# Patient Record
Sex: MALE | Race: WHITE | Employment: OTHER | URBAN - METROPOLITAN AREA
[De-identification: names, ages, dates, MRNs, and addresses within clinical notes are randomized per-mention and may not be internally consistent; named-entity substitution may affect disease eponyms.]

---

## 2017-01-01 ENCOUNTER — HOSPITAL ENCOUNTER (OUTPATIENT)
Dept: OTHER | Age: 52
Discharge: HOME | End: 2017-01-31
Attending: INTERNAL MEDICINE | Admitting: INTERNAL MEDICINE

## 2017-01-03 ENCOUNTER — HOSPITAL ENCOUNTER (OUTPATIENT)
Dept: CARDIAC REHAB | Age: 52
Discharge: HOME | End: 2017-01-03
Admitting: INTERNAL MEDICINE

## 2017-01-03 VITALS — WEIGHT: 258 LBS | BODY MASS INDEX: 37.02 KG/M2

## 2017-01-05 ENCOUNTER — HOSPITAL ENCOUNTER (OUTPATIENT)
Dept: CARDIAC REHAB | Age: 52
Discharge: HOME | End: 2017-01-05
Admitting: INTERNAL MEDICINE

## 2017-01-05 VITALS — WEIGHT: 258.5 LBS | BODY MASS INDEX: 37.09 KG/M2

## 2017-01-10 ENCOUNTER — HOSPITAL ENCOUNTER (OUTPATIENT)
Dept: CARDIAC REHAB | Age: 52
Discharge: HOME | End: 2017-01-10
Admitting: INTERNAL MEDICINE

## 2017-01-10 PROCEDURE — 94620 PR PULMONARY STRESS TESTING,SIMPLE: CPT | Performed by: INTERNAL MEDICINE

## 2017-01-12 ENCOUNTER — HOSPITAL ENCOUNTER (OUTPATIENT)
Dept: CARDIAC REHAB | Age: 52
Discharge: HOME | End: 2017-01-12
Admitting: INTERNAL MEDICINE

## 2017-01-12 VITALS — WEIGHT: 258 LBS | BODY MASS INDEX: 37.02 KG/M2

## 2017-01-20 ENCOUNTER — TELEPHONE (OUTPATIENT)
Dept: FAMILY MEDICINE CLINIC | Age: 52
End: 2017-01-20

## 2017-01-24 ENCOUNTER — TELEPHONE (OUTPATIENT)
Dept: PULMONOLOGY | Age: 52
End: 2017-01-24

## 2017-02-07 ENCOUNTER — TELEPHONE (OUTPATIENT)
Dept: PULMONOLOGY | Age: 52
End: 2017-02-07

## 2017-02-07 DIAGNOSIS — J40 BRONCHITIS: Primary | ICD-10-CM

## 2017-02-07 RX ORDER — PREDNISONE 10 MG/1
TABLET ORAL
Qty: 30 TABLET | Refills: 0 | Status: SHIPPED | OUTPATIENT
Start: 2017-02-07 | End: 2017-04-03 | Stop reason: ALTCHOICE

## 2017-02-07 RX ORDER — LEVOFLOXACIN 500 MG/1
500 TABLET, FILM COATED ORAL DAILY
Qty: 7 TABLET | Refills: 0 | Status: SHIPPED | OUTPATIENT
Start: 2017-02-07 | End: 2017-02-14

## 2017-04-03 ENCOUNTER — OFFICE VISIT (OUTPATIENT)
Dept: FAMILY MEDICINE CLINIC | Age: 52
End: 2017-04-03

## 2017-04-03 VITALS
DIASTOLIC BLOOD PRESSURE: 66 MMHG | SYSTOLIC BLOOD PRESSURE: 102 MMHG | BODY MASS INDEX: 33.21 KG/M2 | HEART RATE: 103 BPM | TEMPERATURE: 98.4 F | OXYGEN SATURATION: 92 % | WEIGHT: 232 LBS | HEIGHT: 70 IN

## 2017-04-03 DIAGNOSIS — J02.9 SORE THROAT: Primary | ICD-10-CM

## 2017-04-03 LAB — S PYO AG THROAT QL: NORMAL

## 2017-04-03 PROCEDURE — 87880 STREP A ASSAY W/OPTIC: CPT | Performed by: FAMILY MEDICINE

## 2017-04-03 PROCEDURE — 99213 OFFICE O/P EST LOW 20 MIN: CPT | Performed by: FAMILY MEDICINE

## 2017-04-03 PROCEDURE — G8419 CALC BMI OUT NRM PARAM NOF/U: HCPCS | Performed by: FAMILY MEDICINE

## 2017-04-03 PROCEDURE — G8427 DOCREV CUR MEDS BY ELIG CLIN: HCPCS | Performed by: FAMILY MEDICINE

## 2017-04-03 PROCEDURE — 1036F TOBACCO NON-USER: CPT | Performed by: FAMILY MEDICINE

## 2017-04-03 ASSESSMENT — PATIENT HEALTH QUESTIONNAIRE - PHQ9
SUM OF ALL RESPONSES TO PHQ QUESTIONS 1-9: 0
SUM OF ALL RESPONSES TO PHQ9 QUESTIONS 1 & 2: 0
2. FEELING DOWN, DEPRESSED OR HOPELESS: 0
1. LITTLE INTEREST OR PLEASURE IN DOING THINGS: 0

## 2017-04-04 ENCOUNTER — TELEPHONE (OUTPATIENT)
Dept: PULMONOLOGY | Age: 52
End: 2017-04-04

## 2017-04-04 DIAGNOSIS — J40 BRONCHITIS: Primary | ICD-10-CM

## 2017-04-04 RX ORDER — DOXYCYCLINE HYCLATE 100 MG
100 TABLET ORAL 2 TIMES DAILY
Qty: 20 TABLET | Refills: 0 | Status: SHIPPED | OUTPATIENT
Start: 2017-04-04 | End: 2017-04-05 | Stop reason: SDUPTHER

## 2017-04-05 DIAGNOSIS — J40 BRONCHITIS: ICD-10-CM

## 2017-04-05 RX ORDER — DOXYCYCLINE HYCLATE 100 MG
100 TABLET ORAL 2 TIMES DAILY
Qty: 20 TABLET | Refills: 0 | Status: CANCELLED | OUTPATIENT
Start: 2017-04-05 | End: 2017-04-15

## 2017-04-05 RX ORDER — DOXYCYCLINE HYCLATE 100 MG
100 TABLET ORAL 2 TIMES DAILY
Qty: 20 TABLET | Refills: 0 | Status: SHIPPED | OUTPATIENT
Start: 2017-04-05 | End: 2017-04-15

## 2017-04-10 ENCOUNTER — OFFICE VISIT (OUTPATIENT)
Dept: PULMONOLOGY | Age: 52
End: 2017-04-10

## 2017-04-10 VITALS
WEIGHT: 230 LBS | HEART RATE: 76 BPM | DIASTOLIC BLOOD PRESSURE: 70 MMHG | SYSTOLIC BLOOD PRESSURE: 116 MMHG | BODY MASS INDEX: 32.93 KG/M2 | TEMPERATURE: 98 F | RESPIRATION RATE: 16 BRPM | OXYGEN SATURATION: 91 % | HEIGHT: 70 IN

## 2017-04-10 DIAGNOSIS — J96.11 CHRONIC RESPIRATORY FAILURE WITH HYPOXIA (HCC): ICD-10-CM

## 2017-04-10 DIAGNOSIS — G47.33 OSA TREATED WITH BIPAP: Primary | ICD-10-CM

## 2017-04-10 DIAGNOSIS — J44.9 COPD, VERY SEVERE (HCC): ICD-10-CM

## 2017-04-10 DIAGNOSIS — J84.9 ILD (INTERSTITIAL LUNG DISEASE) (HCC): ICD-10-CM

## 2017-04-10 DIAGNOSIS — J40 BRONCHITIS: ICD-10-CM

## 2017-04-10 PROCEDURE — G8427 DOCREV CUR MEDS BY ELIG CLIN: HCPCS | Performed by: INTERNAL MEDICINE

## 2017-04-10 PROCEDURE — 1036F TOBACCO NON-USER: CPT | Performed by: INTERNAL MEDICINE

## 2017-04-10 PROCEDURE — G8926 SPIRO NO PERF OR DOC: HCPCS | Performed by: INTERNAL MEDICINE

## 2017-04-10 PROCEDURE — 3023F SPIROM DOC REV: CPT | Performed by: INTERNAL MEDICINE

## 2017-04-10 PROCEDURE — G8417 CALC BMI ABV UP PARAM F/U: HCPCS | Performed by: INTERNAL MEDICINE

## 2017-04-10 PROCEDURE — 99214 OFFICE O/P EST MOD 30 MIN: CPT | Performed by: INTERNAL MEDICINE

## 2017-04-10 ASSESSMENT — SLEEP AND FATIGUE QUESTIONNAIRES
HOW LIKELY ARE YOU TO NOD OFF OR FALL ASLEEP WHILE SITTING AND READING: 0
HOW LIKELY ARE YOU TO NOD OFF OR FALL ASLEEP WHILE WATCHING TV: 0
HOW LIKELY ARE YOU TO NOD OFF OR FALL ASLEEP IN A CAR, WHILE STOPPED FOR A FEW MINUTES IN TRAFFIC: 0
HOW LIKELY ARE YOU TO NOD OFF OR FALL ASLEEP WHEN YOU ARE A PASSENGER IN A CAR FOR AN HOUR WITHOUT A BREAK: 0
HOW LIKELY ARE YOU TO NOD OFF OR FALL ASLEEP WHILE SITTING QUIETLY AFTER LUNCH WITHOUT ALCOHOL: 0
ESS TOTAL SCORE: 0
NECK CIRCUMFERENCE (INCHES): 19.5
HOW LIKELY ARE YOU TO NOD OFF OR FALL ASLEEP WHILE LYING DOWN TO REST IN THE AFTERNOON WHEN CIRCUMSTANCES PERMIT: 0
HOW LIKELY ARE YOU TO NOD OFF OR FALL ASLEEP WHILE SITTING AND TALKING TO SOMEONE: 0
HOW LIKELY ARE YOU TO NOD OFF OR FALL ASLEEP WHILE SITTING INACTIVE IN A PUBLIC PLACE: 0

## 2017-05-18 DIAGNOSIS — J44.9 COPD, VERY SEVERE (HCC): ICD-10-CM

## 2017-05-18 RX ORDER — AZELASTINE 1 MG/ML
SPRAY, METERED NASAL
Qty: 30 ML | Refills: 3 | Status: SHIPPED | OUTPATIENT
Start: 2017-05-18 | End: 2017-06-01 | Stop reason: SDUPTHER

## 2017-05-23 ENCOUNTER — EMPLOYEE WELLNESS (OUTPATIENT)
Dept: OTHER | Age: 52
End: 2017-05-23

## 2017-05-23 LAB
CHOLESTEROL, TOTAL: 176 MG/DL (ref 0–199)
GLUCOSE BLD-MCNC: 100 MG/DL (ref 70–99)
HDLC SERPL-MCNC: 26 MG/DL (ref 40–60)
LDL CHOLESTEROL CALCULATED: 98 MG/DL
TRIGL SERPL-MCNC: 258 MG/DL (ref 0–150)

## 2017-06-01 ENCOUNTER — OFFICE VISIT (OUTPATIENT)
Dept: PULMONOLOGY | Age: 52
End: 2017-06-01

## 2017-06-01 VITALS
HEART RATE: 92 BPM | RESPIRATION RATE: 16 BRPM | SYSTOLIC BLOOD PRESSURE: 124 MMHG | OXYGEN SATURATION: 90 % | HEIGHT: 70 IN | BODY MASS INDEX: 33.5 KG/M2 | TEMPERATURE: 98.1 F | WEIGHT: 234 LBS | DIASTOLIC BLOOD PRESSURE: 80 MMHG

## 2017-06-01 DIAGNOSIS — J44.9 COPD, VERY SEVERE (HCC): ICD-10-CM

## 2017-06-01 DIAGNOSIS — G47.33 OSA TREATED WITH BIPAP: Primary | ICD-10-CM

## 2017-06-01 DIAGNOSIS — J96.11 CHRONIC RESPIRATORY FAILURE WITH HYPOXIA (HCC): ICD-10-CM

## 2017-06-01 DIAGNOSIS — J84.9 ILD (INTERSTITIAL LUNG DISEASE) (HCC): ICD-10-CM

## 2017-06-01 PROCEDURE — 3023F SPIROM DOC REV: CPT | Performed by: INTERNAL MEDICINE

## 2017-06-01 PROCEDURE — G8427 DOCREV CUR MEDS BY ELIG CLIN: HCPCS | Performed by: INTERNAL MEDICINE

## 2017-06-01 PROCEDURE — 99214 OFFICE O/P EST MOD 30 MIN: CPT | Performed by: INTERNAL MEDICINE

## 2017-06-01 PROCEDURE — 1036F TOBACCO NON-USER: CPT | Performed by: INTERNAL MEDICINE

## 2017-06-01 PROCEDURE — G8417 CALC BMI ABV UP PARAM F/U: HCPCS | Performed by: INTERNAL MEDICINE

## 2017-06-01 PROCEDURE — G8926 SPIRO NO PERF OR DOC: HCPCS | Performed by: INTERNAL MEDICINE

## 2017-06-01 ASSESSMENT — SLEEP AND FATIGUE QUESTIONNAIRES
HOW LIKELY ARE YOU TO NOD OFF OR FALL ASLEEP WHILE WATCHING TV: 0
HOW LIKELY ARE YOU TO NOD OFF OR FALL ASLEEP WHILE SITTING INACTIVE IN A PUBLIC PLACE: 0
ESS TOTAL SCORE: 0
HOW LIKELY ARE YOU TO NOD OFF OR FALL ASLEEP IN A CAR, WHILE STOPPED FOR A FEW MINUTES IN TRAFFIC: 0
NECK CIRCUMFERENCE (INCHES): 19.5
HOW LIKELY ARE YOU TO NOD OFF OR FALL ASLEEP WHILE SITTING AND TALKING TO SOMEONE: 0
HOW LIKELY ARE YOU TO NOD OFF OR FALL ASLEEP WHILE LYING DOWN TO REST IN THE AFTERNOON WHEN CIRCUMSTANCES PERMIT: 0
HOW LIKELY ARE YOU TO NOD OFF OR FALL ASLEEP WHEN YOU ARE A PASSENGER IN A CAR FOR AN HOUR WITHOUT A BREAK: 0
HOW LIKELY ARE YOU TO NOD OFF OR FALL ASLEEP WHILE SITTING AND READING: 0
HOW LIKELY ARE YOU TO NOD OFF OR FALL ASLEEP WHILE SITTING QUIETLY AFTER LUNCH WITHOUT ALCOHOL: 0

## 2017-07-24 ENCOUNTER — OFFICE VISIT (OUTPATIENT)
Dept: FAMILY MEDICINE CLINIC | Age: 52
End: 2017-07-24

## 2017-07-24 ENCOUNTER — TELEPHONE (OUTPATIENT)
Dept: CARDIOLOGY CLINIC | Age: 52
End: 2017-07-24

## 2017-07-24 VITALS
DIASTOLIC BLOOD PRESSURE: 84 MMHG | WEIGHT: 240 LBS | BODY MASS INDEX: 34.36 KG/M2 | HEIGHT: 70 IN | HEART RATE: 80 BPM | SYSTOLIC BLOOD PRESSURE: 132 MMHG

## 2017-07-24 DIAGNOSIS — I20.0 UNSTABLE ANGINA (HCC): Primary | ICD-10-CM

## 2017-07-24 PROCEDURE — G8599 NO ASA/ANTIPLAT THER USE RNG: HCPCS | Performed by: FAMILY MEDICINE

## 2017-07-24 PROCEDURE — G8417 CALC BMI ABV UP PARAM F/U: HCPCS | Performed by: FAMILY MEDICINE

## 2017-07-24 PROCEDURE — 1036F TOBACCO NON-USER: CPT | Performed by: FAMILY MEDICINE

## 2017-07-24 PROCEDURE — 99214 OFFICE O/P EST MOD 30 MIN: CPT | Performed by: FAMILY MEDICINE

## 2017-07-24 PROCEDURE — G8427 DOCREV CUR MEDS BY ELIG CLIN: HCPCS | Performed by: FAMILY MEDICINE

## 2017-07-25 PROBLEM — G47.33 OSA TREATED WITH BIPAP: Status: ACTIVE | Noted: 2017-07-25

## 2017-07-27 ENCOUNTER — CARE COORDINATION (OUTPATIENT)
Dept: CASE MANAGEMENT | Age: 52
End: 2017-07-27

## 2017-07-28 ENCOUNTER — TELEPHONE (OUTPATIENT)
Dept: FAMILY MEDICINE CLINIC | Age: 52
End: 2017-07-28

## 2017-08-10 ENCOUNTER — TELEPHONE (OUTPATIENT)
Dept: PHARMACY | Facility: CLINIC | Age: 52
End: 2017-08-10

## 2017-08-11 RX ORDER — OMEPRAZOLE 20 MG/1
20 CAPSULE, DELAYED RELEASE ORAL 2 TIMES DAILY
COMMUNITY
End: 2018-04-04 | Stop reason: SDUPTHER

## 2017-08-18 ENCOUNTER — OFFICE VISIT (OUTPATIENT)
Dept: CARDIOLOGY CLINIC | Age: 52
End: 2017-08-18

## 2017-08-18 VITALS
WEIGHT: 248 LBS | DIASTOLIC BLOOD PRESSURE: 78 MMHG | OXYGEN SATURATION: 92 % | HEART RATE: 88 BPM | BODY MASS INDEX: 35.5 KG/M2 | HEIGHT: 70 IN | SYSTOLIC BLOOD PRESSURE: 118 MMHG

## 2017-08-18 DIAGNOSIS — J44.9 COPD, VERY SEVERE (HCC): ICD-10-CM

## 2017-08-18 DIAGNOSIS — I25.110 CORONARY ARTERY DISEASE INVOLVING NATIVE CORONARY ARTERY OF NATIVE HEART WITH UNSTABLE ANGINA PECTORIS (HCC): Primary | ICD-10-CM

## 2017-08-18 DIAGNOSIS — I10 ESSENTIAL HYPERTENSION: ICD-10-CM

## 2017-08-18 DIAGNOSIS — E78.2 MIXED HYPERLIPIDEMIA: ICD-10-CM

## 2017-08-18 PROCEDURE — 1111F DSCHRG MED/CURRENT MED MERGE: CPT | Performed by: INTERNAL MEDICINE

## 2017-08-18 PROCEDURE — G8427 DOCREV CUR MEDS BY ELIG CLIN: HCPCS | Performed by: INTERNAL MEDICINE

## 2017-08-18 PROCEDURE — G8417 CALC BMI ABV UP PARAM F/U: HCPCS | Performed by: INTERNAL MEDICINE

## 2017-08-18 PROCEDURE — 3023F SPIROM DOC REV: CPT | Performed by: INTERNAL MEDICINE

## 2017-08-18 PROCEDURE — 1036F TOBACCO NON-USER: CPT | Performed by: INTERNAL MEDICINE

## 2017-08-18 PROCEDURE — 99214 OFFICE O/P EST MOD 30 MIN: CPT | Performed by: INTERNAL MEDICINE

## 2017-08-18 PROCEDURE — G8926 SPIRO NO PERF OR DOC: HCPCS | Performed by: INTERNAL MEDICINE

## 2017-08-18 PROCEDURE — G8598 ASA/ANTIPLAT THER USED: HCPCS | Performed by: INTERNAL MEDICINE

## 2017-08-18 RX ORDER — ASPIRIN 81 MG/1
81 TABLET, CHEWABLE ORAL DAILY
Qty: 90 TABLET | Refills: 3 | Status: SHIPPED | OUTPATIENT
Start: 2017-08-18 | End: 2018-11-27 | Stop reason: SDUPTHER

## 2017-08-18 RX ORDER — CLOPIDOGREL BISULFATE 75 MG/1
75 TABLET ORAL DAILY
Qty: 90 TABLET | Refills: 3 | Status: SHIPPED | OUTPATIENT
Start: 2017-08-18 | End: 2018-11-27 | Stop reason: SDUPTHER

## 2017-08-21 DIAGNOSIS — I10 ESSENTIAL HYPERTENSION: ICD-10-CM

## 2017-08-21 RX ORDER — LISINOPRIL 20 MG/1
TABLET ORAL
Qty: 90 TABLET | Refills: 3 | Status: SHIPPED | OUTPATIENT
Start: 2017-08-21 | End: 2018-11-27 | Stop reason: SDUPTHER

## 2017-08-21 RX ORDER — SIMVASTATIN 40 MG
TABLET ORAL
Qty: 30 TABLET | Refills: 0 | Status: SHIPPED | OUTPATIENT
Start: 2017-08-21 | End: 2017-12-07 | Stop reason: SDUPTHER

## 2017-08-29 ENCOUNTER — OFFICE VISIT (OUTPATIENT)
Dept: FAMILY MEDICINE CLINIC | Age: 52
End: 2017-08-29

## 2017-08-29 VITALS
OXYGEN SATURATION: 92 % | SYSTOLIC BLOOD PRESSURE: 126 MMHG | HEIGHT: 70 IN | DIASTOLIC BLOOD PRESSURE: 66 MMHG | WEIGHT: 251 LBS | HEART RATE: 90 BPM | TEMPERATURE: 98.1 F | RESPIRATION RATE: 20 BRPM | BODY MASS INDEX: 35.93 KG/M2

## 2017-08-29 DIAGNOSIS — E78.5 DYSLIPIDEMIA: ICD-10-CM

## 2017-08-29 DIAGNOSIS — J44.9 COPD, VERY SEVERE (HCC): ICD-10-CM

## 2017-08-29 DIAGNOSIS — I10 ESSENTIAL HYPERTENSION: ICD-10-CM

## 2017-08-29 DIAGNOSIS — Z98.61 CAD S/P PERCUTANEOUS CORONARY ANGIOPLASTY: ICD-10-CM

## 2017-08-29 DIAGNOSIS — G47.33 OSA ON CPAP: ICD-10-CM

## 2017-08-29 DIAGNOSIS — R73.03 PREDIABETES: ICD-10-CM

## 2017-08-29 DIAGNOSIS — Z99.89 OSA ON CPAP: ICD-10-CM

## 2017-08-29 DIAGNOSIS — I25.10 CAD S/P PERCUTANEOUS CORONARY ANGIOPLASTY: ICD-10-CM

## 2017-08-29 DIAGNOSIS — J84.9 ILD (INTERSTITIAL LUNG DISEASE) (HCC): Primary | ICD-10-CM

## 2017-08-29 DIAGNOSIS — M31.0 GOODPASTURE SYNDROME (HCC): ICD-10-CM

## 2017-08-29 LAB — HBA1C MFR BLD: 6.2 %

## 2017-08-29 PROCEDURE — G8427 DOCREV CUR MEDS BY ELIG CLIN: HCPCS | Performed by: FAMILY MEDICINE

## 2017-08-29 PROCEDURE — G8598 ASA/ANTIPLAT THER USED: HCPCS | Performed by: FAMILY MEDICINE

## 2017-08-29 PROCEDURE — 83036 HEMOGLOBIN GLYCOSYLATED A1C: CPT | Performed by: FAMILY MEDICINE

## 2017-08-29 PROCEDURE — 99215 OFFICE O/P EST HI 40 MIN: CPT | Performed by: FAMILY MEDICINE

## 2017-08-29 PROCEDURE — G8926 SPIRO NO PERF OR DOC: HCPCS | Performed by: FAMILY MEDICINE

## 2017-08-29 PROCEDURE — 1036F TOBACCO NON-USER: CPT | Performed by: FAMILY MEDICINE

## 2017-08-29 PROCEDURE — G8417 CALC BMI ABV UP PARAM F/U: HCPCS | Performed by: FAMILY MEDICINE

## 2017-08-29 PROCEDURE — 3023F SPIROM DOC REV: CPT | Performed by: FAMILY MEDICINE

## 2017-08-29 RX ORDER — CARVEDILOL 6.25 MG/1
6.25 TABLET ORAL 2 TIMES DAILY
Qty: 60 TABLET | Refills: 0 | Status: SHIPPED | OUTPATIENT
Start: 2017-08-29 | End: 2018-03-12 | Stop reason: CLARIF

## 2017-08-29 RX ORDER — AMLODIPINE BESYLATE 5 MG/1
5 TABLET ORAL DAILY
Qty: 30 TABLET | Refills: 3
Start: 2017-08-29 | End: 2017-09-11 | Stop reason: ALTCHOICE

## 2017-08-29 RX ORDER — CARVEDILOL 6.25 MG/1
6.25 TABLET ORAL 2 TIMES DAILY
Qty: 60 TABLET | Refills: 0 | Status: SHIPPED | OUTPATIENT
Start: 2017-08-29 | End: 2017-08-29 | Stop reason: SDUPTHER

## 2017-08-30 ENCOUNTER — TELEPHONE (OUTPATIENT)
Dept: FAMILY MEDICINE CLINIC | Age: 52
End: 2017-08-30

## 2017-08-30 DIAGNOSIS — R06.09 DOE (DYSPNEA ON EXERTION): Primary | ICD-10-CM

## 2017-09-07 ENCOUNTER — HOSPITAL ENCOUNTER (OUTPATIENT)
Dept: CARDIAC REHAB | Age: 52
Discharge: HOME | End: 2017-09-30
Admitting: INTERNAL MEDICINE

## 2017-09-07 NOTE — PROGRESS NOTES
2017         []  Valve Replacement    []  Arrhythmia    []  CHF   Last Admission:   [] Pacemaker        []  ICD   []  Other     Ejection fraction   60% on echo in 2016      Physical Assessment alert and oriented 54year old male in no acute distress     General Appearance   Color: [x]  Natural [] Pale ( ) Cyanotic []  Flushed [] Jaundice   Skin Integrity: Intact    Incision(s) where: none   Hx of infection at incision(s) site [x] No  [] Yes      Cardiovascular Assessment/ Vital Signs    Heart rate: 70   Heart sounds: regular with good volume S1 S2   Height: 5'10''  Weight: 255.0    Resp rate: 20   Lungs sounds: clear with decreased breath sounds in all fields     Dyspnea  []  no  [x] yes       [x]  Sleep Apnea    [x]  CPAP     [x]  Oxygen 2 liters at night and with exercise       Sleeping Habits:  7-8 hours     # of Pillows: 1-2   # of times up at night: 0-1    BP  R arm sittin/80   L arm sittin/78    Peripheral pulses  []  no [x] yes    Edema [x] no [] yes  Location:      Fatigue  [x] no  [] yes    Numbness/tingling [x]  no   []  yes       Orthopedic/Exercise Limitations none     Pain Assessment   Rate on 1 to 10 scale      [x]  No complaints of pain at this time                    [] Angina/chest pain Rates:    Relief with:       []  Incisional Pain Rates:      Relief with:        []  Muscle / Joint / Arthritic    Rates:     Relief with:         []  Leg Pain     Rates:    Relief with:         []  Other        Fall Risk Assessment: Falls in the last 3 months  [] yes [x]  no     []  Alzheimers Disease [] Cognitive Impairment      [] Balance/Gait Disturbance  []  Fall History      []  Visual impairment uncorrected []  Dizziness []  Uses a cane or walker    []  Other     [x]  Fall safety issues reviewed    Physical/behavioral signs of abuse/neglect  [x] no    []  yes      Do you feel safe at home   []  no    [x]  yes    Advanced Directives        [x] no   []  yes   []  Pt given Advanced Directive pack            Individual Cardiac Treatment Plan EXERCISE  EXERCISE  ASSESSMENT/PLAN EXERCISE  REASSESSMENT  30 day EXERCISE   REASSESSMENT  60 day EXERCISE  REASSESSMENT  90 day EXERCISE  DISCHARGE/FOLLOW-UP   DATE: 9/7/2017 DATE:  DATE:  DATE:  DATE:    EXERCISE ASSESSMENT EXERCISE ASSESSMENT EXERCISE ASSESSMENT EXERCISE  ASSESSMENT EXERCISE REASSESSMENT   6 Min Walk Test  Distance walked: 842 feet   1.5 mph  METs: 2.2  Max HR:100 BPM      RPE:  12    Rhythm: SR      6 Min. Walk Test  Distance walked:       feet  Mets:  Max. HR.      BPM  RPE:   Rhythm:  % changed:     Fall Risk/Other  Fall risk assessed (x)yes   Issue:none   Orthopedic problems ()yes (x)no  Walker () Cane()   Safety issues reviewed  (x)yes   If patient has balance or orthopedic issue, action:      EXERCISE PLAN Exercise Plan EXERCISE PLAN EXERCISE PLAN EXERCISE PLAN   Interventions Interventions Interventions Interventions Interventions   Exercise Prescription/Order   Exercise Prescription/Order Exercise Prescription/order Exercise   Prescription/Order Discharge Exercise Plan                Mode       1. TM at 1.0-1.2 mph for 10 min at  1.5 mets    2. NS at 1/40  for 15-20 min at 1.8 mets    3. UBE at 0-5 pillai  for 10 min   Mode      TM  NS  Ellipt/Arc  Bike  UBE  Scifit     Mode      TM  NS  Ellipt/Arc  Bike  UBE  Scifit     Mode      TM  NS  Ellipt/Arc  Bike  UBE  Scifit   Continue independent exercise [] Y    Continue in Phase IV [] Y    Aerobic Mode:     Frequency: 2-3  Week  Duration: 15-30 min  Intensity:   RPE 11-12     Frequency:   3 x week   Duration: 20-30 min  Intensity:   THR ___or RPE 11-13     Frequency: 3 x week  Duration: 20-40 min. Intensity:   THR ___ or RPE 11-14     Frequency: 3 x week  Duration: 30-45 min  Intensity:  THR ___or RPE 11-14 Frequency:      Exercise 3-5 days per week. [] yes[] no  []   30+ min.  Of exercise per session    [] yes [] no     Progression:  Depending on patient condition, time and intensity will be increased. An initial met level< 3 is classified as \"light\". Progression to \"moderate\" 3-6 mets or higher for patients in better physical condition. Resistance Training  [x] yes  [] no         Max. Met level:    Session #:    Resistance Training  [] yes  [] no  Type:    Symptoms with Exercise[] yes [] no Max. Met Level:    Session#:    Resistance Training  [] yes [] no  Type:    Symptoms with Exercise[] yes[] no   Max. Met. Level:    Session #:    Resistance Training  [] yes [] no  Type:    Symptoms with Exercise [] yes [] no Max. Met level:    Sessions#:    Home Resistance Training [] Y[] N  Type:   Home Exercise Plan and Goals  Jensen plans:  Exercise (x)yes ()no  Frequency:2-3 days per week   Duration:30-60 minutes   Mode:walking in doors, mall or store     Discharge Goal:  30+ min. Of aerobic exercise 3-5 days/week       Home Exercise Goal  Reassessed  Exercising [] yes[] no  Frequency:  Duration:  Mode:         Home Exercise Goal Reassessed  Exercising [] yes [] no  Frequency:  Duration:  Mode:       Home Exercise Goal Reassessed  Exercising [] yes [] no  Frequency:  Duration:  Mode:           See above plan   Education Plan Education Plan Education Plan Education Plan Education Completed   Orientation to:  [x] Y [] N Rehab/Routine  [x] Y[] N RPE Scale  [x] Y [] N Exercise Safety  [x] Y [] N   S/S to Report  [x] Y [] N Infection Control      Understand/Review  [] Y [] N RPE Scale  [] Y [] N Exercise Safety  [] Y [] N Equipment Orientation  [] Y [] N S/S to Report  [] Y [] N Warm Up/Cool Down      Attends Ed Class:  []  Benefits of Exercise   []   Resistance Training 101  []   Benefits of Cardiac Rehab    [] Patient is competent with stretches/equipment  []  Patient continues to need assist with equipment/routine        Attends Ed. Class  []  Benefits of Exercise  []   Resistance Training 101  []  Benefits of Cardiac Rehab                                Attends Ed.  Class:  []   Benefits of Exercise   [] Assessment Tool:  Rate your plate survey  [id number]=02/06  Score of 55-69 is excellent. Diet Assessment Tool:  Rate your plate survey  Score:    /69  Score of 55-69 is excellent. NUTRITION PLAN NUTRITION PLAN NUTRITION PLAN NUTRITION PLAN NUTRITION PLAN   *Interventions* *Interventions* *Interventions* *Interventions* *Interventions*   Professional Referral  Please check if needed. [] Dietician Consult    [] Diabetes Referral Professional Referral   []  Seen by dietician for   Consult/referral   []  Diabetes referral  [] Seen by dietician for consult/ referral   []  Seen for Diabetes Referral   Has patient completed consult if needed? [] Y [] N Met with dietician   [] Y  [] N Met with diabetes educator   Nutrition Education Nutrition Education Nutrition Education Nutrition Education Nutrition Education    [x] Individual Nutrition Counseling by staff/dietician and / or nutrition education series   []  Individual Nutrition Counseling   []  Diabetic education if needed    Education Classes by dietician  1. []  Nutrients and Portion control  2. [] Fats & Fiber  3. []  Sodium, Dash & Mediterranean Diet               Education Classes by dietician  1. [] Nutrients & Portion Control  2. [] Fats & Fibers  3. []  Sodium, Dash and Mediterranean Diet           Education Classes by Dietician  1. [] Nutrients & Portion Control  2. [] Fat & Fibers  3. [] Sodium, Dash and Mediterranean Diet   Patient has knowledge of:   [] Y  [] N Heart Healthy diet   [] Y [] N Nutritional guidelines    [] Y  [] N Diabetic diet      Goals Goals Reassessed Goals Reassessed Goals Reassessed Goals   Initial Nutritional Goals Set (x)Yes  ()No Previous Nutritional Goals Met?  ()Yes  ()No Previous Nutritional Goals Met?  ()Yes  ()No Previous Nutritional Goals Met?  ()Yes  ()No Previous Nutritional Goals Met?  ()Yes  ()No   Jensen's nutritional goals are as follows: Individual goals     1.  Decrease intake of red meat from 3-4 times per week to 1 time per week     2. Decrease intake of soda from a 2 liter per day to one 12 ounce can    3. Weight loss to a goal of 235 pounds       Long term:  1. Improved Rate your plate score  2. Improved glucose control Review goals/ Revised:             Review goals/Revised:             Review goals/Revised:                   Long Term:  1. Improved Rate your plate score  [] yes  2. Improved glucose control  [] yes   Individual Cardiac Treatment Plan  Psychosocial  PSYCHOSOCIAL  ASSESSMENT/PLAN PSYCHOSOCIAL  REASSESSMENT PSYCHOSOCIAL   REASSESSMENT PSYCHOSOCIAL   REASSESSMENT PSYCHOSOCIAL  DISCHARGE/FOLLOW-UP   PSYCHOSOCIAL ASSESSMENT PSYCHOSOCIAL REASSESSMENT PSYCHOSOCIAL REASSESSMENT PSYCHOSOCIAL REASSESSMENT PSYCHOSOCIAL REASSESSMENT   Behavioral Outcomes Behavioral Outcomes Behavioral Outcomes Behavioral Outcomes Behavioral Outcomes   Tool Used: Lili and Jo Score:  Overall score: 21.50  Psych/Spiritial: 23.21     PHQ-9 score 4  Score 10 or > signifies moderate or > depression. Depression:  Overall score  Psych/Spiritial     PHQ-9 score:    Does patient have Family Support? [x] Yes   [] No   [] Lives alone [x]  Lives with spouse       PSYCHOSOCIAL PLAN PSYCHOSOCIAL PLAN PSYCHOSOCIAL PLAN PSYCHOSOCIAL PLAN  PSYCHOSOCIAL PLAN   Interventions Interventions Interventions Interventions Interventions    [] Physician notified of PHQ-9 score of 10 or > per protocol, as signifies moderate or > depression           PHQ-9 score:    []  Improvement   []  Unchanged   []  Notify PCP if score is worse   Is patient currently taking anti-depressant or anti-anxiety medications? [] Yes   [x] No  Current depression tx: NA Current depression tx:   []  N/A Current depression tx   [] N/A: Current depression tx:   []  N/A  []  Patient has plan/treatment for anxiety/depression.    Education Education Education Education Education   Individual discussion/education of:   [x] Stress management skills   [x] Relaxation Techniques [x] Coping Techniques Check if completed:   [] Attends Emotional Wellness class  ()Voices method of coping/ relaxation technique   Check if completed:   [] Attends Emotional wellness class   [] Voices method of coping/ relaxation technique   Check if completed:   [] Attends Emotional wellness class   [] Voices method of coping/ relaxation technique      Goals    Goals*   Initial Psychosocial Goals Set [] Yes   [x] No    Previous Psychosocial Goals Met  [] Yes   [] No   Jensen's psychosocial goals are as follows:    Denies s/s of depression and declines to set goals     Improved PHQ9 score  Improved Mental Health  Score               [] Improved PHQ9 score   [] Improved Mental Health score     Individual Cardiac Treatment Plan  Other:  Risk Factor/Education  CORE MEASURE  ASSESSMENT/PLAN CORE MEASURE  [facility]  DISCHARGE/FOLLOW-UP   [facility]  Discharge   Hypertension   [x]Yes []No  Resting BP: 128/78  Peak Ex BP:146/82   See medication list.   Hypertension    Resting BP:   Peak Ex BP:  Medication Changes:  []Yes []No   Hypertension    Resting BP:   Peak Ex BP:  Medication Changes:  []Yes []No     Hypertension    Resting BP:   Peak Ex BP:  Medication Changes:  []Yes []No    Hypertension    Resting BP:   Peak Ex BP:  Medication Changes:  []Yes []No     Tobacco Use  []Current [x]Former []Never    Years smoked: 25 years     Date Quit: 2005  Quit date set: []Yes [x]No  Date: NA  # cigarettes smoked/day: 3 packs per day   Smokeless Tobacco use:   []Yes  [x]No  Amount: NA Tobacco Use  Change in smoking status           Quit date:    Tobacco Use  Change in smoking status           Quit date:    Tobacco Use  Change in smoking status           Quit date:     Tobacco Use  Change in smoking status           Quit date:      Heart Health Knowledge   Test Score: 11 /15        Heart Health Knowledge   Test Score:    /15        Learning Barriers  Please select one:  []Speech  []Literacy  [] Hearing  []Cognitive  [] Vision  [x]Ready to Learn Learning Barriers addressed:[] Y[] N  Modifications: Other Core Measures EDUCATION PLAN Other Core Measures EDUCATION PLAN Other Core Measures EDUCATION PLAN Other Core Measures EDUCATION PLAN Other Core Measure EDUCATION PLAN   Interventions Interventions Interventions Interventions Interventions   Cardiac Risk Factor Education Needed      [x]HTN              [] Attended Education Class on Risk Factors for Heart Disease  [] Home B/P monitoring  [] Dietary Sodium Restriction      []Attended Education Class on Risk Factors for Heart Disease  [] Home B/P monitoring  [] Dietary Sodium Restriction          []Attended Education Class on Risk Factors for Heart Disease    []Home B/P monitoring  [] Dietary Sodium Restriction  Jensen voices 1. Understanding of risks for heart disease and how to modify their risk. 2. Understanding of importance of restricting sodium in diet. []Y [x]N  Smoking Cessation counseling needed  []Y [x]N Pt verbalizes readiness to quit smoking?  []Y[x] () N Quit date set  []Y [x]N Cut down cigarettes   []One on one counseling on Tobacco Cessation  [] Attend Smoking Cessation classes    []Smoking Cessation Information given  []Smoking cessation medications used:   [] One on one counseling on Tobacco Cessation. [] Attend smoking cessation classes      []Smoking cessation medications used:   [] One on one counseling on Tobacco Cessation. [] Attend smoking cessation classes        []Smoking cessation medications used: Tobacco Cessation Counseling attended  []Yes  []No  If not completed, Why?      Core Measure Education Core Measure Education Core Measure Education Core Measure Education Education   [x] Cardiac Rehab Education booklet given  [x] Cardiac Rehab education class list given Attended Education

## 2017-09-11 ENCOUNTER — OFFICE VISIT (OUTPATIENT)
Dept: FAMILY MEDICINE CLINIC | Age: 52
End: 2017-09-11

## 2017-09-11 ENCOUNTER — OFFICE VISIT (OUTPATIENT)
Dept: PULMONOLOGY | Age: 52
End: 2017-09-11

## 2017-09-11 VITALS
TEMPERATURE: 98.2 F | RESPIRATION RATE: 16 BRPM | WEIGHT: 251 LBS | SYSTOLIC BLOOD PRESSURE: 128 MMHG | HEIGHT: 70 IN | DIASTOLIC BLOOD PRESSURE: 72 MMHG | HEART RATE: 85 BPM | OXYGEN SATURATION: 92 % | BODY MASS INDEX: 35.93 KG/M2

## 2017-09-11 VITALS
HEIGHT: 70 IN | BODY MASS INDEX: 35.93 KG/M2 | WEIGHT: 251 LBS | DIASTOLIC BLOOD PRESSURE: 66 MMHG | HEART RATE: 78 BPM | RESPIRATION RATE: 16 BRPM | OXYGEN SATURATION: 91 % | SYSTOLIC BLOOD PRESSURE: 100 MMHG

## 2017-09-11 DIAGNOSIS — I25.83 CORONARY ARTERY DISEASE DUE TO LIPID RICH PLAQUE: ICD-10-CM

## 2017-09-11 DIAGNOSIS — J84.9 ILD (INTERSTITIAL LUNG DISEASE) (HCC): ICD-10-CM

## 2017-09-11 DIAGNOSIS — E78.2 MIXED HYPERLIPIDEMIA: ICD-10-CM

## 2017-09-11 DIAGNOSIS — R06.02 SOB (SHORTNESS OF BREATH): ICD-10-CM

## 2017-09-11 DIAGNOSIS — I25.10 CORONARY ARTERY DISEASE DUE TO LIPID RICH PLAQUE: ICD-10-CM

## 2017-09-11 DIAGNOSIS — J96.11 CHRONIC RESPIRATORY FAILURE WITH HYPOXIA (HCC): ICD-10-CM

## 2017-09-11 DIAGNOSIS — I10 ESSENTIAL HYPERTENSION: Primary | ICD-10-CM

## 2017-09-11 DIAGNOSIS — J44.9 COPD, VERY SEVERE (HCC): ICD-10-CM

## 2017-09-11 DIAGNOSIS — G47.33 OSA TREATED WITH BIPAP: Primary | ICD-10-CM

## 2017-09-11 LAB
A/G RATIO: 1.4 (ref 1.1–2.2)
ALBUMIN SERPL-MCNC: 4.2 G/DL (ref 3.4–5)
ALP BLD-CCNC: 88 U/L (ref 40–129)
ALT SERPL-CCNC: 30 U/L (ref 10–40)
ANION GAP SERPL CALCULATED.3IONS-SCNC: 15 MMOL/L (ref 3–16)
AST SERPL-CCNC: 20 U/L (ref 15–37)
BILIRUB SERPL-MCNC: 0.7 MG/DL (ref 0–1)
BUN BLDV-MCNC: 21 MG/DL (ref 7–20)
CALCIUM SERPL-MCNC: 9.4 MG/DL (ref 8.3–10.6)
CHLORIDE BLD-SCNC: 103 MMOL/L (ref 99–110)
CHOLESTEROL, TOTAL: 137 MG/DL (ref 0–199)
CO2: 25 MMOL/L (ref 21–32)
CREAT SERPL-MCNC: 0.9 MG/DL (ref 0.9–1.3)
GFR AFRICAN AMERICAN: >60
GFR NON-AFRICAN AMERICAN: >60
GLOBULIN: 2.9 G/DL
GLUCOSE BLD-MCNC: 112 MG/DL (ref 70–99)
HDLC SERPL-MCNC: 28 MG/DL (ref 40–60)
LDL CHOLESTEROL CALCULATED: 84 MG/DL
POTASSIUM SERPL-SCNC: 4 MMOL/L (ref 3.5–5.1)
SODIUM BLD-SCNC: 143 MMOL/L (ref 136–145)
TOTAL PROTEIN: 7.1 G/DL (ref 6.4–8.2)
TRIGL SERPL-MCNC: 124 MG/DL (ref 0–150)
VLDLC SERPL CALC-MCNC: 25 MG/DL

## 2017-09-11 PROCEDURE — G8417 CALC BMI ABV UP PARAM F/U: HCPCS | Performed by: FAMILY MEDICINE

## 2017-09-11 PROCEDURE — 1036F TOBACCO NON-USER: CPT | Performed by: INTERNAL MEDICINE

## 2017-09-11 PROCEDURE — G8598 ASA/ANTIPLAT THER USED: HCPCS | Performed by: FAMILY MEDICINE

## 2017-09-11 PROCEDURE — 99214 OFFICE O/P EST MOD 30 MIN: CPT | Performed by: INTERNAL MEDICINE

## 2017-09-11 PROCEDURE — 3023F SPIROM DOC REV: CPT | Performed by: INTERNAL MEDICINE

## 2017-09-11 PROCEDURE — G8427 DOCREV CUR MEDS BY ELIG CLIN: HCPCS | Performed by: FAMILY MEDICINE

## 2017-09-11 PROCEDURE — G8926 SPIRO NO PERF OR DOC: HCPCS | Performed by: INTERNAL MEDICINE

## 2017-09-11 PROCEDURE — G8598 ASA/ANTIPLAT THER USED: HCPCS | Performed by: INTERNAL MEDICINE

## 2017-09-11 PROCEDURE — 99213 OFFICE O/P EST LOW 20 MIN: CPT | Performed by: FAMILY MEDICINE

## 2017-09-11 PROCEDURE — 1036F TOBACCO NON-USER: CPT | Performed by: FAMILY MEDICINE

## 2017-09-11 PROCEDURE — G8427 DOCREV CUR MEDS BY ELIG CLIN: HCPCS | Performed by: INTERNAL MEDICINE

## 2017-09-11 PROCEDURE — G8417 CALC BMI ABV UP PARAM F/U: HCPCS | Performed by: INTERNAL MEDICINE

## 2017-09-12 ENCOUNTER — HOSPITAL ENCOUNTER (OUTPATIENT)
Dept: NON INVASIVE DIAGNOSTICS | Age: 52
Discharge: HOME | End: 2017-09-12
Admitting: FAMILY MEDICINE

## 2017-09-12 ENCOUNTER — TELEPHONE (OUTPATIENT)
Dept: FAMILY MEDICINE CLINIC | Age: 52
End: 2017-09-12

## 2017-09-12 DIAGNOSIS — R06.09 OTHER FORMS OF DYSPNEA: ICD-10-CM

## 2017-09-12 LAB
LV EF: 67 %
LVEF MODALITY: NORMAL

## 2017-09-15 ENCOUNTER — TELEPHONE (OUTPATIENT)
Dept: FAMILY MEDICINE CLINIC | Age: 52
End: 2017-09-15

## 2017-09-15 ENCOUNTER — TELEPHONE (OUTPATIENT)
Dept: PULMONOLOGY | Age: 52
End: 2017-09-15

## 2017-09-18 ENCOUNTER — HOSPITAL ENCOUNTER (OUTPATIENT)
Dept: CT IMAGING | Age: 52
Discharge: HOME | End: 2017-09-18
Attending: INTERNAL MEDICINE | Admitting: INTERNAL MEDICINE

## 2017-09-18 DIAGNOSIS — J84.9 ILD (INTERSTITIAL LUNG DISEASE) (HCC): ICD-10-CM

## 2017-09-18 DIAGNOSIS — J84.9 INTERSTITIAL PULMONARY DISEASE (HCC): ICD-10-CM

## 2017-09-18 DIAGNOSIS — J44.1 COPD EXACERBATION (HCC): Primary | ICD-10-CM

## 2017-09-18 RX ORDER — PREDNISONE 10 MG/1
TABLET ORAL
Qty: 30 TABLET | Refills: 0 | Status: SHIPPED | OUTPATIENT
Start: 2017-09-18 | End: 2017-12-11 | Stop reason: ALTCHOICE

## 2017-09-20 ENCOUNTER — TELEPHONE (OUTPATIENT)
Dept: CARDIOLOGY CLINIC | Age: 52
End: 2017-09-20

## 2017-09-27 ENCOUNTER — OFFICE VISIT (OUTPATIENT)
Dept: PULMONOLOGY | Age: 52
End: 2017-09-27

## 2017-09-27 VITALS
RESPIRATION RATE: 20 BRPM | DIASTOLIC BLOOD PRESSURE: 80 MMHG | WEIGHT: 256 LBS | HEIGHT: 70 IN | HEART RATE: 72 BPM | TEMPERATURE: 97.9 F | SYSTOLIC BLOOD PRESSURE: 120 MMHG | BODY MASS INDEX: 36.65 KG/M2 | OXYGEN SATURATION: 91 %

## 2017-09-27 DIAGNOSIS — Z23 NEED FOR INFLUENZA VACCINATION: ICD-10-CM

## 2017-09-27 DIAGNOSIS — J96.11 CHRONIC RESPIRATORY FAILURE WITH HYPOXIA (HCC): ICD-10-CM

## 2017-09-27 DIAGNOSIS — R06.02 SOB (SHORTNESS OF BREATH): ICD-10-CM

## 2017-09-27 DIAGNOSIS — J84.9 ILD (INTERSTITIAL LUNG DISEASE) (HCC): Primary | ICD-10-CM

## 2017-09-27 DIAGNOSIS — J44.9 COPD, VERY SEVERE (HCC): ICD-10-CM

## 2017-09-27 PROCEDURE — G8926 SPIRO NO PERF OR DOC: HCPCS | Performed by: INTERNAL MEDICINE

## 2017-09-27 PROCEDURE — 90471 IMMUNIZATION ADMIN: CPT | Performed by: INTERNAL MEDICINE

## 2017-09-27 PROCEDURE — 90688 IIV4 VACCINE SPLT 0.5 ML IM: CPT | Performed by: INTERNAL MEDICINE

## 2017-09-27 PROCEDURE — G8598 ASA/ANTIPLAT THER USED: HCPCS | Performed by: INTERNAL MEDICINE

## 2017-09-27 PROCEDURE — G8417 CALC BMI ABV UP PARAM F/U: HCPCS | Performed by: INTERNAL MEDICINE

## 2017-09-27 PROCEDURE — 99215 OFFICE O/P EST HI 40 MIN: CPT | Performed by: INTERNAL MEDICINE

## 2017-09-27 PROCEDURE — 3023F SPIROM DOC REV: CPT | Performed by: INTERNAL MEDICINE

## 2017-09-27 PROCEDURE — 1036F TOBACCO NON-USER: CPT | Performed by: INTERNAL MEDICINE

## 2017-09-27 PROCEDURE — G8427 DOCREV CUR MEDS BY ELIG CLIN: HCPCS | Performed by: INTERNAL MEDICINE

## 2017-09-27 RX ORDER — LORAZEPAM 0.5 MG/1
0.5 TABLET ORAL EVERY 6 HOURS PRN
COMMUNITY
End: 2018-01-24 | Stop reason: SDUPTHER

## 2017-10-03 ENCOUNTER — TELEPHONE (OUTPATIENT)
Dept: PULMONOLOGY | Age: 52
End: 2017-10-03

## 2017-10-03 DIAGNOSIS — J44.1 COPD EXACERBATION (HCC): Primary | ICD-10-CM

## 2017-10-03 RX ORDER — LEVOFLOXACIN 500 MG/1
500 TABLET, FILM COATED ORAL DAILY
Qty: 7 TABLET | Refills: 0 | Status: SHIPPED | OUTPATIENT
Start: 2017-10-03 | End: 2017-10-10

## 2017-10-06 ENCOUNTER — HOSPITAL ENCOUNTER (OUTPATIENT)
Dept: NON INVASIVE DIAGNOSTICS | Age: 52
Discharge: HOME | End: 2017-10-06
Attending: INTERNAL MEDICINE | Admitting: INTERNAL MEDICINE

## 2017-10-06 DIAGNOSIS — R06.02 SOB (SHORTNESS OF BREATH): ICD-10-CM

## 2017-10-06 DIAGNOSIS — R06.02 SHORTNESS OF BREATH: ICD-10-CM

## 2017-10-06 LAB
BASE EXCESS ARTERIAL: 1.2 MMOL/L (ref -3–3)
CARBOXYHEMOGLOBIN ARTERIAL: 1.3 % (ref 0–1.5)
HCO3 ARTERIAL: 24.9 MMOL/L (ref 21–29)
HEMOGLOBIN, ART, EXTENDED: 18.8 G/DL (ref 13.5–17.5)
LV EF: 58 %
LVEF MODALITY: NORMAL
METHEMOGLOBIN ARTERIAL: 0.8 %
O2 CONTENT ARTERIAL: 24 ML/DL
O2 SAT, ARTERIAL: 91.7 %
O2 THERAPY: ABNORMAL
PCO2 ARTERIAL: 38.5 MMHG (ref 35–45)
PH ARTERIAL: 7.43 (ref 7.35–7.45)
PO2 ARTERIAL: 60.7 MMHG (ref 75–108)
TCO2 ARTERIAL: 26.1 MMOL/L

## 2017-10-06 PROCEDURE — 94727 GAS DIL/WSHOT DETER LNG VOL: CPT | Performed by: INTERNAL MEDICINE

## 2017-10-06 PROCEDURE — 94060 EVALUATION OF WHEEZING: CPT | Performed by: INTERNAL MEDICINE

## 2017-10-06 PROCEDURE — 94729 DIFFUSING CAPACITY: CPT | Performed by: INTERNAL MEDICINE

## 2017-10-06 RX ORDER — ALBUTEROL SULFATE 90 UG/1
4 AEROSOL, METERED RESPIRATORY (INHALATION) ONCE
Status: COMPLETED | OUTPATIENT
Start: 2017-10-06 | End: 2017-10-06

## 2017-10-06 RX ADMIN — ALBUTEROL SULFATE 4 PUFF: 90 AEROSOL, METERED RESPIRATORY (INHALATION) at 12:54

## 2017-10-07 NOTE — PROCEDURES
Spirometry was acceptable and reproducible by ATS standards    Spirometry  1. Spirometry shows very severe obstructive defect. 2. The FVC is diminished suggesting a possible restrictive defect; suggest lung volumes if clinically indicated. Lung Volumes  3. Air trapping is present. Bronchodilator  1. There is a response to bronchodilator. Diffusing Capacity  4. Diffusing capacity is severely decreased. [<40%]                Overall Interpretation  PFT does  demonstrates obstruction with FEV1 of 29 %  with bronchodilator response with associated decrease in diffusion capacity. Air trapping is  present. Hyperinflation is not present. This consistent with very  Severe COPD.                OBSTRUCTION % Predicted FEV1   MILD >70%   MODERATE 60-69%   MODERATELY-SEVERE 50-59%   SEVERE 35-49%   VERY SEVERE <35%         RESTRICTION % Predicted TLC   MILD Less than LLN but > than 70%   MODERATE 60-69%   MODERATELY-SEVERE <60%                 DIFFUSION CAPACITY DL,CO % Pred   MILD >60% AND < LLN   MODERATE 40-60%   SEVERE <40%       PFT data will be scanned into the procedure tab in epic under this encounter    Arianna Lynch MD  [facility]

## 2017-10-09 ENCOUNTER — TELEPHONE (OUTPATIENT)
Dept: PULMONOLOGY | Age: 52
End: 2017-10-09

## 2017-10-09 DIAGNOSIS — J44.9 COPD WITH ASTHMA (HCC): Primary | ICD-10-CM

## 2017-10-09 RX ORDER — ALBUTEROL SULFATE 90 UG/1
2 AEROSOL, METERED RESPIRATORY (INHALATION) EVERY 4 HOURS PRN
Qty: 1 INHALER | Refills: 6 | Status: SHIPPED | OUTPATIENT
Start: 2017-10-09 | End: 2017-12-11 | Stop reason: SDUPTHER

## 2017-10-09 RX ORDER — FLUTICASONE FUROATE AND VILANTEROL 200; 25 UG/1; UG/1
1 POWDER RESPIRATORY (INHALATION) DAILY
Qty: 1 EACH | Refills: 4 | Status: SHIPPED | OUTPATIENT
Start: 2017-10-09 | End: 2018-06-21 | Stop reason: SDUPTHER

## 2017-10-11 RX ORDER — FLUTICASONE FUROATE AND VILANTEROL 200; 25 UG/1; UG/1
1 POWDER RESPIRATORY (INHALATION) DAILY
Qty: 1 EACH | Refills: 0 | COMMUNITY
Start: 2017-10-11 | End: 2017-12-11 | Stop reason: SDUPTHER

## 2017-11-01 ENCOUNTER — HOSPITAL ENCOUNTER (OUTPATIENT)
Dept: OTHER | Age: 52
Discharge: HOME | End: 2017-11-30
Attending: INTERNAL MEDICINE | Admitting: INTERNAL MEDICINE

## 2017-12-01 ENCOUNTER — HOSPITAL ENCOUNTER (OUTPATIENT)
Dept: OTHER | Age: 52
Discharge: HOME | End: 2017-12-31
Attending: INTERNAL MEDICINE | Admitting: INTERNAL MEDICINE

## 2017-12-01 NOTE — PROGRESS NOTES
[facility]  Phase 2 Cardiac Rehabilitation  Individualized Cardiac Treatment Plan    Patient Name:  Pipe Kwong :  1965 Age:  54 y.o. Account Number:  [de-identified]  Diagnosis: CAD, PTCA  Date of Event: 20  Physician:  Dr. Juan Carlos Houser   Next Office Visit: 6 months     Risk Stratifications: ()Low (x)Intermediate ()High  Allergies: Allergies   Allergen Reactions    Erythromycin        . Primary Diagnosis   CAD, PTCA     Cardiac History  History of symptoms related to cardiac event. (Note frequency, duration, location, radiation, quality, predisposing factors, other symptoms and what relieved symptoms) Mr. Errol Stewart is a 54year old male with a past medical history significant for COPD/ILD, HTN, HLD, ESTRELLITA and [place] Syndrome. He reports approximately a one month period of intermittent chest discomfort which increased with activity. He describes the pain as a substernal pressure radiating to the left side of his chest and rates on average a 5 out of 10. He does admit to increased SOB with the pain but denies nausea, vomiting  or diaphoresis. He presented to his PCP with his complaints and was referred to the ED for further evaluation. His troponin levels remained normal and he ruled out for MI. Cardiology was consulted for further evaluation and on 2017 he underwent a LHC. He was found to have significant disease of the distal and second diagonal branch of the LAD. He underwent successful balloon angioplasty of both lesions without stent placement. He presents today to begin Phase 2 cardiac rehab. He denies further chest discomfort but does admit to increased SOB with even minimal activity. He has discussed this with his PCP and further evaluation is planned.  He is familiar with rehab as he has recently completed a pulmonary rehab program.          []  MI              []  CABG         [x]  PTCA   Distal and second diagonal branch of LAD pack            Individual Cardiac Treatment Plan EXERCISE  EXERCISE  ASSESSMENT/PLAN EXERCISE  REASSESSMENT  30 day EXERCISE   REASSESSMENT  60 day EXERCISE  REASSESSMENT  90 day EXERCISE  DISCHARGE/FOLLOW-UP   DATE: 9/7/2017 DATE: 10/6/2017 DATE: 11/3/2017 DATE: 12/1/2017 DATE:    EXERCISE ASSESSMENT EXERCISE ASSESSMENT EXERCISE ASSESSMENT EXERCISE  ASSESSMENT EXERCISE REASSESSMENT   6 Min Walk Test  Distance walked: 842 feet   1.5 mph  METs: 2.2  Max HR:100 BPM      RPE:  12    Rhythm: SR      6 Min. Walk Test  Distance walked:       feet  Mets:  Max. HR.      BPM  RPE:   Rhythm:  % changed:     Fall Risk/Other  Fall risk assessed (x)yes   Issue:none   Orthopedic problems ()yes (x)no  Walker () Cane()   Safety issues reviewed  (x)yes   If patient has balance or orthopedic issue, action: none      EXERCISE PLAN Exercise Plan EXERCISE PLAN EXERCISE PLAN EXERCISE PLAN   Interventions Interventions Interventions Interventions Interventions   Exercise Prescription/Order   Exercise Prescription/Order Exercise Prescription/order Exercise   Prescription/Order Discharge Exercise Plan                Mode       1. TM at 1.0-1.2 mph for 10 min at  1.5 mets    2. NS at 1/40  for 15-20 min at 1.8 mets    3. UBE at 0-5 pillai  for 10 min   Mode      NS level 4/61watts  X 20 minutes (1.9 METS)    Bike-recumbent bike level 3/27 pillai x 10 minutes (1.8 METS)    UBE-arm ergometer 10 pillai x 10 minutes (1.4 METS)     Mode      TM  NS level 5/64 pillai x 20 minutes (2.5 METS)  Ellipt/Arc  Bike-recumbent bike level 6/33 pillai x 10 minutes (2.0 METS)  UBE  Scifit     Mode      NS level 6/64 pillai x 20 minutes (2.5 METS)    Bike-recumbent bike x 10 minutes    UBE-arm ergometer   Continue independent exercise [] Y    Continue in Phase IV [] Y    Aerobic Mode:     Frequency: 2-3  Week  Duration: 15-30 min  Intensity:   RPE 11-12     Frequency:   3 x week   Duration: 20-30 min  Intensity: 12  RPE 11-13     Frequency: 3 x week  Duration: 20-40 min.  Intensity:  12-13  RPE 11-14     Frequency: 3 x week  Duration: 30-45 min  Intensity: 12-13  RPE 11-14 Frequency:      Exercise 3-5 days per week. [] yes[] no  []   30+ min. Of exercise per session    [] yes [] no     Progression:  Depending on patient condition, time and intensity will be increased. An initial met level< 3 is classified as \"light\". Progression to \"moderate\" 3-6 mets or higher for patients in better physical condition. Resistance Training  [x] yes  [] no         Max. Met level: 1.9    Session #: 9    Resistance Training  [] yes  [x] no  Type:    Symptoms with Exercise[x] yes [] no    Offers complaints of shortness of breath during most exercises, having additional tests on Friday (PFT, ABG) Max. Met Level: 2.5    Session#: 20    Resistance Training  [] yes [x] no  Type:    Symptoms with Exercise[x] yes[] no    Continues to offer complaints of shortness of breath, at rest and with exertion   Max. Met. Level: 2.5    Session #: 30    Resistance Training  [] yes [x] no  Type:    Symptoms with Exercise [x] yes [] no    Some shortness of breath, improved while walking, worse when bending, stooping Max. Met level:    Sessions#:    Home Resistance Training [] Y[] N  Type:   Home Exercise Plan and Goals  Jensen plans:  Exercise (x)yes ()no  Frequency:2-3 days per week   Duration:30-60 minutes   Mode:walking in doors, mall or store     Discharge Goal:  30+ min.  Of aerobic exercise 3-5 days/week       Home Exercise Goal  Reassessed  Exercising [x] yes[] no  Frequency: twice weekly  Duration: 30 minutes  Mode: walking at grocery store twice weekly pushing a cart         Home Exercise Goal Reassessed  Exercising [x] yes [] no  Frequency: twice weekly  Duration: 20-30 minutes  Mode: store or mall walking       Home Exercise Goal Reassessed  Exercising [x] yes [] no  Frequency: daily  Duration: 30-45 minutes  Mode: walking    Aims to walk everyday at [employer] stores, mall           See above plan monitor his glucose. FBS  125 on 7/26/17  HgA1c 6.2 on 8/29/17    Checks BS at home   [] Y  [x] N  Frequency NA  Range NA  Medications NA  Low BS Reaction NA     []  To check BS first 6 sessions before/after exercise   []  To carry snack for low BS   Most recent BS-n/a    [] Y  [] N Any medication changes   [] Y  [] N Problems with low sugar or high sugars with exercise. Treatment: Most recent BS -n/a   [] Y  [] N Any medication changes Most recent BS-n/a    [] Y  [] N Any medication changes Most recent BS   [] Y  [] N Any medication changes   Lipids  Hyperlipidemia  [x] Y  [] N  Total Chol: 210  HDL: 32  LDL: 135  Triglycerides: 215  9/7/2016  Current Tx: Zocor daily           [] Y [x] N Medication changes? [] Y  [x] N Recent Lipids   [] Y [x] N Medication changes? [] Y [x] N Recent Lipids  [] Y [x] N Medication changes? [] Y [x] N Recent Lipids  [] Y  [] N Medication changes? [] Y  [] N Recent lipids   Diet Assessment Tool:  Rate your plate survey  DVWMW=01/46  Score of 55-69 is excellent. Diet Assessment Tool:  Rate your plate survey  Score:    /69  Score of 55-69 is excellent. NUTRITION PLAN NUTRITION PLAN NUTRITION PLAN NUTRITION PLAN NUTRITION PLAN   *Interventions* *Interventions* *Interventions* *Interventions* *Interventions*   Professional Referral  Please check if needed. [] Dietician Consult    [] Diabetes Referral Professional Referral   []  Seen by dietician for   Consult/referral   []  Diabetes referral  [] Seen by dietician for consult/ referral   []  Seen for Diabetes Referral   Has patient completed consult if needed?   [] Y [] N Met with dietician   [] Y  [] N Met with diabetes educator   Nutrition Education Nutrition Education Nutrition Education Nutrition Education Nutrition Education    [x] Individual Nutrition Counseling by staff/dietician and / or nutrition education series   []  Individual Nutrition Counseling   []  Diabetic education if needed    Education Classes by PSYCHOSOCIAL  REASSESSMENT PSYCHOSOCIAL   REASSESSMENT PSYCHOSOCIAL   REASSESSMENT PSYCHOSOCIAL  DISCHARGE/FOLLOW-UP   PSYCHOSOCIAL ASSESSMENT PSYCHOSOCIAL REASSESSMENT PSYCHOSOCIAL REASSESSMENT PSYCHOSOCIAL REASSESSMENT PSYCHOSOCIAL REASSESSMENT   Behavioral Outcomes Behavioral Outcomes Behavioral Outcomes Behavioral Outcomes Behavioral Outcomes   Tool Used: Ferrans and Osorio Score:  Overall score: 21.50  Psych/Spiritial: 23.21     PHQ-9 score 4  Score 10 or > signifies moderate or > depression. Depression:  Overall score  Psych/Spiritial     PHQ-9 score:    Does patient have Family Support? [x] Yes   [] No   [] Lives alone [x]  Lives with spouse       PSYCHOSOCIAL PLAN PSYCHOSOCIAL PLAN PSYCHOSOCIAL PLAN PSYCHOSOCIAL PLAN  PSYCHOSOCIAL PLAN   Interventions Interventions Interventions Interventions Interventions    [] Physician notified of PHQ-9 score of 10 or > per protocol, as signifies moderate or > depression           PHQ-9 score:    []  Improvement   []  Unchanged   []  Notify PCP if score is worse   Is patient currently taking anti-depressant or anti-anxiety medications? [] Yes   [x] No  Current depression tx: NA Current depression tx:   [x]  N/A Current depression tx   [x] N/A: Current depression tx:   []  N/A  []  Patient has plan/treatment for anxiety/depression.    Education Education Education Education Education   Individual discussion/education of:   [x] Stress management skills   [x] Relaxation Techniques   [x] Coping Techniques Check if completed:   [] Attends Emotional Wellness class  (x)Voices method of coping/ relaxation technique   Check if completed:   [] Attends Emotional wellness class   [x] Voices method of coping/ relaxation technique   Check if completed:   [] Attends Emotional wellness class   [x] Voices method of coping/ relaxation technique      Goals    Goals*   Initial Psychosocial Goals Set [] Yes   [x] No    Previous Psychosocial Goals Met  [] Yes   [] No   Nixon psychosocial goals are as follows:    Denies s/s of depression and declines to set goals     Improved PHQ9 score  Improved Mental Health  Score   Denies depression or anxiety at this time. Denies concern for depression or anxiety at this time. [] Improved PHQ9 score   [] Improved Mental Health score     Individual Cardiac Treatment Plan  Other:  Risk Factor/Education  CORE MEASURE  ASSESSMENT/PLAN CORE MEASURE  REASSESSMENT  CORE MEASURE  REASSESSMENT CORE MEASURE  REASSESSMENT CORE MEASURE  DISCHARGE/FOLLOW-UP   CORE MEASURE ASSESSMENT CORE MEASURE REASSESSMENT CORE MEASURE REASSESSMENT CORE MEASURE REASSESSMENT CORE MEASURE  Discharge   Hypertension   [x]Yes []No  Resting BP: 128/78  Peak Ex BP:146/82   See medication list.   Hypertension    Resting BP: 120/72  Peak Ex BP: 124/70  Medication Changes:  []Yes [x]No   Hypertension    Resting BP: 138/70  Peak Ex BP: 140/70  Medication Changes:  []Yes [x]No     Hypertension    Resting BP: 120/80  Peak Ex BP: 150/74  Medication Changes:  []Yes [x]No    Hypertension    Resting BP:   Peak Ex BP:  Medication Changes:  []Yes []No     Tobacco Use  []Current [x]Former []Never    Years smoked: 25 years     Date Quit: 2005  Quit date set: []Yes [x]No  Date: NA  # cigarettes smoked/day: 3 packs per day   Smokeless Tobacco use:   []Yes  [x]No  Amount: NA Tobacco Use-remains smoke free  Change in smoking status           Quit date:    Tobacco Use  Change in smoking status     Remains smoke free      Quit date:    Tobacco Use-remains smoke free  Change in smoking status           Quit date:     Tobacco Use  Change in smoking status           Quit date:      Heart Health Knowledge   Test Score: 11 /15        Heart Health Knowledge   Test Score:    /15        Learning Barriers  Please select one:  []Speech  []Literacy  [] Hearing  []Cognitive  [] Vision  [x]Ready to Learn Learning Barriers addressed:[] Y[] N  Modifications:           Other Core Measures EDUCATION PLAN Other Core Measures EDUCATION PLAN Other Core Measures EDUCATION PLAN Other Core Measures EDUCATION PLAN Other Core Measure EDUCATION PLAN   Interventions Interventions Interventions Interventions Interventions   Cardiac Risk Factor Education Needed      [x]HTN              [] Attended Education Class on Risk Factors for Heart Disease  [] Home B/P monitoring  [x] Dietary Sodium Restriction      []Attended Education Class on Risk Factors for Heart Disease  [x] Home B/P monitoring  [x] Dietary Sodium Restriction          []Attended Education Class on Risk Factors for Heart Disease    [x]Home B/P monitoring  [x] Dietary Sodium Restriction  Jensen voices 1. Understanding of risks for heart disease and how to modify their risk. 2. Understanding of importance of restricting sodium in diet. []Y [x]N  Smoking Cessation counseling needed  []Y [x]N Pt verbalizes readiness to quit smoking?  []Y[x] () N Quit date set  []Y [x]N Cut down cigarettes   []One on one counseling on Tobacco Cessation  [] Attend Smoking Cessation classes    []Smoking Cessation Information given  []Smoking cessation medications used:   [] One on one counseling on Tobacco Cessation. [] Attend smoking cessation classes      []Smoking cessation medications used:   [] One on one counseling on Tobacco Cessation. [] Attend smoking cessation classes        []Smoking cessation medications used: Tobacco Cessation Counseling attended  []Yes  []No  If not completed, Why? Core Measure Education Core Measure Education Core Measure Education Core Measure Education Education   [x] Cardiac Rehab Education booklet given  [x] Cardiac Rehab education class list given Attended Education Classes:  1. []  A & P of the  Heart & Body  2. []Heart Disease  3. [] Risk Factors  4. []  Cardiac Medications  5. [] Cardiac Interventions Attended Education Classes:  1. [x]  A & P of the  Heart & Body  2. [] Heart Disease  3. []  Risk Factors  4. []  Cardiac Medications  5.  [] Cardiac Interventions Attended Education Classes:  1. [x] A & P of the  Heart & Body  2. []Heart Disease  3. [x] Risk Factors  4. [x] Cardiac Medications  5. [x]Cardiac Interventions Attend all the education classes. *Goals* Goal Reassessment Goal Reassessment Goal Reassessment *Goals*   Morteza Initial Risk factor/education  goals are as follows:      1. Establish a consistent exercise routine 3 - 5 days per week    2. Improve tolerance for exercise with decreased SOB    3. Weight loss to a goal of 235 pounds. 4. Decrease LDL cholesterol from 135. Resting B/P < 140/90 or 130/80 if DM or CKD  [] Y [x]N Complete tobacco cessation  [x]Y []N Understanding risks of heart disease  []Y [x]N Heart failure self management     Goal Progress:       1. Progressing, trying to walk in a grocery store with the support of a cart for 30 minutes. 2. Still feels short of breath, undergoing additional testing this Friday. 3. Weight loss of -3 lbs. Goal Progress:    1. Progressing; support and encouragement provided to continue to progress. 2. Continues to feel short of breath; feels it is actually worse. Currently discussing concerns with Dr. Clotilde Cruz as well as Dr. Rebecca Richardson. Recently started on another inhaler for suspicion of asthma, does not feel it is making a difference at this time. 3. - 1 lb weight loss thus far. 4. No recent labs for comparison. Goal Progress:    1. Reports he is walking in a grocery store or the mall on a daily basis, aims for 30 minutes minimally. 2. Continues to feel short of breath, but reports he has noticed an improvement with walking, but remains 'really short of breath' when bending, stooping. 3. Weight loss of -3 lbs. 4. No recent labs for comparison. Has completely eliminated red meats and soda.      Will follow up with Dr. Clotilde Cruz 12/1/2017, Dr. Rebecca Richardson 12/5/17 Juan Miguel Geovanyamber has met goals ()yes         Physician Response  [x]Initiate Individualized Treatment Plan (ITP)  []Other   Physician MASTERN, LD, RN

## 2017-12-08 RX ORDER — SIMVASTATIN 40 MG
TABLET ORAL
Qty: 30 TABLET | Refills: 0 | Status: SHIPPED | OUTPATIENT
Start: 2017-12-08 | End: 2018-01-24 | Stop reason: SDUPTHER

## 2017-12-11 ENCOUNTER — OFFICE VISIT (OUTPATIENT)
Dept: PULMONOLOGY | Age: 52
End: 2017-12-11

## 2017-12-11 VITALS
HEIGHT: 70 IN | TEMPERATURE: 97.5 F | SYSTOLIC BLOOD PRESSURE: 147 MMHG | BODY MASS INDEX: 36.79 KG/M2 | RESPIRATION RATE: 20 BRPM | WEIGHT: 257 LBS | HEART RATE: 89 BPM | OXYGEN SATURATION: 89 % | DIASTOLIC BLOOD PRESSURE: 84 MMHG

## 2017-12-11 DIAGNOSIS — J44.9 COPD, SEVERE (HCC): ICD-10-CM

## 2017-12-11 DIAGNOSIS — J44.9 COPD WITH ASTHMA (HCC): ICD-10-CM

## 2017-12-11 DIAGNOSIS — J84.9 ILD (INTERSTITIAL LUNG DISEASE) (HCC): Primary | ICD-10-CM

## 2017-12-11 DIAGNOSIS — J40 BRONCHITIS: ICD-10-CM

## 2017-12-11 DIAGNOSIS — J96.11 CHRONIC RESPIRATORY FAILURE WITH HYPOXIA (HCC): ICD-10-CM

## 2017-12-11 PROCEDURE — 99214 OFFICE O/P EST MOD 30 MIN: CPT | Performed by: INTERNAL MEDICINE

## 2017-12-11 PROCEDURE — G8926 SPIRO NO PERF OR DOC: HCPCS | Performed by: INTERNAL MEDICINE

## 2017-12-11 PROCEDURE — 3023F SPIROM DOC REV: CPT | Performed by: INTERNAL MEDICINE

## 2017-12-11 PROCEDURE — G8484 FLU IMMUNIZE NO ADMIN: HCPCS | Performed by: INTERNAL MEDICINE

## 2017-12-11 PROCEDURE — G8598 ASA/ANTIPLAT THER USED: HCPCS | Performed by: INTERNAL MEDICINE

## 2017-12-11 PROCEDURE — G8427 DOCREV CUR MEDS BY ELIG CLIN: HCPCS | Performed by: INTERNAL MEDICINE

## 2017-12-11 PROCEDURE — 1036F TOBACCO NON-USER: CPT | Performed by: INTERNAL MEDICINE

## 2017-12-11 PROCEDURE — G8417 CALC BMI ABV UP PARAM F/U: HCPCS | Performed by: INTERNAL MEDICINE

## 2017-12-11 RX ORDER — LEVOFLOXACIN 500 MG/1
500 TABLET, FILM COATED ORAL DAILY
Qty: 7 TABLET | Refills: 0 | Status: SHIPPED | OUTPATIENT
Start: 2017-12-11 | End: 2017-12-18

## 2017-12-11 RX ORDER — PREDNISONE 10 MG/1
TABLET ORAL
Qty: 30 TABLET | Refills: 0 | Status: ON HOLD | OUTPATIENT
Start: 2017-12-11 | End: 2018-01-12

## 2017-12-11 ASSESSMENT — SLEEP AND FATIGUE QUESTIONNAIRES
ESS TOTAL SCORE: 0
HOW LIKELY ARE YOU TO NOD OFF OR FALL ASLEEP WHILE SITTING INACTIVE IN A PUBLIC PLACE: 0
HOW LIKELY ARE YOU TO NOD OFF OR FALL ASLEEP WHILE SITTING AND TALKING TO SOMEONE: 0
HOW LIKELY ARE YOU TO NOD OFF OR FALL ASLEEP WHEN YOU ARE A PASSENGER IN A CAR FOR AN HOUR WITHOUT A BREAK: 0
HOW LIKELY ARE YOU TO NOD OFF OR FALL ASLEEP WHILE SITTING AND READING: 0
HOW LIKELY ARE YOU TO NOD OFF OR FALL ASLEEP WHILE WATCHING TV: 0
HOW LIKELY ARE YOU TO NOD OFF OR FALL ASLEEP WHILE LYING DOWN TO REST IN THE AFTERNOON WHEN CIRCUMSTANCES PERMIT: 0
HOW LIKELY ARE YOU TO NOD OFF OR FALL ASLEEP WHILE SITTING QUIETLY AFTER LUNCH WITHOUT ALCOHOL: 0
HOW LIKELY ARE YOU TO NOD OFF OR FALL ASLEEP IN A CAR, WHILE STOPPED FOR A FEW MINUTES IN TRAFFIC: 0

## 2017-12-11 NOTE — PROGRESS NOTES
Diagnosis Date    CAD S/P percutaneous coronary angioplasty     Chronic respiratory failure (HCC)     COPD exacerbation ([facility] [address] 9/7/2016    Coronary artery disease involving native coronary artery of native heart with unstable angina pectoris (HCC)     Dyslipidemia     Dyspnea     Good[place] syndrome ([facility] [address]     reason pt is on cellcept. He will need lung transplant eventually    High risk medications (not anticoagulants) long-term use     HTN (hypertension)     Hypoxemia     ILD (interstitial lung disease) (HCC)     ESTRELLITA on CPAP     Prediabetes     Ureteral calculus of right kidney transplant 2005       Past Surgical History:   Procedure Laterality Date    BRONCHOSCOPY      DIAGNOSTIC CARDIAC CATH LAB PROCEDURE  2006??    LUNG REMOVAL, PARTIAL Right 2005    Partial right lobectomy for diagnostic purpose. Surgery performed in [facility]           Current Outpatient Prescriptions:     simvastatin (ZOCOR) 40 MG tablet, TAKE 1 TABLET BY MOUTH EVERY EVENING, Disp: 30 tablet, Rfl: 0    Fluticasone Furoate-Vilanterol (BREO ELLIPTA) 200-25 MCG/INH AEPB, Inhale 1 puff into the lungs daily One puff daily, Disp: 1 each, Rfl: 4    Umeclidinium Bromide (INCRUSE ELLIPTA) 62.5 MCG/INH AEPB, Inhale 1 puff into the lungs daily, Disp: 1 each, Rfl: 4    LORazepam (ATIVAN) 0.5 MG tablet, Take 0.5 mg by mouth every 6 hours as needed for Anxiety (every 12 hr PRN), Disp: , Rfl:     hydrocortisone (WESTCORT) 0.2 % cream, Apply topically 2 times daily Apply topically 2 times daily. , Disp: 60 g, Rfl: 0    carvedilol (COREG) 6.25 MG tablet, Take 1 tablet by mouth 2 times daily, Disp: 60 tablet, Rfl: 0    lisinopril (PRINIVIL;ZESTRIL) 20 MG tablet, TAKE ONE TABLET BY MOUTH ONE TIME A DAY, Disp: 90 tablet, Rfl: 3    aspirin 81 MG chewable tablet, Take 1 tablet by mouth daily, Disp: 90 tablet, Rfl: 3    clopidogrel (PLAVIX) 75 MG tablet, Take 1 tablet by mouth daily, Disp: 90 tablet, Rfl: 3    omeprazole (PRILOSEC) 20 MG delayed release capsule, Take 20 mg by mouth 2 times daily, Disp: , Rfl:     nitroGLYCERIN (NITROSTAT) 0.4 MG SL tablet, up to max of 3 total doses.  If no relief after 1 dose, call 911., Disp: 25 tablet, Rfl: 3    albuterol sulfate HFA (PROAIR HFA) 108 (90 BASE) MCG/ACT inhaler, Inhale 2 puffs into the lungs every 4 hours as needed for Wheezing or Shortness of Breath, Disp: 1 Inhaler, Rfl: 6    budesonide (PULMICORT) 0.5 MG/2ML nebulizer suspension, Take 2 mLs by nebulization 2 times daily, Disp: 120 ampule, Rfl: 3    azelastine (ASTELIN) 0.1 % nasal spray, 2 sprays by Nasal route 2 times daily Use in each nostril as directed, Disp: 1 Bottle, Rfl: 3    sodium chloride 3 % nebulizer solution, Take 15 mLs by nebulization 2 times daily, Disp: 900 mL, Rfl: 0    ROS:  GENERAL:  No fevers, chills, or night sweats: still with issues losing weight  HEENT:  No double vision, blurry vision, or difficulty swallowing;  HEART:  No chest pain, no palpitations  LUNGS:  Improved baseline SOB, feels like he has a chest infection  ABDOMEN:  No abdominal pains, no changes in stools  GENITOURINARY:  No increased frequency, no blood in urine  EXTREMITIES:  No swelling, no lesions  NEURO:  No numbness or tingling, no seizures  SKIN:  No new rashes, no changes in hair or nails  LYMPH:  No masses, no swelling neck, armpits, or groin      PHYSICAL EXAM:  GENERAL:  Well nourished, alert, appears stated age, no distress, plethoric facie  HEENT:  No scleral icterus, no conjunctival irritation, Mallampati IV with bifid pharnyx  NECK:  No thyromegaly, no bruits  LYMPH:  No cervical or supraclavicular adenopathy  HEART:  Regular rate and rhythm, no murmurs  LUNGS:  Wheezing with rhonchi  ABDOMEN:  No distention, no organomegaly  EXTREMITIES:  Trace edema, no digital clubbing  NEURO:  No localizing deficits, CN II-XII intact    Pulmonary Function Testing 10/2015  A very severe obstructive lung defect is present with

## 2018-01-01 ENCOUNTER — HOSPITAL ENCOUNTER (OUTPATIENT)
Dept: OTHER | Age: 53
Discharge: HOME | End: 2018-01-31
Attending: INTERNAL MEDICINE | Admitting: INTERNAL MEDICINE

## 2018-01-07 ENCOUNTER — TELEPHONE (OUTPATIENT)
Dept: PULMONOLOGY | Age: 53
End: 2018-01-07

## 2018-01-07 RX ORDER — LEVOFLOXACIN 500 MG/1
500 TABLET, FILM COATED ORAL DAILY
Qty: 7 TABLET | Refills: 0 | Status: ON HOLD | OUTPATIENT
Start: 2018-01-07 | End: 2018-01-12 | Stop reason: HOSPADM

## 2018-01-07 RX ORDER — OSELTAMIVIR PHOSPHATE 75 MG/1
75 CAPSULE ORAL 2 TIMES DAILY
Qty: 10 CAPSULE | Refills: 0 | Status: ON HOLD | OUTPATIENT
Start: 2018-01-07 | End: 2018-01-09 | Stop reason: ALTCHOICE

## 2018-01-13 ENCOUNTER — CARE COORDINATION (OUTPATIENT)
Dept: CASE MANAGEMENT | Age: 53
End: 2018-01-13

## 2018-01-13 DIAGNOSIS — J44.9 COPD, VERY SEVERE (HCC): Primary | ICD-10-CM

## 2018-01-13 PROCEDURE — 1111F DSCHRG MED/CURRENT MED MERGE: CPT

## 2018-01-13 NOTE — CARE COORDINATION
[facility] Transitions Initial Follow Up Call    Call within 2 business days of discharge: Yes    Patient: Benji Ramirez  Patient : 1965   MRN: [id number]    Reason for Admission: COPD  Discharge Date: 18  RARS: [facility] Risk Score: 26.75     Spoke with: [address]: [facility]     Non-face-to-face services provided:  Obtained and reviewed discharge summary and/or continuity of care documents  Education of patient/family/caregiver/guardian to support self-management-   Assessment and support for treatment adherence and medication management-     Care Transitions 24 Hour Call    Schedule Follow Up Appointment with PCP:  Declined  Do you have any ongoing symptoms?:  Yes  Patient-reported symptoms:  Shortness of Breath, Cough (Comment: reports SOB but not worse since d/c and NP cough )  Interventions for patient-reported symptoms:  Other (Comment: continue taking meds as prescribed, schedule appts, s/s that require med attn)  Do you have a copy of your discharge instructions?:  Yes  Do you have all of your prescriptions and are they filled?:  Yes  Have you been contacted by a [address]?:  No  Have you scheduled your follow up appointment?:  No (Comment: declined assistance from [facility] and has agreed to call Monday to schedule )  Were you discharged with any Home Care or Post Acute Services:  No  Patient Home Equipment:  Oxygen, BiPAP  Do you have support at home?:  Partner/Spouse/SO, Child  Do you feel like you have everything you need to keep you well at home?:  Yes  Are you an active caregiver in your home?:  No  Care Transitions Interventions  No Identified Needs       Summary  [facility] spoke to the Pt who denies chest pain and chest congestion. Pt reports SOB that is not worse since d/c and non-productive cough. Pt and CTC reviewed all meds and Pt has no questions or concerns at this time.   Pt declined offer to assist with scheduling appt and has agreed to call Monday to

## 2018-01-14 ENCOUNTER — TELEPHONE (OUTPATIENT)
Dept: FAMILY MEDICINE CLINIC | Age: 53
End: 2018-01-14

## 2018-01-15 NOTE — TELEPHONE ENCOUNTER
Call patient to schedule hospital follow up appointment sometime this week    Please document call and then close encounter.   thanks

## 2018-01-19 ENCOUNTER — CARE COORDINATION (OUTPATIENT)
Dept: CASE MANAGEMENT | Age: 53
End: 2018-01-19

## 2018-01-24 ENCOUNTER — OFFICE VISIT (OUTPATIENT)
Dept: FAMILY MEDICINE CLINIC | Age: 53
End: 2018-01-24

## 2018-01-24 VITALS
SYSTOLIC BLOOD PRESSURE: 120 MMHG | HEIGHT: 70 IN | BODY MASS INDEX: 35.65 KG/M2 | TEMPERATURE: 97.9 F | DIASTOLIC BLOOD PRESSURE: 70 MMHG | HEART RATE: 102 BPM | RESPIRATION RATE: 20 BRPM | OXYGEN SATURATION: 91 % | WEIGHT: 249 LBS

## 2018-01-24 DIAGNOSIS — F33.1 MODERATE EPISODE OF RECURRENT MAJOR DEPRESSIVE DISORDER (HCC): ICD-10-CM

## 2018-01-24 DIAGNOSIS — J11.1 INFLUENZA: ICD-10-CM

## 2018-01-24 DIAGNOSIS — J84.9 INTERSTITIAL LUNG DISEASE (HCC): ICD-10-CM

## 2018-01-24 DIAGNOSIS — J44.1 COPD EXACERBATION (HCC): ICD-10-CM

## 2018-01-24 DIAGNOSIS — J96.11 CHRONIC RESPIRATORY FAILURE WITH HYPOXIA (HCC): ICD-10-CM

## 2018-01-24 DIAGNOSIS — Z09 HOSPITAL DISCHARGE FOLLOW-UP: Primary | ICD-10-CM

## 2018-01-24 PROCEDURE — 99495 TRANSJ CARE MGMT MOD F2F 14D: CPT | Performed by: FAMILY MEDICINE

## 2018-01-24 PROCEDURE — 1111F DSCHRG MED/CURRENT MED MERGE: CPT | Performed by: FAMILY MEDICINE

## 2018-01-24 RX ORDER — SIMVASTATIN 40 MG
TABLET ORAL
Qty: 90 TABLET | Refills: 1 | Status: SHIPPED | OUTPATIENT
Start: 2018-01-24 | End: 2018-05-17 | Stop reason: ALTCHOICE

## 2018-01-24 RX ORDER — LORAZEPAM 0.5 MG/1
0.5 TABLET ORAL DAILY PRN
Qty: 30 TABLET | Refills: 0 | Status: SHIPPED | OUTPATIENT
Start: 2018-01-24 | End: 2018-02-23

## 2018-01-24 NOTE — PROGRESS NOTES
his baseline. Reviewed notes from Dr. Osorio Lima on 12/11/17. He doesn't see a progression of interstitial lung disease on CAT scan. He notes the patient failed immune therapy and prednisone in the past.      Patient very unhappy with the dyspnea on exertion that he has. He doesn't qualify for daytime oxygen. He is not willing to retry immunotherapy, as is confident it didn't help him. Chronic respiratory failure with hypoxia (HCC)  On recent pulmonary function tests, patient did have a new reversible component. Dr. Osorio Lima is treating with incruse and breo and as needed albuterol. Patient does not feel any of these medicines have made a difference. Influenza  Resolved. Moderate episode of recurrent major depressive disorder [facility])  Discussed with patient that I do believe that he is clinically depressed, and may benefit from antidepressant. Discussed while this will not make his shortness of breath go away, it might allow him to not be as upset and focused on it all the time. Patient states she's going to think about it, and let me know if he decides to start antidepressant. Okay to continue Ativan p.r.n. He takes very sparingly for anxiety.  -     LORazepam (ATIVAN) 0.5 MG tablet; Take 1 tablet by mouth daily as needed for Anxiety (every 12 hr PRN) for up to 30 days. Controlled Substances Monitoring:     Attestation: The Prescription Monitoring Report for this patient was reviewed today. (Brianne Nicholson MD)  Documentation: No signs of potential drug abuse or diversion identified.  (Brianne Nicholson MD)          Diagnostic test results reviewed: inpatient labs, culture/micro-influenza test, blood cultures and CT-chest    Patient risk of morbidity and mortality: high    Medical Decision Making: high complexity

## 2018-01-26 ENCOUNTER — CARE COORDINATION (OUTPATIENT)
Dept: CASE MANAGEMENT | Age: 53
End: 2018-01-26

## 2018-01-26 NOTE — CARE COORDINATION
[facility] Transitions Follow Up Call    2018    Patient: bArahan Jeronimo  Patient : 1965   MRN: [id number]  Reason for Admission: There are no discharge diagnoses documented for the most recent discharge. Discharge Date: 18 RARS: Risk Score: 26.75       Spoke with: Luh Nichols (patient)    Care Transitions Subsequent and Final Call    Subsequent and Final Calls  Do you have any ongoing symptoms?:  No  Have your medications changed?:  No  Do you have any questions related to your medications?:  No  Do you currently have any active services?:  No  Do you have any needs or concerns that I can assist you with?:  No  Identified Barriers:  None  Care Transitions Interventions  Other Interventions: Follow Up: Spoke with Jensen. He is doing [address] He has appt for f/u with Dr. Jael Palm. Vergia Matters denies any chest pain. He states he is always SOB - it is his normal. Jensen states the cough and congestion are gone. Informed Vergia Matters this would be the last CTC call. He denies any needs or concerns today. Encouraged Jensen to call his PCP with any future issues or concerns.   Future Appointments  Date Time Provider Bradly Lucas   2018 10:00 AM Zach Sheffield MD [facility]   3/12/2018 1:00 PM Sonja Acosta DO [facility] PULM [id number] RN

## 2018-02-05 ENCOUNTER — OFFICE VISIT (OUTPATIENT)
Dept: CARDIOLOGY CLINIC | Age: 53
End: 2018-02-05

## 2018-02-05 VITALS
HEART RATE: 84 BPM | WEIGHT: 259 LBS | HEIGHT: 70 IN | SYSTOLIC BLOOD PRESSURE: 134 MMHG | OXYGEN SATURATION: 91 % | DIASTOLIC BLOOD PRESSURE: 80 MMHG | BODY MASS INDEX: 37.08 KG/M2

## 2018-02-05 DIAGNOSIS — E78.2 MIXED HYPERLIPIDEMIA: ICD-10-CM

## 2018-02-05 DIAGNOSIS — R06.09 DOE (DYSPNEA ON EXERTION): ICD-10-CM

## 2018-02-05 DIAGNOSIS — J84.9 INTERSTITIAL LUNG DISEASE (HCC): ICD-10-CM

## 2018-02-05 DIAGNOSIS — I10 ESSENTIAL HYPERTENSION: ICD-10-CM

## 2018-02-05 DIAGNOSIS — R63.5 WEIGHT GAIN: ICD-10-CM

## 2018-02-05 DIAGNOSIS — J44.9 CHRONIC OBSTRUCTIVE PULMONARY DISEASE, UNSPECIFIED COPD TYPE (HCC): ICD-10-CM

## 2018-02-05 DIAGNOSIS — J45.909 UNCOMPLICATED ASTHMA, UNSPECIFIED ASTHMA SEVERITY, UNSPECIFIED WHETHER PERSISTENT: ICD-10-CM

## 2018-02-05 DIAGNOSIS — I25.110 CORONARY ARTERY DISEASE INVOLVING NATIVE CORONARY ARTERY OF NATIVE HEART WITH UNSTABLE ANGINA PECTORIS (HCC): Primary | ICD-10-CM

## 2018-02-05 PROCEDURE — G8484 FLU IMMUNIZE NO ADMIN: HCPCS | Performed by: INTERNAL MEDICINE

## 2018-02-05 PROCEDURE — G8427 DOCREV CUR MEDS BY ELIG CLIN: HCPCS | Performed by: INTERNAL MEDICINE

## 2018-02-05 PROCEDURE — G8417 CALC BMI ABV UP PARAM F/U: HCPCS | Performed by: INTERNAL MEDICINE

## 2018-02-05 PROCEDURE — 1036F TOBACCO NON-USER: CPT | Performed by: INTERNAL MEDICINE

## 2018-02-05 PROCEDURE — 1111F DSCHRG MED/CURRENT MED MERGE: CPT | Performed by: INTERNAL MEDICINE

## 2018-02-05 PROCEDURE — 99215 OFFICE O/P EST HI 40 MIN: CPT | Performed by: INTERNAL MEDICINE

## 2018-02-05 PROCEDURE — G8598 ASA/ANTIPLAT THER USED: HCPCS | Performed by: INTERNAL MEDICINE

## 2018-02-05 PROCEDURE — 3017F COLORECTAL CA SCREEN DOC REV: CPT | Performed by: INTERNAL MEDICINE

## 2018-02-05 PROCEDURE — G8926 SPIRO NO PERF OR DOC: HCPCS | Performed by: INTERNAL MEDICINE

## 2018-02-05 PROCEDURE — 3023F SPIROM DOC REV: CPT | Performed by: INTERNAL MEDICINE

## 2018-02-05 NOTE — PATIENT INSTRUCTIONS
Patient Education        Left Heart Catheterization: About This Test  What is it? Cardiac catheterization is a test to check the left side of your heart. Your doctor might look at the shape of your heart, the motion of your heart, or the blood pressure inside the chambers. Why is this test done? This test gives information about how your heart is working. It can:  · Check blood flow and blood pressure in the chambers of the heart. · Check the pumping action of the heart. · Find out if a heart defect is present and how severe it is. · Find out how well the heart valves work. What happens during the test?  · You will get medicine to help you relax. · A thin tube called a catheter is put into a blood vessel in the groin or the arm. The doctor moves the catheter through the blood vessel into your heart. · You will get a shot to numb the skin where the catheter goes in. You may feel pressure when the doctor moves the catheter through your blood vessel into your heart. · Dye may be injected into your heart. Your doctor can watch on special monitors as the dye moves in your heart. The dye helps your doctor see blood flow in your heart. · You may feel hot or flushed for several seconds when the dye is put in.  · If a heart defect is found, cardiac catheterization sometimes is used to correct it during the test.  How long does it take? · The test will take about 30 minutes. If a problem is found and the doctor treats it, it can take a few hours longer. What happens after the test?  · You will stay in a room for at least a few hours to make sure the catheter site starts to heal. You may have a bandage or a compression device on your groin or arm to prevent bleeding. · If the catheter was placed in your groin, you may lie in bed for a few hours. If the catheter was put in your arm, you will need to keep your arm still for at least 1 hour. · You may or may not need to stay in the hospital overnight.  You will a bandage or a compression device may be placed on your groin or wrist at the catheter insertion site. This prevents bleeding. After the test, you will be taken to a room where the catheter site and your heart rate, blood pressure, and temperature will be checked several times. If the catheter was put in your groin, you will need to lie still and keep your leg straight for several hours. If the catheter was put in your arm, you may be able to sit up and get out of bed right away. But you will need to keep your arm still for at least one hour. The average hospital stay is 1 to 2 days for most procedures. When you go home, you will get instructions from your doctor to help you recover well and prevent problems. Make sure to drink plenty of fluids (unless your doctor tells you not to) for several hours after the test. This will help flush the dye out of your body. Your doctor will prescribe blood-thinning medicines. You will likely take aspirin plus another blood thinner. It is very important that you take these medicines exactly as directed. They help keep the coronary artery open and reduce your risk of a heart attack. If you have this procedure, you will still need to make lifestyle changes like eating healthy, being active, and not smoking. This will give you the best chance for a longer, healthier life. Follow-up care is a key part of your treatment and safety. Be sure to make and go to all appointments, and call your doctor if you are having problems. It's also a good idea to know your test results and keep a list of the medicines you take. Where can you learn more? Go to https://Viroolmarisa.BeeBillion. org and sign in to your [employer] account. Enter M625 in the [employer] box to learn more about \"Learning About Percutaneous Coronary Intervention. \"     If you do not have an account, please click on the \"Sign Up Now\" link.   Current as of: September 21, 2016  Content Version: 11.5  © Angina: Care Instructions  Your Care Instructions    You have a problem called angina. Angina happens when there is not enough blood flow to your heart muscle. Angina is a sign of coronary artery disease (CAD). CAD occurs when blood vessels that supply the heart become narrowed. Having CAD increases your risk of a heart attack. Chest pain or pressure is the most common symptom of angina. But some people have other symptoms, like:  · Pain, pressure, or a strange feeling in the back, neck, jaw, or upper belly, or in one or both shoulders or arms. · Shortness of breath. · Nausea or vomiting. · Lightheadedness or sudden weakness. · Fast or irregular heartbeat. Women are somewhat more likely than men to have angina symptoms like shortness of breath, nausea, and back or jaw pain. Angina can be dangerous. That's why it is important to pay attention to your symptoms. Know what is typical for you, learn how to control your symptoms, and understand when you need to get treatment. A change in your usual pattern of symptoms is an emergency. It may mean that you are having a heart attack. The doctor has checked you carefully, but problems can develop later. If you notice any problems or new symptoms, get medical treatment right away. Follow-up care is a key part of your treatment and safety. Be sure to make and go to all appointments, and call your doctor if you are having problems. It's also a good idea to know your test results and keep a list of the medicines you take. How can you care for yourself at home? Medicines  ? · If your doctor has given you nitroglycerin for angina symptoms, keep it with you at all times. If you have symptoms, sit down and rest, and take the first dose of nitroglycerin as directed. If your symptoms get worse or are not getting better within 5 minutes, call 911 right away. Stay on the phone. The emergency  will give you further instructions.    ? · If your doctor advises it, may be listed somewhere in the store. These foods usually have no sodium or low sodium. · The Nutrition Facts label also gives you the Percent Daily Value for sodium. This is how much of the recommended amount of sodium a serving contains. The daily value for sodium is less than 2,300 mg. So if the Percent Daily Value says 50%, this means one serving is giving you half of this, or 1,150 mg. Buy low-sodium foods  · Look for foods that are made with less sodium. Watch for the following words on the label. ¨ \"Unsalted\" means there is no sodium added to the food. But there may be sodium already in the food naturally. ¨ \"Sodium-free\" means a serving has less than 5 mg of sodium. ¨ \"Very low sodium\" means a serving has 35 mg or less of sodium. ¨ \"Low-sodium\" means a serving has 140 mg or less of sodium. · \"Reduced-sodium\" means that there is 25% less sodium than what the food normally has. This is still usually too much sodium. Try not to buy foods with this on the label. · Buy fresh vegetables, or frozen vegetables without added sauces. Buy low-sodium versions of canned vegetables, soups, and other canned goods. Where can you learn more? Go to https://Napartner.Sirrus Technology. org and sign in to your [employer] account. Enter 26 [id number] in the [facility] box to learn more about \"How to Read a Food Label to Limit Sodium: Care Instructions. \"     If you do not have an account, please click on the \"Sign Up Now\" link. Current as of: May 12, 2017  Content Version: 11.5  © 3823-0835 [employer]. Care instructions adapted under license by [facility] ([facility]). If you have questions about a medical condition or this instruction, always ask your healthcare professional. [address] any warranty or liability for your use of this information.

## 2018-02-05 NOTE — PROGRESS NOTES
ALT 54 01/08/2018      No results found for: Lightswitch  Lab Results   Component Value Date    LABA1C 6.2 08/29/2017     Lab Results   Component Value Date    CKTOTAL 189 07/24/2017    TROPONINI <0.01 01/08/2018       Cardiac, Vascular and Imaging Data       Echo: 9/2016  Summary  Normal LV size and systolic function: EF is   60%. Grade I diastolic  dysfunction. Left atrium is of normal size. Normal right ventricular size. Stress Test: 9/2016  Summary    Pharmacological Stress/MPI Results:        1. Technically a satisfactory study.    2. Normal pharmacological stress portion of the study.    3. No evidence of Ischemia by Myocardial Perfusion Imaging.    4. Gated Study shows normal LV size and Systolic function; EF is 70 %.       Cath:  7/26/17  INTERVENTIONS PERFORMED:  1. We intervened on the second diagonal branch of the left anterior  descending and performed a balloon angioplasty only using 2.5 x 12-mm  balloon catheter. This was an 80% stenotic lesion which was reduced  to 20%. 2.  Distal left anterior descending artery also had stenosis, this was  90% and after balloon angioplasty reduced to 20%.    Lexiscan stress test 9/12/17   Summary    There is no obvious ischemia in this study. There is an area of decrease    tracer uptake at the apex both at stress and with rest that could represent    an area of prior infarct but is probably more bowel artifact.    Diaphragmatic and bowel attenuation present.    Non-diagnostic EKG response due to failure to reach target heart rate.    Appropriate hemodynamic response to [place] with no significant ST changes.      ECHO: 10/6/17   Summary   Left ventricular size is normal.   Mild concentric left ventricular hypertrophy is present.   Global left ventricular function is normal with ejection fraction estimated from 55 % to 60 %.   Normal right ventricular size and function.   Diastolic filling parameters suggests grade I diastolic dysfunction .       Assessment and

## 2018-02-13 ENCOUNTER — TELEPHONE (OUTPATIENT)
Dept: CARDIOLOGY CLINIC | Age: 53
End: 2018-02-13

## 2018-02-13 NOTE — TELEPHONE ENCOUNTER
Pt preferred a Friday. Offered 2/16 and 2/23/18. Patient and his wife chose 2/23/18    Procedure scheduled  2/23/18  1:30pm         Pt to arrive & report to [facility] cath lab  12:00pm  Pre procedure labs due (2/12-2/22)    NPO 6:00am day of procedure. Ok to take all med's in am with sip of water. Instructed patient to take 4 tablets of 81mg Baby Aspirin on the procedure day only.  at discharge  If you go home the same day as your procedure someone needs to stay with you overnight.      HFU ov   3/15/15  1:45pm Dr. Merlin Guerra # 80  Handouts given to & reviewed with patient and Dipak Ibrahim RN (his wife)    Patient expressed understanding

## 2018-02-15 ENCOUNTER — TELEPHONE (OUTPATIENT)
Dept: PULMONOLOGY | Age: 53
End: 2018-02-15

## 2018-03-02 ENCOUNTER — OFFICE VISIT (OUTPATIENT)
Dept: CARDIOLOGY CLINIC | Age: 53
End: 2018-03-02

## 2018-03-02 VITALS
WEIGHT: 254 LBS | SYSTOLIC BLOOD PRESSURE: 124 MMHG | BODY MASS INDEX: 36.36 KG/M2 | HEIGHT: 70 IN | OXYGEN SATURATION: 90 % | DIASTOLIC BLOOD PRESSURE: 62 MMHG | HEART RATE: 86 BPM

## 2018-03-02 DIAGNOSIS — R33.9 URINARY RETENTION: ICD-10-CM

## 2018-03-02 DIAGNOSIS — E78.5 HYPERLIPIDEMIA LDL GOAL <70: ICD-10-CM

## 2018-03-02 DIAGNOSIS — I25.10 CORONARY ARTERY DISEASE INVOLVING NATIVE CORONARY ARTERY OF NATIVE HEART WITHOUT ANGINA PECTORIS: ICD-10-CM

## 2018-03-02 DIAGNOSIS — I10 ESSENTIAL HYPERTENSION: ICD-10-CM

## 2018-03-02 DIAGNOSIS — R06.02 SOB (SHORTNESS OF BREATH): Primary | ICD-10-CM

## 2018-03-02 PROCEDURE — 93000 ELECTROCARDIOGRAM COMPLETE: CPT | Performed by: INTERNAL MEDICINE

## 2018-03-02 PROCEDURE — 99214 OFFICE O/P EST MOD 30 MIN: CPT | Performed by: INTERNAL MEDICINE

## 2018-03-02 PROCEDURE — G8484 FLU IMMUNIZE NO ADMIN: HCPCS | Performed by: INTERNAL MEDICINE

## 2018-03-02 PROCEDURE — 3017F COLORECTAL CA SCREEN DOC REV: CPT | Performed by: INTERNAL MEDICINE

## 2018-03-02 PROCEDURE — G8427 DOCREV CUR MEDS BY ELIG CLIN: HCPCS | Performed by: INTERNAL MEDICINE

## 2018-03-02 PROCEDURE — 1036F TOBACCO NON-USER: CPT | Performed by: INTERNAL MEDICINE

## 2018-03-02 PROCEDURE — G8417 CALC BMI ABV UP PARAM F/U: HCPCS | Performed by: INTERNAL MEDICINE

## 2018-03-02 PROCEDURE — G8598 ASA/ANTIPLAT THER USED: HCPCS | Performed by: INTERNAL MEDICINE

## 2018-03-02 RX ORDER — FUROSEMIDE 20 MG/1
20 TABLET ORAL DAILY
Qty: 30 TABLET | Refills: 4 | Status: SHIPPED | OUTPATIENT
Start: 2018-03-02 | End: 2018-04-11 | Stop reason: SDUPTHER

## 2018-03-02 NOTE — PROGRESS NOTES
[address]    Belinda Rosa  1965 March 2, 2018    Reason for Consult:  CAD    HPI: The patient is 50 y.o. male with a past medical history significant for CAD, HTN and COPD. He presented to the hospital on 7/24/17 with complaints of chest pain and worsening shortness of breath. A left heart catheterization was performed by Dr. Sebastien Christiansen which revealed significant 2nd diagonal and distal LAD disease. Only balloon angioplasty w3as performed to the 80% diagonal lesion and more distal 90% LAD lesion resulting in 20% residual stenosis. He presents to the office today in follow-up. He reports feeling well after his catheterization and then a few weeks after he began with worsening shortness of breath. He was seen by Dr. Troy Dennis and was told his symptoms were not related to pulmonary issues. He does experience occasional chest discomfort. Nothing requiring sublingual nitroglycerin. He did complete cardiac rehab. He reports walking daily but does have to stop at times to catch his breath. He has been using CPAP therapy since this past October. He does report sleeping better but does not feel better overall. Prior to his heart catheterization, he was experiencing shortness of breath. A few days prior to presenting to the hospital he did experience some chest pain with associated arm pain. He took 2 baby aspirin to help alleviate this pain. Review of Systems:  Constitutional: No fatigue, weakness, night sweats or fever. HEENT: No new vision difficulties or ringing in the ears. Respiratory: No new SOB, PND, orthopnea or cough. Cardiovascular: See HPI   GI: No n/v, diarrhea, constipation, abdominal pain or changes in bowel habits. No melena, no hematochezia  : No urinary frequency, urgency, incontinence, hematuria or dysuria. Skin: No cyanosis or skin lesions. Musculoskeletal: No new muscle or joint pain. Neurological: No syncope or TIA-like symptoms.   Psychiatric: No tablet up to max of 3 total doses. If no relief after 1 dose, call 911. 25 tablet 3    albuterol sulfate HFA (PROAIR HFA) 108 (90 BASE) MCG/ACT inhaler Inhale 2 puffs into the lungs every 4 hours as needed for Wheezing or Shortness of Breath 1 Inhaler 6    budesonide (PULMICORT) 0.5 MG/2ML nebulizer suspension Take 2 mLs by nebulization 2 times daily 120 ampule 3    azelastine (ASTELIN) 0.1 % nasal spray 2 sprays by Nasal route 2 times daily Use in each nostril as directed 1 Bottle 3    sodium chloride 3 % nebulizer solution Take 15 mLs by nebulization 2 times daily 900 mL 0     No current facility-administered medications for this visit. Physical Exam:   /62 (Site: Left Arm, Position: Sitting)   Pulse 86   Ht 5' 10\" (1.778 m)   Wt 254 lb (115.2 kg)   SpO2 90%   BMI 36.45 kg/m²   No intake or output data in the 24 hours ending 03/02/18 0925  Wt Readings from Last 2 Encounters:   03/02/18 254 lb (115.2 kg)   02/05/18 259 lb (117.5 kg)     Constitutional: He is oriented to person, place, and time. He appears well-developed and well-nourished. In no acute distress. Head: Normocephalic and atraumatic. Neck: Neck supple. No JVD present. Carotid bruit is not present. No mass and no thyromegaly present. No lymphadenopathy present. Cardiovascular: Normal rate, regular rhythm, normal heart sounds and intact distal pulses. Exam reveals no gallop and no friction rub. No murmur heard. Pulmonary/Chest: Effort normal and breath sounds normal. No respiratory distress. He has no wheezes, rhonchi or rales. Abdominal: Soft, non-tender. Bowel sounds and aorta are normal. He exhibits no organomegaly, mass or bruit. Extremities: No edema, cyanosis, or clubbing. Pulses are 2+ radial/carotid/dorsalis pedis and posterior tibial bilaterally. Neurological: He is alert and oriented to person, place, and time. He has normal reflexes. No cranial nerve deficit.  Coordination normal.   Skin: Skin is warm and dry. There is no rash or diaphoresis. Psychiatric: He has a normal mood and affect. His speech is normal and behavior is normal.     EKG Interpretation: Sinus rhythm with prior septal infarct    Echo: 9/2016  Summary  Normal LV size and systolic function: EF is   60%. Grade I diastolic  dysfunction. Left atrium is of normal size. Normal right ventricular size.     Stress Test: 9/2016  Summary    Pharmacological Stress/MPI Results:        1. Technically a satisfactory study.    2. Normal pharmacological stress portion of the study.    3. No evidence of Ischemia by Myocardial Perfusion Imaging.    4. Gated Study shows normal LV size and Systolic function; EF is 70 %.        Cath:  7/26/17  INTERVENTIONS PERFORMED:  1.  We intervened on the second diagonal branch of the left anterior  descending and performed a balloon angioplasty only using 2.5 x 12-mm  balloon catheter.  This was an 80% stenotic lesion which was reduced  to 20%. 2.  Distal left anterior descending artery also had stenosis, this was  90% and after balloon angioplasty reduced to 20%.    Lexiscan stress test 9/12/17   Summary    There is no obvious ischemia in this study.  There is an area of decrease    tracer uptake at the apex both at stress and with rest that could represent    an area of prior infarct but is probably more bowel artifact.    Diaphragmatic and bowel attenuation present.    Non-diagnostic EKG response due to failure to reach target heart rate.    Appropriate hemodynamic response to Lexiscan with no significant ST changes.      ECHO: 10/6/17   Summary   Left ventricular size is normal.   Mild concentric left ventricular hypertrophy is present.   Global left ventricular function is normal with ejection fraction estimated from 55 % to 60 %.   Normal right ventricular size and function.   Diastolic filling parameters suggests grade I diastolic dysfunction .       Lab Review:   Lab Results   Component Value Date    TRIG 124

## 2018-03-02 NOTE — PATIENT INSTRUCTIONS
Patient Education        Learning About Coronary Artery Disease (CAD)  What is coronary artery disease? Coronary artery disease (CAD) occurs when plaque builds up in the arteries that bring oxygen-rich blood to your heart. Plaque is a fatty substance made of cholesterol, calcium, and other substances in the blood. This process is called hardening of the arteries, or atherosclerosis. What happens when you have coronary artery disease? · Plaque may narrow the coronary arteries. Narrowed arteries cause poor blood flow. This can lead to angina symptoms such as chest pain or discomfort. If blood flow is completely blocked, you could have a heart attack. · You can slow CAD and reduce the risk of future problems by making changes in your lifestyle. These include quitting smoking and eating heart-healthy foods. · Treatments for CAD, along with changes in your lifestyle, can help you live a longer and healthier life. How can you prevent coronary artery disease? · Do not smoke. It may be the best thing you can do to prevent heart disease. If you need help quitting, talk to your doctor about stop-smoking programs and medicines. These can increase your chances of quitting for good. · Be active. Get at least 30 minutes of exercise on most days of the week. Walking is a good choice. You also may want to do other activities, such as running, swimming, cycling, or playing tennis or team sports. · Eat heart-healthy foods. Eat more fruits and vegetables and less foods that contain saturated and trans fats. Limit alcohol, sodium, and sweets. · Stay at a healthy weight. Lose weight if you need to. · Manage other health problems such as diabetes, high blood pressure, and high cholesterol. · Manage stress. Stress can hurt your heart. To keep stress low, talk about your problems and feelings. Don't keep your feelings hidden. · If you have talked about it with your doctor, take a low-dose aspirin every day.  Aspirin can help certain people lower their risk of a heart attack or stroke. But taking aspirin isn't right for everyone, because it can cause serious bleeding. Do not start taking daily aspirin unless your doctor knows about it. How is coronary artery disease treated? · Your doctor will suggest that you make lifestyle changes. For example, your doctor may ask you to eat healthy foods, quit smoking, lose extra weight, and be more active. · You will have to take medicines. · Your doctor may suggest a procedure to open narrowed or blocked arteries. This is called angioplasty. Or your doctor may suggest using healthy blood vessels to create detours around narrowed or blocked arteries. This is called bypass surgery. Follow-up care is a key part of your treatment and safety. Be sure to make and go to all appointments, and call your doctor if you are having problems. It's also a good idea to know your test results and keep a list of the medicines you take. Where can you learn more? Go to https://[employer].Brandtone. org and sign in to your [employer] account. Enter [phone] in the [employer] box to learn more about \"Learning About Coronary Artery Disease (CAD). \"     If you do not have an account, please click on the \"Sign Up Now\" link. Current as of: September 21, 2016  Content Version: 11.5  © 6713-3539 [employer]. Care instructions adapted under license by [facility] ([facility]). If you have questions about a medical condition or this instruction, always ask your healthcare professional. [place] [address] any warranty or liability for your use of this information.

## 2018-03-06 ENCOUNTER — TELEPHONE (OUTPATIENT)
Dept: BARIATRICS/WEIGHT MGMT | Age: 53
End: 2018-03-06

## 2018-03-12 ENCOUNTER — OFFICE VISIT (OUTPATIENT)
Dept: PULMONOLOGY | Age: 53
End: 2018-03-12

## 2018-03-12 VITALS
BODY MASS INDEX: 34.5 KG/M2 | SYSTOLIC BLOOD PRESSURE: 127 MMHG | DIASTOLIC BLOOD PRESSURE: 74 MMHG | HEART RATE: 75 BPM | HEIGHT: 70 IN | OXYGEN SATURATION: 91 % | TEMPERATURE: 97.3 F | RESPIRATION RATE: 20 BRPM | WEIGHT: 241 LBS

## 2018-03-12 DIAGNOSIS — J96.11 CHRONIC RESPIRATORY FAILURE WITH HYPOXIA (HCC): ICD-10-CM

## 2018-03-12 DIAGNOSIS — G47.33 OSA TREATED WITH BIPAP: ICD-10-CM

## 2018-03-12 DIAGNOSIS — I25.10 CORONARY ARTERY DISEASE INVOLVING NATIVE CORONARY ARTERY OF NATIVE HEART WITHOUT ANGINA PECTORIS: ICD-10-CM

## 2018-03-12 DIAGNOSIS — J44.9 COPD, SEVERE (HCC): ICD-10-CM

## 2018-03-12 DIAGNOSIS — R06.02 SOB (SHORTNESS OF BREATH): ICD-10-CM

## 2018-03-12 DIAGNOSIS — J84.9 ILD (INTERSTITIAL LUNG DISEASE) (HCC): Primary | ICD-10-CM

## 2018-03-12 LAB
ANION GAP SERPL CALCULATED.3IONS-SCNC: 17 MMOL/L (ref 3–16)
BUN BLDV-MCNC: 19 MG/DL (ref 7–20)
CALCIUM SERPL-MCNC: 9.2 MG/DL (ref 8.3–10.6)
CHLORIDE BLD-SCNC: 103 MMOL/L (ref 99–110)
CO2: 22 MMOL/L (ref 21–32)
CREAT SERPL-MCNC: 1 MG/DL (ref 0.9–1.3)
GFR AFRICAN AMERICAN: >60
GFR NON-AFRICAN AMERICAN: >60
GLUCOSE BLD-MCNC: 100 MG/DL (ref 70–99)
POTASSIUM SERPL-SCNC: 4.8 MMOL/L (ref 3.5–5.1)
PRO-BNP: 16 PG/ML (ref 0–124)
SODIUM BLD-SCNC: 142 MMOL/L (ref 136–145)

## 2018-03-12 PROCEDURE — 1036F TOBACCO NON-USER: CPT | Performed by: INTERNAL MEDICINE

## 2018-03-12 PROCEDURE — 3017F COLORECTAL CA SCREEN DOC REV: CPT | Performed by: INTERNAL MEDICINE

## 2018-03-12 PROCEDURE — G8598 ASA/ANTIPLAT THER USED: HCPCS | Performed by: INTERNAL MEDICINE

## 2018-03-12 PROCEDURE — 99214 OFFICE O/P EST MOD 30 MIN: CPT | Performed by: INTERNAL MEDICINE

## 2018-03-12 PROCEDURE — G8482 FLU IMMUNIZE ORDER/ADMIN: HCPCS | Performed by: INTERNAL MEDICINE

## 2018-03-12 PROCEDURE — G8417 CALC BMI ABV UP PARAM F/U: HCPCS | Performed by: INTERNAL MEDICINE

## 2018-03-12 PROCEDURE — G8926 SPIRO NO PERF OR DOC: HCPCS | Performed by: INTERNAL MEDICINE

## 2018-03-12 PROCEDURE — G8427 DOCREV CUR MEDS BY ELIG CLIN: HCPCS | Performed by: INTERNAL MEDICINE

## 2018-03-12 PROCEDURE — 3023F SPIROM DOC REV: CPT | Performed by: INTERNAL MEDICINE

## 2018-03-12 ASSESSMENT — SLEEP AND FATIGUE QUESTIONNAIRES

## 2018-03-12 NOTE — PROGRESS NOTES
azelastine (ASTELIN) 0.1 % nasal spray, 2 sprays by Nasal route 2 times daily Use in each nostril as directed, Disp: 1 Bottle, Rfl: 3    sodium chloride 3 % nebulizer solution, Take 15 mLs by nebulization 2 times daily, Disp: 900 mL, Rfl: 0    ROS:  GENERAL:  No fevers, chills, or night sweats: + intentional weight loss  HEENT:  No double vision, blurry vision, or difficulty swallowing;  HEART:  No chest pain, no palpitations  LUNGS:  SOB with any activity, no help with oxygen or inhalers  ABDOMEN:  No abdominal pains, no changes in stools  GENITOURINARY:  No increased frequency, no blood in urine  EXTREMITIES:  No swelling, no lesions  NEURO:  No numbness or tingling, no seizures  SKIN:  No new rashes, no changes in hair or nails  LYMPH:  No masses, no swelling neck, armpits, or groin      PHYSICAL EXAM:  GENERAL:  Well nourished, alert, appears stated age, no distress, plethoric facie  HEENT:  No scleral icterus, no conjunctival irritation, Mallampati IV with bifid pharnyx  NECK:  No thyromegaly, no bruits  LYMPH:  No cervical or supraclavicular adenopathy  HEART:  Regular rate and rhythm, no murmurs  LUNGS:  Clear but diminished  ABDOMEN:  No distention, no organomegaly  EXTREMITIES:  Trace edema, no digital clubbing  NEURO:  No localizing deficits, CN II-XII intact    Pulmonary Function Testing 10/2015  A very severe obstructive lung defect is present with minimal  broncho-reactivity. There is also air trapping and a moderate reduction in  diffusion capacity. These lung function tests are most consistent with  severe chronic obstructive pulmonary disease. In comparison with pulmonary  function testing performed in October of 2012, there has been significant  reduction in the forced vital capacity and FEV1. Pulmonary Function Testing 10/2017  PFT does  demonstrates obstruction with FEV1 of 29 %  with   bronchodilator response with associated decrease in diffusion   capacity.  Air trapping is  present.

## 2018-03-20 VITALS — WEIGHT: 232 LBS | BODY MASS INDEX: 33.29 KG/M2

## 2018-03-26 ENCOUNTER — HOSPITAL ENCOUNTER (OUTPATIENT)
Dept: NUCLEAR MEDICINE | Age: 53
Discharge: HOME | End: 2018-03-26
Admitting: INTERNAL MEDICINE

## 2018-03-26 DIAGNOSIS — R06.02 SOB (SHORTNESS OF BREATH): ICD-10-CM

## 2018-03-26 DIAGNOSIS — J84.9 ILD (INTERSTITIAL LUNG DISEASE) (HCC): ICD-10-CM

## 2018-03-26 RX ADMIN — Medication 20 MILLICURIE: at 10:29

## 2018-03-26 RX ADMIN — Medication 6 MILLICURIE: at 10:29

## 2018-04-04 RX ORDER — OMEPRAZOLE 20 MG/1
CAPSULE, DELAYED RELEASE ORAL
Qty: 180 CAPSULE | Refills: 3 | Status: SHIPPED | OUTPATIENT
Start: 2018-04-04 | End: 2019-04-12 | Stop reason: SDUPTHER

## 2018-04-10 ENCOUNTER — HOSPITAL ENCOUNTER (OUTPATIENT)
Dept: OTHER | Age: 53
Discharge: HOME | End: 2018-04-10
Attending: INTERNAL MEDICINE | Admitting: INTERNAL MEDICINE

## 2018-04-10 ENCOUNTER — OFFICE VISIT (OUTPATIENT)
Dept: CARDIOLOGY CLINIC | Age: 53
End: 2018-04-10

## 2018-04-10 VITALS
HEIGHT: 70 IN | HEART RATE: 84 BPM | WEIGHT: 244 LBS | BODY MASS INDEX: 34.93 KG/M2 | OXYGEN SATURATION: 90 % | SYSTOLIC BLOOD PRESSURE: 110 MMHG | DIASTOLIC BLOOD PRESSURE: 72 MMHG

## 2018-04-10 DIAGNOSIS — I25.10 CORONARY ARTERY DISEASE INVOLVING NATIVE CORONARY ARTERY OF NATIVE HEART WITHOUT ANGINA PECTORIS: ICD-10-CM

## 2018-04-10 DIAGNOSIS — R06.02 SOB (SHORTNESS OF BREATH): ICD-10-CM

## 2018-04-10 DIAGNOSIS — I10 ESSENTIAL HYPERTENSION: ICD-10-CM

## 2018-04-10 DIAGNOSIS — E78.5 HYPERLIPIDEMIA LDL GOAL <70: ICD-10-CM

## 2018-04-10 DIAGNOSIS — D75.1 ERYTHROCYTOSIS: ICD-10-CM

## 2018-04-10 DIAGNOSIS — R06.02 SOB (SHORTNESS OF BREATH): Primary | ICD-10-CM

## 2018-04-10 LAB
ANION GAP SERPL CALCULATED.3IONS-SCNC: 19 MMOL/L (ref 3–16)
BUN BLDV-MCNC: 13 MG/DL (ref 7–20)
CALCIUM SERPL-MCNC: 8.6 MG/DL (ref 8.3–10.6)
CHLORIDE BLD-SCNC: 101 MMOL/L (ref 99–110)
CO2: 20 MMOL/L (ref 21–32)
CREAT SERPL-MCNC: 0.9 MG/DL (ref 0.9–1.3)
GFR AFRICAN AMERICAN: >60
GFR NON-AFRICAN AMERICAN: >60
GLUCOSE BLD-MCNC: 189 MG/DL (ref 70–99)
HCT VFR BLD CALC: 53 % (ref 40.5–52.5)
HEMOGLOBIN: 18.2 G/DL (ref 13.5–17.5)
INR BLD: 1.08 (ref 0.85–1.15)
MCH RBC QN AUTO: 29.1 PG (ref 26–34)
MCHC RBC AUTO-ENTMCNC: 34.3 G/DL (ref 31–36)
MCV RBC AUTO: 84.7 FL (ref 80–100)
PDW BLD-RTO: 14.4 % (ref 12.4–15.4)
PLATELET # BLD: 146 K/UL (ref 135–450)
PMV BLD AUTO: 8.6 FL (ref 5–10.5)
POTASSIUM SERPL-SCNC: 3.4 MMOL/L (ref 3.5–5.1)
PROTHROMBIN TIME: 12.2 SEC (ref 9.6–13)
RBC # BLD: 6.26 M/UL (ref 4.2–5.9)
SODIUM BLD-SCNC: 140 MMOL/L (ref 136–145)
WBC # BLD: 5.9 K/UL (ref 4–11)

## 2018-04-10 PROCEDURE — 1036F TOBACCO NON-USER: CPT | Performed by: INTERNAL MEDICINE

## 2018-04-10 PROCEDURE — G8427 DOCREV CUR MEDS BY ELIG CLIN: HCPCS | Performed by: INTERNAL MEDICINE

## 2018-04-10 PROCEDURE — G8417 CALC BMI ABV UP PARAM F/U: HCPCS | Performed by: INTERNAL MEDICINE

## 2018-04-10 PROCEDURE — 99215 OFFICE O/P EST HI 40 MIN: CPT | Performed by: INTERNAL MEDICINE

## 2018-04-10 PROCEDURE — G8598 ASA/ANTIPLAT THER USED: HCPCS | Performed by: INTERNAL MEDICINE

## 2018-04-10 PROCEDURE — 3017F COLORECTAL CA SCREEN DOC REV: CPT | Performed by: INTERNAL MEDICINE

## 2018-04-11 DIAGNOSIS — R06.02 SOB (SHORTNESS OF BREATH): ICD-10-CM

## 2018-04-11 DIAGNOSIS — I25.10 CORONARY ARTERY DISEASE INVOLVING NATIVE CORONARY ARTERY OF NATIVE HEART WITHOUT ANGINA PECTORIS: Primary | ICD-10-CM

## 2018-04-11 RX ORDER — FUROSEMIDE 20 MG/1
20 TABLET ORAL EVERY OTHER DAY
Qty: 30 TABLET | Refills: 4
Start: 2018-04-11 | End: 2018-04-16

## 2018-04-12 DIAGNOSIS — R06.02 SOB (SHORTNESS OF BREATH): Primary | ICD-10-CM

## 2018-04-12 DIAGNOSIS — R71.8 ABNORMAL RBC: ICD-10-CM

## 2018-04-13 ENCOUNTER — TELEPHONE (OUTPATIENT)
Dept: CARDIOLOGY CLINIC | Age: 53
End: 2018-04-13

## 2018-04-13 ENCOUNTER — TELEPHONE (OUTPATIENT)
Dept: FAMILY MEDICINE CLINIC | Age: 53
End: 2018-04-13

## 2018-04-13 ENCOUNTER — HOSPITAL ENCOUNTER (OUTPATIENT)
Dept: CT IMAGING | Age: 53
Discharge: HOME | End: 2018-04-13
Attending: FAMILY MEDICINE | Admitting: FAMILY MEDICINE

## 2018-04-13 ENCOUNTER — OFFICE VISIT (OUTPATIENT)
Dept: FAMILY MEDICINE CLINIC | Age: 53
End: 2018-04-13

## 2018-04-13 VITALS
DIASTOLIC BLOOD PRESSURE: 82 MMHG | HEART RATE: 91 BPM | SYSTOLIC BLOOD PRESSURE: 136 MMHG | BODY MASS INDEX: 35.15 KG/M2 | WEIGHT: 245 LBS

## 2018-04-13 DIAGNOSIS — R30.0 DYSURIA: ICD-10-CM

## 2018-04-13 DIAGNOSIS — N20.1 LEFT URETERAL STONE: ICD-10-CM

## 2018-04-13 DIAGNOSIS — R10.9 LEFT FLANK PAIN: ICD-10-CM

## 2018-04-13 DIAGNOSIS — R10.9 ABDOMINAL PAIN: ICD-10-CM

## 2018-04-13 DIAGNOSIS — R10.9 LEFT FLANK PAIN: Primary | ICD-10-CM

## 2018-04-13 LAB
A/G RATIO: 1.7 (ref 1.1–2.2)
ALBUMIN SERPL-MCNC: 4.5 G/DL (ref 3.4–5)
ALP BLD-CCNC: 97 U/L (ref 40–129)
ALT SERPL-CCNC: 46 U/L (ref 10–40)
ANION GAP SERPL CALCULATED.3IONS-SCNC: 17 MMOL/L (ref 3–16)
AST SERPL-CCNC: 26 U/L (ref 15–37)
BASOPHILS ABSOLUTE: 0 K/UL (ref 0–0.2)
BASOPHILS RELATIVE PERCENT: 0.5 %
BILIRUB SERPL-MCNC: 0.7 MG/DL (ref 0–1)
BILIRUBIN, POC: NEGATIVE
BLOOD URINE, POC: ABNORMAL
BUN BLDV-MCNC: 12 MG/DL (ref 7–20)
CALCIUM SERPL-MCNC: 9.2 MG/DL (ref 8.3–10.6)
CHLORIDE BLD-SCNC: 105 MMOL/L (ref 99–110)
CLARITY, POC: NEGATIVE
CO2: 21 MMOL/L (ref 21–32)
COLOR, POC: NEGATIVE
CREAT SERPL-MCNC: 1 MG/DL (ref 0.9–1.3)
EOSINOPHILS ABSOLUTE: 0.2 K/UL (ref 0–0.6)
EOSINOPHILS RELATIVE PERCENT: 3.6 %
GFR AFRICAN AMERICAN: >60
GFR NON-AFRICAN AMERICAN: >60
GLOBULIN: 2.6 G/DL
GLUCOSE BLD-MCNC: 119 MG/DL (ref 70–99)
GLUCOSE URINE, POC: NEGATIVE
HCT VFR BLD CALC: 54.6 % (ref 40.5–52.5)
HEMOGLOBIN: 18 G/DL (ref 13.5–17.5)
KETONES, POC: NEGATIVE
LEUKOCYTE EST, POC: NEGATIVE
LYMPHOCYTES ABSOLUTE: 1.2 K/UL (ref 1–5.1)
LYMPHOCYTES RELATIVE PERCENT: 23.2 %
MCH RBC QN AUTO: 28.9 PG (ref 26–34)
MCHC RBC AUTO-ENTMCNC: 33 G/DL (ref 31–36)
MCV RBC AUTO: 87.4 FL (ref 80–100)
MONOCYTES ABSOLUTE: 0.5 K/UL (ref 0–1.3)
MONOCYTES RELATIVE PERCENT: 10.3 %
NEUTROPHILS ABSOLUTE: 3.2 K/UL (ref 1.7–7.7)
NEUTROPHILS RELATIVE PERCENT: 62.4 %
NITRITE, POC: NEGATIVE
PDW BLD-RTO: 14.8 % (ref 12.4–15.4)
PH, POC: 6
PLATELET # BLD: 156 K/UL (ref 135–450)
PMV BLD AUTO: 8.9 FL (ref 5–10.5)
POTASSIUM SERPL-SCNC: 4.2 MMOL/L (ref 3.5–5.1)
PROTEIN, POC: ABNORMAL
RBC # BLD: 6.25 M/UL (ref 4.2–5.9)
SODIUM BLD-SCNC: 143 MMOL/L (ref 136–145)
SPECIFIC GRAVITY, POC: >=1.03
TOTAL PROTEIN: 7.1 G/DL (ref 6.4–8.2)
UROBILINOGEN, POC: ABNORMAL
WBC # BLD: 5.2 K/UL (ref 4–11)

## 2018-04-13 PROCEDURE — 81002 URINALYSIS NONAUTO W/O SCOPE: CPT | Performed by: FAMILY MEDICINE

## 2018-04-13 PROCEDURE — 99214 OFFICE O/P EST MOD 30 MIN: CPT | Performed by: FAMILY MEDICINE

## 2018-04-13 PROCEDURE — G8417 CALC BMI ABV UP PARAM F/U: HCPCS | Performed by: FAMILY MEDICINE

## 2018-04-13 PROCEDURE — G8427 DOCREV CUR MEDS BY ELIG CLIN: HCPCS | Performed by: FAMILY MEDICINE

## 2018-04-13 PROCEDURE — 3017F COLORECTAL CA SCREEN DOC REV: CPT | Performed by: FAMILY MEDICINE

## 2018-04-13 PROCEDURE — 1036F TOBACCO NON-USER: CPT | Performed by: FAMILY MEDICINE

## 2018-04-13 PROCEDURE — 36415 COLL VENOUS BLD VENIPUNCTURE: CPT | Performed by: FAMILY MEDICINE

## 2018-04-13 PROCEDURE — G8598 ASA/ANTIPLAT THER USED: HCPCS | Performed by: FAMILY MEDICINE

## 2018-04-13 RX ORDER — TAMSULOSIN HYDROCHLORIDE 0.4 MG/1
0.4 CAPSULE ORAL DAILY
Qty: 30 CAPSULE | Refills: 3 | Status: SHIPPED | OUTPATIENT
Start: 2018-04-13 | End: 2018-10-09 | Stop reason: ALTCHOICE

## 2018-04-13 RX ORDER — CIPROFLOXACIN 500 MG/1
500 TABLET, FILM COATED ORAL 2 TIMES DAILY
Qty: 28 TABLET | Refills: 0 | Status: SHIPPED | OUTPATIENT
Start: 2018-04-13 | End: 2018-04-27

## 2018-04-13 ASSESSMENT — ENCOUNTER SYMPTOMS
CONSTIPATION: 0
GASTROINTESTINAL NEGATIVE: 1
SHORTNESS OF BREATH: 0
NAUSEA: 0
COUGH: 0
DIARRHEA: 0
ABDOMINAL PAIN: 0
VOMITING: 0

## 2018-04-13 ASSESSMENT — PATIENT HEALTH QUESTIONNAIRE - PHQ9
2. FEELING DOWN, DEPRESSED OR HOPELESS: 0
1. LITTLE INTEREST OR PLEASURE IN DOING THINGS: 0
SUM OF ALL RESPONSES TO PHQ QUESTIONS 1-9: 0
SUM OF ALL RESPONSES TO PHQ9 QUESTIONS 1 & 2: 0

## 2018-04-15 LAB — URINE CULTURE, ROUTINE: NORMAL

## 2018-04-16 ENCOUNTER — HOSPITAL ENCOUNTER (OUTPATIENT)
Dept: CARDIAC CATH/INVASIVE PROCEDURES | Age: 53
Discharge: HOME | End: 2018-04-16
Attending: INTERNAL MEDICINE | Admitting: INTERNAL MEDICINE

## 2018-04-16 ENCOUNTER — TELEPHONE (OUTPATIENT)
Dept: FAMILY MEDICINE CLINIC | Age: 53
End: 2018-04-16

## 2018-04-16 VITALS — WEIGHT: 247.14 LBS | BODY MASS INDEX: 35.38 KG/M2 | HEIGHT: 70 IN

## 2018-04-16 DIAGNOSIS — R06.02 SOB (SHORTNESS OF BREATH): ICD-10-CM

## 2018-04-16 PROCEDURE — 93451 RIGHT HEART CATH: CPT | Performed by: INTERNAL MEDICINE

## 2018-04-16 RX ORDER — ONDANSETRON 2 MG/ML
4 INJECTION INTRAMUSCULAR; INTRAVENOUS EVERY 6 HOURS PRN
Status: DISCONTINUED | OUTPATIENT
Start: 2018-04-16 | End: 2018-04-17 | Stop reason: HOSPADM

## 2018-04-16 RX ORDER — SODIUM CHLORIDE 9 MG/ML
INJECTION, SOLUTION INTRAVENOUS CONTINUOUS
Status: DISCONTINUED | OUTPATIENT
Start: 2018-04-16 | End: 2018-04-17 | Stop reason: HOSPADM

## 2018-04-16 RX ORDER — SODIUM CHLORIDE 0.9 % (FLUSH) 0.9 %
10 SYRINGE (ML) INJECTION EVERY 12 HOURS SCHEDULED
Status: DISCONTINUED | OUTPATIENT
Start: 2018-04-16 | End: 2018-04-16 | Stop reason: ALTCHOICE

## 2018-04-16 RX ORDER — SODIUM CHLORIDE 0.9 % (FLUSH) 0.9 %
10 SYRINGE (ML) INJECTION PRN
Status: DISCONTINUED | OUTPATIENT
Start: 2018-04-16 | End: 2018-04-17 | Stop reason: HOSPADM

## 2018-04-16 RX ORDER — SODIUM CHLORIDE 0.9 % (FLUSH) 0.9 %
10 SYRINGE (ML) INJECTION PRN
Status: DISCONTINUED | OUTPATIENT
Start: 2018-04-16 | End: 2018-04-16 | Stop reason: ALTCHOICE

## 2018-04-16 RX ORDER — ASPIRIN 325 MG
325 TABLET ORAL ONCE
Status: DISCONTINUED | OUTPATIENT
Start: 2018-04-16 | End: 2018-04-16 | Stop reason: ALTCHOICE

## 2018-04-16 RX ORDER — ACETAMINOPHEN 325 MG/1
650 TABLET ORAL EVERY 4 HOURS PRN
Status: DISCONTINUED | OUTPATIENT
Start: 2018-04-16 | End: 2018-04-17 | Stop reason: HOSPADM

## 2018-04-16 RX ORDER — FUROSEMIDE 20 MG/1
20 TABLET ORAL
Qty: 30 TABLET | Refills: 4 | Status: SHIPPED | OUTPATIENT
Start: 2018-04-16 | End: 2018-10-02 | Stop reason: ALTCHOICE

## 2018-04-16 RX ORDER — SODIUM CHLORIDE 0.9 % (FLUSH) 0.9 %
10 SYRINGE (ML) INJECTION EVERY 12 HOURS SCHEDULED
Status: DISCONTINUED | OUTPATIENT
Start: 2018-04-16 | End: 2018-04-17 | Stop reason: HOSPADM

## 2018-05-07 ENCOUNTER — TELEPHONE (OUTPATIENT)
Dept: BARIATRICS/WEIGHT MGMT | Age: 53
End: 2018-05-07

## 2018-05-14 ENCOUNTER — OFFICE VISIT (OUTPATIENT)
Dept: PULMONOLOGY | Age: 53
End: 2018-05-14

## 2018-05-14 ENCOUNTER — OFFICE VISIT (OUTPATIENT)
Dept: FAMILY MEDICINE CLINIC | Age: 53
End: 2018-05-14

## 2018-05-14 VITALS
WEIGHT: 248 LBS | HEART RATE: 83 BPM | BODY MASS INDEX: 35.5 KG/M2 | OXYGEN SATURATION: 90 % | RESPIRATION RATE: 20 BRPM | DIASTOLIC BLOOD PRESSURE: 84 MMHG | SYSTOLIC BLOOD PRESSURE: 126 MMHG | HEIGHT: 70 IN | TEMPERATURE: 97.3 F

## 2018-05-14 VITALS
WEIGHT: 247 LBS | HEIGHT: 70 IN | SYSTOLIC BLOOD PRESSURE: 116 MMHG | DIASTOLIC BLOOD PRESSURE: 78 MMHG | BODY MASS INDEX: 35.36 KG/M2 | OXYGEN SATURATION: 90 % | HEART RATE: 78 BPM | RESPIRATION RATE: 16 BRPM

## 2018-05-14 DIAGNOSIS — J42 FOLLICULAR BRONCHIOLITIS (HCC): ICD-10-CM

## 2018-05-14 DIAGNOSIS — Z13.220 SCREENING FOR HYPERLIPIDEMIA: ICD-10-CM

## 2018-05-14 DIAGNOSIS — J96.11 CHRONIC RESPIRATORY FAILURE WITH HYPOXIA (HCC): ICD-10-CM

## 2018-05-14 DIAGNOSIS — E78.5 DYSLIPIDEMIA: ICD-10-CM

## 2018-05-14 DIAGNOSIS — G47.33 OSA TREATED WITH BIPAP: ICD-10-CM

## 2018-05-14 DIAGNOSIS — Z13.1 SCREENING FOR DIABETES MELLITUS (DM): Primary | ICD-10-CM

## 2018-05-14 DIAGNOSIS — J44.9 COPD, SEVERE (HCC): ICD-10-CM

## 2018-05-14 DIAGNOSIS — J84.9 ILD (INTERSTITIAL LUNG DISEASE) (HCC): Primary | ICD-10-CM

## 2018-05-14 DIAGNOSIS — Z12.11 COLON CANCER SCREENING: ICD-10-CM

## 2018-05-14 DIAGNOSIS — I10 ESSENTIAL HYPERTENSION: ICD-10-CM

## 2018-05-14 DIAGNOSIS — Z00.00 WELL ADULT EXAM: Primary | ICD-10-CM

## 2018-05-14 DIAGNOSIS — R73.03 PREDIABETES: ICD-10-CM

## 2018-05-14 DIAGNOSIS — R06.02 SOB (SHORTNESS OF BREATH): ICD-10-CM

## 2018-05-14 LAB
CHOLESTEROL, TOTAL: 168 MG/DL (ref 0–199)
GLUCOSE BLD-MCNC: 108 MG/DL (ref 70–99)
HBA1C MFR BLD: 6.3 %
HDLC SERPL-MCNC: 31 MG/DL (ref 40–60)
LDL CHOLESTEROL CALCULATED: 112 MG/DL
TRIGL SERPL-MCNC: 127 MG/DL (ref 0–150)
VLDLC SERPL CALC-MCNC: 25 MG/DL

## 2018-05-14 PROCEDURE — G8427 DOCREV CUR MEDS BY ELIG CLIN: HCPCS | Performed by: INTERNAL MEDICINE

## 2018-05-14 PROCEDURE — 99214 OFFICE O/P EST MOD 30 MIN: CPT | Performed by: INTERNAL MEDICINE

## 2018-05-14 PROCEDURE — G8598 ASA/ANTIPLAT THER USED: HCPCS | Performed by: INTERNAL MEDICINE

## 2018-05-14 PROCEDURE — 36415 COLL VENOUS BLD VENIPUNCTURE: CPT | Performed by: FAMILY MEDICINE

## 2018-05-14 PROCEDURE — 3023F SPIROM DOC REV: CPT | Performed by: INTERNAL MEDICINE

## 2018-05-14 PROCEDURE — 3017F COLORECTAL CA SCREEN DOC REV: CPT | Performed by: INTERNAL MEDICINE

## 2018-05-14 PROCEDURE — 99396 PREV VISIT EST AGE 40-64: CPT | Performed by: FAMILY MEDICINE

## 2018-05-14 PROCEDURE — 1036F TOBACCO NON-USER: CPT | Performed by: INTERNAL MEDICINE

## 2018-05-14 PROCEDURE — G8926 SPIRO NO PERF OR DOC: HCPCS | Performed by: INTERNAL MEDICINE

## 2018-05-14 PROCEDURE — G8417 CALC BMI ABV UP PARAM F/U: HCPCS | Performed by: INTERNAL MEDICINE

## 2018-05-14 PROCEDURE — 83036 HEMOGLOBIN GLYCOSYLATED A1C: CPT | Performed by: FAMILY MEDICINE

## 2018-05-14 RX ORDER — MYCOPHENOLATE MOFETIL 500 MG/1
1000 TABLET ORAL 2 TIMES DAILY
Qty: 60 TABLET | Refills: 3 | Status: SHIPPED | OUTPATIENT
Start: 2018-05-14 | End: 2018-08-16 | Stop reason: SDUPTHER

## 2018-05-14 ASSESSMENT — SLEEP AND FATIGUE QUESTIONNAIRES
HOW LIKELY ARE YOU TO NOD OFF OR FALL ASLEEP WHILE SITTING QUIETLY AFTER LUNCH WITHOUT ALCOHOL: 0
HOW LIKELY ARE YOU TO NOD OFF OR FALL ASLEEP WHILE SITTING AND READING: 0
HOW LIKELY ARE YOU TO NOD OFF OR FALL ASLEEP IN A CAR, WHILE STOPPED FOR A FEW MINUTES IN TRAFFIC: 0
HOW LIKELY ARE YOU TO NOD OFF OR FALL ASLEEP WHILE LYING DOWN TO REST IN THE AFTERNOON WHEN CIRCUMSTANCES PERMIT: 0
HOW LIKELY ARE YOU TO NOD OFF OR FALL ASLEEP WHILE WATCHING TV: 0
ESS TOTAL SCORE: 0
HOW LIKELY ARE YOU TO NOD OFF OR FALL ASLEEP WHILE SITTING INACTIVE IN A PUBLIC PLACE: 0
HOW LIKELY ARE YOU TO NOD OFF OR FALL ASLEEP WHEN YOU ARE A PASSENGER IN A CAR FOR AN HOUR WITHOUT A BREAK: 0
HOW LIKELY ARE YOU TO NOD OFF OR FALL ASLEEP WHILE SITTING AND TALKING TO SOMEONE: 0
NECK CIRCUMFERENCE (INCHES): 19.5

## 2018-05-17 ENCOUNTER — TELEPHONE (OUTPATIENT)
Dept: FAMILY MEDICINE CLINIC | Age: 53
End: 2018-05-17

## 2018-05-17 RX ORDER — ROSUVASTATIN CALCIUM 40 MG/1
40 TABLET, COATED ORAL EVERY EVENING
Qty: 90 TABLET | Refills: 1 | Status: SHIPPED | OUTPATIENT
Start: 2018-05-17 | End: 2018-11-27 | Stop reason: SDUPTHER

## 2018-05-21 ENCOUNTER — OFFICE VISIT (OUTPATIENT)
Dept: BARIATRICS/WEIGHT MGMT | Age: 53
End: 2018-05-21

## 2018-05-21 VITALS
SYSTOLIC BLOOD PRESSURE: 142 MMHG | DIASTOLIC BLOOD PRESSURE: 80 MMHG | HEIGHT: 70 IN | BODY MASS INDEX: 35.72 KG/M2 | HEART RATE: 80 BPM | WEIGHT: 249.5 LBS

## 2018-05-21 DIAGNOSIS — G47.33 OSA TREATED WITH BIPAP: ICD-10-CM

## 2018-05-21 DIAGNOSIS — Z71.3 DIETARY COUNSELING AND SURVEILLANCE: ICD-10-CM

## 2018-05-21 DIAGNOSIS — J96.11 CHRONIC RESPIRATORY FAILURE WITH HYPOXIA (HCC): ICD-10-CM

## 2018-05-21 DIAGNOSIS — I10 ESSENTIAL HYPERTENSION: ICD-10-CM

## 2018-05-21 DIAGNOSIS — E66.9 CLASS 2 OBESITY: Primary | ICD-10-CM

## 2018-05-21 PROCEDURE — 99204 OFFICE O/P NEW MOD 45 MIN: CPT | Performed by: FAMILY MEDICINE

## 2018-05-21 PROCEDURE — G8427 DOCREV CUR MEDS BY ELIG CLIN: HCPCS | Performed by: FAMILY MEDICINE

## 2018-05-21 PROCEDURE — 3017F COLORECTAL CA SCREEN DOC REV: CPT | Performed by: FAMILY MEDICINE

## 2018-05-21 PROCEDURE — 1036F TOBACCO NON-USER: CPT | Performed by: FAMILY MEDICINE

## 2018-05-21 PROCEDURE — G8598 ASA/ANTIPLAT THER USED: HCPCS | Performed by: FAMILY MEDICINE

## 2018-05-21 PROCEDURE — G8417 CALC BMI ABV UP PARAM F/U: HCPCS | Performed by: FAMILY MEDICINE

## 2018-05-21 ASSESSMENT — PATIENT HEALTH QUESTIONNAIRE - PHQ9
1. LITTLE INTEREST OR PLEASURE IN DOING THINGS: 0
SUM OF ALL RESPONSES TO PHQ QUESTIONS 1-9: 0
2. FEELING DOWN, DEPRESSED OR HOPELESS: 0
SUM OF ALL RESPONSES TO PHQ9 QUESTIONS 1 & 2: 0

## 2018-05-21 ASSESSMENT — ENCOUNTER SYMPTOMS
GASTROINTESTINAL NEGATIVE: 1
RESPIRATORY NEGATIVE: 1
EYES NEGATIVE: 1

## 2018-05-22 ENCOUNTER — TELEPHONE (OUTPATIENT)
Dept: FAMILY MEDICINE CLINIC | Age: 53
End: 2018-05-22

## 2018-05-22 ENCOUNTER — NURSE ONLY (OUTPATIENT)
Dept: FAMILY MEDICINE CLINIC | Age: 53
End: 2018-05-22

## 2018-05-22 DIAGNOSIS — Z12.11 COLON CANCER SCREENING: ICD-10-CM

## 2018-05-22 LAB
CONTROL: NORMAL
HEMOCCULT STL QL: NEGATIVE

## 2018-05-22 PROCEDURE — 82274 ASSAY TEST FOR BLOOD FECAL: CPT | Performed by: FAMILY MEDICINE

## 2018-06-08 ENCOUNTER — OFFICE VISIT (OUTPATIENT)
Dept: CARDIOLOGY CLINIC | Age: 53
End: 2018-06-08

## 2018-06-08 VITALS
WEIGHT: 235 LBS | BODY MASS INDEX: 33.64 KG/M2 | HEART RATE: 89 BPM | SYSTOLIC BLOOD PRESSURE: 130 MMHG | HEIGHT: 70 IN | DIASTOLIC BLOOD PRESSURE: 82 MMHG | OXYGEN SATURATION: 89 %

## 2018-06-08 DIAGNOSIS — I10 ESSENTIAL HYPERTENSION: ICD-10-CM

## 2018-06-08 DIAGNOSIS — E78.5 HYPERLIPIDEMIA LDL GOAL <70: ICD-10-CM

## 2018-06-08 DIAGNOSIS — D75.1 ERYTHROCYTOSIS: ICD-10-CM

## 2018-06-08 DIAGNOSIS — I25.10 CORONARY ARTERY DISEASE INVOLVING NATIVE CORONARY ARTERY OF NATIVE HEART WITHOUT ANGINA PECTORIS: Primary | ICD-10-CM

## 2018-06-08 DIAGNOSIS — R06.02 SOB (SHORTNESS OF BREATH): ICD-10-CM

## 2018-06-08 PROCEDURE — 93000 ELECTROCARDIOGRAM COMPLETE: CPT | Performed by: INTERNAL MEDICINE

## 2018-06-08 PROCEDURE — G8417 CALC BMI ABV UP PARAM F/U: HCPCS | Performed by: INTERNAL MEDICINE

## 2018-06-08 PROCEDURE — 3017F COLORECTAL CA SCREEN DOC REV: CPT | Performed by: INTERNAL MEDICINE

## 2018-06-08 PROCEDURE — 99213 OFFICE O/P EST LOW 20 MIN: CPT | Performed by: INTERNAL MEDICINE

## 2018-06-08 PROCEDURE — 1036F TOBACCO NON-USER: CPT | Performed by: INTERNAL MEDICINE

## 2018-06-08 PROCEDURE — G8427 DOCREV CUR MEDS BY ELIG CLIN: HCPCS | Performed by: INTERNAL MEDICINE

## 2018-06-08 PROCEDURE — G8598 ASA/ANTIPLAT THER USED: HCPCS | Performed by: INTERNAL MEDICINE

## 2018-06-13 ENCOUNTER — TELEPHONE (OUTPATIENT)
Dept: FAMILY MEDICINE CLINIC | Age: 53
End: 2018-06-13

## 2018-06-13 DIAGNOSIS — J44.9 COPD WITH ASTHMA (HCC): ICD-10-CM

## 2018-06-13 RX ORDER — UMECLIDINIUM 62.5 UG/1
AEROSOL, POWDER ORAL
Qty: 1 EACH | Refills: 4 | Status: SHIPPED | OUTPATIENT
Start: 2018-06-13 | End: 2019-02-26 | Stop reason: SDUPTHER

## 2018-06-15 ENCOUNTER — TELEPHONE (OUTPATIENT)
Dept: FAMILY MEDICINE CLINIC | Age: 53
End: 2018-06-15

## 2018-06-18 DIAGNOSIS — Z79.899 MEDICATION MANAGEMENT: ICD-10-CM

## 2018-06-18 DIAGNOSIS — E66.09 CLASS 1 OBESITY DUE TO EXCESS CALORIES WITH SERIOUS COMORBIDITY AND BODY MASS INDEX (BMI) OF 33.0 TO 33.9 IN ADULT: Primary | ICD-10-CM

## 2018-06-21 DIAGNOSIS — J44.9 COPD WITH ASTHMA (HCC): ICD-10-CM

## 2018-06-22 ENCOUNTER — OFFICE VISIT (OUTPATIENT)
Dept: BARIATRICS/WEIGHT MGMT | Age: 53
End: 2018-06-22

## 2018-06-22 VITALS
HEIGHT: 70 IN | BODY MASS INDEX: 34 KG/M2 | SYSTOLIC BLOOD PRESSURE: 132 MMHG | HEART RATE: 76 BPM | DIASTOLIC BLOOD PRESSURE: 86 MMHG | WEIGHT: 237.5 LBS

## 2018-06-22 DIAGNOSIS — I10 ESSENTIAL HYPERTENSION: ICD-10-CM

## 2018-06-22 DIAGNOSIS — Z71.3 DIETARY COUNSELING AND SURVEILLANCE: ICD-10-CM

## 2018-06-22 DIAGNOSIS — E66.9 CLASS 1 OBESITY: Primary | ICD-10-CM

## 2018-06-22 DIAGNOSIS — E66.09 CLASS 1 OBESITY DUE TO EXCESS CALORIES WITH SERIOUS COMORBIDITY AND BODY MASS INDEX (BMI) OF 33.0 TO 33.9 IN ADULT: ICD-10-CM

## 2018-06-22 DIAGNOSIS — Z79.899 MEDICATION MANAGEMENT: ICD-10-CM

## 2018-06-22 LAB
A/G RATIO: 1.9 (ref 1.1–2.2)
ALBUMIN SERPL-MCNC: 4.4 G/DL (ref 3.4–5)
ALP BLD-CCNC: 90 U/L (ref 40–129)
ALT SERPL-CCNC: 52 U/L (ref 10–40)
ANION GAP SERPL CALCULATED.3IONS-SCNC: 13 MMOL/L (ref 3–16)
AST SERPL-CCNC: 28 U/L (ref 15–37)
BASOPHILS ABSOLUTE: 0 K/UL (ref 0–0.2)
BASOPHILS RELATIVE PERCENT: 0.6 %
BILIRUB SERPL-MCNC: 0.7 MG/DL (ref 0–1)
BUN BLDV-MCNC: 14 MG/DL (ref 7–20)
CALCIUM SERPL-MCNC: 9.2 MG/DL (ref 8.3–10.6)
CHLORIDE BLD-SCNC: 106 MMOL/L (ref 99–110)
CO2: 23 MMOL/L (ref 21–32)
CREAT SERPL-MCNC: 0.9 MG/DL (ref 0.9–1.3)
EOSINOPHILS ABSOLUTE: 0.1 K/UL (ref 0–0.6)
EOSINOPHILS RELATIVE PERCENT: 2.8 %
FOLATE: 8.69 NG/ML (ref 4.78–24.2)
GFR AFRICAN AMERICAN: >60
GFR NON-AFRICAN AMERICAN: >60
GLOBULIN: 2.3 G/DL
GLUCOSE BLD-MCNC: 104 MG/DL (ref 70–99)
HCT VFR BLD CALC: 53.8 % (ref 40.5–52.5)
HEMOGLOBIN: 18.6 G/DL (ref 13.5–17.5)
LYMPHOCYTES ABSOLUTE: 1.2 K/UL (ref 1–5.1)
LYMPHOCYTES RELATIVE PERCENT: 24.1 %
MCH RBC QN AUTO: 28.9 PG (ref 26–34)
MCHC RBC AUTO-ENTMCNC: 34.6 G/DL (ref 31–36)
MCV RBC AUTO: 83.5 FL (ref 80–100)
MONOCYTES ABSOLUTE: 0.6 K/UL (ref 0–1.3)
MONOCYTES RELATIVE PERCENT: 13.1 %
NEUTROPHILS ABSOLUTE: 2.9 K/UL (ref 1.7–7.7)
NEUTROPHILS RELATIVE PERCENT: 59.4 %
PDW BLD-RTO: 13.8 % (ref 12.4–15.4)
PLATELET # BLD: 162 K/UL (ref 135–450)
PMV BLD AUTO: 8.9 FL (ref 5–10.5)
POTASSIUM SERPL-SCNC: 4.6 MMOL/L (ref 3.5–5.1)
RBC # BLD: 6.44 M/UL (ref 4.2–5.9)
SODIUM BLD-SCNC: 142 MMOL/L (ref 136–145)
TOTAL PROTEIN: 6.7 G/DL (ref 6.4–8.2)
TSH REFLEX: 1.03 UIU/ML (ref 0.27–4.2)
VITAMIN B-12: 596 PG/ML (ref 211–911)
VITAMIN D 25-HYDROXY: 30.7 NG/ML
WBC # BLD: 4.9 K/UL (ref 4–11)

## 2018-06-22 PROCEDURE — G8598 ASA/ANTIPLAT THER USED: HCPCS | Performed by: FAMILY MEDICINE

## 2018-06-22 PROCEDURE — 99213 OFFICE O/P EST LOW 20 MIN: CPT | Performed by: FAMILY MEDICINE

## 2018-06-22 PROCEDURE — 1036F TOBACCO NON-USER: CPT | Performed by: FAMILY MEDICINE

## 2018-06-22 PROCEDURE — 3017F COLORECTAL CA SCREEN DOC REV: CPT | Performed by: FAMILY MEDICINE

## 2018-06-22 PROCEDURE — G8417 CALC BMI ABV UP PARAM F/U: HCPCS | Performed by: FAMILY MEDICINE

## 2018-06-22 PROCEDURE — G8427 DOCREV CUR MEDS BY ELIG CLIN: HCPCS | Performed by: FAMILY MEDICINE

## 2018-06-22 RX ORDER — FLUTICASONE FUROATE AND VILANTEROL 200; 25 UG/1; UG/1
POWDER RESPIRATORY (INHALATION)
Qty: 1 EACH | Refills: 3 | OUTPATIENT
Start: 2018-06-22 | End: 2018-10-01 | Stop reason: SDUPTHER

## 2018-06-22 ASSESSMENT — ENCOUNTER SYMPTOMS
EYES NEGATIVE: 1
RESPIRATORY NEGATIVE: 1
GASTROINTESTINAL NEGATIVE: 1

## 2018-07-17 ENCOUNTER — OFFICE VISIT (OUTPATIENT)
Dept: PULMONOLOGY | Age: 53
End: 2018-07-17

## 2018-07-17 VITALS
HEIGHT: 70 IN | BODY MASS INDEX: 34.65 KG/M2 | RESPIRATION RATE: 16 BRPM | WEIGHT: 242 LBS | DIASTOLIC BLOOD PRESSURE: 80 MMHG | OXYGEN SATURATION: 92 % | SYSTOLIC BLOOD PRESSURE: 136 MMHG | TEMPERATURE: 97.9 F | HEART RATE: 74 BPM

## 2018-07-17 DIAGNOSIS — J96.11 CHRONIC RESPIRATORY FAILURE WITH HYPOXIA (HCC): ICD-10-CM

## 2018-07-17 DIAGNOSIS — R04.0 EPISTAXIS: ICD-10-CM

## 2018-07-17 DIAGNOSIS — J84.9 ILD (INTERSTITIAL LUNG DISEASE) (HCC): ICD-10-CM

## 2018-07-17 DIAGNOSIS — J84.9 ILD (INTERSTITIAL LUNG DISEASE) (HCC): Primary | ICD-10-CM

## 2018-07-17 DIAGNOSIS — J42 FOLLICULAR BRONCHIOLITIS (HCC): ICD-10-CM

## 2018-07-17 DIAGNOSIS — J44.9 COPD, SEVERE (HCC): ICD-10-CM

## 2018-07-17 LAB
ALBUMIN SERPL-MCNC: 4.2 G/DL (ref 3.4–5)
ALP BLD-CCNC: 97 U/L (ref 40–129)
ALT SERPL-CCNC: 41 U/L (ref 10–40)
AST SERPL-CCNC: 31 U/L (ref 15–37)
BILIRUB SERPL-MCNC: 0.7 MG/DL (ref 0–1)
BILIRUBIN DIRECT: <0.2 MG/DL (ref 0–0.3)
BILIRUBIN, INDIRECT: ABNORMAL MG/DL (ref 0–1)
TOTAL PROTEIN: 6.7 G/DL (ref 6.4–8.2)

## 2018-07-17 PROCEDURE — G8417 CALC BMI ABV UP PARAM F/U: HCPCS | Performed by: INTERNAL MEDICINE

## 2018-07-17 PROCEDURE — G8926 SPIRO NO PERF OR DOC: HCPCS | Performed by: INTERNAL MEDICINE

## 2018-07-17 PROCEDURE — 99214 OFFICE O/P EST MOD 30 MIN: CPT | Performed by: INTERNAL MEDICINE

## 2018-07-17 PROCEDURE — G8427 DOCREV CUR MEDS BY ELIG CLIN: HCPCS | Performed by: INTERNAL MEDICINE

## 2018-07-17 PROCEDURE — 3017F COLORECTAL CA SCREEN DOC REV: CPT | Performed by: INTERNAL MEDICINE

## 2018-07-17 PROCEDURE — 1036F TOBACCO NON-USER: CPT | Performed by: INTERNAL MEDICINE

## 2018-07-17 PROCEDURE — G8598 ASA/ANTIPLAT THER USED: HCPCS | Performed by: INTERNAL MEDICINE

## 2018-07-17 PROCEDURE — 3023F SPIROM DOC REV: CPT | Performed by: INTERNAL MEDICINE

## 2018-07-17 NOTE — PATIENT INSTRUCTIONS
Continue with inhalers    Continue with cellcept twice a day    Blood work today    Use ocean's spray nasal spray to keep nose moist    Get some over the counter afrin to help stop you bleeding from nose    Avoid using astelin while having issues with dryness     Stay active    Follow up in 2 month

## 2018-07-20 ENCOUNTER — OFFICE VISIT (OUTPATIENT)
Dept: BARIATRICS/WEIGHT MGMT | Age: 53
End: 2018-07-20

## 2018-07-20 VITALS
WEIGHT: 238.5 LBS | BODY MASS INDEX: 34.14 KG/M2 | DIASTOLIC BLOOD PRESSURE: 84 MMHG | SYSTOLIC BLOOD PRESSURE: 136 MMHG | HEART RATE: 88 BPM | HEIGHT: 70 IN

## 2018-07-20 DIAGNOSIS — Z71.3 DIETARY COUNSELING AND SURVEILLANCE: ICD-10-CM

## 2018-07-20 DIAGNOSIS — E66.9 CLASS 1 OBESITY: Primary | ICD-10-CM

## 2018-07-20 PROCEDURE — G8598 ASA/ANTIPLAT THER USED: HCPCS | Performed by: FAMILY MEDICINE

## 2018-07-20 PROCEDURE — 1036F TOBACCO NON-USER: CPT | Performed by: FAMILY MEDICINE

## 2018-07-20 PROCEDURE — 99213 OFFICE O/P EST LOW 20 MIN: CPT | Performed by: FAMILY MEDICINE

## 2018-07-20 PROCEDURE — 3017F COLORECTAL CA SCREEN DOC REV: CPT | Performed by: FAMILY MEDICINE

## 2018-07-20 PROCEDURE — G8417 CALC BMI ABV UP PARAM F/U: HCPCS | Performed by: FAMILY MEDICINE

## 2018-07-20 PROCEDURE — G8427 DOCREV CUR MEDS BY ELIG CLIN: HCPCS | Performed by: FAMILY MEDICINE

## 2018-07-20 ASSESSMENT — ENCOUNTER SYMPTOMS
EYES NEGATIVE: 1
GASTROINTESTINAL NEGATIVE: 1
RESPIRATORY NEGATIVE: 1

## 2018-07-20 NOTE — PATIENT INSTRUCTIONS
Patient Education        Learning About Obesity  What is obesity? Obesity means having so much body fat that your health is in danger. Having too much body fat can lead to type 2 diabetes, heart disease, high blood pressure, arthritis, sleep apnea, and stroke. Even if you don't feel bad now, think about these health risks. Do they seem like a good reason to start on a new path toward a healthier weight? Or do you have another personal, powerful reason for wanting to lose weight? Whatever it is, keep it in mind. It can be hard to change eating habits and exercise habits. But with your own reason and plan, you can do it. How do you know if your weight is in the obesity range? To know if your weight is in the obesity range, your doctor looks at your body mass index (BMI) and waist size. Your BMI is a number that is calculated from your weight and your height. To figure your BMI for yourself, get a BMI table from your doctor or use an online tool, such as http://www.Spireon.com/ on the [employer] of [employer]itness[employer]. What causes obesity? When you take in more calories than you burn off, you gain weight. How you eat, how active you are, and other things affect how your body uses calories and whether you gain weight. If you have family members who have too much body fat, you may have inherited a tendency to gain weight. And your family also helps form your eating and lifestyle habits, which can lead to obesity. Also, our busy lives make it harder to plan and cook healthy meals. For many of us, it's easier to reach for prepared foods, go out to eat, or go to the drive-through. But these foods are often high in saturated fat and calories. Portions are often too large. What can you do to reach a healthy weight? Focus on health, not diets. Diets are hard to stay on and don't work in the long run.  It is very hard to stay with a diet that includes lots of big

## 2018-07-20 NOTE — PROGRESS NOTES
Endocrine: Negative. Musculoskeletal: Negative. Neurological: Negative. Psychiatric/Behavioral: Negative. Physical Exam   Constitutional: He is oriented to person, place, and time. He appears well-developed and well-nourished. Musculoskeletal: He exhibits no edema. Neurological: He is alert and oriented to person, place, and time. Skin: Skin is dry. Psychiatric: His behavior is normal. Thought content normal.       Orders Only on 07/17/2018   Component Date Value Ref Range Status    Total Protein 07/17/2018 6.7  6.4 - 8.2 g/dL Final    Alb 07/17/2018 4.2  3.4 - 5.0 g/dL Final    Alkaline Phosphatase 07/17/2018 97  40 - 129 U/L Final    ALT 07/17/2018 41* 10 - 40 U/L Final    AST 07/17/2018 31  15 - 37 U/L Final    Total Bilirubin 07/17/2018 0.7  0.0 - 1.0 mg/dL Final    Bilirubin, Direct 07/17/2018 <0.2  0.0 - 0.3 mg/dL Final    Bilirubin, Indirect 07/17/2018 see below  0.0 - 1.0 mg/dL Final    Comment: Indirect Bilirubin cannot be calculated since Total Bilirubin  and/or Direct Bilirubin is below measurable range. Assessment and Plan:    ICD-10-CM ICD-9-CM    1. Class 1 obesity E66.9 278.00 Stable. Follow the prescribed meal plan. Increase physical activity as tolerated. Reinforced dietitian's recommendations. Follow-up in 2 months. 2. BMI 34.0-34.9,adult Z68.34 V85.34    3. Dietary counseling and surveillance Z71.3 V65.3 Greater than 50% of this 15 minute visit was used in direct counseling.        Nutrition plan: [] LCHF/Ketogenic   [x] Modified low-calorie diet (low carb/low-garfield)               [] Low-calorie diet    []Maintenance       []Other    Exercise: []Cardio     []Resistance/strength training                       [x]ACSM recommendations (150 minutes/week in active weight loss)                              Behavior: [x]Motivational interviewing performed    [] Referral for counseling                         [x]Discussed strategies to overcome habits/challenges for focus         [] Stress management   [x] Stimulus control                    [] Sleep hygiene    Reviewed:  [x] Nutrition and the importance of regular protein intake  [x] Hidden carbohydrate sources  [x] Alcohol use  [x] Tobacco use   [x] Importance of exercise and reducing sedentary time    Total weight loss: 11 pounds      No orders of the defined types were placed in this encounter. No Follow-up on file. This dictation was performed with a verbal recognition program (Dragon) and all efforts were made to ensure accuracy of this dictation. It is possible that there are still dictated errors within this note. If so, please bring any errors to my attention for correction.

## 2018-07-20 NOTE — PROGRESS NOTES
Wheezing or Shortness of Breath, Disp: 1 Inhaler, Rfl: 6    azelastine (ASTELIN) 0.1 % nasal spray, 2 sprays by Nasal route 2 times daily Use in each nostril as directed, Disp: 1 Bottle, Rfl: 3    sodium chloride 3 % nebulizer solution, Take 15 mLs by nebulization 2 times daily, Disp: 900 mL, Rfl: 0    Patient Active Problem List   Diagnosis    Interstitial lung disease ([facility]    Obstructive lung disease (HCC)    SOB (shortness of breath)    Hypoxemia requiring supplemental oxygen    Cough    Goodpasture syndrome (HCC)    HTN (hypertension)    Prediabetes    Dyslipidemia    Chronic respiratory failure (HCC)    Syncope and collapse    Acute respiratory failure with hypoxia (HCC)    COPD, very severe (HCC)    COPD exacerbation (HCC)    Bronchitis    Unstable angina (HCC)    Chest pain    ESTRELLITA treated with BiPAP    Coronary artery disease involving native coronary artery of native heart with unstable angina pectoris (HCC)    CAD S/P percutaneous coronary angioplasty    ESTRELLITA on CPAP    Viral illness    Hemoptysis    Influenza B    Left ureteral stone       Review of Systems   HENT: Negative. Eyes: Negative. Respiratory: Negative. Cardiovascular: Negative. Gastrointestinal: Negative. Endocrine: Negative. Musculoskeletal: Negative. Neurological: Negative. Psychiatric/Behavioral: Negative. Physical Exam   Constitutional: He is oriented to person, place, and time. He appears well-developed and well-nourished. Cardiovascular: Normal rate, regular rhythm and normal heart sounds. Exam reveals no gallop and no friction rub. No murmur heard. Pulmonary/Chest: Effort normal and breath sounds normal. No respiratory distress. He has no wheezes. He has no rales. Neurological: He is alert and oriented to person, place, and time. Skin: Skin is warm and dry. Psychiatric: He has a normal mood and affect.  His behavior is normal. Judgment and thought content normal. Orders Only on 07/17/2018   Component Date Value Ref Range Status    Total Protein 07/17/2018 6.7  6.4 - 8.2 g/dL Final    Alb 07/17/2018 4.2  3.4 - 5.0 g/dL Final    Alkaline Phosphatase 07/17/2018 97  40 - 129 U/L Final    ALT 07/17/2018 41* 10 - 40 U/L Final    AST 07/17/2018 31  15 - 37 U/L Final    Total Bilirubin 07/17/2018 0.7  0.0 - 1.0 mg/dL Final    Bilirubin, Direct 07/17/2018 <0.2  0.0 - 0.3 mg/dL Final    Bilirubin, Indirect 07/17/2018 see below  0.0 - 1.0 mg/dL Final    Comment: Indirect Bilirubin cannot be calculated since Total Bilirubin  and/or Direct Bilirubin is below measurable range. No diagnosis found. No Follow-up on file.

## 2018-07-25 ENCOUNTER — TELEPHONE (OUTPATIENT)
Dept: PULMONOLOGY | Age: 53
End: 2018-07-25

## 2018-07-25 DIAGNOSIS — J44.1 COPD EXACERBATION (HCC): Primary | ICD-10-CM

## 2018-07-25 RX ORDER — LEVOFLOXACIN 500 MG/1
500 TABLET, FILM COATED ORAL DAILY
Qty: 7 TABLET | Refills: 0 | Status: SHIPPED | OUTPATIENT
Start: 2018-07-25 | End: 2018-08-01

## 2018-07-25 RX ORDER — PREDNISONE 10 MG/1
TABLET ORAL
Qty: 30 TABLET | Refills: 0 | Status: SHIPPED | OUTPATIENT
Start: 2018-07-25 | End: 2018-10-01 | Stop reason: ALTCHOICE

## 2018-08-16 DIAGNOSIS — J42 FOLLICULAR BRONCHIOLITIS (HCC): ICD-10-CM

## 2018-08-16 DIAGNOSIS — J84.9 ILD (INTERSTITIAL LUNG DISEASE) (HCC): ICD-10-CM

## 2018-08-16 RX ORDER — MYCOPHENOLATE MOFETIL 500 MG/1
TABLET ORAL
Qty: 60 TABLET | Refills: 3 | Status: SHIPPED | OUTPATIENT
Start: 2018-08-16 | End: 2018-08-16 | Stop reason: SDUPTHER

## 2018-08-16 RX ORDER — MYCOPHENOLATE MOFETIL 500 MG/1
TABLET ORAL
Qty: 180 TABLET | Refills: 1 | Status: SHIPPED | OUTPATIENT
Start: 2018-08-16 | End: 2019-05-13 | Stop reason: ALTCHOICE

## 2018-09-10 DIAGNOSIS — J44.9 COPD WITH ASTHMA (HCC): ICD-10-CM

## 2018-10-01 ENCOUNTER — OFFICE VISIT (OUTPATIENT)
Dept: PULMONOLOGY | Age: 53
End: 2018-10-01
Payer: COMMERCIAL

## 2018-10-01 VITALS
SYSTOLIC BLOOD PRESSURE: 149 MMHG | HEIGHT: 70 IN | BODY MASS INDEX: 36.65 KG/M2 | HEART RATE: 89 BPM | WEIGHT: 256 LBS | TEMPERATURE: 97.6 F | RESPIRATION RATE: 16 BRPM | DIASTOLIC BLOOD PRESSURE: 99 MMHG | OXYGEN SATURATION: 90 %

## 2018-10-01 DIAGNOSIS — J84.9 ILD (INTERSTITIAL LUNG DISEASE) (HCC): ICD-10-CM

## 2018-10-01 DIAGNOSIS — J42 FOLLICULAR BRONCHIOLITIS (HCC): ICD-10-CM

## 2018-10-01 DIAGNOSIS — Z23 NEED FOR VACCINATION WITH 13-POLYVALENT PNEUMOCOCCAL CONJUGATE VACCINE: ICD-10-CM

## 2018-10-01 DIAGNOSIS — J96.11 CHRONIC RESPIRATORY FAILURE WITH HYPOXIA (HCC): ICD-10-CM

## 2018-10-01 DIAGNOSIS — J84.9 ILD (INTERSTITIAL LUNG DISEASE) (HCC): Primary | ICD-10-CM

## 2018-10-01 DIAGNOSIS — J44.9 COPD WITH ASTHMA (HCC): ICD-10-CM

## 2018-10-01 LAB
ALBUMIN SERPL-MCNC: 4.7 G/DL (ref 3.4–5)
ALP BLD-CCNC: 111 U/L (ref 40–129)
ALT SERPL-CCNC: 67 U/L (ref 10–40)
AST SERPL-CCNC: 38 U/L (ref 15–37)
BASOPHILS ABSOLUTE: 0 K/UL (ref 0–0.2)
BASOPHILS RELATIVE PERCENT: 0.5 %
BILIRUB SERPL-MCNC: 0.6 MG/DL (ref 0–1)
BILIRUBIN DIRECT: <0.2 MG/DL (ref 0–0.3)
BILIRUBIN, INDIRECT: ABNORMAL MG/DL (ref 0–1)
EOSINOPHILS ABSOLUTE: 0.1 K/UL (ref 0–0.6)
EOSINOPHILS RELATIVE PERCENT: 1.8 %
HCT VFR BLD CALC: 56.9 % (ref 40.5–52.5)
HEMOGLOBIN: 19 G/DL (ref 13.5–17.5)
LYMPHOCYTES ABSOLUTE: 1 K/UL (ref 1–5.1)
LYMPHOCYTES RELATIVE PERCENT: 12.3 %
MCH RBC QN AUTO: 28.4 PG (ref 26–34)
MCHC RBC AUTO-ENTMCNC: 33.3 G/DL (ref 31–36)
MCV RBC AUTO: 85.3 FL (ref 80–100)
MONOCYTES ABSOLUTE: 0.7 K/UL (ref 0–1.3)
MONOCYTES RELATIVE PERCENT: 8.4 %
NEUTROPHILS ABSOLUTE: 6.2 K/UL (ref 1.7–7.7)
NEUTROPHILS RELATIVE PERCENT: 77 %
PDW BLD-RTO: 14.8 % (ref 12.4–15.4)
PLATELET # BLD: 189 K/UL (ref 135–450)
PMV BLD AUTO: 8.8 FL (ref 5–10.5)
RBC # BLD: 6.67 M/UL (ref 4.2–5.9)
TOTAL PROTEIN: 7.4 G/DL (ref 6.4–8.2)
WBC # BLD: 8.1 K/UL (ref 4–11)

## 2018-10-01 PROCEDURE — 3017F COLORECTAL CA SCREEN DOC REV: CPT | Performed by: INTERNAL MEDICINE

## 2018-10-01 PROCEDURE — 99214 OFFICE O/P EST MOD 30 MIN: CPT | Performed by: INTERNAL MEDICINE

## 2018-10-01 PROCEDURE — G8417 CALC BMI ABV UP PARAM F/U: HCPCS | Performed by: INTERNAL MEDICINE

## 2018-10-01 PROCEDURE — G8427 DOCREV CUR MEDS BY ELIG CLIN: HCPCS | Performed by: INTERNAL MEDICINE

## 2018-10-01 PROCEDURE — G8484 FLU IMMUNIZE NO ADMIN: HCPCS | Performed by: INTERNAL MEDICINE

## 2018-10-01 PROCEDURE — 90670 PCV13 VACCINE IM: CPT | Performed by: INTERNAL MEDICINE

## 2018-10-01 PROCEDURE — 3023F SPIROM DOC REV: CPT | Performed by: INTERNAL MEDICINE

## 2018-10-01 PROCEDURE — G8926 SPIRO NO PERF OR DOC: HCPCS | Performed by: INTERNAL MEDICINE

## 2018-10-01 PROCEDURE — 90471 IMMUNIZATION ADMIN: CPT | Performed by: INTERNAL MEDICINE

## 2018-10-01 PROCEDURE — 1036F TOBACCO NON-USER: CPT | Performed by: INTERNAL MEDICINE

## 2018-10-01 PROCEDURE — G8598 ASA/ANTIPLAT THER USED: HCPCS | Performed by: INTERNAL MEDICINE

## 2018-10-02 ENCOUNTER — APPOINTMENT (OUTPATIENT)
Dept: GENERAL RADIOLOGY | Age: 53
End: 2018-10-02
Payer: COMMERCIAL

## 2018-10-02 ENCOUNTER — APPOINTMENT (OUTPATIENT)
Dept: CT IMAGING | Age: 53
End: 2018-10-02
Payer: COMMERCIAL

## 2018-10-02 ENCOUNTER — HOSPITAL ENCOUNTER (OUTPATIENT)
Age: 53
Setting detail: OBSERVATION
LOS: 1 days | Discharge: HOME | End: 2018-10-03
Attending: EMERGENCY MEDICINE | Admitting: INTERNAL MEDICINE
Payer: COMMERCIAL

## 2018-10-02 ENCOUNTER — TELEPHONE (OUTPATIENT)
Dept: BARIATRICS/WEIGHT MGMT | Age: 53
End: 2018-10-02

## 2018-10-02 ENCOUNTER — TELEPHONE (OUTPATIENT)
Dept: PULMONOLOGY | Age: 53
End: 2018-10-02

## 2018-10-02 ENCOUNTER — NURSE TRIAGE (OUTPATIENT)
Dept: OTHER | Facility: CLINIC | Age: 53
End: 2018-10-02

## 2018-10-02 ENCOUNTER — TELEPHONE (OUTPATIENT)
Dept: FAMILY MEDICINE CLINIC | Age: 53
End: 2018-10-02

## 2018-10-02 DIAGNOSIS — I10 UNCONTROLLED HYPERTENSION: ICD-10-CM

## 2018-10-02 DIAGNOSIS — R00.0 TACHYCARDIA: ICD-10-CM

## 2018-10-02 DIAGNOSIS — R07.1 CHEST PAIN ON BREATHING: Primary | ICD-10-CM

## 2018-10-02 PROBLEM — E78.5 HYPERLIPIDEMIA: Status: ACTIVE | Noted: 2018-10-02

## 2018-10-02 PROBLEM — E66.01 MORBID OBESITY DUE TO EXCESS CALORIES (HCC): Status: ACTIVE | Noted: 2018-10-02

## 2018-10-02 PROBLEM — R51.9 HEADACHE: Status: ACTIVE | Noted: 2018-10-02

## 2018-10-02 LAB
A/G RATIO: 1.5 (ref 1.1–2.2)
ALBUMIN SERPL-MCNC: 4.7 G/DL (ref 3.4–5)
ALP BLD-CCNC: 111 U/L (ref 40–129)
ALT SERPL-CCNC: 51 U/L (ref 10–40)
ANION GAP SERPL CALCULATED.3IONS-SCNC: 16 MMOL/L (ref 3–16)
AST SERPL-CCNC: 25 U/L (ref 15–37)
BASOPHILS ABSOLUTE: 0 K/UL (ref 0–0.2)
BASOPHILS RELATIVE PERCENT: 0.4 %
BILIRUB SERPL-MCNC: 1 MG/DL (ref 0–1)
BUN BLDV-MCNC: 15 MG/DL (ref 7–20)
CALCIUM SERPL-MCNC: 9.6 MG/DL (ref 8.3–10.6)
CHLORIDE BLD-SCNC: 98 MMOL/L (ref 99–110)
CO2: 22 MMOL/L (ref 21–32)
CREAT SERPL-MCNC: 1 MG/DL (ref 0.9–1.3)
D DIMER: 203 NG/ML DDU (ref 0–229)
EOSINOPHILS ABSOLUTE: 0.1 K/UL (ref 0–0.6)
EOSINOPHILS RELATIVE PERCENT: 1.1 %
GFR AFRICAN AMERICAN: >60
GFR NON-AFRICAN AMERICAN: >60
GLOBULIN: 3.2 G/DL
GLUCOSE BLD-MCNC: 146 MG/DL (ref 70–99)
HCT VFR BLD CALC: 56.4 % (ref 40.5–52.5)
HEMOGLOBIN: 19.5 G/DL (ref 13.5–17.5)
LYMPHOCYTES ABSOLUTE: 0.7 K/UL (ref 1–5.1)
LYMPHOCYTES RELATIVE PERCENT: 9 %
MCH RBC QN AUTO: 28.7 PG (ref 26–34)
MCHC RBC AUTO-ENTMCNC: 34.5 G/DL (ref 31–36)
MCV RBC AUTO: 83.3 FL (ref 80–100)
MONOCYTES ABSOLUTE: 1 K/UL (ref 0–1.3)
MONOCYTES RELATIVE PERCENT: 13.1 %
NEUTROPHILS ABSOLUTE: 6.1 K/UL (ref 1.7–7.7)
NEUTROPHILS RELATIVE PERCENT: 76.4 %
PDW BLD-RTO: 14.6 % (ref 12.4–15.4)
PLATELET # BLD: 159 K/UL (ref 135–450)
PMV BLD AUTO: 8.3 FL (ref 5–10.5)
POTASSIUM SERPL-SCNC: 3.8 MMOL/L (ref 3.5–5.1)
PRO-BNP: 17 PG/ML (ref 0–124)
RBC # BLD: 6.78 M/UL (ref 4.2–5.9)
SODIUM BLD-SCNC: 136 MMOL/L (ref 136–145)
TOTAL PROTEIN: 7.9 G/DL (ref 6.4–8.2)
TROPONIN: <0.01 NG/ML
TROPONIN: <0.01 NG/ML
WBC # BLD: 8 K/UL (ref 4–11)

## 2018-10-02 PROCEDURE — G0378 HOSPITAL OBSERVATION PER HR: HCPCS

## 2018-10-02 PROCEDURE — 6370000000 HC RX 637 (ALT 250 FOR IP): Performed by: INTERNAL MEDICINE

## 2018-10-02 PROCEDURE — 93005 ELECTROCARDIOGRAM TRACING: CPT | Performed by: EMERGENCY MEDICINE

## 2018-10-02 PROCEDURE — 71045 X-RAY EXAM CHEST 1 VIEW: CPT

## 2018-10-02 PROCEDURE — 85025 COMPLETE CBC W/AUTO DIFF WBC: CPT

## 2018-10-02 PROCEDURE — 85379 FIBRIN DEGRADATION QUANT: CPT

## 2018-10-02 PROCEDURE — 96375 TX/PRO/DX INJ NEW DRUG ADDON: CPT

## 2018-10-02 PROCEDURE — 2580000003 HC RX 258: Performed by: INTERNAL MEDICINE

## 2018-10-02 PROCEDURE — 6360000002 HC RX W HCPCS: Performed by: EMERGENCY MEDICINE

## 2018-10-02 PROCEDURE — 70450 CT HEAD/BRAIN W/O DYE: CPT

## 2018-10-02 PROCEDURE — 83880 ASSAY OF NATRIURETIC PEPTIDE: CPT

## 2018-10-02 PROCEDURE — 99285 EMERGENCY DEPT VISIT HI MDM: CPT

## 2018-10-02 PROCEDURE — 96374 THER/PROPH/DIAG INJ IV PUSH: CPT

## 2018-10-02 PROCEDURE — 96376 TX/PRO/DX INJ SAME DRUG ADON: CPT

## 2018-10-02 PROCEDURE — 84484 ASSAY OF TROPONIN QUANT: CPT

## 2018-10-02 PROCEDURE — 36415 COLL VENOUS BLD VENIPUNCTURE: CPT

## 2018-10-02 PROCEDURE — 80053 COMPREHEN METABOLIC PANEL: CPT

## 2018-10-02 RX ORDER — TAMSULOSIN HYDROCHLORIDE 0.4 MG/1
0.4 CAPSULE ORAL DAILY
Status: DISCONTINUED | OUTPATIENT
Start: 2018-10-03 | End: 2018-10-03 | Stop reason: HOSPADM

## 2018-10-02 RX ORDER — SODIUM CHLORIDE 9 MG/ML
INJECTION, SOLUTION INTRAVENOUS CONTINUOUS
Status: DISCONTINUED | OUTPATIENT
Start: 2018-10-03 | End: 2018-10-03 | Stop reason: HOSPADM

## 2018-10-02 RX ORDER — AZELASTINE 1 MG/ML
2 SPRAY, METERED NASAL 2 TIMES DAILY
Status: DISCONTINUED | OUTPATIENT
Start: 2018-10-03 | End: 2018-10-03 | Stop reason: HOSPADM

## 2018-10-02 RX ORDER — LISINOPRIL 20 MG/1
20 TABLET ORAL DAILY
Status: DISCONTINUED | OUTPATIENT
Start: 2018-10-03 | End: 2018-10-03 | Stop reason: HOSPADM

## 2018-10-02 RX ORDER — BISACODYL 10 MG
10 SUPPOSITORY, RECTAL RECTAL DAILY PRN
Status: DISCONTINUED | OUTPATIENT
Start: 2018-10-02 | End: 2018-10-03 | Stop reason: HOSPADM

## 2018-10-02 RX ORDER — SODIUM CHLORIDE 0.9 % (FLUSH) 0.9 %
10 SYRINGE (ML) INJECTION EVERY 12 HOURS SCHEDULED
Status: DISCONTINUED | OUTPATIENT
Start: 2018-10-02 | End: 2018-10-03 | Stop reason: HOSPADM

## 2018-10-02 RX ORDER — ALBUTEROL SULFATE 90 UG/1
2 AEROSOL, METERED RESPIRATORY (INHALATION) EVERY 4 HOURS PRN
Status: DISCONTINUED | OUTPATIENT
Start: 2018-10-02 | End: 2018-10-03 | Stop reason: HOSPADM

## 2018-10-02 RX ORDER — DOCUSATE SODIUM 100 MG/1
100 CAPSULE, LIQUID FILLED ORAL 2 TIMES DAILY PRN
Status: DISCONTINUED | OUTPATIENT
Start: 2018-10-02 | End: 2018-10-03 | Stop reason: HOSPADM

## 2018-10-02 RX ORDER — MORPHINE SULFATE 4 MG/ML
4 INJECTION, SOLUTION INTRAMUSCULAR; INTRAVENOUS ONCE
Status: COMPLETED | OUTPATIENT
Start: 2018-10-02 | End: 2018-10-02

## 2018-10-02 RX ORDER — TRAMADOL HYDROCHLORIDE 50 MG/1
100 TABLET ORAL EVERY 6 HOURS PRN
Status: DISCONTINUED | OUTPATIENT
Start: 2018-10-02 | End: 2018-10-03 | Stop reason: HOSPADM

## 2018-10-02 RX ORDER — ASPIRIN 81 MG/1
81 TABLET, CHEWABLE ORAL DAILY
Status: DISCONTINUED | OUTPATIENT
Start: 2018-10-03 | End: 2018-10-03 | Stop reason: HOSPADM

## 2018-10-02 RX ORDER — SODIUM CHLORIDE 0.9 % (FLUSH) 0.9 %
10 SYRINGE (ML) INJECTION PRN
Status: DISCONTINUED | OUTPATIENT
Start: 2018-10-02 | End: 2018-10-03 | Stop reason: HOSPADM

## 2018-10-02 RX ORDER — CARVEDILOL 6.25 MG/1
6.25 TABLET ORAL 2 TIMES DAILY WITH MEALS
Status: DISCONTINUED | OUTPATIENT
Start: 2018-10-02 | End: 2018-10-03 | Stop reason: HOSPADM

## 2018-10-02 RX ORDER — ATORVASTATIN CALCIUM 40 MG/1
40 TABLET, FILM COATED ORAL NIGHTLY
Status: DISCONTINUED | OUTPATIENT
Start: 2018-10-02 | End: 2018-10-03 | Stop reason: HOSPADM

## 2018-10-02 RX ORDER — TRAMADOL HYDROCHLORIDE 50 MG/1
50 TABLET ORAL EVERY 6 HOURS PRN
Status: DISCONTINUED | OUTPATIENT
Start: 2018-10-02 | End: 2018-10-03 | Stop reason: HOSPADM

## 2018-10-02 RX ORDER — PANTOPRAZOLE SODIUM 40 MG/1
40 TABLET, DELAYED RELEASE ORAL
Status: DISCONTINUED | OUTPATIENT
Start: 2018-10-03 | End: 2018-10-03 | Stop reason: HOSPADM

## 2018-10-02 RX ORDER — MYCOPHENOLATE MOFETIL 250 MG/1
1000 CAPSULE ORAL 2 TIMES DAILY
Status: DISCONTINUED | OUTPATIENT
Start: 2018-10-02 | End: 2018-10-03 | Stop reason: HOSPADM

## 2018-10-02 RX ORDER — ONDANSETRON 2 MG/ML
4 INJECTION INTRAMUSCULAR; INTRAVENOUS EVERY 4 HOURS PRN
Status: DISCONTINUED | OUTPATIENT
Start: 2018-10-02 | End: 2018-10-03 | Stop reason: HOSPADM

## 2018-10-02 RX ORDER — FLUTICASONE FUROATE AND VILANTEROL 200; 25 UG/1; UG/1
1 POWDER RESPIRATORY (INHALATION) DAILY
Status: DISCONTINUED | OUTPATIENT
Start: 2018-10-03 | End: 2018-10-02 | Stop reason: CLARIF

## 2018-10-02 RX ORDER — CLOPIDOGREL BISULFATE 75 MG/1
75 TABLET ORAL DAILY
Status: DISCONTINUED | OUTPATIENT
Start: 2018-10-03 | End: 2018-10-03 | Stop reason: HOSPADM

## 2018-10-02 RX ORDER — NICOTINE 21 MG/24HR
1 PATCH, TRANSDERMAL 24 HOURS TRANSDERMAL DAILY PRN
Status: DISCONTINUED | OUTPATIENT
Start: 2018-10-02 | End: 2018-10-03 | Stop reason: HOSPADM

## 2018-10-02 RX ORDER — ONDANSETRON 2 MG/ML
4 INJECTION INTRAMUSCULAR; INTRAVENOUS ONCE
Status: COMPLETED | OUTPATIENT
Start: 2018-10-02 | End: 2018-10-02

## 2018-10-02 RX ADMIN — MORPHINE SULFATE 4 MG: 4 INJECTION INTRAVENOUS at 19:38

## 2018-10-02 RX ADMIN — MORPHINE SULFATE 4 MG: 4 INJECTION INTRAVENOUS at 18:37

## 2018-10-02 RX ADMIN — ATORVASTATIN CALCIUM 40 MG: 40 TABLET, FILM COATED ORAL at 23:13

## 2018-10-02 RX ADMIN — ONDANSETRON 4 MG: 2 INJECTION INTRAMUSCULAR; INTRAVENOUS at 18:37

## 2018-10-02 RX ADMIN — TRAMADOL HYDROCHLORIDE 100 MG: 50 TABLET, FILM COATED ORAL at 22:22

## 2018-10-02 RX ADMIN — SODIUM CHLORIDE: 9 INJECTION, SOLUTION INTRAVENOUS at 23:14

## 2018-10-02 RX ADMIN — CARVEDILOL 6.25 MG: 6.25 TABLET, FILM COATED ORAL at 22:22

## 2018-10-02 RX ADMIN — Medication 10 ML: at 23:13

## 2018-10-02 ASSESSMENT — PAIN SCALES - GENERAL
PAINLEVEL_OUTOF10: 6
PAINLEVEL_OUTOF10: 10
PAINLEVEL_OUTOF10: 6
PAINLEVEL_OUTOF10: 9

## 2018-10-02 ASSESSMENT — PAIN DESCRIPTION - ORIENTATION: ORIENTATION: LEFT

## 2018-10-02 ASSESSMENT — PAIN DESCRIPTION - PAIN TYPE
TYPE: ACUTE PAIN

## 2018-10-02 ASSESSMENT — PAIN DESCRIPTION - LOCATION
LOCATION: CHEST
LOCATION: CHEST
LOCATION: HEAD

## 2018-10-02 ASSESSMENT — PAIN DESCRIPTION - FREQUENCY: FREQUENCY: CONTINUOUS

## 2018-10-02 NOTE — ED PROVIDER NOTES
Resp SpO2 Height Weight   (!) 146/106 99.2 °F (37.3 °C) Oral 121 20 90 % 5' 10\" (1.778 m) 258 lb 9.6 oz (117.3 kg)       Gen. : An alert white male, moderately obese, no acute distress. Head: Atraumatic and normocephalic. Eyes: Pupils equal and reactive. Extraocular movements are intact. ENT: No facial trauma. TMs normal. Oropharynx negative. Neck: Supple without adenopathy, nontender. No JVD. Heart: Tachycardic, regular, no murmurs gallops noted. Lungs: Breath sounds equal bilaterally, no rales or rhonchi. Abdomen: Obese, soft, nontender. Musculoskeletal: No calf tenderness. No extremity edema. Intact symmetrical distal pulses. Neuro: Awake alert oriented. Nonfocal. Symmetrical pupils, intact extraocular movements. No facial asymmetry. Midline tongue. Skin: Warm and dry, good turgor. No cyanosis or diaphoresis. DIFFERENTIAL DIAGNOSIS   Differential includes but is not limited to uncontrolled/accelerated hypertension, hypertensive emergency, angina, myocardial infarction, pulmonary embolus, pneumonia, congestive heart failure, closed head injury, traumatic subarachnoid hemorrhage      DIAGNOSTIC RESULTS     EKG: All EKG's are interpreted by Jovon Aguilar MD in the absence of a cardiologist.    Sinus tachycardia, rate of 124, age indeterminate septal infarct, nonspecific ST-T changes. Rhythm strip shows sinus tachycardia with a rate of 124, FL interval 150 ms, QRS of 80 ms with no other ectopy is interpreted by me. This is compared to a tracing dated 6/8/18, a sinus tachycardia is new, but poorly progression is unchanged, no other significant changes.     RADIOLOGY:   Non-plain film images such as CT, Ultrasound and MRI are read by the radiologist. Plain radiographic images are visualized and preliminarily interpreted Jovon Aguilar MD with the below findings:      Interpretation per the Radiologist below, if available at the time of this note:    CT Head WO Contrast   Final Result   No CT evidence for acute intracranial abnormality. XR CHEST PORTABLE   Final Result   Bibasilar airspace disease favored to be due to atelectasis, less likely   pneumonia. ED BEDSIDE ULTRASOUND:   Performed by ED Physician - none    LABS:  Labs Reviewed   CBC WITH AUTO DIFFERENTIAL - Abnormal; Notable for the following:        Result Value    RBC 6.78 (*)     Hemoglobin 19.5 (*)     Hematocrit 56.4 (*)     Lymphocytes # 0.7 (*)     All other components within normal limits    Narrative:     Performed at:  [facility]  [address] [address]   Phone [phone]   COMPREHENSIVE METABOLIC PANEL - Abnormal; Notable for the following:     Chloride 98 (*)     Glucose 146 (*)     ALT 51 (*)     All other components within normal limits    Narrative:     Performed at:  [address] [address]   Phone [phone]   TROPONIN    Narrative:     Performed at:  [facility]  [address] [place] [address] [address]   Phone [phone]   BRAIN NATRIURETIC PEPTIDE    Narrative:     Performed at:  [facility]  [address] [place] [address] [address]   Phone [phone]   D-DIMER, QUANTITATIVE    Narrative:     Performed at:  [address]   Phone [phone]       All other labs were within normal range or not returned as of this dictation. EMERGENCY DEPARTMENT COURSE and DIFFERENTIAL DIAGNOSIS/MDM:   Vitals:    Vitals:    10/02/18 1819 10/02/18 1918   BP: (!) 146/106 132/86   Pulse: 121 120   Resp: 20 14   Temp: 99.2 °F (37.3 °C)    TempSrc: Oral    SpO2: 90% 90%   Weight: 258 lb 9.6 oz (117.3 kg)    Height: 5' 10\" (1.778 m)        1910:  Pain decreased to a level 5. HR down to 112. /96.    1950:  Heart rate still 112-116.  His blood

## 2018-10-03 VITALS
SYSTOLIC BLOOD PRESSURE: 133 MMHG | HEIGHT: 70 IN | RESPIRATION RATE: 16 BRPM | HEART RATE: 78 BPM | WEIGHT: 255.73 LBS | TEMPERATURE: 97.5 F | BODY MASS INDEX: 36.61 KG/M2 | DIASTOLIC BLOOD PRESSURE: 88 MMHG | OXYGEN SATURATION: 91 %

## 2018-10-03 LAB
ANION GAP SERPL CALCULATED.3IONS-SCNC: 11 MMOL/L (ref 3–16)
BASOPHILS ABSOLUTE: 0 K/UL (ref 0–0.2)
BASOPHILS RELATIVE PERCENT: 0.6 %
BUN BLDV-MCNC: 20 MG/DL (ref 7–20)
CALCIUM SERPL-MCNC: 8.9 MG/DL (ref 8.3–10.6)
CHLORIDE BLD-SCNC: 98 MMOL/L (ref 99–110)
CO2: 27 MMOL/L (ref 21–32)
CREAT SERPL-MCNC: 1.1 MG/DL (ref 0.9–1.3)
EOSINOPHILS ABSOLUTE: 0.1 K/UL (ref 0–0.6)
EOSINOPHILS RELATIVE PERCENT: 1.4 %
GFR AFRICAN AMERICAN: >60
GFR NON-AFRICAN AMERICAN: >60
GLUCOSE BLD-MCNC: 134 MG/DL (ref 70–99)
HCT VFR BLD CALC: 53.9 % (ref 40.5–52.5)
HEMOGLOBIN: 18.3 G/DL (ref 13.5–17.5)
LV EF: 78 %
LVEF MODALITY: NORMAL
LYMPHOCYTES ABSOLUTE: 1.3 K/UL (ref 1–5.1)
LYMPHOCYTES RELATIVE PERCENT: 17.2 %
MCH RBC QN AUTO: 28.8 PG (ref 26–34)
MCHC RBC AUTO-ENTMCNC: 33.9 G/DL (ref 31–36)
MCV RBC AUTO: 84.9 FL (ref 80–100)
MONOCYTES ABSOLUTE: 1.2 K/UL (ref 0–1.3)
MONOCYTES RELATIVE PERCENT: 16.1 %
NEUTROPHILS ABSOLUTE: 5 K/UL (ref 1.7–7.7)
NEUTROPHILS RELATIVE PERCENT: 64.7 %
PDW BLD-RTO: 14.6 % (ref 12.4–15.4)
PLATELET # BLD: 150 K/UL (ref 135–450)
PMV BLD AUTO: 8.5 FL (ref 5–10.5)
POTASSIUM REFLEX MAGNESIUM: 4 MMOL/L (ref 3.5–5.1)
RBC # BLD: 6.35 M/UL (ref 4.2–5.9)
SODIUM BLD-SCNC: 136 MMOL/L (ref 136–145)
TROPONIN: <0.01 NG/ML
WBC # BLD: 7.7 K/UL (ref 4–11)

## 2018-10-03 PROCEDURE — 80048 BASIC METABOLIC PNL TOTAL CA: CPT

## 2018-10-03 PROCEDURE — 94760 N-INVAS EAR/PLS OXIMETRY 1: CPT

## 2018-10-03 PROCEDURE — 6360000002 HC RX W HCPCS: Performed by: INTERNAL MEDICINE

## 2018-10-03 PROCEDURE — A9502 TC99M TETROFOSMIN: HCPCS | Performed by: INTERNAL MEDICINE

## 2018-10-03 PROCEDURE — G0378 HOSPITAL OBSERVATION PER HR: HCPCS

## 2018-10-03 PROCEDURE — 93010 ELECTROCARDIOGRAM REPORT: CPT | Performed by: INTERNAL MEDICINE

## 2018-10-03 PROCEDURE — 96372 THER/PROPH/DIAG INJ SC/IM: CPT

## 2018-10-03 PROCEDURE — 85025 COMPLETE CBC W/AUTO DIFF WBC: CPT

## 2018-10-03 PROCEDURE — 3430000000 HC RX DIAGNOSTIC RADIOPHARMACEUTICAL: Performed by: INTERNAL MEDICINE

## 2018-10-03 PROCEDURE — 78452 HT MUSCLE IMAGE SPECT MULT: CPT

## 2018-10-03 PROCEDURE — 84484 ASSAY OF TROPONIN QUANT: CPT

## 2018-10-03 PROCEDURE — 6370000000 HC RX 637 (ALT 250 FOR IP): Performed by: INTERNAL MEDICINE

## 2018-10-03 PROCEDURE — 2700000000 HC OXYGEN THERAPY PER DAY

## 2018-10-03 PROCEDURE — 90686 IIV4 VACC NO PRSV 0.5 ML IM: CPT | Performed by: INTERNAL MEDICINE

## 2018-10-03 PROCEDURE — 94640 AIRWAY INHALATION TREATMENT: CPT

## 2018-10-03 PROCEDURE — 93017 CV STRESS TEST TRACING ONLY: CPT

## 2018-10-03 PROCEDURE — 94660 CPAP INITIATION&MGMT: CPT

## 2018-10-03 PROCEDURE — 36415 COLL VENOUS BLD VENIPUNCTURE: CPT

## 2018-10-03 PROCEDURE — G0008 ADMIN INFLUENZA VIRUS VAC: HCPCS | Performed by: INTERNAL MEDICINE

## 2018-10-03 PROCEDURE — 99244 OFF/OP CNSLTJ NEW/EST MOD 40: CPT | Performed by: INTERNAL MEDICINE

## 2018-10-03 RX ORDER — CARVEDILOL 6.25 MG/1
6.25 TABLET ORAL 2 TIMES DAILY WITH MEALS
Qty: 60 TABLET | Refills: 0 | Status: SHIPPED | OUTPATIENT
Start: 2018-10-03 | End: 2018-10-29 | Stop reason: SDUPTHER

## 2018-10-03 RX ORDER — ACETAMINOPHEN 325 MG/1
650 TABLET ORAL EVERY 4 HOURS PRN
Status: DISCONTINUED | OUTPATIENT
Start: 2018-10-03 | End: 2018-10-03 | Stop reason: HOSPADM

## 2018-10-03 RX ADMIN — CLOPIDOGREL BISULFATE 75 MG: 75 TABLET ORAL at 10:54

## 2018-10-03 RX ADMIN — TIOTROPIUM BROMIDE 18 MCG: 18 CAPSULE ORAL; RESPIRATORY (INHALATION) at 12:19

## 2018-10-03 RX ADMIN — TETROFOSMIN 10 MILLICURIE: 0.23 INJECTION, POWDER, LYOPHILIZED, FOR SOLUTION INTRAVENOUS at 07:36

## 2018-10-03 RX ADMIN — ASPIRIN 81 MG 81 MG: 81 TABLET ORAL at 10:54

## 2018-10-03 RX ADMIN — TETROFOSMIN 30 MILLICURIE: 0.23 INJECTION, POWDER, LYOPHILIZED, FOR SOLUTION INTRAVENOUS at 09:37

## 2018-10-03 RX ADMIN — CARVEDILOL 6.25 MG: 6.25 TABLET, FILM COATED ORAL at 07:24

## 2018-10-03 RX ADMIN — ENOXAPARIN SODIUM 40 MG: 40 INJECTION SUBCUTANEOUS at 10:54

## 2018-10-03 RX ADMIN — MYCOPHENOLATE MOFETIL 1000 MG: 250 CAPSULE ORAL at 10:54

## 2018-10-03 RX ADMIN — INFLUENZA A VIRUS A/MICHIGAN/45/2015 X-275 (H1N1) ANTIGEN (FORMALDEHYDE INACTIVATED), INFLUENZA A VIRUS A/SINGAPORE/INFIMH-16-0019/2016 IVR-186 (H3N2) ANTIGEN (FORMALDEHYDE INACTIVATED), INFLUENZA B VIRUS B/PHUKET/3073/2013 ANTIGEN (FORMALDEHYDE INACTIVATED), AND INFLUENZA B VIRUS B/MARYLAND/15/2016 BX-69A ANTIGEN (FORMALDEHYDE INACTIVATED) 0.5 ML: 15; 15; 15; 15 INJECTION, SUSPENSION INTRAMUSCULAR at 10:55

## 2018-10-03 RX ADMIN — TRAMADOL HYDROCHLORIDE 100 MG: 50 TABLET, FILM COATED ORAL at 04:33

## 2018-10-03 RX ADMIN — REGADENOSON 0.4 MG: 0.08 INJECTION, SOLUTION INTRAVENOUS at 09:17

## 2018-10-03 RX ADMIN — TAMSULOSIN HYDROCHLORIDE 0.4 MG: 0.4 CAPSULE ORAL at 10:54

## 2018-10-03 RX ADMIN — AZELASTINE HYDROCHLORIDE 2 SPRAY: 137 SPRAY, METERED NASAL at 10:54

## 2018-10-03 RX ADMIN — MOMETASONE FUROATE AND FORMOTEROL FUMARATE DIHYDRATE 2 PUFF: 200; 5 AEROSOL RESPIRATORY (INHALATION) at 12:19

## 2018-10-03 RX ADMIN — LISINOPRIL 20 MG: 20 TABLET ORAL at 10:54

## 2018-10-03 RX ADMIN — TRAMADOL HYDROCHLORIDE 100 MG: 50 TABLET, FILM COATED ORAL at 10:55

## 2018-10-03 ASSESSMENT — PAIN SCALES - GENERAL
PAINLEVEL_OUTOF10: 5
PAINLEVEL_OUTOF10: 7
PAINLEVEL_OUTOF10: 7

## 2018-10-03 NOTE — CARE COORDINATION
CTC went to see patient for Face to Face visit, patient is not in room at this time. CTC will attempt to see patient at a later time.

## 2018-10-03 NOTE — CONSULTS
inhaler 2 puff  2 puff Inhalation BID Payton Malik MD           Physical Exam:   /88   Pulse 78   Temp 97.5 °F (36.4 °C) (Oral)   Resp 18   Ht 5' 10\" (1.778 m)   Wt 255 lb 11.7 oz (116 kg)   SpO2 90%   BMI 36.69 kg/m²     Intake/Output Summary (Last 24 hours) at 10/03/18 1131  Last data filed at 10/02/18 2226   Gross per 24 hour   Intake              250 ml   Output                0 ml   Net              250 ml     Wt Readings from Last 2 Encounters:   10/03/18 255 lb 11.7 oz (116 kg)   10/01/18 256 lb (116.1 kg)     Constitutional: He is oriented to person, place, and time. He appears well-developed and well-nourished. In no acute distress. Head: Normocephalic and atraumatic. Neck: Neck supple. No JVD present. Carotid bruit is not present. No mass and no thyromegaly present. No lymphadenopathy present. Cardiovascular: Normal rate, regular rhythm, normal heart sounds and intact distal pulses. Exam reveals no gallop and no friction rub. No murmur heard. Pulmonary/Chest: Effort normal and breath sounds normal. No respiratory distress. He has no wheezes, rhonchi or rales. Abdominal: Soft, non-tender. Bowel sounds and aorta are normal. He exhibits no organomegaly, mass or bruit. Extremities: No edema, cyanosis, or clubbing. Pulses are 2+ radial/carotid/dorsalis pedis and posterior tibial bilaterally. Neurological: He is alert and oriented to person, place, and time. He has normal reflexes. No cranial nerve deficit. Coordination normal.   Skin: Skin is warm and dry. There is no rash or diaphoresis. Psychiatric: He has a normal mood and affect.  His speech is normal and behavior is normal.     EKG Interpretation: Sinus rhythm with LAFB    Lab Review:   Lab Results   Component Value Date    TRIG 127 05/14/2018    HDL 31 05/14/2018    LDLCALC 112 05/14/2018    LABVLDL 25 05/14/2018     Lab Results   Component Value Date     10/03/2018    K 4.0 10/03/2018    BUN 20 10/03/2018

## 2018-10-04 ENCOUNTER — CARE COORDINATION (OUTPATIENT)
Dept: CASE MANAGEMENT | Age: 53
End: 2018-10-04

## 2018-10-04 LAB
EKG ATRIAL RATE: 124 BPM
EKG DIAGNOSIS: NORMAL
EKG P AXIS: 70 DEGREES
EKG P-R INTERVAL: 150 MS
EKG Q-T INTERVAL: 298 MS
EKG QRS DURATION: 80 MS
EKG QTC CALCULATION (BAZETT): 428 MS
EKG R AXIS: -49 DEGREES
EKG T AXIS: 73 DEGREES
EKG VENTRICULAR RATE: 124 BPM

## 2018-10-08 ENCOUNTER — TELEPHONE (OUTPATIENT)
Dept: FAMILY MEDICINE CLINIC | Age: 53
End: 2018-10-08

## 2018-10-09 ENCOUNTER — OFFICE VISIT (OUTPATIENT)
Dept: FAMILY MEDICINE CLINIC | Age: 53
End: 2018-10-09
Payer: COMMERCIAL

## 2018-10-09 VITALS
RESPIRATION RATE: 16 BRPM | HEIGHT: 70 IN | HEART RATE: 84 BPM | WEIGHT: 257 LBS | BODY MASS INDEX: 36.79 KG/M2 | SYSTOLIC BLOOD PRESSURE: 130 MMHG | OXYGEN SATURATION: 92 % | DIASTOLIC BLOOD PRESSURE: 84 MMHG

## 2018-10-09 DIAGNOSIS — I25.10 CORONARY ARTERY DISEASE INVOLVING NATIVE CORONARY ARTERY OF NATIVE HEART WITHOUT ANGINA PECTORIS: ICD-10-CM

## 2018-10-09 DIAGNOSIS — R07.89 OTHER CHEST PAIN: ICD-10-CM

## 2018-10-09 DIAGNOSIS — I10 ESSENTIAL HYPERTENSION: ICD-10-CM

## 2018-10-09 DIAGNOSIS — S06.0X0D CONCUSSION WITHOUT LOSS OF CONSCIOUSNESS, SUBSEQUENT ENCOUNTER: Primary | ICD-10-CM

## 2018-10-09 DIAGNOSIS — E11.9 TYPE 2 DIABETES MELLITUS WITHOUT COMPLICATION, WITHOUT LONG-TERM CURRENT USE OF INSULIN (HCC): ICD-10-CM

## 2018-10-09 DIAGNOSIS — E78.00 PURE HYPERCHOLESTEROLEMIA: ICD-10-CM

## 2018-10-09 DIAGNOSIS — D75.1 SECONDARY POLYCYTHEMIA: ICD-10-CM

## 2018-10-09 DIAGNOSIS — J84.9 ILD (INTERSTITIAL LUNG DISEASE) (HCC): ICD-10-CM

## 2018-10-09 DIAGNOSIS — J96.11 CHRONIC RESPIRATORY FAILURE WITH HYPOXIA (HCC): ICD-10-CM

## 2018-10-09 PROBLEM — I20.0 UNSTABLE ANGINA (HCC): Status: RESOLVED | Noted: 2017-07-24 | Resolved: 2018-10-09

## 2018-10-09 PROBLEM — R51.9 HEADACHE: Status: RESOLVED | Noted: 2018-10-02 | Resolved: 2018-10-09

## 2018-10-09 PROBLEM — N20.1 LEFT URETERAL STONE: Status: RESOLVED | Noted: 2018-04-13 | Resolved: 2018-10-09

## 2018-10-09 PROBLEM — R00.0 SINUS TACHYCARDIA: Status: RESOLVED | Noted: 2018-10-02 | Resolved: 2018-10-09

## 2018-10-09 LAB — HBA1C MFR BLD: 6.7 %

## 2018-10-09 PROCEDURE — 99215 OFFICE O/P EST HI 40 MIN: CPT | Performed by: FAMILY MEDICINE

## 2018-10-09 PROCEDURE — 3044F HG A1C LEVEL LT 7.0%: CPT | Performed by: FAMILY MEDICINE

## 2018-10-09 PROCEDURE — 3017F COLORECTAL CA SCREEN DOC REV: CPT | Performed by: FAMILY MEDICINE

## 2018-10-09 PROCEDURE — G8427 DOCREV CUR MEDS BY ELIG CLIN: HCPCS | Performed by: FAMILY MEDICINE

## 2018-10-09 PROCEDURE — G8417 CALC BMI ABV UP PARAM F/U: HCPCS | Performed by: FAMILY MEDICINE

## 2018-10-09 PROCEDURE — G8482 FLU IMMUNIZE ORDER/ADMIN: HCPCS | Performed by: FAMILY MEDICINE

## 2018-10-09 PROCEDURE — 1111F DSCHRG MED/CURRENT MED MERGE: CPT | Performed by: FAMILY MEDICINE

## 2018-10-09 PROCEDURE — G8598 ASA/ANTIPLAT THER USED: HCPCS | Performed by: FAMILY MEDICINE

## 2018-10-09 PROCEDURE — 2022F DILAT RTA XM EVC RTNOPTHY: CPT | Performed by: FAMILY MEDICINE

## 2018-10-09 PROCEDURE — 83036 HEMOGLOBIN GLYCOSYLATED A1C: CPT | Performed by: FAMILY MEDICINE

## 2018-10-09 PROCEDURE — 1036F TOBACCO NON-USER: CPT | Performed by: FAMILY MEDICINE

## 2018-10-09 NOTE — PROGRESS NOTES
dysdiadochokinesia  Neck:  · Symmetric and without masses  · No thyromegaly  Resp:  · Normal effort  · Clear to auscultation bilaterally without rhonchi, wheezing or crackles  Cardiovascular:  · On auscultation, normal S1 and S2 without murmurs, rubs or gallops  · No bruits of bilateral carotids and no JVD  Gastrointestinal:  · Nontender, nondistended, and no masses  · No hepatosplenomegaly  Musculoskeletal:  · Full active range of motion of cervical spine without pain  · Full range of motion of shoulder and arms bilaterally without pain  · No pain with palpation of chest wall  · All extremities without clubbing, cyanosis or edema  Skin:  · No rashes on inspection  · No areas of increased heat or induration on palpation  Psych:  · Normal mood and affect  · Normal insight and judgement    Assessment / Plan:     1. Concussion without loss of consciousness, subsequent encounter  Educated patient on postconcussive symptoms. Discussed that headache most likely secondary to concussion. Discussed he needs to have absolute mental and physical rest until headaches resolve, and then slowly increase activity from there. Patient expressed understanding. Patient to follow-up with me in 2-3 weeks, sooner if symptoms acutely worsen. Reviewed recent CT imaging result. No signs of neurologic deficits today. 2. Other chest pain  Unclear the cause of his pain. Doesn't appear to be musculoskeletal.  Reviewed discharge summary from hospitalization, blood work, EKG results, chest x-ray results, and cardiac stress test as summarized above. Patient has recently seen his pulmonologist, and will follow-up with Dr. Jennifer Najera, cardiology soon. 3. Secondary polycythemia  Reviewed recent H&H, patient aware that they are still elevated. He has follow-up with Dr. Neetu Akhtar, hematology, this Friday. This is thought to be secondary to his hypoxia from his lung disease.     4. Type 2 diabetes mellitus without complication, without long-term current use of insulin ([facility]  poc [id number]=6.7    New diagnosis today. Discussed that A1c is no longer in prediabetic range. Patient does not want to start more medication at this time. He wants to work first on healthy lifestyle changes over the next 3 months. We'll follow-up in 3 months to repeat A1c. Patient to continue aspirin, statin, ACEI    5. Essential hypertension  At goal.  Discussed that his home blood pressure cuff isn't accurate and he should stop using it. Blood pressure looks okay here at the office. Patient to continue carvedilol 6.25 mg b.i.d., lisinopril 20 mg daily    6. Pure hypercholesterolemia  Pt to continue ASA and Crestor 40mg    7. Coronary artery disease involving native coronary artery of native heart without angina pectoris  As stated above, recent stress test ruled out signs of reversible ischemia. Patient to continue to follow up with Dr. Cindy Laughlin. Patient to continue aspirin, and beta blocker. 8. ILD (interstitial lung disease) (HCC)/Chronic respiratory failure with hypoxia ([facility]  As summarized above, reviewed Dr. Martin Said last note from 10/1/18. He ordered future PFTs for patient to get them annually. Patient to continue CellCept prescribed by Dr. Lainna Castelan, and breo and incruse for his COPD. Patient to continue 2-3 L oxygen for exertion and sleeping. Dyspnea on exertion currently at baseline. Lungs clear on exam today.

## 2018-10-23 ENCOUNTER — TELEPHONE (OUTPATIENT)
Dept: PULMONOLOGY | Age: 53
End: 2018-10-23

## 2018-10-23 DIAGNOSIS — J40 BRONCHITIS: Primary | ICD-10-CM

## 2018-10-23 RX ORDER — DOXYCYCLINE HYCLATE 100 MG/1
100 CAPSULE ORAL 2 TIMES DAILY
Qty: 20 CAPSULE | Refills: 0 | Status: SHIPPED | OUTPATIENT
Start: 2018-10-23 | End: 2018-12-04 | Stop reason: ALTCHOICE

## 2018-10-23 RX ORDER — PREDNISONE 10 MG/1
TABLET ORAL
Qty: 30 TABLET | Refills: 0 | Status: SHIPPED | OUTPATIENT
Start: 2018-10-23 | End: 2018-12-04 | Stop reason: ALTCHOICE

## 2018-10-29 ENCOUNTER — OFFICE VISIT (OUTPATIENT)
Dept: FAMILY MEDICINE CLINIC | Age: 53
End: 2018-10-29
Payer: COMMERCIAL

## 2018-10-29 VITALS
DIASTOLIC BLOOD PRESSURE: 86 MMHG | HEIGHT: 70 IN | BODY MASS INDEX: 37.65 KG/M2 | HEART RATE: 83 BPM | WEIGHT: 263 LBS | SYSTOLIC BLOOD PRESSURE: 137 MMHG

## 2018-10-29 DIAGNOSIS — G44.309 POST-CONCUSSION HEADACHE: Primary | ICD-10-CM

## 2018-10-29 PROCEDURE — 1036F TOBACCO NON-USER: CPT | Performed by: FAMILY MEDICINE

## 2018-10-29 PROCEDURE — 3017F COLORECTAL CA SCREEN DOC REV: CPT | Performed by: FAMILY MEDICINE

## 2018-10-29 PROCEDURE — 1111F DSCHRG MED/CURRENT MED MERGE: CPT | Performed by: FAMILY MEDICINE

## 2018-10-29 PROCEDURE — G8417 CALC BMI ABV UP PARAM F/U: HCPCS | Performed by: FAMILY MEDICINE

## 2018-10-29 PROCEDURE — 99214 OFFICE O/P EST MOD 30 MIN: CPT | Performed by: FAMILY MEDICINE

## 2018-10-29 PROCEDURE — G8427 DOCREV CUR MEDS BY ELIG CLIN: HCPCS | Performed by: FAMILY MEDICINE

## 2018-10-29 PROCEDURE — G8598 ASA/ANTIPLAT THER USED: HCPCS | Performed by: FAMILY MEDICINE

## 2018-10-29 PROCEDURE — G8482 FLU IMMUNIZE ORDER/ADMIN: HCPCS | Performed by: FAMILY MEDICINE

## 2018-10-29 RX ORDER — CARVEDILOL 6.25 MG/1
6.25 TABLET ORAL 2 TIMES DAILY WITH MEALS
Qty: 180 TABLET | Refills: 0 | Status: SHIPPED | OUTPATIENT
Start: 2018-10-29 | End: 2019-01-25 | Stop reason: SDUPTHER

## 2018-10-29 NOTE — PROGRESS NOTES
auscultation, normal S1 and S2 without murmurs, rubs or gallops  · No bruits of bilateral carotids and no JVD  Gastrointestinal:  · Nontender, nondistended, and no masses  · No hepatosplenomegaly  Musculoskeletal:  · Nl gait  · All extremities without clubbing, cyanosis or edema  Skin:  · No rashes on inspection  Psych:  · Normal mood and affect  · Normal insight and judgement    Assessment / Plan:     1. Post-concussion headache  Not controlled. Again reviewed the importance of resting for long periods of time, and only increasing activity slowly in a stepwise progression. Headaches are now intermittent where they were constant prior, but this is likely due to the fact that he is only following my directions 1-2 days in a row. Discussed that headaches are not likely to resolve any time soon, if he keeps having days of great activity. Patient expressed understanding. Patient to follow directions, and follow-up in one month if symptoms fail to improve, sooner if significantly worsen.

## 2018-11-21 ENCOUNTER — HOSPITAL ENCOUNTER (OUTPATIENT)
Dept: PULMONOLOGY | Age: 53
Discharge: HOME | End: 2018-11-21
Payer: COMMERCIAL

## 2018-11-21 DIAGNOSIS — J84.9 ILD (INTERSTITIAL LUNG DISEASE) (HCC): ICD-10-CM

## 2018-11-21 DIAGNOSIS — J42 FOLLICULAR BRONCHIOLITIS (HCC): ICD-10-CM

## 2018-11-21 PROCEDURE — 94729 DIFFUSING CAPACITY: CPT

## 2018-11-21 PROCEDURE — 94726 PLETHYSMOGRAPHY LUNG VOLUMES: CPT

## 2018-11-21 PROCEDURE — 94060 EVALUATION OF WHEEZING: CPT

## 2018-11-21 PROCEDURE — 94726 PLETHYSMOGRAPHY LUNG VOLUMES: CPT | Performed by: INTERNAL MEDICINE

## 2018-11-21 PROCEDURE — 6370000000 HC RX 637 (ALT 250 FOR IP): Performed by: INTERNAL MEDICINE

## 2018-11-21 PROCEDURE — 94729 DIFFUSING CAPACITY: CPT | Performed by: INTERNAL MEDICINE

## 2018-11-21 PROCEDURE — 94640 AIRWAY INHALATION TREATMENT: CPT

## 2018-11-21 PROCEDURE — 94060 EVALUATION OF WHEEZING: CPT | Performed by: INTERNAL MEDICINE

## 2018-11-21 PROCEDURE — 94664 DEMO&/EVAL PT USE INHALER: CPT

## 2018-11-21 RX ORDER — ALBUTEROL SULFATE 90 UG/1
4 AEROSOL, METERED RESPIRATORY (INHALATION) ONCE
Status: COMPLETED | OUTPATIENT
Start: 2018-11-21 | End: 2018-11-21

## 2018-11-21 RX ADMIN — ALBUTEROL SULFATE 4 PUFF: 90 AEROSOL, METERED RESPIRATORY (INHALATION) at 12:41

## 2018-11-27 DIAGNOSIS — I10 ESSENTIAL HYPERTENSION: ICD-10-CM

## 2018-11-27 RX ORDER — CLOPIDOGREL BISULFATE 75 MG/1
TABLET ORAL
Qty: 90 TABLET | Refills: 3 | Status: SHIPPED | OUTPATIENT
Start: 2018-11-27 | End: 2020-09-30

## 2018-11-27 RX ORDER — LISINOPRIL 20 MG/1
TABLET ORAL
Qty: 90 TABLET | Refills: 1 | Status: SHIPPED | OUTPATIENT
Start: 2018-11-27 | End: 2019-08-07 | Stop reason: SDUPTHER

## 2018-11-27 RX ORDER — ROSUVASTATIN CALCIUM 40 MG/1
TABLET, COATED ORAL
Qty: 90 TABLET | Refills: 1 | Status: SHIPPED | OUTPATIENT
Start: 2018-11-27 | End: 2019-08-07 | Stop reason: SDUPTHER

## 2018-11-27 RX ORDER — ASPIRIN 81 MG/1
TABLET, CHEWABLE ORAL
Qty: 90 TABLET | Refills: 3 | Status: SHIPPED | OUTPATIENT
Start: 2018-11-27 | End: 2020-01-31 | Stop reason: SDUPTHER

## 2018-12-04 ENCOUNTER — TELEPHONE (OUTPATIENT)
Dept: CARDIOLOGY CLINIC | Age: 53
End: 2018-12-04

## 2018-12-04 ENCOUNTER — OFFICE VISIT (OUTPATIENT)
Dept: PULMONOLOGY | Age: 53
End: 2018-12-04
Payer: COMMERCIAL

## 2018-12-04 VITALS
TEMPERATURE: 97.7 F | HEIGHT: 70 IN | RESPIRATION RATE: 20 BRPM | WEIGHT: 261 LBS | DIASTOLIC BLOOD PRESSURE: 80 MMHG | BODY MASS INDEX: 37.37 KG/M2 | HEART RATE: 80 BPM | SYSTOLIC BLOOD PRESSURE: 126 MMHG | OXYGEN SATURATION: 90 %

## 2018-12-04 DIAGNOSIS — J43.2 CENTRILOBULAR EMPHYSEMA (HCC): ICD-10-CM

## 2018-12-04 DIAGNOSIS — D75.1 POLYCYTHEMIA: ICD-10-CM

## 2018-12-04 DIAGNOSIS — J42 FOLLICULAR BRONCHIOLITIS (HCC): ICD-10-CM

## 2018-12-04 DIAGNOSIS — J44.9 COPD WITH ASTHMA (HCC): ICD-10-CM

## 2018-12-04 DIAGNOSIS — J84.9 ILD (INTERSTITIAL LUNG DISEASE) (HCC): ICD-10-CM

## 2018-12-04 DIAGNOSIS — J84.9 ILD (INTERSTITIAL LUNG DISEASE) (HCC): Primary | ICD-10-CM

## 2018-12-04 DIAGNOSIS — J96.11 CHRONIC RESPIRATORY FAILURE WITH HYPOXIA (HCC): ICD-10-CM

## 2018-12-04 LAB
ALBUMIN SERPL-MCNC: 4.4 G/DL (ref 3.4–5)
ALP BLD-CCNC: 104 U/L (ref 40–129)
ALT SERPL-CCNC: 106 U/L (ref 10–40)
AST SERPL-CCNC: 63 U/L (ref 15–37)
BASOPHILS ABSOLUTE: 0 K/UL (ref 0–0.2)
BASOPHILS RELATIVE PERCENT: 0.7 %
BILIRUB SERPL-MCNC: 0.8 MG/DL (ref 0–1)
BILIRUBIN DIRECT: <0.2 MG/DL (ref 0–0.3)
BILIRUBIN, INDIRECT: ABNORMAL MG/DL (ref 0–1)
EOSINOPHILS ABSOLUTE: 0.1 K/UL (ref 0–0.6)
EOSINOPHILS RELATIVE PERCENT: 2.2 %
HCT VFR BLD CALC: 52.1 % (ref 40.5–52.5)
HEMOGLOBIN: 17.4 G/DL (ref 13.5–17.5)
LYMPHOCYTES ABSOLUTE: 1.2 K/UL (ref 1–5.1)
LYMPHOCYTES RELATIVE PERCENT: 21.8 %
MCH RBC QN AUTO: 28.3 PG (ref 26–34)
MCHC RBC AUTO-ENTMCNC: 33.4 G/DL (ref 31–36)
MCV RBC AUTO: 84.9 FL (ref 80–100)
MONOCYTES ABSOLUTE: 0.6 K/UL (ref 0–1.3)
MONOCYTES RELATIVE PERCENT: 11.6 %
NEUTROPHILS ABSOLUTE: 3.4 K/UL (ref 1.7–7.7)
NEUTROPHILS RELATIVE PERCENT: 63.7 %
PDW BLD-RTO: 14.2 % (ref 12.4–15.4)
PLATELET # BLD: 175 K/UL (ref 135–450)
PMV BLD AUTO: 9.2 FL (ref 5–10.5)
RBC # BLD: 6.13 M/UL (ref 4.2–5.9)
TOTAL PROTEIN: 6.5 G/DL (ref 6.4–8.2)
WBC # BLD: 5.3 K/UL (ref 4–11)

## 2018-12-04 PROCEDURE — G8926 SPIRO NO PERF OR DOC: HCPCS | Performed by: INTERNAL MEDICINE

## 2018-12-04 PROCEDURE — 3023F SPIROM DOC REV: CPT | Performed by: INTERNAL MEDICINE

## 2018-12-04 PROCEDURE — G8598 ASA/ANTIPLAT THER USED: HCPCS | Performed by: INTERNAL MEDICINE

## 2018-12-04 PROCEDURE — G8417 CALC BMI ABV UP PARAM F/U: HCPCS | Performed by: INTERNAL MEDICINE

## 2018-12-04 PROCEDURE — 3017F COLORECTAL CA SCREEN DOC REV: CPT | Performed by: INTERNAL MEDICINE

## 2018-12-04 PROCEDURE — G8427 DOCREV CUR MEDS BY ELIG CLIN: HCPCS | Performed by: INTERNAL MEDICINE

## 2018-12-04 PROCEDURE — G8482 FLU IMMUNIZE ORDER/ADMIN: HCPCS | Performed by: INTERNAL MEDICINE

## 2018-12-04 PROCEDURE — 99214 OFFICE O/P EST MOD 30 MIN: CPT | Performed by: INTERNAL MEDICINE

## 2018-12-04 PROCEDURE — 1036F TOBACCO NON-USER: CPT | Performed by: INTERNAL MEDICINE

## 2018-12-04 RX ORDER — NITROGLYCERIN 0.4 MG/1
TABLET SUBLINGUAL
Qty: 25 TABLET | Refills: 3 | Status: SHIPPED | OUTPATIENT
Start: 2018-12-04 | End: 2021-03-18 | Stop reason: SDUPTHER

## 2018-12-04 NOTE — PATIENT INSTRUCTIONS
Continue with inhalers    Continue with cellcept    Blood work today    Continue with BIPAP at night    Use oxygen as previously     Follow up in 2 months

## 2018-12-04 NOTE — PROGRESS NOTES
Inhaler, Rfl: 6    azelastine (ASTELIN) 0.1 % nasal spray, 2 sprays by Nasal route 2 times daily Use in each nostril as directed, Disp: 1 Bottle, Rfl: 3    sodium chloride 3 % nebulizer solution, Take 15 mLs by nebulization 2 times daily, Disp: 900 mL, Rfl: 0    ROS:  GENERAL:  No fevers, chills, or night sweats: weight is still up  HEENT:  No double vision, blurry vision, or difficulty swallowing  HEART:  No chest pain, no palpitations  LUNGS:  SOB with exertion. This seems to be stable  ABDOMEN:  No abdominal pains, no changes in stools  GENITOURINARY:  No increased frequency, no blood in urine  EXTREMITIES:  No swelling, no lesions  NEURO:  No numbness or tingling, no seizures  SKIN:  No new rashes, no changes in hair or nails  LYMPH:  No masses, no swelling neck, armpits, or groin      PHYSICAL EXAM:  GENERAL:  Well nourished, alert, appears stated age, no distress, plethoric facie  HEENT:  No scleral icterus, no conjunctival irritation, Mallampati IV with bifid pharnyx  NECK:  No thyromegaly, no bruits  LYMPH:  No cervical or supraclavicular adenopathy  HEART:  Regular rate and rhythm, no murmurs  LUNGS:  Clear with slightly diminished flow  ABDOMEN:  No distention, no organomegaly  EXTREMITIES:  Trace edema, no digital clubbing  NEURO:  No localizing deficits, CN II-XII intact    Pulmonary Function Testing 10/2015  A very severe obstructive lung defect is present with minimal  broncho-reactivity. There is also air trapping and a moderate reduction in  diffusion capacity. These lung function tests are most consistent with  severe chronic obstructive pulmonary disease. In comparison with pulmonary  function testing performed in October of 2012, there has been significant  reduction in the forced vital capacity and FEV1.      Pulmonary Function Testing 10/2017  FEV1 1.09 L to 1.26 L  FVC  2.86 L to 3.48 L  FEV1/FVC 38  TLC 7.06 L  DLCO 42%    Pulmonary Function Testing 10/2017  FEV1 1.34 L to 1.42 L  FVC  3.31 L to 3.68 L  FEV1/FVC 40  TLC 7.83 L   DLCO 44%    ECHO 10/2017  Left ventricular size is normal.   Mild concentric left ventricular hypertrophy is present.   Global left ventricular function is normal with ejection fraction estimated   from 55 % to 60 %.   Normal right ventricular size and function.     Stress test 9/2017  Susan Friendly is no obvious ischemia in this study. There is an area of decrease    tracer uptake at the apex both at stress and with rest that could represent    an area of prior infarct but is probably more bowel artifact.    Diaphragmatic and bowel attenuation present.    Non-diagnostic EKG response due to failure to reach target heart rate.    Appropriate hemodynamic response to [place] with no significant ST changes. Chest imaging from 3/2018 is reviewed. My interpretation is continued emphysema with cystic lung disease. Mosaic pattern is noted     Alpha-1 Anti-trypsin levels:  120, Phenotype:  M1M1       ABG 10/2017:  7.43/39/61.  92%    RHC 4/2018  1. Normal right heart pressures with a right atrial pressure of 1 and a  pulmonary capillary wedge pressure of 8.  2.  No evidence of pulmonary hypertension with an instantaneous pressure of  26/14 and a mean pulmonary arterial pressure of 20.  3.  Normal cardiac outputs by thermodilution at 6.73 liters per minute with  a cardiac index of 2.95 liters per kilogram per minute. By second equation  the cardiac output was 4.76 with a cardiac index of 2.09. Of note, the  patient does have erythrocytosis. 4.  Pulmonary vascular resistance 147.  5.  Normal mixed venous oxygen saturations with a superior vena cava  saturation of 70 and a pulmonary arterial saturation of 73%.     Lab Results   Component Value Date    WBC 7.7 10/03/2018    HGB 18.3 (H) 10/03/2018    HCT 53.9 (H) 10/03/2018    MCV 84.9 10/03/2018     10/03/2018       No results found for: BNP    Lab Results   Component Value Date    CREATININE 1.1 10/03/2018    BUN 20 10/03/2018

## 2019-01-02 ENCOUNTER — PATIENT MESSAGE (OUTPATIENT)
Dept: FAMILY MEDICINE CLINIC | Age: 54
End: 2019-01-02

## 2019-01-03 RX ORDER — AMLODIPINE BESYLATE 5 MG/1
5 TABLET ORAL DAILY
Qty: 30 TABLET | Refills: 0 | Status: SHIPPED | OUTPATIENT
Start: 2019-01-03 | End: 2019-04-12 | Stop reason: SDUPTHER

## 2019-01-09 ENCOUNTER — OFFICE VISIT (OUTPATIENT)
Dept: FAMILY MEDICINE CLINIC | Age: 54
End: 2019-01-09
Payer: COMMERCIAL

## 2019-01-09 VITALS
OXYGEN SATURATION: 92 % | HEART RATE: 92 BPM | HEIGHT: 70 IN | DIASTOLIC BLOOD PRESSURE: 88 MMHG | BODY MASS INDEX: 37.8 KG/M2 | SYSTOLIC BLOOD PRESSURE: 135 MMHG | WEIGHT: 264 LBS

## 2019-01-09 DIAGNOSIS — I25.10 CAD S/P PERCUTANEOUS CORONARY ANGIOPLASTY: ICD-10-CM

## 2019-01-09 DIAGNOSIS — Z98.61 CAD S/P PERCUTANEOUS CORONARY ANGIOPLASTY: ICD-10-CM

## 2019-01-09 DIAGNOSIS — J84.9 ILD (INTERSTITIAL LUNG DISEASE) (HCC): ICD-10-CM

## 2019-01-09 DIAGNOSIS — Z79.899 MEDICATION MANAGEMENT: ICD-10-CM

## 2019-01-09 DIAGNOSIS — F33.1 MODERATE EPISODE OF RECURRENT MAJOR DEPRESSIVE DISORDER (HCC): ICD-10-CM

## 2019-01-09 DIAGNOSIS — D75.1 POLYCYTHEMIA: Primary | ICD-10-CM

## 2019-01-09 DIAGNOSIS — I10 ESSENTIAL HYPERTENSION: ICD-10-CM

## 2019-01-09 DIAGNOSIS — E11.9 TYPE 2 DIABETES MELLITUS WITHOUT COMPLICATION, WITHOUT LONG-TERM CURRENT USE OF INSULIN (HCC): ICD-10-CM

## 2019-01-09 LAB
ALBUMIN SERPL-MCNC: 4.3 G/DL (ref 3.4–5)
ALP BLD-CCNC: 112 U/L (ref 40–129)
ALT SERPL-CCNC: 85 U/L (ref 10–40)
ANION GAP SERPL CALCULATED.3IONS-SCNC: 11 MMOL/L (ref 3–16)
AST SERPL-CCNC: 55 U/L (ref 15–37)
BILIRUB SERPL-MCNC: 0.6 MG/DL (ref 0–1)
BILIRUBIN DIRECT: <0.2 MG/DL (ref 0–0.3)
BILIRUBIN, INDIRECT: ABNORMAL MG/DL (ref 0–1)
BUN BLDV-MCNC: 12 MG/DL (ref 7–20)
CALCIUM SERPL-MCNC: 9.4 MG/DL (ref 8.3–10.6)
CHLORIDE BLD-SCNC: 107 MMOL/L (ref 99–110)
CO2: 24 MMOL/L (ref 21–32)
CREAT SERPL-MCNC: 1 MG/DL (ref 0.9–1.3)
GFR AFRICAN AMERICAN: >60
GFR NON-AFRICAN AMERICAN: >60
GLUCOSE BLD-MCNC: 164 MG/DL (ref 70–99)
POTASSIUM SERPL-SCNC: 4.6 MMOL/L (ref 3.5–5.1)
SODIUM BLD-SCNC: 142 MMOL/L (ref 136–145)
TOTAL PROTEIN: 7 G/DL (ref 6.4–8.2)

## 2019-01-09 PROCEDURE — 3017F COLORECTAL CA SCREEN DOC REV: CPT | Performed by: FAMILY MEDICINE

## 2019-01-09 PROCEDURE — 3046F HEMOGLOBIN A1C LEVEL >9.0%: CPT | Performed by: FAMILY MEDICINE

## 2019-01-09 PROCEDURE — G8417 CALC BMI ABV UP PARAM F/U: HCPCS | Performed by: FAMILY MEDICINE

## 2019-01-09 PROCEDURE — G8598 ASA/ANTIPLAT THER USED: HCPCS | Performed by: FAMILY MEDICINE

## 2019-01-09 PROCEDURE — 1036F TOBACCO NON-USER: CPT | Performed by: FAMILY MEDICINE

## 2019-01-09 PROCEDURE — G8482 FLU IMMUNIZE ORDER/ADMIN: HCPCS | Performed by: FAMILY MEDICINE

## 2019-01-09 PROCEDURE — 99215 OFFICE O/P EST HI 40 MIN: CPT | Performed by: FAMILY MEDICINE

## 2019-01-09 PROCEDURE — 2022F DILAT RTA XM EVC RTNOPTHY: CPT | Performed by: FAMILY MEDICINE

## 2019-01-09 PROCEDURE — 36415 COLL VENOUS BLD VENIPUNCTURE: CPT | Performed by: FAMILY MEDICINE

## 2019-01-09 PROCEDURE — G8427 DOCREV CUR MEDS BY ELIG CLIN: HCPCS | Performed by: FAMILY MEDICINE

## 2019-01-09 RX ORDER — ESCITALOPRAM OXALATE 10 MG/1
10 TABLET ORAL DAILY
Qty: 90 TABLET | Refills: 0 | Status: SHIPPED | OUTPATIENT
Start: 2019-01-09 | End: 2019-04-12 | Stop reason: SDUPTHER

## 2019-01-10 LAB
ESTIMATED AVERAGE GLUCOSE: 171.4 MG/DL
HBA1C MFR BLD: 7.6 %

## 2019-01-14 ENCOUNTER — TELEPHONE (OUTPATIENT)
Dept: FAMILY MEDICINE CLINIC | Age: 54
End: 2019-01-14

## 2019-01-25 DIAGNOSIS — J44.9 COPD WITH ASTHMA (HCC): ICD-10-CM

## 2019-01-25 RX ORDER — CARVEDILOL 6.25 MG/1
TABLET ORAL
Qty: 180 TABLET | Refills: 0 | Status: SHIPPED | OUTPATIENT
Start: 2019-01-25 | End: 2019-01-29 | Stop reason: CLARIF

## 2019-01-29 ENCOUNTER — OFFICE VISIT (OUTPATIENT)
Dept: PULMONOLOGY | Age: 54
End: 2019-01-29
Payer: COMMERCIAL

## 2019-01-29 VITALS
RESPIRATION RATE: 20 BRPM | OXYGEN SATURATION: 90 % | BODY MASS INDEX: 37.22 KG/M2 | WEIGHT: 260 LBS | TEMPERATURE: 97.5 F | HEIGHT: 70 IN | SYSTOLIC BLOOD PRESSURE: 105 MMHG | DIASTOLIC BLOOD PRESSURE: 73 MMHG | HEART RATE: 79 BPM

## 2019-01-29 DIAGNOSIS — J84.9 ILD (INTERSTITIAL LUNG DISEASE) (HCC): ICD-10-CM

## 2019-01-29 DIAGNOSIS — R06.02 SOB (SHORTNESS OF BREATH): Primary | ICD-10-CM

## 2019-01-29 DIAGNOSIS — J96.11 CHRONIC RESPIRATORY FAILURE WITH HYPOXIA (HCC): ICD-10-CM

## 2019-01-29 DIAGNOSIS — J43.2 CENTRILOBULAR EMPHYSEMA (HCC): ICD-10-CM

## 2019-01-29 DIAGNOSIS — R06.02 SOB (SHORTNESS OF BREATH): ICD-10-CM

## 2019-01-29 DIAGNOSIS — J42 FOLLICULAR BRONCHIOLITIS (HCC): ICD-10-CM

## 2019-01-29 LAB — SEDIMENTATION RATE, ERYTHROCYTE: 10 MM/HR (ref 0–20)

## 2019-01-29 PROCEDURE — G8598 ASA/ANTIPLAT THER USED: HCPCS | Performed by: INTERNAL MEDICINE

## 2019-01-29 PROCEDURE — 1036F TOBACCO NON-USER: CPT | Performed by: INTERNAL MEDICINE

## 2019-01-29 PROCEDURE — 3017F COLORECTAL CA SCREEN DOC REV: CPT | Performed by: INTERNAL MEDICINE

## 2019-01-29 PROCEDURE — G8427 DOCREV CUR MEDS BY ELIG CLIN: HCPCS | Performed by: INTERNAL MEDICINE

## 2019-01-29 PROCEDURE — 3023F SPIROM DOC REV: CPT | Performed by: INTERNAL MEDICINE

## 2019-01-29 PROCEDURE — 99214 OFFICE O/P EST MOD 30 MIN: CPT | Performed by: INTERNAL MEDICINE

## 2019-01-29 PROCEDURE — G8482 FLU IMMUNIZE ORDER/ADMIN: HCPCS | Performed by: INTERNAL MEDICINE

## 2019-01-29 PROCEDURE — G8926 SPIRO NO PERF OR DOC: HCPCS | Performed by: INTERNAL MEDICINE

## 2019-01-29 PROCEDURE — G8417 CALC BMI ABV UP PARAM F/U: HCPCS | Performed by: INTERNAL MEDICINE

## 2019-01-29 RX ORDER — PREDNISONE 10 MG/1
TABLET ORAL
Qty: 30 TABLET | Refills: 0 | Status: SHIPPED | OUTPATIENT
Start: 2019-01-29 | End: 2019-01-29 | Stop reason: SDUPTHER

## 2019-01-29 RX ORDER — PREDNISONE 10 MG/1
TABLET ORAL
Qty: 30 TABLET | Refills: 0 | Status: SHIPPED | OUTPATIENT
Start: 2019-01-29 | End: 2019-05-13 | Stop reason: ALTCHOICE

## 2019-01-31 LAB — BETA GLOBULIN: 0 AU/ML (ref 0–19)

## 2019-02-01 ENCOUNTER — HOSPITAL ENCOUNTER (OUTPATIENT)
Dept: CT IMAGING | Age: 54
Discharge: HOME | End: 2019-02-01
Payer: COMMERCIAL

## 2019-02-01 DIAGNOSIS — J84.9 ILD (INTERSTITIAL LUNG DISEASE) (HCC): ICD-10-CM

## 2019-02-01 DIAGNOSIS — J42 FOLLICULAR BRONCHIOLITIS (HCC): ICD-10-CM

## 2019-02-01 PROCEDURE — 71250 CT THORAX DX C-: CPT

## 2019-02-26 DIAGNOSIS — J44.9 COPD WITH ASTHMA (HCC): ICD-10-CM

## 2019-02-26 RX ORDER — UMECLIDINIUM 62.5 UG/1
AEROSOL, POWDER ORAL
Qty: 1 EACH | Refills: 6 | Status: SHIPPED | OUTPATIENT
Start: 2019-02-26 | End: 2019-10-07 | Stop reason: SDUPTHER

## 2019-03-25 ENCOUNTER — OFFICE VISIT (OUTPATIENT)
Dept: PULMONOLOGY | Age: 54
End: 2019-03-25
Payer: COMMERCIAL

## 2019-03-25 VITALS
HEART RATE: 76 BPM | OXYGEN SATURATION: 89 % | SYSTOLIC BLOOD PRESSURE: 120 MMHG | HEIGHT: 70 IN | WEIGHT: 263 LBS | TEMPERATURE: 97.3 F | RESPIRATION RATE: 20 BRPM | BODY MASS INDEX: 37.65 KG/M2 | DIASTOLIC BLOOD PRESSURE: 73 MMHG

## 2019-03-25 DIAGNOSIS — J96.11 CHRONIC RESPIRATORY FAILURE WITH HYPOXIA (HCC): ICD-10-CM

## 2019-03-25 DIAGNOSIS — J43.2 CENTRILOBULAR EMPHYSEMA (HCC): ICD-10-CM

## 2019-03-25 DIAGNOSIS — D75.1 POLYCYTHEMIA: ICD-10-CM

## 2019-03-25 DIAGNOSIS — J42 FOLLICULAR BRONCHIOLITIS (HCC): ICD-10-CM

## 2019-03-25 DIAGNOSIS — J84.9 ILD (INTERSTITIAL LUNG DISEASE) (HCC): Primary | ICD-10-CM

## 2019-03-25 PROCEDURE — G8417 CALC BMI ABV UP PARAM F/U: HCPCS | Performed by: INTERNAL MEDICINE

## 2019-03-25 PROCEDURE — G8427 DOCREV CUR MEDS BY ELIG CLIN: HCPCS | Performed by: INTERNAL MEDICINE

## 2019-03-25 PROCEDURE — G8926 SPIRO NO PERF OR DOC: HCPCS | Performed by: INTERNAL MEDICINE

## 2019-03-25 PROCEDURE — 1036F TOBACCO NON-USER: CPT | Performed by: INTERNAL MEDICINE

## 2019-03-25 PROCEDURE — G8598 ASA/ANTIPLAT THER USED: HCPCS | Performed by: INTERNAL MEDICINE

## 2019-03-25 PROCEDURE — 99214 OFFICE O/P EST MOD 30 MIN: CPT | Performed by: INTERNAL MEDICINE

## 2019-03-25 PROCEDURE — 3023F SPIROM DOC REV: CPT | Performed by: INTERNAL MEDICINE

## 2019-03-25 PROCEDURE — 3017F COLORECTAL CA SCREEN DOC REV: CPT | Performed by: INTERNAL MEDICINE

## 2019-03-25 PROCEDURE — G8482 FLU IMMUNIZE ORDER/ADMIN: HCPCS | Performed by: INTERNAL MEDICINE

## 2019-04-12 DIAGNOSIS — F33.1 MODERATE EPISODE OF RECURRENT MAJOR DEPRESSIVE DISORDER (HCC): ICD-10-CM

## 2019-04-12 RX ORDER — OMEPRAZOLE 20 MG/1
CAPSULE, DELAYED RELEASE ORAL
Qty: 180 CAPSULE | Refills: 3 | Status: SHIPPED | OUTPATIENT
Start: 2019-04-12 | End: 2020-09-10 | Stop reason: SDUPTHER

## 2019-04-15 RX ORDER — ASPIRIN 81 MG/1
TABLET, CHEWABLE ORAL
Qty: 90 TABLET | Refills: 3 | Status: SHIPPED | OUTPATIENT
Start: 2019-04-15 | End: 2019-07-18 | Stop reason: SDUPTHER

## 2019-04-15 NOTE — TELEPHONE ENCOUNTER
Call pt      Dr Aruna Rodriguez removed pt's coreg form his med list on 1/29/19    Find out if he wants him off of the medication or not

## 2019-04-16 RX ORDER — ESCITALOPRAM OXALATE 10 MG/1
TABLET ORAL
Qty: 90 TABLET | Refills: 0 | Status: SHIPPED | OUTPATIENT
Start: 2019-04-16 | End: 2019-08-07 | Stop reason: SDUPTHER

## 2019-04-16 RX ORDER — CARVEDILOL 6.25 MG/1
TABLET ORAL
Qty: 180 TABLET | Refills: 0 | Status: SHIPPED | OUTPATIENT
Start: 2019-04-16 | End: 2019-08-07 | Stop reason: SDUPTHER

## 2019-04-22 ENCOUNTER — TELEPHONE (OUTPATIENT)
Dept: PULMONOLOGY | Age: 54
End: 2019-04-22

## 2019-04-26 ENCOUNTER — OFFICE VISIT (OUTPATIENT)
Dept: FAMILY MEDICINE CLINIC | Age: 54
End: 2019-04-26
Payer: COMMERCIAL

## 2019-04-26 VITALS
BODY MASS INDEX: 37.51 KG/M2 | WEIGHT: 262 LBS | SYSTOLIC BLOOD PRESSURE: 131 MMHG | HEART RATE: 84 BPM | HEIGHT: 70 IN | DIASTOLIC BLOOD PRESSURE: 84 MMHG

## 2019-04-26 DIAGNOSIS — J96.11 CHRONIC RESPIRATORY FAILURE WITH HYPOXIA (HCC): ICD-10-CM

## 2019-04-26 DIAGNOSIS — Z12.11 SCREEN FOR COLON CANCER: ICD-10-CM

## 2019-04-26 DIAGNOSIS — I10 ESSENTIAL HYPERTENSION: ICD-10-CM

## 2019-04-26 DIAGNOSIS — Z98.61 CAD S/P PERCUTANEOUS CORONARY ANGIOPLASTY: ICD-10-CM

## 2019-04-26 DIAGNOSIS — I25.10 CAD S/P PERCUTANEOUS CORONARY ANGIOPLASTY: ICD-10-CM

## 2019-04-26 DIAGNOSIS — E78.00 PURE HYPERCHOLESTEROLEMIA: ICD-10-CM

## 2019-04-26 DIAGNOSIS — J84.9 ILD (INTERSTITIAL LUNG DISEASE) (HCC): ICD-10-CM

## 2019-04-26 DIAGNOSIS — E11.9 TYPE 2 DIABETES MELLITUS WITHOUT COMPLICATION, WITHOUT LONG-TERM CURRENT USE OF INSULIN (HCC): ICD-10-CM

## 2019-04-26 DIAGNOSIS — D75.1 POLYCYTHEMIA: Primary | ICD-10-CM

## 2019-04-26 LAB
CREATININE URINE: 338.9 MG/DL (ref 39–259)
HBA1C MFR BLD: 7.6 %
MICROALBUMIN UR-MCNC: <1.2 MG/DL
MICROALBUMIN/CREAT UR-RTO: ABNORMAL MG/G (ref 0–30)

## 2019-04-26 PROCEDURE — 2022F DILAT RTA XM EVC RTNOPTHY: CPT | Performed by: FAMILY MEDICINE

## 2019-04-26 PROCEDURE — 83036 HEMOGLOBIN GLYCOSYLATED A1C: CPT | Performed by: FAMILY MEDICINE

## 2019-04-26 PROCEDURE — 3045F PR MOST RECENT HEMOGLOBIN A1C LEVEL 7.0-9.0%: CPT | Performed by: FAMILY MEDICINE

## 2019-04-26 PROCEDURE — 99215 OFFICE O/P EST HI 40 MIN: CPT | Performed by: FAMILY MEDICINE

## 2019-04-26 PROCEDURE — G8427 DOCREV CUR MEDS BY ELIG CLIN: HCPCS | Performed by: FAMILY MEDICINE

## 2019-04-26 PROCEDURE — G8417 CALC BMI ABV UP PARAM F/U: HCPCS | Performed by: FAMILY MEDICINE

## 2019-04-26 PROCEDURE — 1036F TOBACCO NON-USER: CPT | Performed by: FAMILY MEDICINE

## 2019-04-26 PROCEDURE — 3017F COLORECTAL CA SCREEN DOC REV: CPT | Performed by: FAMILY MEDICINE

## 2019-04-26 PROCEDURE — G8598 ASA/ANTIPLAT THER USED: HCPCS | Performed by: FAMILY MEDICINE

## 2019-04-26 NOTE — PATIENT INSTRUCTIONS
Please remember to bring back FIT test within a week, so you don't get a charge. Get fasting blood work at [facility].

## 2019-04-26 NOTE — PROGRESS NOTES
PROGRESS NOTE     Ebony Trent MD  [facility]) Physicians  Eleazar Hay [address] [address]  [phone] office  [phone] fax      Subjective:      Patient ID: Marbella Baker is a 48 y.o. male      CC:  polycythemia, DM2, HTN, HLD, CAD, ILD, MDD, chronic respiratory failure        Polycythemia  Patient diagnosed with polycythemia thought to be secondary to his hypoxia from his lung disease. Dr Carlos Alberto Vegas [facility]. Diabetes Mellitus Type 2:  Suggested start metformin in January 2019, but patient declined. Patient plans to have weight loss surgery, so wants to wait for this prior to considering diabetic medication     pt denies blurry vision, foot ulcerations, neuropathy, polyphagia, polyuria, polydipsia, nausea, vomiting and diarrhea     Patient no longer drinking regular soda. He is trying to eat proper portion sizes of carbs    Hypertension:  Home blood pressure monitoring: No.  He is adherent to a low sodium diet. Patient denies shortness of breath, lightheadedness, blurred vision, peripheral edema, palpitations, dry cough and fatigue. Antihypertensive medication side effects: no medication side effects noted. Use of agents associated with hypertension: none. Hyperlipidemia:  No new myalgias or GI upset on rosuvastatin (Crestor). Lab Results   Component Value Date    LABA1C 7.6 01/09/2019    LABA1C 6.7 10/09/2018    LABA1C 6.3 05/14/2018     Lab Results   Component Value Date    CREATININE 1.0 01/09/2019     Lab Results   Component Value Date    ALT 85 (H) 01/09/2019    AST 55 (H) 01/09/2019     Lab Results   Component Value Date    CHOL 168 05/14/2018    TRIG 127 05/14/2018    HDL 31 (L) 05/14/2018    LDLCALC 112 (H) 05/14/2018        ILD  Most recently saw a pulmonologist, Dr. Jeyson Vasquez, on 3/25/19. According to his note, CellCept, burst of prednisone, and bronchodilators have not helped patient's shortness of breath at all.   He also comments AEPB INHALE ONE PUFF BY MOUTH ONE TIME A DAY 1 each 6    predniSONE (DELTASONE) 10 MG tablet 40mg for 3days 30mg for 3days 20mg for 3days 10mg for 3days 30 tablet 0    BREO ELLIPTA 200-25 MCG/INH AEPB INHALE 1 PUFF BY MOUTH ONE TIME A DAY 1 each 11    hydrocortisone (WESTCORT) 0.2 % cream Apply topically 2 times daily Apply topically 2 times daily. 60 g 0    nitroGLYCERIN (NITROSTAT) 0.4 MG SL tablet up to max of 3 total doses. If no relief after 1 dose, call 911. 25 tablet 3    ASPIRIN LOW DOSE ADULT 81 MG chewable tablet CHEW AND SWALLOW ONE TABLET BY MOUTH ONE TIME A DAY 90 tablet 3    rosuvastatin (CRESTOR) 40 MG tablet TAKE ONE TABLET BY MOUTH EVERY EVENING 90 tablet 1    clopidogrel (PLAVIX) 75 MG tablet TAKE ONE TABLET BY MOUTH ONE TIME A DAY 90 tablet 3    lisinopril (PRINIVIL;ZESTRIL) 20 MG tablet TAKE 1 TABLET BY MOUTH ONE TIME DAILY 90 tablet 1    mycophenolate (CELLCEPT) 500 MG tablet TAKE TWO TABLETS BY MOUTH TWICE A  tablet 1    albuterol sulfate HFA (PROAIR HFA) 108 (90 BASE) MCG/ACT inhaler Inhale 2 puffs into the lungs every 4 hours as needed for Wheezing or Shortness of Breath 1 Inhaler 6    azelastine (ASTELIN) 0.1 % nasal spray 2 sprays by Nasal route 2 times daily Use in each nostril as directed 1 Bottle 3    sodium chloride 3 % nebulizer solution Take 15 mLs by nebulization 2 times daily 900 mL 0       Past Medical History:   Diagnosis Date    CAD S/P percutaneous coronary angioplasty     Chronic respiratory failure (HCC)     COPD exacerbation (HCC) 9/7/2016    Coronary artery disease involving native coronary artery of native heart with unstable angina pectoris (HCC)     Diabetes (HCC)     Diabetes (HCC)     Dyslipidemia     Dyspnea     Goodpasture syndrome (HCC)     reason pt is on cellcept.   He will need lung transplant eventually    High risk medications (not anticoagulants) long-term use     HTN (hypertension)     Hyperlipidemia     Hypoxemia     ILD PFTs, yet patient still complaining of dyspnea on exertion. [facility] to see if weight loss can help symptoms. CellCept was stopped as it did not help. Patient still on incruse and breo since has some evidence of lung obstruction. . Patient to continue 2-3 L oxygen for exertion and sleeping. Dyspnea on exertion currently at baseline. Lungs clear on exam today. Moderate episode of recurrent major depressive disorder (HCC)   improved on Lexapro 10 mg. Patient to continue. HM:    Due for FIT test early next month.   Ordered today

## 2019-05-13 ENCOUNTER — OFFICE VISIT (OUTPATIENT)
Dept: PULMONOLOGY | Age: 54
End: 2019-05-13
Payer: COMMERCIAL

## 2019-05-13 VITALS
BODY MASS INDEX: 37.94 KG/M2 | TEMPERATURE: 97 F | HEIGHT: 70 IN | DIASTOLIC BLOOD PRESSURE: 86 MMHG | WEIGHT: 265 LBS | SYSTOLIC BLOOD PRESSURE: 137 MMHG | RESPIRATION RATE: 20 BRPM | HEART RATE: 69 BPM | OXYGEN SATURATION: 90 %

## 2019-05-13 DIAGNOSIS — J42 FOLLICULAR BRONCHIOLITIS (HCC): ICD-10-CM

## 2019-05-13 DIAGNOSIS — J84.9 ILD (INTERSTITIAL LUNG DISEASE) (HCC): Primary | ICD-10-CM

## 2019-05-13 DIAGNOSIS — D75.1 POLYCYTHEMIA: ICD-10-CM

## 2019-05-13 DIAGNOSIS — J43.2 CENTRILOBULAR EMPHYSEMA (HCC): ICD-10-CM

## 2019-05-13 PROCEDURE — G8598 ASA/ANTIPLAT THER USED: HCPCS | Performed by: INTERNAL MEDICINE

## 2019-05-13 PROCEDURE — 3017F COLORECTAL CA SCREEN DOC REV: CPT | Performed by: INTERNAL MEDICINE

## 2019-05-13 PROCEDURE — 99214 OFFICE O/P EST MOD 30 MIN: CPT | Performed by: INTERNAL MEDICINE

## 2019-05-13 PROCEDURE — 1036F TOBACCO NON-USER: CPT | Performed by: INTERNAL MEDICINE

## 2019-05-13 PROCEDURE — G8427 DOCREV CUR MEDS BY ELIG CLIN: HCPCS | Performed by: INTERNAL MEDICINE

## 2019-05-13 PROCEDURE — G8417 CALC BMI ABV UP PARAM F/U: HCPCS | Performed by: INTERNAL MEDICINE

## 2019-05-13 PROCEDURE — G8926 SPIRO NO PERF OR DOC: HCPCS | Performed by: INTERNAL MEDICINE

## 2019-05-13 PROCEDURE — 3023F SPIROM DOC REV: CPT | Performed by: INTERNAL MEDICINE

## 2019-05-13 RX ORDER — SODIUM CHLORIDE FOR INHALATION 3 %
15 VIAL, NEBULIZER (ML) INHALATION 2 TIMES DAILY
Qty: 900 ML | Refills: 0 | Status: SHIPPED | OUTPATIENT
Start: 2019-05-13

## 2019-05-13 NOTE — PROGRESS NOTES
disease) ([facility] [address]     Left ureteral stone 4/13/2018    MDD (major depressive disorder)     ESTRELLITA on CPAP     Polycythemia     Prediabetes     Ureteral calculus of right kidney transplant 2005       Past Surgical History:   Procedure Laterality Date    BRONCHOSCOPY      COLONOSCOPY      DIAGNOSTIC CARDIAC CATH LAB PROCEDURE  2006??    ENDOSCOPY, COLON, DIAGNOSTIC      LUNG REMOVAL, PARTIAL Right 2005    Partial right lobectomy for diagnostic purpose. Surgery performed in [place], [place]           Current Outpatient Medications:     carvedilol (COREG) 6.25 MG tablet, TAKE 1 TABLET BY MOUTH 2 TIMES A DAY WITH MEALS, Disp: 180 tablet, Rfl: 0    escitalopram (LEXAPRO) 10 MG tablet, TAKE 1 TABLET BY MOUTH ONE TIME A DAY, Disp: 90 tablet, Rfl: 0    amLODIPine (NORVASC) 5 MG tablet, TAKE 1 TABLET BY MOUTH ONE TIME A DAY IN ADDITION TO LISINOPRIL FOR BLOOD PRESSURE, Disp: 90 tablet, Rfl: 1    ASPIRIN LOW DOSE ADULT 81 MG chewable tablet, CHEW AND SWALLOW ONE TABLET BY MOUTH ONE TIME A DAY, Disp: 90 tablet, Rfl: 3    omeprazole (PRILOSEC) 20 MG delayed release capsule, TAKE ONE CAPSULE BY MOUTH TWICE A DAY, Disp: 180 capsule, Rfl: 3    INCRUSE ELLIPTA 62.5 MCG/INH AEPB, INHALE ONE PUFF BY MOUTH ONE TIME A DAY, Disp: 1 each, Rfl: 6    BREO ELLIPTA 200-25 MCG/INH AEPB, INHALE 1 PUFF BY MOUTH ONE TIME A DAY, Disp: 1 each, Rfl: 11    hydrocortisone (WESTCORT) 0.2 % cream, Apply topically 2 times daily Apply topically 2 times daily. , Disp: 60 g, Rfl: 0    nitroGLYCERIN (NITROSTAT) 0.4 MG SL tablet, up to max of 3 total doses.  If no relief after 1 dose, call 911., Disp: 25 tablet, Rfl: 3    ASPIRIN LOW DOSE ADULT 81 MG chewable tablet, CHEW AND SWALLOW ONE TABLET BY MOUTH ONE TIME A DAY, Disp: 90 tablet, Rfl: 3    rosuvastatin (CRESTOR) 40 MG tablet, TAKE ONE TABLET BY MOUTH EVERY EVENING, Disp: 90 tablet, Rfl: 1    clopidogrel (PLAVIX) 75 MG tablet, TAKE ONE TABLET BY MOUTH ONE TIME A DAY, Disp: 90 tablet, Rfl: 3   lisinopril (PRINIVIL;ZESTRIL) 20 MG tablet, TAKE 1 TABLET BY MOUTH ONE TIME DAILY, Disp: 90 tablet, Rfl: 1    albuterol sulfate HFA (PROAIR HFA) 108 (90 BASE) MCG/ACT inhaler, Inhale 2 puffs into the lungs every 4 hours as needed for Wheezing or Shortness of Breath, Disp: 1 Inhaler, Rfl: 6    azelastine (ASTELIN) 0.1 % nasal spray, 2 sprays by Nasal route 2 times daily Use in each nostril as directed, Disp: 1 Bottle, Rfl: 3    sodium chloride 3 % nebulizer solution, Take 15 mLs by nebulization 2 times daily, Disp: 900 mL, Rfl: 0    ROS:  GENERAL:  Weight still is up   HEENT:  No double vision, blurry vision, or difficulty swallowing  HEART:  No chest pain, no palpitations. Chest wall pains  LUNGS:  Chronically SOB with exertion   ABDOMEN:  No abdominal pains, no changes in stools  GENITOURINARY:  No increased frequency, no blood in urine  EXTREMITIES:  No swelling, no lesions. NEURO:  No numbness or tingling, no seizures  SKIN:  No new rashes, no changes in hair or nails  LYMPH:  No masses, no swelling neck, armpits, or groin      PHYSICAL EXAM:  GENERAL:  Well nourished, alert, appears stated age, no distress, plethoric facie  HEENT:  No scleral icterus, no conjunctival irritation, Mallampati IV with bifid pharnyx  NECK:  No thyromegaly, no bruits  LYMPH:  No cervical or supraclavicular adenopathy  HEART:  Regular rate and rhythm, no murmurs  LUNGS:  Diminished with wheeze today   ABDOMEN:  No distention, no organomegaly  EXTREMITIES:  Trace edema, no digital clubbing  NEURO:  No localizing deficits, CN II-XII intact    Pulmonary Function Testing 10/2015  A very severe obstructive lung defect is present with minimal  broncho-reactivity. There is also air trapping and a moderate reduction in  diffusion capacity. These lung function tests are most consistent with  severe chronic obstructive pulmonary disease.  In comparison with pulmonary  function testing performed in October of 2012, there has been significant  reduction in the forced vital capacity and FEV1. Pulmonary Function Testing 10/2017  FEV1 1.09 L to 1.26 L  FVC  2.86 L to 3.48 L  FEV1/FVC 38  TLC 7.06 L  DLCO 42%    Pulmonary Function Testing 11/2018  FEV1 1.34 L to 1.42 L  FVC  3.31 L to 3.68 L  FEV1/FVC 40  TLC 7.83 L   DLCO 44%    ECHO 10/2017  Left ventricular size is normal.   Mild concentric left ventricular hypertrophy is present.   Global left ventricular function is normal with ejection fraction estimated   from 55 % to 60 %.   Normal right ventricular size and function.     Stress test 9/2017  [place] is no obvious ischemia in this study. There is an area of decrease    tracer uptake at the apex both at stress and with rest that could represent    an area of prior infarct but is probably more bowel artifact.    Diaphragmatic and bowel attenuation present.    Non-diagnostic EKG response due to failure to reach target heart rate.    Appropriate hemodynamic response to [place] with no significant ST changes. Chest imaging from 2/2019 is reviewed and compared to previous CT on 3/2018. My interpretation is continued  Presence of emphysema with cystic lung disease scattered throughout. There does not appear to be a significant difference or progression of disease since 2018    Alpha-1 Anti-trypsin levels:  120, Phenotype:  M1M1       ABG 10/2017:  7.43/39/61.  92%    RHC 4/2018  1. Normal right heart pressures with a right atrial pressure of 1 and a  pulmonary capillary wedge pressure of 8.  2.  No evidence of pulmonary hypertension with an instantaneous pressure of  26/14 and a mean pulmonary arterial pressure of 20.  3.  Normal cardiac outputs by thermodilution at 6.73 liters per minute with  a cardiac index of 2.95 liters per kilogram per minute. By second equation  the cardiac output was 4.76 with a cardiac index of 2.09. Of note, the  patient does have erythrocytosis.   4.  Pulmonary vascular resistance 147.  5.  Normal mixed venous oxygen saturations with a superior vena cava  saturation of 70 and a pulmonary arterial saturation of 73%. Lab Results   Component Value Date    WBC 5.3 12/04/2018    HGB 17.4 12/04/2018    HCT 52.1 12/04/2018    MCV 84.9 12/04/2018     12/04/2018       No results found for: BNP    Lab Results   Component Value Date    CREATININE 1.0 01/09/2019    BUN 12 01/09/2019     01/09/2019    K 4.6 01/09/2019     01/09/2019    CO2 24 01/09/2019     I reviewed the above labs images, etc.     Assessment/Plan:  1. ILD (interstitial lung disease) ([address]  Due to #2. He failed prednisone retrial and cellcept. We are just monitoring it now  - sodium chloride, Inhalant, 3 % nebulizer solution; Take 15 mLs by nebulization 2 times daily  Dispense: 900 mL; Refill: 0    2. Follicular bronchiolitis ([address]  Failed immune therapy and has failed it for years. Probably needs to get re-establish with lung transplant clinic but would need to lose weight. He knows this  - sodium chloride, Inhalant, 3 % nebulizer solution; Take 15 mLs by nebulization 2 times daily  Dispense: 900 mL; Refill: 0    3. Centrilobular emphysema ([address]  Likely on CT as well    4. BMI 37.0-37.9, adult  Follow up with weight loss clinic. I would sign off on weight loss surgery    5. Polycythemia  Due to chronic hypoxia in my opinion. I hope by increasing his night oxygen flow (from 1 to 3) this will help. He had previously been having phlebotomies by Dr. Angel De La O but he was not tolerating them. Follow up in 2 months    Pulmonary Rehab:  Finished Pulmonary rehab and cardiac rehab. Does exercise on his own    Lung Cancer Screening CT:  Does not qualify due to >15 years since he quit and age <55    Test/Lab/Injection Due Last Misc. PFT 10/2019 11/2018    CT Chest 2/2020 2/2019    Pulmonary Rehab   Completed   Pneumo.   23 2021 6/2016    Prevnar 13 Up to date 10/2018    Influenza  Fall 2019 10/2018     Candidate Levels Misc Xolair No     Nucala No

## 2019-05-14 ENCOUNTER — EMPLOYEE WELLNESS (OUTPATIENT)
Dept: OTHER | Age: 54
End: 2019-05-14

## 2019-05-14 LAB
CHOLESTEROL, TOTAL: 109 MG/DL (ref 0–199)
GLUCOSE BLD-MCNC: 146 MG/DL (ref 70–99)
HDLC SERPL-MCNC: 26 MG/DL (ref 40–60)
LDL CHOLESTEROL CALCULATED: 56 MG/DL
TRIGL SERPL-MCNC: 135 MG/DL (ref 0–150)

## 2019-05-15 LAB
ESTIMATED AVERAGE GLUCOSE: 185.8 MG/DL
HBA1C MFR BLD: 8.1 %

## 2019-05-21 ENCOUNTER — TELEPHONE (OUTPATIENT)
Dept: PULMONOLOGY | Age: 54
End: 2019-05-21

## 2019-05-21 DIAGNOSIS — J40 BRONCHITIS: Primary | ICD-10-CM

## 2019-05-21 RX ORDER — LEVOFLOXACIN 500 MG/1
500 TABLET, FILM COATED ORAL DAILY
Qty: 7 TABLET | Refills: 0 | Status: SHIPPED | OUTPATIENT
Start: 2019-05-21 | End: 2019-05-28

## 2019-05-21 NOTE — TELEPHONE ENCOUNTER
I called and clarified and this is the pharmacy they were referring too. I made it his preferred pharmacy.   [address] [facility] [address], [facility], [address] [facility]

## 2019-05-21 NOTE — TELEPHONE ENCOUNTER
Patient called in stated that he has been getting sick over the weekend. And having a hard time wearing his bipap at night due to being sick. Complains of cough and SOB  Duration 4 days  Cough with sputum production? yes  Colorv yellow green  Fever? Maybe Sunday but not since then  Any other Symptoms? No, just coughing and hacking  Any current treatment tried? james like a neti pot but only opens him up for a short time  Using inhalers? yes do they help? no  Pharmacy?  [address] in [place], [place]

## 2019-05-28 VITALS — BODY MASS INDEX: 38.17 KG/M2 | WEIGHT: 266 LBS

## 2019-07-11 ENCOUNTER — TELEPHONE (OUTPATIENT)
Dept: BARIATRICS/WEIGHT MGMT | Age: 54
End: 2019-07-11

## 2019-07-18 ENCOUNTER — OFFICE VISIT (OUTPATIENT)
Dept: BARIATRICS/WEIGHT MGMT | Age: 54
End: 2019-07-18
Payer: COMMERCIAL

## 2019-07-18 ENCOUNTER — OFFICE VISIT (OUTPATIENT)
Dept: PULMONOLOGY | Age: 54
End: 2019-07-18
Payer: COMMERCIAL

## 2019-07-18 VITALS
HEIGHT: 70 IN | WEIGHT: 262.4 LBS | SYSTOLIC BLOOD PRESSURE: 142 MMHG | HEART RATE: 60 BPM | BODY MASS INDEX: 37.56 KG/M2 | DIASTOLIC BLOOD PRESSURE: 82 MMHG

## 2019-07-18 VITALS
SYSTOLIC BLOOD PRESSURE: 146 MMHG | HEART RATE: 67 BPM | TEMPERATURE: 97 F | WEIGHT: 259 LBS | BODY MASS INDEX: 37.08 KG/M2 | HEIGHT: 70 IN | DIASTOLIC BLOOD PRESSURE: 84 MMHG | OXYGEN SATURATION: 90 % | RESPIRATION RATE: 20 BRPM

## 2019-07-18 DIAGNOSIS — D75.1 POLYCYTHEMIA: ICD-10-CM

## 2019-07-18 DIAGNOSIS — J84.9 ILD (INTERSTITIAL LUNG DISEASE) (HCC): Primary | ICD-10-CM

## 2019-07-18 DIAGNOSIS — J43.2 CENTRILOBULAR EMPHYSEMA (HCC): ICD-10-CM

## 2019-07-18 DIAGNOSIS — I25.10 CORONARY ARTERY DISEASE INVOLVING NATIVE CORONARY ARTERY OF NATIVE HEART WITHOUT ANGINA PECTORIS: ICD-10-CM

## 2019-07-18 DIAGNOSIS — I10 ESSENTIAL HYPERTENSION: ICD-10-CM

## 2019-07-18 DIAGNOSIS — J84.9 ILD (INTERSTITIAL LUNG DISEASE) (HCC): ICD-10-CM

## 2019-07-18 DIAGNOSIS — J96.11 CHRONIC RESPIRATORY FAILURE WITH HYPOXIA (HCC): ICD-10-CM

## 2019-07-18 DIAGNOSIS — E66.01 SEVERE OBESITY (BMI 35.0-35.9 WITH COMORBIDITY) (HCC): Primary | ICD-10-CM

## 2019-07-18 DIAGNOSIS — K21.9 CHRONIC GERD: ICD-10-CM

## 2019-07-18 DIAGNOSIS — G47.33 OSA TREATED WITH BIPAP: ICD-10-CM

## 2019-07-18 DIAGNOSIS — J42 FOLLICULAR BRONCHIOLITIS (HCC): ICD-10-CM

## 2019-07-18 PROBLEM — E78.2 MIXED HYPERLIPIDEMIA: Status: ACTIVE | Noted: 2018-10-02

## 2019-07-18 PROBLEM — E66.9 OBESITY: Status: ACTIVE | Noted: 2018-10-02

## 2019-07-18 PROBLEM — E66.9 OBESITY (BMI 30-39.9): Status: ACTIVE | Noted: 2019-07-18

## 2019-07-18 PROBLEM — E66.9 OBESITY: Status: RESOLVED | Noted: 2018-10-02 | Resolved: 2019-07-18

## 2019-07-18 PROCEDURE — G8417 CALC BMI ABV UP PARAM F/U: HCPCS | Performed by: INTERNAL MEDICINE

## 2019-07-18 PROCEDURE — 3017F COLORECTAL CA SCREEN DOC REV: CPT | Performed by: SURGERY

## 2019-07-18 PROCEDURE — G8598 ASA/ANTIPLAT THER USED: HCPCS | Performed by: INTERNAL MEDICINE

## 2019-07-18 PROCEDURE — G8427 DOCREV CUR MEDS BY ELIG CLIN: HCPCS | Performed by: SURGERY

## 2019-07-18 PROCEDURE — 1036F TOBACCO NON-USER: CPT | Performed by: INTERNAL MEDICINE

## 2019-07-18 PROCEDURE — G8926 SPIRO NO PERF OR DOC: HCPCS | Performed by: INTERNAL MEDICINE

## 2019-07-18 PROCEDURE — 3017F COLORECTAL CA SCREEN DOC REV: CPT | Performed by: INTERNAL MEDICINE

## 2019-07-18 PROCEDURE — 99214 OFFICE O/P EST MOD 30 MIN: CPT | Performed by: INTERNAL MEDICINE

## 2019-07-18 PROCEDURE — G8417 CALC BMI ABV UP PARAM F/U: HCPCS | Performed by: SURGERY

## 2019-07-18 PROCEDURE — 3023F SPIROM DOC REV: CPT | Performed by: INTERNAL MEDICINE

## 2019-07-18 PROCEDURE — 99205 OFFICE O/P NEW HI 60 MIN: CPT | Performed by: SURGERY

## 2019-07-18 PROCEDURE — G8427 DOCREV CUR MEDS BY ELIG CLIN: HCPCS | Performed by: INTERNAL MEDICINE

## 2019-07-18 PROCEDURE — 1036F TOBACCO NON-USER: CPT | Performed by: SURGERY

## 2019-07-18 PROCEDURE — G8598 ASA/ANTIPLAT THER USED: HCPCS | Performed by: SURGERY

## 2019-07-18 NOTE — PROGRESS NOTES
membranes are normal. No oropharyngeal exudate. Eyes: Conjunctivae, EOM and lids are normal. Pupils are equal, round, and reactive to light. Right eye exhibits no discharge. No foreign body present in the right eye. Left eye exhibits no discharge. No foreign body present in the left eye. No scleral icterus. Neck: Trachea normal and normal range of motion. Neck supple. No JVD present. No tracheal tenderness present. Carotid bruit is not present. No rigidity. No tracheal deviation and no edema present. No thyromegaly present. Cardiovascular: Normal rate, regular rhythm, normal heart sounds, intact distal pulses and normal pulses. Pulmonary/Chest: Effort normal and breath sounds normal. No stridor. No respiratory distress. Patient  has no wheezes. Patient has no rales. Patient exhibits no tenderness and no crepitus. Abdominal: Soft. Normal appearance and bowel sounds are normal. Patient exhibits no distension, no abdominal bruit, no ascites and no mass. There is no hepatosplenomegaly. There is no tenderness. There is no rigidity, no rebound, no guarding and no CVA tenderness. No hernia. Hernia confirmed negative in the ventral area. Musculoskeletal: Normal range of motion. Patient exhibits no edema or tenderness. Lymphadenopathy:        Head (right side): No submental, no submandibular, no preauricular, no posterior auricular and no occipital adenopathy present. Head (left side): No submental, no submandibular, no preauricular, no posterior auricular and no occipital adenopathy present. Patient  has no cervical adenopathy. Right: No supraclavicular adenopathy present. Left: No supraclavicular adenopathy present. Neurological: Patient is alert and oriented to person, place, and time. Patient has normal strength. Coordination and gait normal. GCS eye subscore is 4. GCS verbal subscore is 5. GCS motor subscore is 6. Skin: Skin is warm and dry. No abrasion and no rash noted.

## 2019-07-18 NOTE — PATIENT INSTRUCTIONS
Continue with inhalers    Use sodium chloride as needed    Keep active    Try to lose weight    Continue with BIPAp every night

## 2019-07-18 NOTE — PROGRESS NOTES
Chief complaint  This is a 48y.o. year old male  who comes to see me with a chief complaint of   Chief Complaint   Patient presents with    COPD    Follow-up     ILD     HPI  Here with cc of ILD    He is about the same. Has lost some weight and is going to see weight loss specialist today. He is off all prednisone and cellcept due to non-response. Remains on triple inhalation therapy and oxygen primarily with exertion. He does not feel sick and feels ok. Still using BIPAP every night. He has been trying to cut out pop from his diet which has helped him to lose some weight     Historical:  He had been following with Dr. Sarika Murphy for ILD related to bronchiolitis. Eden Oquendo had been concerned about following up all the way out in [address] and asked if he could follow up with me. I asked Dr. Sarika Murphy and he was ok with it. Dr. Sarika Murphy has been following Eden Oquendo for years. 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 He was tried on various immune suppressant with no improvement in symptoms. Last medication he was on was cellcept about 4 years ago. He was seen at [facility] for lung transplant in the past, but was not felt to be sick enough to require transplant, nor was his weight low enough. He has not been seen at [facility] since then. Past Medical History:   Diagnosis Date    CAD S/P percutaneous coronary angioplasty     Chronic respiratory failure (HCC)     COPD exacerbation ([address] 9/7/2016    Coronary artery disease involving native coronary artery of native heart with unstable angina pectoris ([address]     Diabetes ([address]     Diabetes ([address]     Dyslipidemia     Dyspnea     Goodpasture syndrome ([address]     reason pt is on cellcept.   He will need lung transplant eventually    High risk medications (not anticoagulants) long-term use     HTN (hypertension)     Hyperlipidemia     Hypoxemia     ILD (interstitial lung disease) (HCC)     Left ureteral stone 1 TABLET BY MOUTH ONE TIME DAILY, Disp: 90 tablet, Rfl: 1    albuterol sulfate HFA (PROAIR HFA) 108 (90 BASE) MCG/ACT inhaler, Inhale 2 puffs into the lungs every 4 hours as needed for Wheezing or Shortness of Breath, Disp: 1 Inhaler, Rfl: 6    azelastine (ASTELIN) 0.1 % nasal spray, 2 sprays by Nasal route 2 times daily Use in each nostril as directed, Disp: 1 Bottle, Rfl: 3    ROS:  GENERAL:  Losing weight intentionally   HEENT:  No double vision, blurry vision, or difficulty swallowing  HEART:  No chest pain, no palpitations. Chest wall pains  LUNGS:  Chronic SOB with exertion which is about stable  ABDOMEN:  No abdominal pains, no changes in stools  GENITOURINARY:  No increased frequency, no blood in urine  EXTREMITIES:  No swelling, no lesions. NEURO:  No numbness or tingling, no seizures  SKIN:  No new rashes, no changes in hair or nails  LYMPH:  No masses, no swelling neck, armpits, or groin      PHYSICAL EXAM:  GENERAL:  Well nourished, alert, appears stated age, no distress, plethoric facie  HEENT:  No scleral icterus, no conjunctival irritation, Mallampati IV with bifid pharnyx  NECK:  No thyromegaly, no bruits  LYMPH:  No cervical or supraclavicular adenopathy  HEART:  Regular rate and rhythm, no murmurs  LUNGS:  Clear but diminished   ABDOMEN:  No distention, no organomegaly  EXTREMITIES:  Trace edema, no digital clubbing  NEURO:  No localizing deficits, CN II-XII intact    Pulmonary Function Testing 10/2015  A very severe obstructive lung defect is present with minimal  broncho-reactivity. There is also air trapping and a moderate reduction in  diffusion capacity. These lung function tests are most consistent with  severe chronic obstructive pulmonary disease. In comparison with pulmonary  function testing performed in October of 2012, there has been significant  reduction in the forced vital capacity and FEV1.      Pulmonary Function Testing 10/2017  FEV1 1.09 L to 1.26 L  FVC  2.86 L to 3.48

## 2019-08-02 ENCOUNTER — TELEPHONE (OUTPATIENT)
Dept: PULMONOLOGY | Age: 54
End: 2019-08-02

## 2019-08-07 ENCOUNTER — OFFICE VISIT (OUTPATIENT)
Dept: FAMILY MEDICINE CLINIC | Age: 54
End: 2019-08-07
Payer: COMMERCIAL

## 2019-08-07 VITALS
SYSTOLIC BLOOD PRESSURE: 137 MMHG | HEIGHT: 70 IN | BODY MASS INDEX: 36.08 KG/M2 | WEIGHT: 252 LBS | DIASTOLIC BLOOD PRESSURE: 88 MMHG | HEART RATE: 71 BPM

## 2019-08-07 DIAGNOSIS — J96.11 CHRONIC RESPIRATORY FAILURE WITH HYPOXIA (HCC): ICD-10-CM

## 2019-08-07 DIAGNOSIS — K21.9 CHRONIC GERD: ICD-10-CM

## 2019-08-07 DIAGNOSIS — G47.33 OSA TREATED WITH BIPAP: ICD-10-CM

## 2019-08-07 DIAGNOSIS — Z98.61 CAD S/P PERCUTANEOUS CORONARY ANGIOPLASTY: ICD-10-CM

## 2019-08-07 DIAGNOSIS — F33.1 MODERATE EPISODE OF RECURRENT MAJOR DEPRESSIVE DISORDER (HCC): ICD-10-CM

## 2019-08-07 DIAGNOSIS — E78.2 MIXED HYPERLIPIDEMIA: ICD-10-CM

## 2019-08-07 DIAGNOSIS — Z12.11 SCREENING FOR COLON CANCER: ICD-10-CM

## 2019-08-07 DIAGNOSIS — D75.1 POLYCYTHEMIA: ICD-10-CM

## 2019-08-07 DIAGNOSIS — I10 ESSENTIAL HYPERTENSION: ICD-10-CM

## 2019-08-07 DIAGNOSIS — R19.5 POSITIVE FIT (FECAL IMMUNOCHEMICAL TEST): ICD-10-CM

## 2019-08-07 DIAGNOSIS — J84.9 ILD (INTERSTITIAL LUNG DISEASE) (HCC): Primary | ICD-10-CM

## 2019-08-07 DIAGNOSIS — F32.5 MAJOR DEPRESSIVE DISORDER WITH SINGLE EPISODE, IN FULL REMISSION (HCC): ICD-10-CM

## 2019-08-07 DIAGNOSIS — I25.10 CORONARY ARTERY DISEASE INVOLVING NATIVE CORONARY ARTERY OF NATIVE HEART WITHOUT ANGINA PECTORIS: ICD-10-CM

## 2019-08-07 DIAGNOSIS — J84.9 ILD (INTERSTITIAL LUNG DISEASE) (HCC): ICD-10-CM

## 2019-08-07 DIAGNOSIS — E66.01 SEVERE OBESITY (BMI 35.0-35.9 WITH COMORBIDITY) (HCC): ICD-10-CM

## 2019-08-07 DIAGNOSIS — I25.10 CAD S/P PERCUTANEOUS CORONARY ANGIOPLASTY: ICD-10-CM

## 2019-08-07 LAB
A/G RATIO: 1.6 (ref 1.1–2.2)
ALBUMIN SERPL-MCNC: 4.4 G/DL (ref 3.4–5)
ALP BLD-CCNC: 107 U/L (ref 40–129)
ALT SERPL-CCNC: 46 U/L (ref 10–40)
ANION GAP SERPL CALCULATED.3IONS-SCNC: 16 MMOL/L (ref 3–16)
AST SERPL-CCNC: 39 U/L (ref 15–37)
BASOPHILS ABSOLUTE: 0.1 K/UL (ref 0–0.2)
BASOPHILS RELATIVE PERCENT: 1 %
BILIRUB SERPL-MCNC: 0.9 MG/DL (ref 0–1)
BUN BLDV-MCNC: 10 MG/DL (ref 7–20)
CALCIUM SERPL-MCNC: 9.4 MG/DL (ref 8.3–10.6)
CHLORIDE BLD-SCNC: 105 MMOL/L (ref 99–110)
CHOLESTEROL, TOTAL: 101 MG/DL (ref 0–199)
CO2: 23 MMOL/L (ref 21–32)
CONTROL: ABNORMAL
CREAT SERPL-MCNC: 0.9 MG/DL (ref 0.9–1.3)
EOSINOPHILS ABSOLUTE: 0.3 K/UL (ref 0–0.6)
EOSINOPHILS RELATIVE PERCENT: 5 %
FOLATE: 4.37 NG/ML (ref 4.78–24.2)
GFR AFRICAN AMERICAN: >60
GFR NON-AFRICAN AMERICAN: >60
GLOBULIN: 2.8 G/DL
GLUCOSE BLD-MCNC: 131 MG/DL (ref 70–99)
HCT VFR BLD CALC: 51.5 % (ref 40.5–52.5)
HDLC SERPL-MCNC: 31 MG/DL (ref 40–60)
HEMOCCULT STL QL: POSITIVE
HEMOGLOBIN: 17.2 G/DL (ref 13.5–17.5)
IRON SATURATION: 21 % (ref 20–50)
IRON: 80 UG/DL (ref 59–158)
LDL CHOLESTEROL CALCULATED: 47 MG/DL
LYMPHOCYTES ABSOLUTE: 2.1 K/UL (ref 1–5.1)
LYMPHOCYTES RELATIVE PERCENT: 30 %
MCH RBC QN AUTO: 27.2 PG (ref 26–34)
MCHC RBC AUTO-ENTMCNC: 33.5 G/DL (ref 31–36)
MCV RBC AUTO: 81.3 FL (ref 80–100)
MONOCYTES ABSOLUTE: 0.6 K/UL (ref 0–1.3)
MONOCYTES RELATIVE PERCENT: 8 %
NEUTROPHILS ABSOLUTE: 3.9 K/UL (ref 1.7–7.7)
NEUTROPHILS RELATIVE PERCENT: 56 %
PDW BLD-RTO: 16.8 % (ref 12.4–15.4)
PLATELET # BLD: 156 K/UL (ref 135–450)
PLATELET SLIDE REVIEW: ADEQUATE
PMV BLD AUTO: 9 FL (ref 5–10.5)
POTASSIUM SERPL-SCNC: 4 MMOL/L (ref 3.5–5.1)
RBC # BLD: 6.33 M/UL (ref 4.2–5.9)
SLIDE REVIEW: ABNORMAL
SODIUM BLD-SCNC: 144 MMOL/L (ref 136–145)
TOTAL IRON BINDING CAPACITY: 373 UG/DL (ref 260–445)
TOTAL PROTEIN: 7.2 G/DL (ref 6.4–8.2)
TRIGL SERPL-MCNC: 115 MG/DL (ref 0–150)
TSH REFLEX: 1.28 UIU/ML (ref 0.27–4.2)
VITAMIN B-12: 677 PG/ML (ref 211–911)
VITAMIN D 25-HYDROXY: 22.8 NG/ML
VLDLC SERPL CALC-MCNC: 23 MG/DL
WBC # BLD: 6.9 K/UL (ref 4–11)

## 2019-08-07 PROCEDURE — 1036F TOBACCO NON-USER: CPT | Performed by: FAMILY MEDICINE

## 2019-08-07 PROCEDURE — 3017F COLORECTAL CA SCREEN DOC REV: CPT | Performed by: FAMILY MEDICINE

## 2019-08-07 PROCEDURE — G8417 CALC BMI ABV UP PARAM F/U: HCPCS | Performed by: FAMILY MEDICINE

## 2019-08-07 PROCEDURE — 99215 OFFICE O/P EST HI 40 MIN: CPT | Performed by: FAMILY MEDICINE

## 2019-08-07 PROCEDURE — G8427 DOCREV CUR MEDS BY ELIG CLIN: HCPCS | Performed by: FAMILY MEDICINE

## 2019-08-07 PROCEDURE — G8598 ASA/ANTIPLAT THER USED: HCPCS | Performed by: FAMILY MEDICINE

## 2019-08-07 PROCEDURE — 82274 ASSAY TEST FOR BLOOD FECAL: CPT | Performed by: FAMILY MEDICINE

## 2019-08-07 RX ORDER — ROSUVASTATIN CALCIUM 40 MG/1
TABLET, COATED ORAL
Qty: 90 TABLET | Refills: 1 | Status: SHIPPED | OUTPATIENT
Start: 2019-08-07 | End: 2020-09-23 | Stop reason: SDUPTHER

## 2019-08-07 RX ORDER — ESCITALOPRAM OXALATE 10 MG/1
TABLET ORAL
Qty: 90 TABLET | Refills: 1 | Status: SHIPPED | OUTPATIENT
Start: 2019-08-07 | End: 2020-09-23 | Stop reason: SDUPTHER

## 2019-08-07 RX ORDER — LISINOPRIL 20 MG/1
TABLET ORAL
Qty: 90 TABLET | Refills: 1 | Status: ON HOLD | OUTPATIENT
Start: 2019-08-07 | End: 2020-01-22 | Stop reason: SDUPTHER

## 2019-08-07 RX ORDER — CARVEDILOL 6.25 MG/1
TABLET ORAL
Qty: 180 TABLET | Refills: 1 | Status: SHIPPED | OUTPATIENT
Start: 2019-08-07 | End: 2020-09-23 | Stop reason: SDUPTHER

## 2019-08-07 NOTE — PROGRESS NOTES
PROGRESS NOTE     Jayne Rose MD  [facility]  [facility], [address] [address]  [phone] office  [phone] fax      Subjective:      Patient ID: Shivam Peterson is a 48 y.o. male      CC:  polycythemia, DM2, HTN, HLD, CAD, ILD, MDD, chronic respiratory failure        Polycythemia  Patient diagnosed with polycythemia thought to be secondary to his hypoxia from his lung disease. Dr Philipp Brown following. Cording to his last progress note on 5/3/2019, he only recommends phlebotomy if hemoglobin goes above 20. Diabetes Mellitus Type 2:   pt denies blurry vision, foot ulcerations, neuropathy, polyphagia, polyuria, polydipsia, nausea, vomiting and diarrhea     Patient no longer drinking regular soda. He is trying to eat proper portion sizes of carbs    Still needs to get diabetic eye exam    Hypertension:  Home blood pressure monitoring: No.  He is adherent to a low sodium diet. Patient denies shortness of breath, lightheadedness, blurred vision, peripheral edema, palpitations, dry cough and fatigue. Antihypertensive medication side effects: no medication side effects noted. Use of agents associated with hypertension: none. Hyperlipidemia:  No new myalgias or GI upset on rosuvastatin (Crestor). Lab Results   Component Value Date    LABA1C 8.1 05/14/2019    LABA1C 7.6 04/26/2019    LABA1C 7.6 01/09/2019     Lab Results   Component Value Date    LABMICR <1.20 04/26/2019    CREATININE 1.0 01/09/2019     Lab Results   Component Value Date    ALT 85 (H) 01/09/2019    AST 55 (H) 01/09/2019     Lab Results   Component Value Date    CHOL 109 05/14/2019    TRIG 135 05/14/2019    HDL 26 (L) 05/14/2019    LDLCALC 56 05/14/2019        ILD/emphysema  Most recently saw a pulmonologist, Dr. Angelina Crawford, on 7/18/19. According to his note, patient did not respond to prednisone or CellCept, and therefore did not feel more treatment was needed at this time.   He did recommend DAY 1 each 11    hydrocortisone (WESTCORT) 0.2 % cream Apply topically 2 times daily Apply topically 2 times daily. 60 g 0    nitroGLYCERIN (NITROSTAT) 0.4 MG SL tablet up to max of 3 total doses. If no relief after 1 dose, call 911. 25 tablet 3    ASPIRIN LOW DOSE ADULT 81 MG chewable tablet CHEW AND SWALLOW ONE TABLET BY MOUTH ONE TIME A DAY 90 tablet 3    clopidogrel (PLAVIX) 75 MG tablet TAKE ONE TABLET BY MOUTH ONE TIME A DAY 90 tablet 3    albuterol sulfate HFA (PROAIR HFA) 108 (90 BASE) MCG/ACT inhaler Inhale 2 puffs into the lungs every 4 hours as needed for Wheezing or Shortness of Breath 1 Inhaler 6    azelastine (ASTELIN) 0.1 % nasal spray 2 sprays by Nasal route 2 times daily Use in each nostril as directed 1 Bottle 3       Past Medical History:   Diagnosis Date    CAD S/P percutaneous coronary angioplasty     Chronic respiratory failure (HCC)     COPD exacerbation (HCC) 9/7/2016    Coronary artery disease involving native coronary artery of native heart with unstable angina pectoris (HCC)     Diabetes (HCC)     Diabetes (HCC)     Dyslipidemia     Dyspnea     GERD (gastroesophageal reflux disease)     Goodpasture syndrome (HCC)     reason pt is on cellcept. He will need lung transplant eventually    High risk medications (not anticoagulants) long-term use     HTN (hypertension)     Hyperlipidemia     Hypoxemia     ILD (interstitial lung disease) ([facility] [address]     Left ureteral stone 4/13/2018    MDD (major depressive disorder)     ESTRELLITA on CPAP     Polycythemia     Positive FIT (fecal immunochemical test)     Prediabetes     Ureteral calculus of right kidney transplant 2005       Past Surgical History:   Procedure Laterality Date    BRONCHOSCOPY      COLONOSCOPY      DIAGNOSTIC CARDIAC CATH LAB PROCEDURE  2006??    ENDOSCOPY, COLON, DIAGNOSTIC      LUNG REMOVAL, PARTIAL Right 2005    Partial right lobectomy for diagnostic purpose.  Surgery performed in [address] affect  · Normal insight and judgement      Assessment / Plan:      Secondary polycythemia    This is thought to be secondary to his hypoxia from his lung disease. Receiving phlebotomy from Dr. Zari Mercedes only when above 20. As summarized, reviewed Dr. Michael Carbajal most recent note. Type 2 diabetes mellitus without complication, without long-term current use of insulin (HCC)  A1c=6.8 today w/o meds. Patient plans on having gastric sleeve surgery soon to help him lose more weight. Due for diabetic eye exam.  Encouraged to schedule    Patient to continue aspirin, statin, ACEI    Patient had blood work earlier today, including BMP. We will follow-up on results. Essential hypertension  Slightly above goal today. Goal <130/80. Pt doesn't want more meds. Will monitor  Patient to continue carvedilol 6.25 mg b.i.d., lisinopril 20 mg daily, norvasc 5mg    Checking BMP    Pure hypercholesterolemia  Pt to continue ASA and Crestor 40mg. Checking FLP and LFTs     ILD (interstitial lung disease) (HCC)/Chronic respiratory failure with hypoxia (HCC)  As summarized above, reviewed Dr. Vanessa Feng last note from  Last month    No meds currently for ILD, as they had not helped. Patient to continue Breo and Incruse for underlying emphysema    Patient to establish with lung transplant center soon. . Patient to continue 2-3 L oxygen for exertion and sleeping. Dyspnea on exertion currently at baseline. Lungs clear on exam today. Moderate episode of recurrent major depressive disorder (HCC)   improved on Lexapro 10 mg. Patient to continue. Positive FIT test:   Sending to Dr. Germain Blackwell for colonoscopy. Patient understands the risk for morbidity and mortality that occur if he does not get this completed.     HM   Encouraged to get Shingrix at pharmacy

## 2019-08-08 LAB
ESTIMATED AVERAGE GLUCOSE: 154.2 MG/DL
HBA1C MFR BLD: 7 %

## 2019-08-15 ENCOUNTER — OFFICE VISIT (OUTPATIENT)
Dept: BARIATRICS/WEIGHT MGMT | Age: 54
End: 2019-08-15
Payer: COMMERCIAL

## 2019-08-15 VITALS
WEIGHT: 251.2 LBS | SYSTOLIC BLOOD PRESSURE: 130 MMHG | DIASTOLIC BLOOD PRESSURE: 80 MMHG | HEIGHT: 70 IN | HEART RATE: 88 BPM | BODY MASS INDEX: 35.96 KG/M2

## 2019-08-15 DIAGNOSIS — E66.01 SEVERE OBESITY (BMI 35.0-35.9 WITH COMORBIDITY) (HCC): Primary | ICD-10-CM

## 2019-08-15 DIAGNOSIS — G47.33 OSA TREATED WITH BIPAP: ICD-10-CM

## 2019-08-15 DIAGNOSIS — I10 ESSENTIAL HYPERTENSION: ICD-10-CM

## 2019-08-15 DIAGNOSIS — K21.9 CHRONIC GERD: ICD-10-CM

## 2019-08-15 PROCEDURE — 99213 OFFICE O/P EST LOW 20 MIN: CPT | Performed by: SURGERY

## 2019-08-15 PROCEDURE — G8427 DOCREV CUR MEDS BY ELIG CLIN: HCPCS | Performed by: SURGERY

## 2019-08-15 PROCEDURE — 1036F TOBACCO NON-USER: CPT | Performed by: SURGERY

## 2019-08-15 PROCEDURE — 3017F COLORECTAL CA SCREEN DOC REV: CPT | Performed by: SURGERY

## 2019-08-15 PROCEDURE — G8417 CALC BMI ABV UP PARAM F/U: HCPCS | Performed by: SURGERY

## 2019-08-15 PROCEDURE — G8598 ASA/ANTIPLAT THER USED: HCPCS | Performed by: SURGERY

## 2019-08-15 NOTE — PROGRESS NOTES
Jensen Davidson lost 11.2 lbs over past ~ month. Pt has cut back on bread and weaning off pepsi, down to 2 per day. Breakfast: HB egg & cheese    Snack: banana OR cutie    Lunch: roasted turkey OR ham & HB egg & a little cheese    Snack: banana OR cutie    Dinner: turkey OR ham w/ green beans OR occasional burger    Snack: none    Fluids: water / pepsi / 2% milk    Is pt consuming smaller portions? yes - mindful    Is pt consuming at least 64 oz of fluids per day? yes - water    Is pt consuming carbonated, caffeinated, or sugary beverages? yes - regular pepsi (2 per day)      Has pt sampled Unjury and/or Nectar protein?  Discussed, to try    Exercise: walks for 1.5-2 hours per day around stores    Plan/Recommendations:   - Switch to 1% or skim milk  - Continue to focus on protein 4x/day  - Eliminate pepsi    Handouts: none    Ailce Hadley
The patient underwent dietary counseling with registered dietician. I have reviewed, discussed and agree with the dietary plan. Patient is trying hard to keep good dietary and behavior modifications. Patient is monitoring portion sizes, food choices and liquid calories. Patient is trying to exercise regularly as much as possible. We discussed how his weight affects his overall health including:  Jordan Thompson was seen today for obesity. Diagnoses and all orders for this visit:    Severe obesity (BMI 35.0-35.9 with comorbidity) ([facility] [address]    Chronic GERD    Essential hypertension    ESTRELLITA treated with BiPAP       and importance of weight loss to alleviate those co morbid conditions. I encouraged the patient to continue exercise and keeping healthy eating habits. Discussed pre-op labs and work up till now. Also counseled the patient extensively on Surgery. I spent 15 minutes face to face with patient with more than 50% of the time counseling and/or coordinating care for weight loss surgery. RTC in 4 weeks  Obtain rest of pre-op work up / clearances  Diet and Exercise      Patient advised that its their responsibility to follow up for studies and/or labs ordered today.      Екатерина Blackwood

## 2019-08-19 ENCOUNTER — TELEPHONE (OUTPATIENT)
Dept: SURGERY | Age: 54
End: 2019-08-19

## 2019-08-23 NOTE — PROGRESS NOTES
pain.  Neurological: No syncope or TIA-like symptoms. Psychiatric: No anxiety, insomnia or depression      Allergies   Allergen Reactions    Erythromycin      Current Outpatient Medications   Medication Sig Dispense Refill    escitalopram (LEXAPRO) 10 MG tablet TAKE 1 TABLET BY MOUTH ONE TIME A DAY 90 tablet 1    carvedilol (COREG) 6.25 MG tablet TAKE 1 TABLET BY MOUTH 2 TIMES A DAY WITH MEALS 180 tablet 1    rosuvastatin (CRESTOR) 40 MG tablet TAKE ONE TABLET BY MOUTH EVERY EVENING 90 tablet 1    lisinopril (PRINIVIL;ZESTRIL) 20 MG tablet TAKE 1 TABLET BY MOUTH ONE TIME DAILY 90 tablet 1    sodium chloride, Inhalant, 3 % nebulizer solution Take 15 mLs by nebulization 2 times daily 900 mL 0    amLODIPine (NORVASC) 5 MG tablet TAKE 1 TABLET BY MOUTH ONE TIME A DAY IN ADDITION TO LISINOPRIL FOR BLOOD PRESSURE 90 tablet 1    omeprazole (PRILOSEC) 20 MG delayed release capsule TAKE ONE CAPSULE BY MOUTH TWICE A  capsule 3    INCRUSE ELLIPTA 62.5 MCG/INH AEPB INHALE ONE PUFF BY MOUTH ONE TIME A DAY 1 each 6    BREO ELLIPTA 200-25 MCG/INH AEPB INHALE 1 PUFF BY MOUTH ONE TIME A DAY 1 each 11    hydrocortisone (WESTCORT) 0.2 % cream Apply topically 2 times daily Apply topically 2 times daily. 60 g 0    nitroGLYCERIN (NITROSTAT) 0.4 MG SL tablet up to max of 3 total doses. If no relief after 1 dose, call 911. 25 tablet 3    ASPIRIN LOW DOSE ADULT 81 MG chewable tablet CHEW AND SWALLOW ONE TABLET BY MOUTH ONE TIME A DAY 90 tablet 3    clopidogrel (PLAVIX) 75 MG tablet TAKE ONE TABLET BY MOUTH ONE TIME A DAY 90 tablet 3    albuterol sulfate HFA (PROAIR HFA) 108 (90 BASE) MCG/ACT inhaler Inhale 2 puffs into the lungs every 4 hours as needed for Wheezing or Shortness of Breath 1 Inhaler 6    azelastine (ASTELIN) 0.1 % nasal spray 2 sprays by Nasal route 2 times daily Use in each nostril as directed 1 Bottle 3     No current facility-administered medications for this visit.         Physical Exam:   /80 Pulse 77   Ht 5' 10\" (1.778 m)   Wt 255 lb (115.7 kg) Comment: with shoes  SpO2 (!) 88%   BMI 36.59 kg/m²   No intake or output data in the 24 hours ending 08/26/19 1008  Wt Readings from Last 2 Encounters:   08/26/19 255 lb (115.7 kg)   08/15/19 251 lb 3.2 oz (113.9 kg)     Constitutional: He is oriented to person, place, and time. He appears well-developed and well-nourished. In no acute distress. Head: Normocephalic and atraumatic. Neck: Neck supple. No JVD present. Carotid bruit is not present. No mass and no thyromegaly present. No lymphadenopathy present. Cardiovascular: Normal rate, regular rhythm, normal heart sounds and intact distal pulses. Exam reveals no gallop and no friction rub. No murmur heard. Pulmonary/Chest: Effort normal and breath sounds normal. No respiratory distress. He has no wheezes, rhonchi or rales. Abdominal: Soft, non-tender. Bowel sounds and aorta are normal. He exhibits no organomegaly, mass or bruit. Extremities: No edema, cyanosis, or clubbing. Pulses are 2+ radial/carotid/dorsalis pedis and posterior tibial bilaterally. Neurological: He is alert and oriented to person, place, and time. He has normal reflexes. No cranial nerve deficit. Coordination normal.   Skin: Skin is warm and dry. There is no rash or diaphoresis. Psychiatric: He has a normal mood and affect. His speech is normal and behavior is normal.     EKG Interpretation 8/26/19: Sinus rhythm with prior septal infarct      Procedures:     Cath 7/26/17  INTERVENTIONS PERFORMED:  1.  We intervened on the second diagonal branch of the left anterior  descending and performed a balloon angioplasty only using 2.5 x 12-mm  balloon catheter.  This was an 80% stenotic lesion which was reduced  to 20%. 2.  Distal left anterior descending artery also had stenosis, this was  90% and after balloon angioplasty reduced to 20%. [address] 4/17/18  1.   Normal right heart pressures with a right atrial pressure of 1 and

## 2019-08-26 ENCOUNTER — OFFICE VISIT (OUTPATIENT)
Dept: CARDIOLOGY CLINIC | Age: 54
End: 2019-08-26
Payer: COMMERCIAL

## 2019-08-26 VITALS
SYSTOLIC BLOOD PRESSURE: 130 MMHG | OXYGEN SATURATION: 88 % | DIASTOLIC BLOOD PRESSURE: 80 MMHG | HEART RATE: 77 BPM | WEIGHT: 255 LBS | HEIGHT: 70 IN | BODY MASS INDEX: 36.51 KG/M2

## 2019-08-26 DIAGNOSIS — Z12.11 ENCOUNTER FOR SCREENING COLONOSCOPY: ICD-10-CM

## 2019-08-26 DIAGNOSIS — E78.5 HYPERLIPIDEMIA LDL GOAL <70: ICD-10-CM

## 2019-08-26 DIAGNOSIS — I10 ESSENTIAL HYPERTENSION: Primary | ICD-10-CM

## 2019-08-26 DIAGNOSIS — I25.10 CORONARY ARTERY DISEASE INVOLVING NATIVE CORONARY ARTERY OF NATIVE HEART WITHOUT ANGINA PECTORIS: ICD-10-CM

## 2019-08-26 DIAGNOSIS — Z01.810 PREOPERATIVE CARDIOVASCULAR EXAMINATION: ICD-10-CM

## 2019-08-26 DIAGNOSIS — R19.5 POSITIVE FIT (FECAL IMMUNOCHEMICAL TEST): Primary | ICD-10-CM

## 2019-08-26 PROCEDURE — G8427 DOCREV CUR MEDS BY ELIG CLIN: HCPCS | Performed by: INTERNAL MEDICINE

## 2019-08-26 PROCEDURE — 93000 ELECTROCARDIOGRAM COMPLETE: CPT | Performed by: INTERNAL MEDICINE

## 2019-08-26 PROCEDURE — 3017F COLORECTAL CA SCREEN DOC REV: CPT | Performed by: INTERNAL MEDICINE

## 2019-08-26 PROCEDURE — G8417 CALC BMI ABV UP PARAM F/U: HCPCS | Performed by: INTERNAL MEDICINE

## 2019-08-26 PROCEDURE — 99214 OFFICE O/P EST MOD 30 MIN: CPT | Performed by: INTERNAL MEDICINE

## 2019-08-26 PROCEDURE — 1036F TOBACCO NON-USER: CPT | Performed by: INTERNAL MEDICINE

## 2019-08-26 PROCEDURE — G8598 ASA/ANTIPLAT THER USED: HCPCS | Performed by: INTERNAL MEDICINE

## 2019-08-26 NOTE — PATIENT INSTRUCTIONS
Patient Education        Learning About Coronary Artery Disease (CAD)  What is coronary artery disease? Coronary artery disease (CAD) occurs when plaque builds up in the arteries that bring oxygen-rich blood to your heart. Plaque is a fatty substance made of cholesterol, calcium, and other substances in the blood. This process is called hardening of the arteries, or atherosclerosis. What happens when you have coronary artery disease? · Plaque may narrow the coronary arteries. Narrowed arteries cause poor blood flow. This can lead to angina symptoms such as chest pain or discomfort. If blood flow is completely blocked, you could have a heart attack. · You can slow CAD and reduce the risk of future problems by making changes in your lifestyle. These include quitting smoking and eating heart-healthy foods. · Treatments for CAD, along with changes in your lifestyle, can help you live a longer and healthier life. How can you prevent coronary artery disease? · Do not smoke. It may be the best thing you can do to prevent heart disease. If you need help quitting, talk to your doctor about stop-smoking programs and medicines. These can increase your chances of quitting for good. · Be active. Get at least 30 minutes of exercise on most days of the week. Walking is a good choice. You also may want to do other activities, such as running, swimming, cycling, or playing tennis or team sports. · Eat heart-healthy foods. Eat more fruits and vegetables and less foods that contain saturated and trans fats. Limit alcohol, sodium, and sweets. · Stay at a healthy weight. Lose weight if you need to. · Manage other health problems such as diabetes, high blood pressure, and high cholesterol. · If you have talked about it with your doctor, take a low-dose aspirin every day. Aspirin can help certain people lower their risk of a heart attack or stroke.  But taking aspirin isn't right for everyone, because it can cause serious

## 2019-09-03 ENCOUNTER — TELEPHONE (OUTPATIENT)
Dept: SURGERY | Age: 54
End: 2019-09-03

## 2019-09-03 NOTE — TELEPHONE ENCOUNTER
Spoke with patient to remind him about colonoscopy and EGD tomorrow (9/4/19) at [facility]. at 8:00 a.m. arrival time 6:00 a.m. Patient has not had anything to eat today and started drinking the bowel prep at 4:00 p.m. He does not have any questions re tomorrow's procedures.

## 2019-09-04 ENCOUNTER — ANESTHESIA (OUTPATIENT)
Dept: ENDOSCOPY | Age: 54
End: 2019-09-04
Payer: COMMERCIAL

## 2019-09-04 ENCOUNTER — ANESTHESIA EVENT (OUTPATIENT)
Dept: ENDOSCOPY | Age: 54
End: 2019-09-04
Payer: COMMERCIAL

## 2019-09-04 ENCOUNTER — HOSPITAL ENCOUNTER (OUTPATIENT)
Age: 54
Setting detail: OUTPATIENT SURGERY
Discharge: HOME | End: 2019-09-04
Attending: SURGERY | Admitting: SURGERY
Payer: COMMERCIAL

## 2019-09-04 VITALS
TEMPERATURE: 99 F | HEART RATE: 76 BPM | WEIGHT: 253 LBS | BODY MASS INDEX: 36.22 KG/M2 | OXYGEN SATURATION: 92 % | HEIGHT: 70 IN | SYSTOLIC BLOOD PRESSURE: 138 MMHG | DIASTOLIC BLOOD PRESSURE: 91 MMHG | RESPIRATION RATE: 18 BRPM

## 2019-09-04 VITALS
RESPIRATION RATE: 15 BRPM | SYSTOLIC BLOOD PRESSURE: 140 MMHG | OXYGEN SATURATION: 88 % | DIASTOLIC BLOOD PRESSURE: 90 MMHG

## 2019-09-04 LAB
GLUCOSE BLD-MCNC: 116 MG/DL (ref 70–99)
PERFORMED ON: ABNORMAL

## 2019-09-04 PROCEDURE — 3609012400 HC EGD TRANSORAL BIOPSY SINGLE/MULTIPLE: Performed by: SURGERY

## 2019-09-04 PROCEDURE — 7100000010 HC PHASE II RECOVERY - FIRST 15 MIN: Performed by: SURGERY

## 2019-09-04 PROCEDURE — 43251 EGD REMOVE LESION SNARE: CPT | Performed by: SURGERY

## 2019-09-04 PROCEDURE — 45385 COLONOSCOPY W/LESION REMOVAL: CPT | Performed by: SURGERY

## 2019-09-04 PROCEDURE — 88305 TISSUE EXAM BY PATHOLOGIST: CPT

## 2019-09-04 PROCEDURE — 6360000002 HC RX W HCPCS: Performed by: NURSE ANESTHETIST, CERTIFIED REGISTERED

## 2019-09-04 PROCEDURE — 2709999900 HC NON-CHARGEABLE SUPPLY: Performed by: SURGERY

## 2019-09-04 PROCEDURE — 7100000011 HC PHASE II RECOVERY - ADDTL 15 MIN: Performed by: SURGERY

## 2019-09-04 PROCEDURE — 3700000000 HC ANESTHESIA ATTENDED CARE: Performed by: SURGERY

## 2019-09-04 PROCEDURE — 3609010200 HC COLONOSCOPY ABLATION TUMOR POLYP/OTHER LES: Performed by: SURGERY

## 2019-09-04 PROCEDURE — 2580000003 HC RX 258: Performed by: NURSE ANESTHETIST, CERTIFIED REGISTERED

## 2019-09-04 PROCEDURE — 3700000001 HC ADD 15 MINUTES (ANESTHESIA): Performed by: SURGERY

## 2019-09-04 RX ORDER — PROPOFOL 10 MG/ML
INJECTION, EMULSION INTRAVENOUS PRN
Status: DISCONTINUED | OUTPATIENT
Start: 2019-09-04 | End: 2019-09-04 | Stop reason: SDUPTHER

## 2019-09-04 RX ORDER — SODIUM CHLORIDE 9 MG/ML
INJECTION, SOLUTION INTRAVENOUS CONTINUOUS PRN
Status: DISCONTINUED | OUTPATIENT
Start: 2019-09-04 | End: 2019-09-04 | Stop reason: SDUPTHER

## 2019-09-04 RX ORDER — LIDOCAINE HYDROCHLORIDE 20 MG/ML
INJECTION, SOLUTION INTRAVENOUS PRN
Status: DISCONTINUED | OUTPATIENT
Start: 2019-09-04 | End: 2019-09-04 | Stop reason: SDUPTHER

## 2019-09-04 RX ADMIN — PROPOFOL 50 MG: 10 INJECTION, EMULSION INTRAVENOUS at 09:12

## 2019-09-04 RX ADMIN — PROPOFOL 50 MG: 10 INJECTION, EMULSION INTRAVENOUS at 09:05

## 2019-09-04 RX ADMIN — PROPOFOL 80 MG: 10 INJECTION, EMULSION INTRAVENOUS at 08:38

## 2019-09-04 RX ADMIN — PROPOFOL 50 MG: 10 INJECTION, EMULSION INTRAVENOUS at 09:08

## 2019-09-04 RX ADMIN — PROPOFOL 50 MG: 10 INJECTION, EMULSION INTRAVENOUS at 08:42

## 2019-09-04 RX ADMIN — PROPOFOL 50 MG: 10 INJECTION, EMULSION INTRAVENOUS at 09:14

## 2019-09-04 RX ADMIN — PROPOFOL 70 MG: 10 INJECTION, EMULSION INTRAVENOUS at 08:40

## 2019-09-04 RX ADMIN — SODIUM CHLORIDE: 900 INJECTION, SOLUTION INTRAVENOUS at 08:34

## 2019-09-04 RX ADMIN — LIDOCAINE HYDROCHLORIDE 100 MG: 20 INJECTION, SOLUTION INTRAVENOUS at 08:38

## 2019-09-04 RX ADMIN — PROPOFOL 50 MG: 10 INJECTION, EMULSION INTRAVENOUS at 09:20

## 2019-09-04 ASSESSMENT — PULMONARY FUNCTION TESTS
PIF_VALUE: 0
PIF_VALUE: 1

## 2019-09-04 ASSESSMENT — PAIN - FUNCTIONAL ASSESSMENT: PAIN_FUNCTIONAL_ASSESSMENT: 0-10

## 2019-09-04 NOTE — H&P
(fecal immunochemical test)     Prediabetes     Ureteral calculus of right kidney transplant 2005     Past Surgical History:   Procedure Laterality Date    BRONCHOSCOPY      COLONOSCOPY      DIAGNOSTIC CARDIAC CATH LAB PROCEDURE  2006??    ENDOSCOPY, COLON, DIAGNOSTIC      LUNG REMOVAL, PARTIAL Right 2005    Partial right lobectomy for diagnostic purpose. Surgery performed in [address] [place] [address]         Physical Exam:     BP (!) 149/90   Pulse 76   Temp 99 °F (37.2 °C)   Resp 18   Ht 5' 10\" (1.778 m)   Wt 253 lb (114.8 kg)   SpO2 (!) 88%   BMI 36.30 kg/m²  Body mass index is 36.3 kg/m². Constitutional: Appears well-developed and well-nourished. Grooming appropriate. Head: Normocephalic, atraumatic. Eyes: No scleral icterus. Vision intact grossly. ENT: Hearing grossly intact. No facial deformity. Cardiovascular: Normal rate on monitor. Pulmonary/Chest: Effort normal. No respiratory distress. No wheezes. No use of accessory muscles. Musculoskeletal: Normal range of motion of UE. No gross deformity. Neurological: Alert and oriented to person, place, and time. No gross deficits. Psychiatric: Normal mood and affect. Behavior normal. Oriented to person, place, and time. Abdomen: soft, NTTP, non distended    Recent labs/radiology reviewed as appropriate    Assessment/Plan:     Referred by PCP for screening colonoscopy    Previous colonoscopy: 15 yrs ago  Family history of colorectal cancer: no  Symptoms: no    Anesthesia to provide sedation. Please see their documentation regarding airway and ASA classification. Proceed as planned for endoscopy, possible polypectomy    Risks/benefits/alternatives of procedure discussed with patient and any present family members. Risks including, but not limited to: bleeding, perforation, post polypectomy syndrome, splenic injury, need for additional procedures or surgery, risks of anesthesia.  Patient understands it is their responsibility to call office for pathology results if they do not hear from my office within 1-2 weeks. All questions answered.     Electronically signed by Sheryl Boast, MD on 9/4/2019 at 9:31 AM  \

## 2019-09-04 NOTE — OP NOTE
COLONOSCOPY PROCEDURE NOTE    Nia Grimm  1965  [id number]    FACILITY: [facility]    DATE OF PROCEDURE: 09/04/19    SURGEON: Raghu Rivera MD    PRE-OPERATIVE DIAGNOSIS: Screening for colorectal cnacer    POST-OPERATIVE DIAGNOSIS:    1. Colon polyps x 2    PROCEDURE:   1. Colonoscopy to cecum with hot snare polypectomy    ANESTHESIA: Sedation care provided by CRNA/anesthesiologist    INDICATIONS:  Referred by PCP for screening colonoscopy     Previous colonoscopy: 15 yrs ago  Family history of colorectal cancer: no  Symptoms: no    Risks of the procedure were explained to the patient. Risks include, but are not limited to: bleeding, perforation, post polypectomy syndrome, splenic injury, missed polyps or masses, incomplete exam, and risks of anesthesia/sedation. PROCEDURE DETAILS:  The patient was placed in the left lateral position. Appropriate monitors were put in place per endoscopy/anesthesia protocol. Time out was performed confirming the correct patient/procedure. Antibiotics not indicated. Moderate to deep sedation was started by anesthesia provider. Digital rectum exam performed. Well lubricated Olympus flexible colonoscope inserted transanally and advanced under direct luminal visualization to the cecum using co2 insufflation. Cecal landmarks identified, including the ileocecal valve and appendiceal orifice. Photodocumentation of cecum was obtained. The Ileocecal valve was not intubated. The colonoscope was withdrawn, observing mucosa for abnormalities. Retroflexion of the distal rectum was performed. Withdrawal time was 10 minutes. FINDINGS:    DIGITAL RECTAL EXAM: normal    TERMINAL ILEUM: not intubated    COLORECTUM:   1. Polyp in transverse colon, ~4 mm, completely excised using hot snare polypectomy, retrieved for path. Good hemostasis noted     2. Polyp in sigmoid colon, ~ 5 mm, completely excised using hot snare polypectomy, retrieved for path.  Good hemostasis noted    RECTAL

## 2019-09-04 NOTE — OP NOTE
Esophagogastroduodenoscopy     Indications: Pre-op gastric Surgery, history of / rule out Gastroesophageal reflux disease. Pre-operative Diagnosis: GERD    Post-operative Diagnosis: Gastritis, Hiatal Hernia Hill Grade 2    Surgeon: Michelle Colbert    Anesthesia: MAC    Procedure Details   The patient was seen in the Holding Room. The risks, benefits, complications, treatment options, and expected outcomes were discussed with the patient. Pre-op Endoscopy recommended to rule any intragastric pathology that would interfere with proposed procedure /  And or due to current conditions. The possibilities of reaction to medication, pulmonary aspiration, perforation of viscus, bleeding, recurrent infection, the need for additional procedures, failure to diagnose a condition, and creating a complication requiring transfusion or operation were discussed with the patient. The patient concurred with the proposed plan, giving informed consent. The site of surgery properly noted/marked. The patient was taken to Endoscopy Suite, identified as [employer]  and the procedure verified as  Esophagogastroduodenoscopy  A Time Out was held and the above information confirmed. Upper Endoscopy: An endoscope was inserted through the oropharynx into the upper esophagus. The endoscope was passed into the stomach to the level of the pylorus and passed to the duodenum where the ampulla was visualized and duodenum was normal. Then the scope was retracted back to the stomach and it was normal except for gastritis, biopsy of the antrum obtained, then retroflexed and the gastroesophageal junction was inspected.  There was a moderate sized hiatal hernia and no evidence of Monet's     Findings:  Esophageal findings include:  Upper: normal Esophageal Mucosa  Lower:normal Esophageal Mucosa  Distal: normal Esophageal Mucosa      Gastric findings include: abnormal Gastric Mucosa    Duodenal Findings: normal Duodenal Mucosa      Estimated

## 2019-09-04 NOTE — ANESTHESIA PRE PROCEDURE
Department of Anesthesiology  Preprocedure Note       Name:  Umu Rolon   Age:  48 y.o.  :  1965                                          MRN:  [id number]         Date:  2019      Surgeon: Fernie Dunham):  MD Екатерина Tolliver DO    Procedure: COLONOSCOPY; (N/A )  ESOPHAGOGASTRODUODENOSCOPY WITH MAC (N/A )    Medications prior to admission:   Prior to Admission medications    Medication Sig Start Date End Date Taking? Authorizing Provider   polyethylene glycol (GOLYTELY) 236 g solution Please follow prep instructions provided by doctor's office. 19  Yes Shirley Atkins MD   escitalopram (LEXAPRO) 10 MG tablet TAKE 1 TABLET BY MOUTH ONE TIME A DAY 19  Yes Shahnaz Hankins MD   carvedilol (COREG) 6.25 MG tablet TAKE 1 TABLET BY MOUTH 2 TIMES A DAY WITH MEALS 19  Yes Shahnaz Hankins MD   rosuvastatin (CRESTOR) 40 MG tablet TAKE ONE TABLET BY MOUTH EVERY EVENING 19  Yes Shahnaz Hankins MD   lisinopril (PRINIVIL;ZESTRIL) 20 MG tablet TAKE 1 TABLET BY MOUTH ONE TIME DAILY 19  Yes Shahnaz Hankins MD   sodium chloride, Inhalant, 3 % nebulizer solution Take 15 mLs by nebulization 2 times daily 19  Yes Kelin Figueroa DO   amLODIPine (NORVASC) 5 MG tablet TAKE 1 TABLET BY MOUTH ONE TIME A DAY IN ADDITION TO LISINOPRIL FOR BLOOD PRESSURE 4/15/19  Yes Shahnaz Hankins MD   omeprazole (PRILOSEC) 20 MG delayed release capsule TAKE ONE CAPSULE BY MOUTH TWICE A DAY 19  Yes Kelin Figueroa DO   INCRUSE ELLIPTA 62.5 MCG/INH AEPB INHALE ONE PUFF BY MOUTH ONE TIME A DAY 19  Yes Kelin Figueroa DO   BREO ELLIPTA 200-25 MCG/INH AEPB INHALE 1 PUFF BY MOUTH ONE TIME A DAY 19  Yes Kelin Figueroa DO   hydrocortisone (WESTCORT) 0.2 % cream Apply topically 2 times daily Apply topically 2 times daily.  19  Yes Margie Sarmiento MD   ASPIRIN LOW DOSE ADULT 81 MG chewable tablet CHEW AND SWALLOW ONE

## 2019-09-19 ENCOUNTER — PREP FOR PROCEDURE (OUTPATIENT)
Dept: BARIATRICS/WEIGHT MGMT | Age: 54
End: 2019-09-19

## 2019-09-19 ENCOUNTER — OFFICE VISIT (OUTPATIENT)
Dept: BARIATRICS/WEIGHT MGMT | Age: 54
End: 2019-09-19
Payer: COMMERCIAL

## 2019-09-19 VITALS
HEIGHT: 70 IN | SYSTOLIC BLOOD PRESSURE: 136 MMHG | WEIGHT: 254 LBS | HEART RATE: 72 BPM | DIASTOLIC BLOOD PRESSURE: 86 MMHG | BODY MASS INDEX: 36.36 KG/M2

## 2019-09-19 DIAGNOSIS — E66.01 SEVERE OBESITY (BMI 35.0-35.9 WITH COMORBIDITY) (HCC): Primary | ICD-10-CM

## 2019-09-19 DIAGNOSIS — K21.9 CHRONIC GERD: ICD-10-CM

## 2019-09-19 DIAGNOSIS — I10 ESSENTIAL HYPERTENSION: ICD-10-CM

## 2019-09-19 DIAGNOSIS — G47.33 OSA TREATED WITH BIPAP: ICD-10-CM

## 2019-09-19 PROCEDURE — G8417 CALC BMI ABV UP PARAM F/U: HCPCS | Performed by: SURGERY

## 2019-09-19 PROCEDURE — 99213 OFFICE O/P EST LOW 20 MIN: CPT | Performed by: SURGERY

## 2019-09-19 PROCEDURE — G8598 ASA/ANTIPLAT THER USED: HCPCS | Performed by: SURGERY

## 2019-09-19 PROCEDURE — 1036F TOBACCO NON-USER: CPT | Performed by: SURGERY

## 2019-09-19 PROCEDURE — G8427 DOCREV CUR MEDS BY ELIG CLIN: HCPCS | Performed by: SURGERY

## 2019-09-19 PROCEDURE — 3017F COLORECTAL CA SCREEN DOC REV: CPT | Performed by: SURGERY

## 2019-09-20 ENCOUNTER — TELEPHONE (OUTPATIENT)
Dept: FAMILY MEDICINE CLINIC | Age: 54
End: 2019-09-20

## 2019-09-20 NOTE — TELEPHONE ENCOUNTER
PLEASE KEEP FOR Roni Mitchell,    I spoke with patient. Surgery was suppose to be November, but since they didn't receive note, it is pushed back to December. Can we get note in ASAP?   Not sure why it wasn't done at his appointment    Also, please find out how to fax in health insurance papers with note that it was our fault that it wasn't taken care of yet

## 2019-10-07 DIAGNOSIS — J44.9 COPD WITH ASTHMA (HCC): ICD-10-CM

## 2019-10-07 RX ORDER — UMECLIDINIUM 62.5 UG/1
AEROSOL, POWDER ORAL
Qty: 30 EACH | Refills: 6 | Status: SHIPPED | OUTPATIENT
Start: 2019-10-07 | End: 2020-09-10 | Stop reason: SDUPTHER

## 2019-10-15 ENCOUNTER — OFFICE VISIT (OUTPATIENT)
Dept: PULMONOLOGY | Age: 54
End: 2019-10-15
Payer: COMMERCIAL

## 2019-10-15 VITALS
WEIGHT: 260 LBS | HEIGHT: 70 IN | SYSTOLIC BLOOD PRESSURE: 138 MMHG | TEMPERATURE: 97 F | HEART RATE: 60 BPM | DIASTOLIC BLOOD PRESSURE: 81 MMHG | BODY MASS INDEX: 37.22 KG/M2 | RESPIRATION RATE: 16 BRPM | OXYGEN SATURATION: 91 %

## 2019-10-15 DIAGNOSIS — Z23 NEED FOR INFLUENZA VACCINATION: ICD-10-CM

## 2019-10-15 DIAGNOSIS — J43.2 CENTRILOBULAR EMPHYSEMA (HCC): ICD-10-CM

## 2019-10-15 DIAGNOSIS — J84.9 ILD (INTERSTITIAL LUNG DISEASE) (HCC): Primary | ICD-10-CM

## 2019-10-15 DIAGNOSIS — Z01.818 PREOPERATIVE CLEARANCE: ICD-10-CM

## 2019-10-15 DIAGNOSIS — J42 FOLLICULAR BRONCHIOLITIS (HCC): ICD-10-CM

## 2019-10-15 DIAGNOSIS — J96.11 CHRONIC RESPIRATORY FAILURE WITH HYPOXIA (HCC): ICD-10-CM

## 2019-10-15 PROCEDURE — 1036F TOBACCO NON-USER: CPT | Performed by: INTERNAL MEDICINE

## 2019-10-15 PROCEDURE — 99214 OFFICE O/P EST MOD 30 MIN: CPT | Performed by: INTERNAL MEDICINE

## 2019-10-15 PROCEDURE — 3023F SPIROM DOC REV: CPT | Performed by: INTERNAL MEDICINE

## 2019-10-15 PROCEDURE — 3017F COLORECTAL CA SCREEN DOC REV: CPT | Performed by: INTERNAL MEDICINE

## 2019-10-15 PROCEDURE — 90686 IIV4 VACC NO PRSV 0.5 ML IM: CPT | Performed by: INTERNAL MEDICINE

## 2019-10-15 PROCEDURE — 90471 IMMUNIZATION ADMIN: CPT | Performed by: INTERNAL MEDICINE

## 2019-10-15 PROCEDURE — G8427 DOCREV CUR MEDS BY ELIG CLIN: HCPCS | Performed by: INTERNAL MEDICINE

## 2019-10-15 PROCEDURE — G8417 CALC BMI ABV UP PARAM F/U: HCPCS | Performed by: INTERNAL MEDICINE

## 2019-10-15 PROCEDURE — G8598 ASA/ANTIPLAT THER USED: HCPCS | Performed by: INTERNAL MEDICINE

## 2019-10-15 PROCEDURE — G8926 SPIRO NO PERF OR DOC: HCPCS | Performed by: INTERNAL MEDICINE

## 2019-10-15 PROCEDURE — G8482 FLU IMMUNIZE ORDER/ADMIN: HCPCS | Performed by: INTERNAL MEDICINE

## 2019-10-15 ASSESSMENT — SLEEP AND FATIGUE QUESTIONNAIRES
HOW LIKELY ARE YOU TO NOD OFF OR FALL ASLEEP WHILE WATCHING TV: 0
ESS TOTAL SCORE: 0
HOW LIKELY ARE YOU TO NOD OFF OR FALL ASLEEP IN A CAR, WHILE STOPPED FOR A FEW MINUTES IN TRAFFIC: 0
HOW LIKELY ARE YOU TO NOD OFF OR FALL ASLEEP WHILE SITTING QUIETLY AFTER LUNCH WITHOUT ALCOHOL: 0
HOW LIKELY ARE YOU TO NOD OFF OR FALL ASLEEP WHILE SITTING AND READING: 0
HOW LIKELY ARE YOU TO NOD OFF OR FALL ASLEEP WHILE SITTING AND TALKING TO SOMEONE: 0
HOW LIKELY ARE YOU TO NOD OFF OR FALL ASLEEP WHEN YOU ARE A PASSENGER IN A CAR FOR AN HOUR WITHOUT A BREAK: 0
HOW LIKELY ARE YOU TO NOD OFF OR FALL ASLEEP WHILE LYING DOWN TO REST IN THE AFTERNOON WHEN CIRCUMSTANCES PERMIT: 0
HOW LIKELY ARE YOU TO NOD OFF OR FALL ASLEEP WHILE SITTING INACTIVE IN A PUBLIC PLACE: 0

## 2019-10-17 ENCOUNTER — OFFICE VISIT (OUTPATIENT)
Dept: BARIATRICS/WEIGHT MGMT | Age: 54
End: 2019-10-17
Payer: COMMERCIAL

## 2019-10-17 VITALS
HEIGHT: 70 IN | HEART RATE: 68 BPM | WEIGHT: 256.2 LBS | SYSTOLIC BLOOD PRESSURE: 128 MMHG | DIASTOLIC BLOOD PRESSURE: 72 MMHG | BODY MASS INDEX: 36.68 KG/M2

## 2019-10-17 DIAGNOSIS — K21.9 CHRONIC GERD: ICD-10-CM

## 2019-10-17 DIAGNOSIS — I10 ESSENTIAL HYPERTENSION: ICD-10-CM

## 2019-10-17 DIAGNOSIS — E66.01 SEVERE OBESITY (BMI 35.0-35.9 WITH COMORBIDITY) (HCC): Primary | ICD-10-CM

## 2019-10-17 PROCEDURE — G8598 ASA/ANTIPLAT THER USED: HCPCS | Performed by: SURGERY

## 2019-10-17 PROCEDURE — 3017F COLORECTAL CA SCREEN DOC REV: CPT | Performed by: SURGERY

## 2019-10-17 PROCEDURE — G8417 CALC BMI ABV UP PARAM F/U: HCPCS | Performed by: SURGERY

## 2019-10-17 PROCEDURE — 1036F TOBACCO NON-USER: CPT | Performed by: SURGERY

## 2019-10-17 PROCEDURE — G8427 DOCREV CUR MEDS BY ELIG CLIN: HCPCS | Performed by: SURGERY

## 2019-10-17 PROCEDURE — G8482 FLU IMMUNIZE ORDER/ADMIN: HCPCS | Performed by: SURGERY

## 2019-10-17 PROCEDURE — 99213 OFFICE O/P EST LOW 20 MIN: CPT | Performed by: SURGERY

## 2019-11-06 ENCOUNTER — TELEPHONE (OUTPATIENT)
Dept: SURGERY | Age: 54
End: 2019-11-06

## 2019-11-14 ENCOUNTER — TELEPHONE (OUTPATIENT)
Dept: PULMONOLOGY | Age: 54
End: 2019-11-14

## 2019-11-14 DIAGNOSIS — J40 BRONCHITIS: Primary | ICD-10-CM

## 2019-11-15 RX ORDER — LEVOFLOXACIN 500 MG/1
500 TABLET, FILM COATED ORAL DAILY
Qty: 7 TABLET | Refills: 0 | Status: SHIPPED | OUTPATIENT
Start: 2019-11-15 | End: 2019-11-22

## 2019-11-21 ENCOUNTER — OFFICE VISIT (OUTPATIENT)
Dept: BARIATRICS/WEIGHT MGMT | Age: 54
End: 2019-11-21
Payer: COMMERCIAL

## 2019-11-21 VITALS
SYSTOLIC BLOOD PRESSURE: 134 MMHG | DIASTOLIC BLOOD PRESSURE: 76 MMHG | HEART RATE: 80 BPM | BODY MASS INDEX: 36.22 KG/M2 | HEIGHT: 70 IN | WEIGHT: 253 LBS

## 2019-11-21 DIAGNOSIS — K21.9 CHRONIC GERD: ICD-10-CM

## 2019-11-21 DIAGNOSIS — I10 ESSENTIAL HYPERTENSION: ICD-10-CM

## 2019-11-21 DIAGNOSIS — G47.33 OSA TREATED WITH BIPAP: ICD-10-CM

## 2019-11-21 DIAGNOSIS — E66.01 SEVERE OBESITY (BMI 35.0-35.9 WITH COMORBIDITY) (HCC): Primary | ICD-10-CM

## 2019-11-21 PROCEDURE — G8598 ASA/ANTIPLAT THER USED: HCPCS | Performed by: SURGERY

## 2019-11-21 PROCEDURE — 1036F TOBACCO NON-USER: CPT | Performed by: SURGERY

## 2019-11-21 PROCEDURE — G8417 CALC BMI ABV UP PARAM F/U: HCPCS | Performed by: SURGERY

## 2019-11-21 PROCEDURE — G8482 FLU IMMUNIZE ORDER/ADMIN: HCPCS | Performed by: SURGERY

## 2019-11-21 PROCEDURE — 3017F COLORECTAL CA SCREEN DOC REV: CPT | Performed by: SURGERY

## 2019-11-21 PROCEDURE — 99213 OFFICE O/P EST LOW 20 MIN: CPT | Performed by: SURGERY

## 2019-11-21 PROCEDURE — G8427 DOCREV CUR MEDS BY ELIG CLIN: HCPCS | Performed by: SURGERY

## 2019-12-19 ENCOUNTER — OFFICE VISIT (OUTPATIENT)
Dept: BARIATRICS/WEIGHT MGMT | Age: 54
End: 2019-12-19
Payer: COMMERCIAL

## 2019-12-19 VITALS
WEIGHT: 253 LBS | SYSTOLIC BLOOD PRESSURE: 128 MMHG | HEART RATE: 76 BPM | DIASTOLIC BLOOD PRESSURE: 74 MMHG | BODY MASS INDEX: 36.22 KG/M2 | HEIGHT: 70 IN

## 2019-12-19 DIAGNOSIS — K21.9 CHRONIC GERD: ICD-10-CM

## 2019-12-19 DIAGNOSIS — G47.33 OSA TREATED WITH BIPAP: ICD-10-CM

## 2019-12-19 DIAGNOSIS — I10 ESSENTIAL HYPERTENSION: ICD-10-CM

## 2019-12-19 DIAGNOSIS — E66.01 SEVERE OBESITY (BMI 35.0-35.9 WITH COMORBIDITY) (HCC): Primary | ICD-10-CM

## 2019-12-19 PROCEDURE — 1036F TOBACCO NON-USER: CPT | Performed by: SURGERY

## 2019-12-19 PROCEDURE — G8427 DOCREV CUR MEDS BY ELIG CLIN: HCPCS | Performed by: SURGERY

## 2019-12-19 PROCEDURE — G8482 FLU IMMUNIZE ORDER/ADMIN: HCPCS | Performed by: SURGERY

## 2019-12-19 PROCEDURE — G8417 CALC BMI ABV UP PARAM F/U: HCPCS | Performed by: SURGERY

## 2019-12-19 PROCEDURE — 3017F COLORECTAL CA SCREEN DOC REV: CPT | Performed by: SURGERY

## 2019-12-19 PROCEDURE — G8598 ASA/ANTIPLAT THER USED: HCPCS | Performed by: SURGERY

## 2019-12-19 PROCEDURE — 99214 OFFICE O/P EST MOD 30 MIN: CPT | Performed by: SURGERY

## 2019-12-31 NOTE — PROGRESS NOTES
[facility]) Physicians   [employer]    Subjective:      Patient ID: Keila Campos is a 48 y.o. male    HPI    The patient is here for their bariatric surgery preoperative education group visit. He has made several attempts at weight loss in the past without success and now has been scheduled to have bariatric surgery on January 21, 2020 for future weight loss. He is working to change his dietary behaviors and lose weight to improve comorbid conditions such as hypertension, hyperlipidemia, obstructive sleep apnea and GERD. Keila Campos is a very pleasant 48 y.o. male with Body mass index is 36.88 kg/m². Past Medical History:   Diagnosis Date    CAD S/P percutaneous coronary angioplasty     Chronic respiratory failure (HCC)     COPD exacerbation ([address] 9/7/2016    Coronary artery disease involving native coronary artery of native heart with unstable angina pectoris ([address]     Diabetes ([address]     Diabetes ([address]     Dyslipidemia     Dyspnea     GERD (gastroesophageal reflux disease)     Goodpasture syndrome ([address]     reason pt is on cellcept.   He will need lung transplant eventually    High risk medications (not anticoagulants) long-term use     HTN (hypertension)     Hyperlipidemia     Hypoxemia     ILD (interstitial lung disease) ([address]     Left ureteral stone 4/13/2018    MDD (major depressive disorder)     ESTRELLITA on CPAP     Polycythemia     Positive FIT (fecal immunochemical test)     Prediabetes     Tubular adenoma     colonoscopy 9/2019    Ureteral calculus of right kidney transplant 2005     Past Surgical History:   Procedure Laterality Date    BRONCHOSCOPY      COLONOSCOPY      COLONOSCOPY N/A 9/4/2019    COLONOSCOPY POLYPECTOMY ABLATION HOT SNARE OF TRANSVERSE COLON POLYP AND SIGMOID POLYP performed by Andreas Mendes MD at [facility] CATH LAB PROCEDURE  2006??    ENDOSCOPY, COLON, DIAGNOSTIC      LUNG REMOVAL, PARTIAL Right 2005 2.8 08/07/2019     Lab Results   Component Value Date    CHOL 101 08/07/2019    TRIG 115 08/07/2019    HDL 31 08/07/2019    LDLCALC 47 08/07/2019    LABVLDL 23 08/07/2019     Lab Results   Component Value Date    TSHREFLEX 1.28 08/07/2019     Lab Results   Component Value Date    IRON 80 08/07/2019    TIBC 373 08/07/2019    LABIRON 21 08/07/2019     Lab Results   Component Value Date    NBLRJPWG06 677 08/07/2019    FOLATE 4.37 08/07/2019     Lab Results   Component Value Date    VITD25 22.8 08/07/2019     Lab Results   Component Value Date    LABA1C 7.0 08/07/2019    .2 08/07/2019     Review of Systems   Constitutional: Negative. Negative for chills and fever. HENT: Negative. Eyes: Negative. Respiratory: Positive for shortness of breath. Negative for cough. With exertion   Cardiovascular: Negative. Gastrointestinal: Negative. Negative for abdominal pain, constipation, diarrhea and nausea. Endocrine: Negative. Genitourinary: Negative. Musculoskeletal: Negative. Skin: Negative. Allergic/Immunologic: Negative. Neurological: Negative. Hematological: Negative. Psychiatric/Behavioral: Negative. Objective:     Physical Exam  Vitals signs reviewed. Constitutional:       Appearance: He is well-developed. HENT:      Head: Normocephalic and atraumatic. Eyes:      Conjunctiva/sclera: Conjunctivae normal.      Pupils: Pupils are equal, round, and reactive to light. Neck:      Musculoskeletal: Normal range of motion and neck supple. Cardiovascular:      Rate and Rhythm: Normal rate. Heart sounds: Normal heart sounds. Pulmonary:      Effort: Pulmonary effort is normal. No respiratory distress. Breath sounds: Wheezing present. No rhonchi. Comments: Expiratory upper airways. Abdominal:      General: Bowel sounds are normal. There is no distension. Palpations: Abdomen is soft. Tenderness: There is no tenderness.       Hernia: No hernia is present. Musculoskeletal: Normal range of motion. Skin:     General: Skin is warm and dry. Neurological:      Mental Status: He is alert and oriented to person, place, and time. Psychiatric:         Behavior: Behavior normal.         Thought Content: Thought content normal.         Judgment: Judgment normal.       Assessment and Plan:   Patient is here for their preoperative education group visit for sleeve gastrectomy. The patient is up 4 lbs today. The patient's current Body mass index is 36.88 kg/m². (1/7/20). He is making good dietary and behavior modifications and is considered to be a good surgical candidate. Patient has a diagnosis of hypertension. Reviewed the importance of checking blood pressure preoperatively and postoperatively. In addition, following up with their PCP for medication adjustments as needed. Discussed the fact that they will be required to crush or open their medications for the first two weeks after surgery and reviewed those medications that can not be crushed. Patient has a diagnosis of hyperlipidemia. Discussed the benefits of weight loss and dietary changes on lipids and cholesterol. Discussed the fact that they will be required to crush or open their medications for the first two weeks after surgery and reviewed those medications that can not be crushed. Patient is currently taking blood thinners. Reviewed the importance and recommended timing of when to stop these medications prior to surgery. However, instructed the patient that the final decision lies with the provider who prescribed this medication. Discussed that this medication should continue to be taken whole as we do not want them to crush it for the first two weeks after surgery. In addition this medication will not be restarted until POD #2. Patient has a diagnosis of obstructive sleep apnea and uses a CPAP for sleep. Discussed that weight loss can assist with improving sleep apnea symptoms.  Explained

## 2019-12-31 NOTE — PATIENT INSTRUCTIONS
Patient received dietary handouts and education. **You may receive a survey regarding the care you received during your visit. Your input is valuable to us. We encourage you to complete and return your survey. We hope you will choose us in the future for your healthcare needs.

## 2020-01-03 NOTE — PROGRESS NOTES
Patient Name:   Addison Hansen      Type of Surgery:  Sleeve         Date of Surgery:  January 21, 2020      Start Pre-Op Diet On:  January 8, 2020      Start Clear Liquids On:  January 20, 2020      If you are having a gastric bypass, start Bowel Prep between 2-4pm the day prior to surgery. NPO after midnight the night before your surgery! Take morning medications with a small amount of water.     NOTES:_______________________________________  ______________________________________________

## 2020-01-07 ENCOUNTER — OFFICE VISIT (OUTPATIENT)
Dept: BARIATRICS/WEIGHT MGMT | Age: 55
End: 2020-01-07
Payer: COMMERCIAL

## 2020-01-07 VITALS
DIASTOLIC BLOOD PRESSURE: 90 MMHG | BODY MASS INDEX: 36.79 KG/M2 | RESPIRATION RATE: 16 BRPM | HEART RATE: 82 BPM | HEIGHT: 70 IN | WEIGHT: 257 LBS | SYSTOLIC BLOOD PRESSURE: 138 MMHG

## 2020-01-07 PROCEDURE — G8482 FLU IMMUNIZE ORDER/ADMIN: HCPCS | Performed by: NURSE PRACTITIONER

## 2020-01-07 PROCEDURE — 3017F COLORECTAL CA SCREEN DOC REV: CPT | Performed by: NURSE PRACTITIONER

## 2020-01-07 PROCEDURE — G8427 DOCREV CUR MEDS BY ELIG CLIN: HCPCS | Performed by: NURSE PRACTITIONER

## 2020-01-07 PROCEDURE — G8417 CALC BMI ABV UP PARAM F/U: HCPCS | Performed by: NURSE PRACTITIONER

## 2020-01-07 PROCEDURE — 99214 OFFICE O/P EST MOD 30 MIN: CPT | Performed by: NURSE PRACTITIONER

## 2020-01-07 PROCEDURE — 1036F TOBACCO NON-USER: CPT | Performed by: NURSE PRACTITIONER

## 2020-01-07 ASSESSMENT — ENCOUNTER SYMPTOMS
COUGH: 0
SHORTNESS OF BREATH: 1
ABDOMINAL PAIN: 0
NAUSEA: 0
DIARRHEA: 0
CONSTIPATION: 0

## 2020-01-07 NOTE — PROGRESS NOTES
Jensen gained 4 lbs over the past 3 weeks. After dietary evaluation and education, patient is considered to be a good surgical candidate from a dietary perspective. Patient was educated on gastric sleeve dietary protocol. Pre-operative and post-operative diet guidelines were discussed and written information was provided. Nectar and Unjury protein supplements were purchased. Vitamin regimen with Bariatric Fusion vitamins was explained, and patient purchased a 1 month supply at this visit. Importance of vitamin compliance was discussed and potential of vitamin deficiencies was reviewed. Encouraged continued physical activity. Patient signed agreement stating they are committed to lifelong follow up, smoking and alcohol cessation, vitamin compliance, and 2 week pre-operative dietary protocol.

## 2020-01-08 ENCOUNTER — TELEPHONE (OUTPATIENT)
Dept: BARIATRICS/WEIGHT MGMT | Age: 55
End: 2020-01-08

## 2020-01-09 ENCOUNTER — OFFICE VISIT (OUTPATIENT)
Dept: FAMILY MEDICINE CLINIC | Age: 55
End: 2020-01-09
Payer: COMMERCIAL

## 2020-01-09 VITALS
DIASTOLIC BLOOD PRESSURE: 81 MMHG | HEIGHT: 70 IN | WEIGHT: 256 LBS | HEART RATE: 67 BPM | SYSTOLIC BLOOD PRESSURE: 121 MMHG | BODY MASS INDEX: 36.65 KG/M2

## 2020-01-09 LAB
ALBUMIN SERPL-MCNC: 4.4 G/DL (ref 3.4–5)
ALP BLD-CCNC: 97 U/L (ref 40–129)
ALT SERPL-CCNC: 73 U/L (ref 10–40)
ANION GAP SERPL CALCULATED.3IONS-SCNC: 18 MMOL/L (ref 3–16)
AST SERPL-CCNC: 54 U/L (ref 15–37)
BANDED NEUTROPHILS RELATIVE PERCENT: 3 % (ref 0–7)
BASOPHILS ABSOLUTE: 0.2 K/UL (ref 0–0.2)
BASOPHILS RELATIVE PERCENT: 3 %
BILIRUB SERPL-MCNC: 1.5 MG/DL (ref 0–1)
BILIRUBIN DIRECT: 0.3 MG/DL (ref 0–0.3)
BILIRUBIN, INDIRECT: 1.2 MG/DL (ref 0–1)
BUN BLDV-MCNC: 16 MG/DL (ref 7–20)
CALCIUM SERPL-MCNC: 9.4 MG/DL (ref 8.3–10.6)
CHLORIDE BLD-SCNC: 101 MMOL/L (ref 99–110)
CO2: 20 MMOL/L (ref 21–32)
CREAT SERPL-MCNC: 0.9 MG/DL (ref 0.9–1.3)
EOSINOPHILS ABSOLUTE: 0.2 K/UL (ref 0–0.6)
EOSINOPHILS RELATIVE PERCENT: 3 %
GFR AFRICAN AMERICAN: >60
GFR NON-AFRICAN AMERICAN: >60
GLUCOSE BLD-MCNC: 124 MG/DL (ref 70–99)
HCT VFR BLD CALC: 56.1 % (ref 40.5–52.5)
HEMOGLOBIN: 18.7 G/DL (ref 13.5–17.5)
LYMPHOCYTES ABSOLUTE: 2.3 K/UL (ref 1–5.1)
LYMPHOCYTES RELATIVE PERCENT: 35 %
MAGNESIUM: 2 MG/DL (ref 1.8–2.4)
MCH RBC QN AUTO: 27.4 PG (ref 26–34)
MCHC RBC AUTO-ENTMCNC: 33.3 G/DL (ref 31–36)
MCV RBC AUTO: 82.4 FL (ref 80–100)
MONOCYTES ABSOLUTE: 0.7 K/UL (ref 0–1.3)
MONOCYTES RELATIVE PERCENT: 11 %
NEUTROPHILS ABSOLUTE: 3.2 K/UL (ref 1.7–7.7)
NEUTROPHILS RELATIVE PERCENT: 45 %
PDW BLD-RTO: 15.4 % (ref 12.4–15.4)
PLATELET # BLD: 152 K/UL (ref 135–450)
PMV BLD AUTO: 9.1 FL (ref 5–10.5)
POTASSIUM SERPL-SCNC: 3.9 MMOL/L (ref 3.5–5.1)
RBC # BLD: 6.81 M/UL (ref 4.2–5.9)
SLIDE REVIEW: ABNORMAL
SODIUM BLD-SCNC: 139 MMOL/L (ref 136–145)
TOTAL PROTEIN: 7.2 G/DL (ref 6.4–8.2)
WBC # BLD: 6.6 K/UL (ref 4–11)

## 2020-01-09 PROCEDURE — 3023F SPIROM DOC REV: CPT | Performed by: NURSE PRACTITIONER

## 2020-01-09 PROCEDURE — 36415 COLL VENOUS BLD VENIPUNCTURE: CPT | Performed by: NURSE PRACTITIONER

## 2020-01-09 PROCEDURE — G8417 CALC BMI ABV UP PARAM F/U: HCPCS | Performed by: NURSE PRACTITIONER

## 2020-01-09 PROCEDURE — 1036F TOBACCO NON-USER: CPT | Performed by: NURSE PRACTITIONER

## 2020-01-09 PROCEDURE — G8926 SPIRO NO PERF OR DOC: HCPCS | Performed by: NURSE PRACTITIONER

## 2020-01-09 PROCEDURE — 3017F COLORECTAL CA SCREEN DOC REV: CPT | Performed by: NURSE PRACTITIONER

## 2020-01-09 PROCEDURE — G8427 DOCREV CUR MEDS BY ELIG CLIN: HCPCS | Performed by: NURSE PRACTITIONER

## 2020-01-09 PROCEDURE — 99214 OFFICE O/P EST MOD 30 MIN: CPT | Performed by: NURSE PRACTITIONER

## 2020-01-09 PROCEDURE — G8482 FLU IMMUNIZE ORDER/ADMIN: HCPCS | Performed by: NURSE PRACTITIONER

## 2020-01-09 NOTE — PROGRESS NOTES
failure ([address]     COPD exacerbation ([address] 9/7/2016    Coronary artery disease involving native coronary artery of native heart with unstable angina pectoris ([address]     Diabetes ([address]     Diabetes ([address]     Dyslipidemia     Dyspnea     GERD (gastroesophageal reflux disease)     Goodpasture syndrome ([address]     reason pt is on cellcept. He will need lung transplant eventually    High risk medications (not anticoagulants) long-term use     HTN (hypertension)     Hyperlipidemia     Hypoxemia     ILD (interstitial lung disease) ([address]     Left ureteral stone 4/13/2018    MDD (major depressive disorder)     ESTRELLITA on CPAP     Polycythemia     Positive FIT (fecal immunochemical test)     Prediabetes     Tubular adenoma     colonoscopy 9/2019    Ureteral calculus of right kidney transplant 2005     Past Surgical History:   Procedure Laterality Date    BRONCHOSCOPY      COLONOSCOPY      COLONOSCOPY N/A 9/4/2019    COLONOSCOPY POLYPECTOMY ABLATION HOT SNARE OF TRANSVERSE COLON POLYP AND SIGMOID POLYP performed by Sharyle Citizen, MD at [place] [address] CATH LAB PROCEDURE  2006??    ENDOSCOPY, COLON, DIAGNOSTIC      LUNG REMOVAL, PARTIAL Right 2005    Partial right lobectomy for diagnostic purpose.  Surgery performed in [place], [place]    UPPER GASTROINTESTINAL ENDOSCOPY N/A 9/4/2019    EGD BIOPSY performed by Rosa Ramirez DO at [facility] ENDOSCOPY     Family History   Problem Relation Age of Onset    Diabetes Father     High Cholesterol Father     High Blood Pressure Father     Emphysema Neg Hx     Heart Failure Neg Hx     Hypertension Neg Hx      Social History     Socioeconomic History    Marital status:      Spouse name: Not on file    Number of children: Not on file    Years of education: Not on file    Highest education level: Not on file   Occupational History    Not on file   Social Needs    Financial resource strain: Not on file    Food insecurity:     Worry: Not on file

## 2020-01-09 NOTE — LETTER
 Chronic respiratory failure (HCC)     COPD exacerbation ([facility] 9/7/2016    Coronary artery disease involving native coronary artery of native heart with unstable angina pectoris ([facility]     Diabetes ([address]     Diabetes ([facility]     Dyslipidemia     Dyspnea     GERD (gastroesophageal reflux disease)     Goodpasture syndrome ([address]     reason pt is on cellcept. He will need lung transplant eventually    High risk medications (not anticoagulants) long-term use     HTN (hypertension)     Hyperlipidemia     Hypoxemia     ILD (interstitial lung disease) ([address]     Left ureteral stone 4/13/2018    MDD (major depressive disorder)     ESTRELLITA on CPAP     Polycythemia     Positive FIT (fecal immunochemical test)     Prediabetes     Tubular adenoma     colonoscopy 9/2019    Ureteral calculus of right kidney transplant 2005     Past Surgical History:   Procedure Laterality Date    BRONCHOSCOPY      COLONOSCOPY      COLONOSCOPY N/A 9/4/2019    COLONOSCOPY POLYPECTOMY ABLATION HOT SNARE OF TRANSVERSE COLON POLYP AND SIGMOID POLYP performed by Rita May MD at [place] [address] CATH LAB PROCEDURE  2006??    ENDOSCOPY, COLON, DIAGNOSTIC      LUNG REMOVAL, PARTIAL Right 2005    Partial right lobectomy for diagnostic purpose.  Surgery performed in [place], [place]    UPPER GASTROINTESTINAL ENDOSCOPY N/A 9/4/2019    EGD BIOPSY performed by Nate Dunham DO at [facility] ENDOSCOPY     Family History   Problem Relation Age of Onset    Diabetes Father     High Cholesterol Father     High Blood Pressure Father     Emphysema Neg Hx     Heart Failure Neg Hx     Hypertension Neg Hx      Social History     Socioeconomic History    Marital status:      Spouse name: Not on file    Number of children: Not on file    Years of education: Not on file    Highest education level: Not on file   Occupational History    Not on file   Social Needs    Financial resource strain: Not on file  Food insecurity:     Worry: Not on file     Inability: Not on file    Transportation needs:     Medical: Not on file     Non-medical: Not on file   Tobacco Use    Smoking status: Former Smoker     Packs/day: 3.00     Years: 20.00     Pack years: 60.00     Types: Cigarettes     Last attempt to quit: 2000     Years since quittin.0    Smokeless tobacco: Never Used   Substance and Sexual Activity    Alcohol use: No     Alcohol/week: 0.0 standard drinks     Comment: seldom    Drug use: No    Sexual activity: Yes     Partners: Female   Lifestyle    Physical activity:     Days per week: Not on file     Minutes per session: Not on file    Stress: Not on file   Relationships    Social connections:     Talks on phone: Not on file     Gets together: Not on file     Attends Sabianist service: Not on file     Active member of club or organization: Not on file     Attends meetings of clubs or organizations: Not on file     Relationship status: Not on file    Intimate partner violence:     Fear of current or ex partner: Not on file     Emotionally abused: Not on file     Physically abused: Not on file     Forced sexual activity: Not on file   Other Topics Concern    Not on file   Social History Narrative    Not on file       Review of Systems  A comprehensive review of systems was negative except for what was noted in the HPI. Physical Exam   Constitutional: He is oriented to person, place, and time. He appears well-developed and well-nourished. No distress. HENT:   Head: Normocephalic and atraumatic. Mouth/Throat: Uvula is midline, oropharynx is clear and moist and mucous membranes are normal.   Eyes: Conjunctivae and EOM are normal. Pupils are equal, round, and reactive to light. Neck: Trachea normal and normal range of motion. Neck supple. No JVD present. Carotid bruit is not present. No mass and no thyromegaly present.    Cardiovascular: Normal rate, regular rhythm, normal heart sounds and intact distal pulses. Exam reveals no gallop and no friction rub. No murmur heard. Pulmonary/Chest: Effort normal and breath sounds normal. No respiratory distress. He has no wheezes. He has no rales. Abdominal: Soft. Normal aorta and bowel sounds are normal. He exhibits no distension and no mass. There is no hepatosplenomegaly. No tenderness. Musculoskeletal: He exhibits no edema and no tenderness. Neurological: He is alert and oriented to person, place, and time. He has normal strength. No cranial nerve deficit or sensory deficit. Coordination and gait normal.   Skin: Skin is warm and dry. No rash noted. No erythema. Psychiatric: He has a normal mood and affect. His behavior is normal.     EKG Interpretation:  N/A- was done by cardiology and cleared for surgery. .    Lab Review   Lab Results   Component Value Date     08/07/2019    K 4.0 08/07/2019    K 4.0 10/03/2018     08/07/2019    CO2 23 08/07/2019    BUN 10 08/07/2019    CREATININE 0.9 08/07/2019    GLUCOSE 131 08/07/2019    CALCIUM 9.4 08/07/2019     Lab Results   Component Value Date    WBC 6.9 08/07/2019    HGB 17.2 08/07/2019    HCT 51.5 08/07/2019    MCV 81.3 08/07/2019     08/07/2019           Assessment:       48 y.o. patient with planned surgery as above. Known risk factors for perioperative complications: Coronary artery disease, COPD, Hypertension  Current medications which may produce withdrawal symptoms if withheld perioperatively: none      Plan:     1. Preoperative workup as follows: hemoglobin, hematocrit, electrolytes, creatinine  2. Change in medication regimen before surgery: Discontinue ASA and Plavix 7 days before surgery  3.  Prophylaxis for cardiac events with perioperative beta-blockers: Currently taking  carvedilol  ACC/AHA indications for pre-operative beta-blocker use:    · Vascular surgery with history of postitive stress test  · Intermediate or high risk surgery with history of CAD

## 2020-01-10 NOTE — PROGRESS NOTES
[facility] / [facility] ([facility]) JESICA Melendez [address], [address]    Acknowledgment of Informed Consent for Surgical or Medical Procedure and Sedation  I agree to allow doctor(s) Kiarra Muñiz and his/her associates or assistants, including residents and/or other qualified medical practitioner to perform the following medical treatment or procedure and to administer or direct the administration of sedation as necessary:  Procedure(s): ROBOTIC [address] [facility] / Ricardo Fink  My doctor has explained the following regarding the proposed procedure:   the explanation of the procedure   the benefits of the procedure   the potential problems that might occur during recuperation   the risks and side effects of the procedure which could include but are not limited to severe blood loss, infection, stroke or death   the benefits, risks and side effect of alternative procedures including the consequences of declining this procedure or any alternative procedures   the likelihood of achieving satisfactory results. I acknowledge no guarantee or assurance has been made to me regarding the results. I understand that during the course of this treatment/procedure, unforeseen conditions can occur which require an additional or different procedure. I agree to allow my physician or assistants to perform such extension of the original procedure as they may find necessary. I understand that sedation will often result in temporary impairment of memory and fine motor skills and that sedation can occasionally progress to a state of deep sedation or general anesthesia. I understand the risks of anesthesia for surgery include, but are not limited to, sore throat, hoarseness, injury to face, mouth, or teeth; nausea; headache; injury to blood vessels or nerves; death, brain damage, or paralysis.     I understand that if I have a Limitation of Treatment order in effect during my hospitalization, the order may or may not be in effect during this procedure. I give my doctor permission to give me blood or blood products. I understand that there are risks with receiving blood such as hepatitis, AIDS, fever, or allergic reaction. I acknowledge that the risks, benefits, and alternatives of this treatment have been explained to me and that no express or implied warranty has been given by the hospital, any blood bank, or any person or entity as to the blood or blood components transfused. At the discretion of my doctor, I agree to allow observers, equipment/product representatives and allow photographing, and/or televising of the procedure, provided my name or identity is maintained confidentially. I agree the hospital may dispose of or use for scientific or educational purposes any tissue, fluid, or body parts which may be removed.     ________________________________Date________Time______ am/pm  ([place] [facility])  Patient or Signature of Closest Relative or Legal Guardian    ________________________________Date________Time______am/pm      Page 1 of  1  Witness

## 2020-01-13 ENCOUNTER — HOSPITAL ENCOUNTER (OUTPATIENT)
Dept: ULTRASOUND IMAGING | Age: 55
Discharge: HOME | End: 2020-01-13
Payer: COMMERCIAL

## 2020-01-13 PROCEDURE — 76705 ECHO EXAM OF ABDOMEN: CPT

## 2020-01-15 DIAGNOSIS — E80.6 HYPERBILIRUBINEMIA: ICD-10-CM

## 2020-01-15 DIAGNOSIS — R17 ELEVATED BILIRUBIN: ICD-10-CM

## 2020-01-15 DIAGNOSIS — R79.89 ELEVATED LFTS: ICD-10-CM

## 2020-01-15 LAB
ALBUMIN SERPL-MCNC: 4.6 G/DL (ref 3.4–5)
ALP BLD-CCNC: 100 U/L (ref 40–129)
ALT SERPL-CCNC: 90 U/L (ref 10–40)
AST SERPL-CCNC: 58 U/L (ref 15–37)
BILIRUB SERPL-MCNC: 1.2 MG/DL (ref 0–1)
BILIRUBIN DIRECT: 0.3 MG/DL (ref 0–0.3)
BILIRUBIN, INDIRECT: 0.9 MG/DL (ref 0–1)
HAPTOGLOBIN: 57 MG/DL (ref 30–200)
HCT VFR BLD CALC: 55.7 % (ref 40.5–52.5)
IMMATURE RETIC FRACT: 0.38 (ref 0.21–0.37)
RETICULOCYTE ABSOLUTE COUNT: 0.14 M/UL
RETICULOCYTE COUNT PCT: 2.06 % (ref 0.5–2.18)
TOTAL PROTEIN: 7.6 G/DL (ref 6.4–8.2)

## 2020-01-16 ENCOUNTER — TELEPHONE (OUTPATIENT)
Dept: FAMILY MEDICINE CLINIC | Age: 55
End: 2020-01-16

## 2020-01-16 NOTE — TELEPHONE ENCOUNTER
[facility] called stating that they need Dr. Nevaeh Chery or Dori to go back into PT's H&P to say whether or not PT is cleared for his surgery on Tuesday. They state every other doctor has cleared PT but Dr. Nevaeh Chery. Please call when this has been completed.  [phone]

## 2020-01-16 NOTE — PROGRESS NOTES
[facility] PRE-SURGICAL TESTING INSTRUCTIONS                              PRIOR TO PROCEDURE DATE:  1. Please follow any guidelines/instructions prior to your procedure as advised by your surgeon. 2. Arrange for someone to drive you home and be with you for the first 24 hours after discharge for your safety after your procedure for which you received sedation. Ensure it is someone we can share information with regarding your discharge. 3. You must contact your surgeon for instructions IF:   You are taking any blood thinners, aspirin, anti-inflammatory or vitamin E.   There is a change in your physical condition such as a cold, fever, rash, cuts, sores or any other infection, especially near your surgical site. 4. Do not drink alcohol the day before or day of your procedure. 5. A Pre-op History and Physical for surgery MUST be completed by your Physician or Urgent Care within 30 days of your procedure date. Please bring a copy with you on the day of your procedure and along with any other testing performed. THE DAY OF YOUR PROCEDURE:  1. Follow instructions for ARRIVAL TIME as DIRECTED BY YOUR SURGEON. I    2. Enter the MAIN entrance from [facility] and follow the signs to the free [employer] or [employer] parking (offered free of charge 6am-5pm). 3. Enter the Main Entrance of the hospital (do not enter from the lower level of the parking garage). Upon entrance, check in with the  at the main desk on your left. If no one is available at the desk, proceed into the [facility] Waiting Room and go through the door directly into the [facility]. There is a Check-in desk ACROSS from Room 5 (marked with a sign hanging from the ceiling). The phone number for the surgery center is [phone]. 4. Please call [phone] option #2 option #2 if you have not been preregistered yet. On the day of your procedure bring your insurance card and photo ID.  You will be items you may need brought in by your family after your arrival to your hospital room. 15. If you have a Living Will or Durable Power of , please bring a copy on the day of your procedure. 15. With your permission, one family member may accompany you while you are being prepared for surgery. Once you are ready, additional family members may join you. HOW WE KEEP YOU SAFE and WORK TO PREVENT SURGICAL SITE INFECTIONS:  1. Health care workers should always check your ID bracelet to verify your name and birth date. You will be asked many times to state your name, date of birth, and allergies. 2. Health care workers should always clean their hands with soap or alcohol gel before providing care to you. It is okay to ask anyone if they cleaned their hands before they touch you. 3. You will be actively involved in verifying the type of procedure you are having and ensuring the correct surgical site. This will be confirmed multiple times prior to your procedure. Do NOT rosamaria your surgery site UNLESS instructed to by your surgeon. 4. Do not shave or wax for 72 hours prior to procedure near your operative site. Shaving with a razor can irritate your skin and make it easier to develop an infection. On the day of your procedure, any hair that needs to be removed near the surgical site will be clipped by a healthcare worker using a special clippers designed to avoid skin irritation. 5. When you are in the operating room, your surgical site will be cleansed with a special soap, and in most cases, you will be given an antibiotic before the surgery begins. What to expect AFTER YOUR PROCEDURE:  1. Immediately following your procedure, your will be taken to the PACU for the first phase of your recovery. Your nurse will help you recover from any potential side effects of anesthesia, such as extreme drowsiness, changes in your vital signs or breathing patterns.  Nausea, headache, muscle aches, or sore throat may also occur after anesthesia. Your nurse will help you manage these potential side effects. 2. For comfort and safety, arrange to have someone at home with you for the first 24 hours after discharge. 3. You and your family will be given written instructions about your diet, activity, dressing care, medications, and return visits. 4. Once at home, should issues with nausea, pain, or bleeding occur, or should you notice any signs of infection, you should call your surgeon. 5. Always clean your hands before and after caring for your wound. Do not let your family touch your surgery site without cleaning their hands. 6. Narcotic pain medications can cause significant constipation. You may want to add a stool softener to your postoperative medication schedule or speak to your surgeon on how best to manage this SIDE EFFECT. SPECIAL INSTRUCTIONS     Thank you for allowing us to care for you. We strive to exceed your expectations in the delivery of care and service provided to you and your family. If you need to contact us for any reason, please call us at [phone]    Instructions reviewed with patient during preadmission testing phone interview. Kelsi Peña. 1/16/2020 .8:22 AM      ADDITIONAL EDUCATIONAL INFORMATION REVIEWED PER PHONE WITH YOU AND/OR YOUR FAMILY:  No Bring a urine sample on day of surgery  Yes Pain Goal-Taking Control of Your Pain  Yes FAQs about Surgical Site Infections  No Hibiclens® Bathing Instructions   Yes Antibacterial Soap  No Perico® Wipes Bathing Instructions (Obtained from: https://www.[employer]/. pdf )  No Incentive Spirometer Education  No Other

## 2020-01-16 NOTE — LETTER
 ASPIRIN LOW DOSE ADULT 81 MG chewable tablet CHEW AND SWALLOW ONE TABLET BY MOUTH ONE TIME A DAY 90 tablet 3    clopidogrel (PLAVIX) 75 MG tablet TAKE ONE TABLET BY MOUTH ONE TIME A DAY 90 tablet 3    albuterol sulfate HFA (PROAIR HFA) 108 (90 BASE) MCG/ACT inhaler Inhale 2 puffs into the lungs every 4 hours as needed for Wheezing or Shortness of Breath 1 Inhaler 6    azelastine (ASTELIN) 0.1 % nasal spray 2 sprays by Nasal route 2 times daily Use in each nostril as directed 1 Bottle 3       This patient presents to the office today for a preoperative consultation at the request of surgeon, Dr. Chante Griffin, who plans on performing vertical sleeve gastrectomy on January 21 at [place] [address].  The current problem began many years ago, and symptoms have been worsening with time. Conservative therapy: Yes: diet and exercise, which has been not very effective. .    Planned anesthesia: General   Known anesthesia problems: None   Bleeding risk: No recent or remote history of abnormal bleeding  Personal or FH of DVT/PE: No    Patient objection to receiving blood products: No    Patient Active Problem List   Diagnosis    ILD (interstitial lung disease) ([address]    Goodpasture syndrome ([address]    Essential hypertension    Chronic respiratory failure ([address]    COPD, very severe ([address]    Chest pain    ESTRELLITA treated with BiPAP    CAD S/P percutaneous coronary angioplasty    Mixed hyperlipidemia    Coronary artery disease involving native coronary artery of native heart without angina pectoris    Diabetes ([address]    Polycythemia    Obesity (BMI 30-39. 9)    Chronic GERD    MDD (major depressive disorder)    Positive FIT (fecal immunochemical test)    Severe obesity (BMI 35.0-35.9 with comorbidity) (HCC)    Adenomatous polyp of transverse colon    Polyp of sigmoid colon    Tubular adenoma       Past Medical History:   Diagnosis Date    CAD S/P percutaneous coronary angioplasty  Food insecurity:     Worry: Not on file     Inability: Not on file    Transportation needs:     Medical: Not on file     Non-medical: Not on file   Tobacco Use    Smoking status: Former Smoker     Packs/day: 3.00     Years: 20.00     Pack years: 60.00     Types: Cigarettes     Last attempt to quit: 2000     Years since quittin.0    Smokeless tobacco: Never Used   Substance and Sexual Activity    Alcohol use: No     Alcohol/week: 0.0 standard drinks     Comment: seldom    Drug use: No    Sexual activity: Yes     Partners: Female   Lifestyle    Physical activity:     Days per week: Not on file     Minutes per session: Not on file    Stress: Not on file   Relationships    Social connections:     Talks on phone: Not on file     Gets together: Not on file     Attends Hoahaoism service: Not on file     Active member of club or organization: Not on file     Attends meetings of clubs or organizations: Not on file     Relationship status: Not on file    Intimate partner violence:     Fear of current or ex partner: Not on file     Emotionally abused: Not on file     Physically abused: Not on file     Forced sexual activity: Not on file   Other Topics Concern    Not on file   Social History Narrative    Not on file       Review of Systems  A comprehensive review of systems was negative except for what was noted in the HPI. Physical Exam   Constitutional: He is oriented to person, place, and time. He appears well-developed and well-nourished. No distress. HENT:   Head: Normocephalic and atraumatic. Mouth/Throat: Uvula is midline, oropharynx is clear and moist and mucous membranes are normal.   Eyes: Conjunctivae and EOM are normal. Pupils are equal, round, and reactive to light. Neck: Trachea normal and normal range of motion. Neck supple. No JVD present. Carotid bruit is not present. No mass and no thyromegaly present.    Cardiovascular: Normal rate, regular rhythm, normal heart sounds and  Bilirubin, Indirect 01/09/2020 1.2*          WBC 01/09/2020 6.6     RBC 01/09/2020 6.81*    Hemoglobin 01/09/2020 18.7*    Hematocrit 01/09/2020 56.1*    MCV 01/09/2020 82.4     MCH 01/09/2020 27.4     MCHC 01/09/2020 33.3     RDW 01/09/2020 15.4     Platelets 06/74/0574 152     MPV 01/09/2020 9.1     SLIDE REVIEW 01/09/2020 see below     Neutrophils % 01/09/2020 45.0     Lymphocytes % 01/09/2020 35.0     Monocytes % 01/09/2020 11.0     Eosinophils % 01/09/2020 3.0     Basophils % 01/09/2020 3.0     Neutrophils Absolute 01/09/2020 3.2     Lymphocytes Absolute 01/09/2020 2.3     Monocytes Absolute 01/09/2020 0.7     Eosinophils Absolute 01/09/2020 0.2     Basophils Absolute 01/09/2020 0.2     Bands Relative 01/09/2020 3     Magnesium 01/09/2020 2.00            Assessment:       48 y.o. patient with planned surgery as above. Known risk factors for perioperative complications: Coronary artery disease, COPD, Hypertension  Current medications which may produce withdrawal symptoms if withheld perioperatively: none      Plan:     1. Preoperative workup as follows: hemoglobin, hematocrit, electrolytes, creatinine, LFTs  2. Change in medication regimen before surgery: Discontinue ASA and Plavix 7 days before surgery  3. Prophylaxis for cardiac events with perioperative beta-blockers: Currently taking  carvedilol  ACC/AHA indications for pre-operative beta-blocker use:    · Vascular surgery with history of postitive stress test  · Intermediate or high risk surgery with history of CAD   · Intermediate or high risk surgery with multiple clinical predictors of CAD- 2 of the following: history of compensated or prior heart failure, history of cerebrovascular disease, DM, or renal insufficiency    Routine administration of higher-dose, long-acting metoprolol in beta-blockernaïve patients on the day of surgery, and in the absence of dose titration is associated with an overall increase in mortality.

## 2020-01-20 ENCOUNTER — ANESTHESIA EVENT (OUTPATIENT)
Dept: OPERATING ROOM | Age: 55
End: 2020-01-20
Payer: COMMERCIAL

## 2020-01-20 ENCOUNTER — TELEPHONE (OUTPATIENT)
Dept: BARIATRICS/WEIGHT MGMT | Age: 55
End: 2020-01-20

## 2020-01-20 RX ORDER — AMLODIPINE BESYLATE 5 MG/1
TABLET ORAL
Qty: 90 TABLET | Refills: 1 | Status: SHIPPED | OUTPATIENT
Start: 2020-01-20 | End: 2020-09-23 | Stop reason: SDUPTHER

## 2020-01-21 ENCOUNTER — ANESTHESIA (OUTPATIENT)
Dept: OPERATING ROOM | Age: 55
End: 2020-01-21
Payer: COMMERCIAL

## 2020-01-21 ENCOUNTER — HOSPITAL ENCOUNTER (OUTPATIENT)
Age: 55
Setting detail: OBSERVATION
Discharge: HOME | End: 2020-01-22
Attending: SURGERY | Admitting: SURGERY
Payer: COMMERCIAL

## 2020-01-21 VITALS — TEMPERATURE: 98.6 F | OXYGEN SATURATION: 95 % | DIASTOLIC BLOOD PRESSURE: 94 MMHG | SYSTOLIC BLOOD PRESSURE: 150 MMHG

## 2020-01-21 DIAGNOSIS — E66.01 MORBID OBESITY (HCC): ICD-10-CM

## 2020-01-21 DIAGNOSIS — R10.9 ACUTE POSTOPERATIVE ABDOMINAL PAIN: Primary | ICD-10-CM

## 2020-01-21 DIAGNOSIS — I10 ESSENTIAL HYPERTENSION: ICD-10-CM

## 2020-01-21 DIAGNOSIS — G89.18 ACUTE POSTOPERATIVE ABDOMINAL PAIN: Primary | ICD-10-CM

## 2020-01-21 DIAGNOSIS — K44.9 HIATAL HERNIA: ICD-10-CM

## 2020-01-21 LAB
ABO/RH: NORMAL
ANTIBODY SCREEN: NORMAL
GLUCOSE BLD-MCNC: 88 MG/DL (ref 70–99)
PERFORMED ON: NORMAL

## 2020-01-21 PROCEDURE — 2580000003 HC RX 258: Performed by: ANESTHESIOLOGY

## 2020-01-21 PROCEDURE — 2720000010 HC SURG SUPPLY STERILE: Performed by: SURGERY

## 2020-01-21 PROCEDURE — 2500000003 HC RX 250 WO HCPCS: Performed by: SURGERY

## 2020-01-21 PROCEDURE — 3600000009 HC SURGERY ROBOT BASE: Performed by: SURGERY

## 2020-01-21 PROCEDURE — 3600000019 HC SURGERY ROBOT ADDTL 15MIN: Performed by: SURGERY

## 2020-01-21 PROCEDURE — 88302 TISSUE EXAM BY PATHOLOGIST: CPT

## 2020-01-21 PROCEDURE — 6360000002 HC RX W HCPCS: Performed by: NURSE ANESTHETIST, CERTIFIED REGISTERED

## 2020-01-21 PROCEDURE — 2580000003 HC RX 258: Performed by: SURGERY

## 2020-01-21 PROCEDURE — 2700000000 HC OXYGEN THERAPY PER DAY

## 2020-01-21 PROCEDURE — S2900 ROBOTIC SURGICAL SYSTEM: HCPCS | Performed by: SURGERY

## 2020-01-21 PROCEDURE — 1200000000 HC SEMI PRIVATE

## 2020-01-21 PROCEDURE — 94761 N-INVAS EAR/PLS OXIMETRY MLT: CPT

## 2020-01-21 PROCEDURE — 3700000000 HC ANESTHESIA ATTENDED CARE: Performed by: SURGERY

## 2020-01-21 PROCEDURE — 3700000001 HC ADD 15 MINUTES (ANESTHESIA): Performed by: SURGERY

## 2020-01-21 PROCEDURE — 6360000002 HC RX W HCPCS: Performed by: SURGERY

## 2020-01-21 PROCEDURE — C9113 INJ PANTOPRAZOLE SODIUM, VIA: HCPCS | Performed by: SURGERY

## 2020-01-21 PROCEDURE — 6370000000 HC RX 637 (ALT 250 FOR IP): Performed by: SURGERY

## 2020-01-21 PROCEDURE — 86900 BLOOD TYPING SEROLOGIC ABO: CPT

## 2020-01-21 PROCEDURE — 96372 THER/PROPH/DIAG INJ SC/IM: CPT

## 2020-01-21 PROCEDURE — 6360000002 HC RX W HCPCS: Performed by: ANESTHESIOLOGY

## 2020-01-21 PROCEDURE — 43775 LAP SLEEVE GASTRECTOMY: CPT | Performed by: SURGERY

## 2020-01-21 PROCEDURE — 88307 TISSUE EXAM BY PATHOLOGIST: CPT

## 2020-01-21 PROCEDURE — 2500000003 HC RX 250 WO HCPCS: Performed by: NURSE ANESTHETIST, CERTIFIED REGISTERED

## 2020-01-21 PROCEDURE — 7100000001 HC PACU RECOVERY - ADDTL 15 MIN: Performed by: SURGERY

## 2020-01-21 PROCEDURE — 2709999900 HC NON-CHARGEABLE SUPPLY: Performed by: SURGERY

## 2020-01-21 PROCEDURE — 43281 LAP PARAESOPHAG HERN REPAIR: CPT | Performed by: SURGERY

## 2020-01-21 PROCEDURE — 86901 BLOOD TYPING SEROLOGIC RH(D): CPT

## 2020-01-21 PROCEDURE — 86850 RBC ANTIBODY SCREEN: CPT

## 2020-01-21 PROCEDURE — G0378 HOSPITAL OBSERVATION PER HR: HCPCS

## 2020-01-21 PROCEDURE — 7100000000 HC PACU RECOVERY - FIRST 15 MIN: Performed by: SURGERY

## 2020-01-21 RX ORDER — BUPIVACAINE HYDROCHLORIDE 5 MG/ML
INJECTION, SOLUTION EPIDURAL; INTRACAUDAL PRN
Status: DISCONTINUED | OUTPATIENT
Start: 2020-01-21 | End: 2020-01-21 | Stop reason: ALTCHOICE

## 2020-01-21 RX ORDER — NALOXONE HYDROCHLORIDE 0.4 MG/ML
0.4 INJECTION, SOLUTION INTRAMUSCULAR; INTRAVENOUS; SUBCUTANEOUS PRN
Status: DISCONTINUED | OUTPATIENT
Start: 2020-01-21 | End: 2020-01-22

## 2020-01-21 RX ORDER — SODIUM CHLORIDE 0.9 % (FLUSH) 0.9 %
10 SYRINGE (ML) INJECTION EVERY 12 HOURS SCHEDULED
Status: DISCONTINUED | OUTPATIENT
Start: 2020-01-21 | End: 2020-01-22 | Stop reason: HOSPADM

## 2020-01-21 RX ORDER — ONDANSETRON 2 MG/ML
INJECTION INTRAMUSCULAR; INTRAVENOUS PRN
Status: DISCONTINUED | OUTPATIENT
Start: 2020-01-21 | End: 2020-01-21 | Stop reason: SDUPTHER

## 2020-01-21 RX ORDER — METHYLENE BLUE 10 MG/ML
INJECTION INTRAVENOUS PRN
Status: DISCONTINUED | OUTPATIENT
Start: 2020-01-21 | End: 2020-01-21 | Stop reason: ALTCHOICE

## 2020-01-21 RX ORDER — SCOLOPAMINE TRANSDERMAL SYSTEM 1 MG/1
1 PATCH, EXTENDED RELEASE TRANSDERMAL
Status: DISCONTINUED | OUTPATIENT
Start: 2020-01-21 | End: 2020-01-22 | Stop reason: HOSPADM

## 2020-01-21 RX ORDER — METOCLOPRAMIDE HYDROCHLORIDE 5 MG/ML
10 INJECTION INTRAMUSCULAR; INTRAVENOUS EVERY 6 HOURS PRN
Status: DISCONTINUED | OUTPATIENT
Start: 2020-01-21 | End: 2020-01-22 | Stop reason: HOSPADM

## 2020-01-21 RX ORDER — ONDANSETRON 2 MG/ML
4 INJECTION INTRAMUSCULAR; INTRAVENOUS
Status: DISCONTINUED | OUTPATIENT
Start: 2020-01-21 | End: 2020-01-21 | Stop reason: HOSPADM

## 2020-01-21 RX ORDER — ROCURONIUM BROMIDE 10 MG/ML
INJECTION, SOLUTION INTRAVENOUS PRN
Status: DISCONTINUED | OUTPATIENT
Start: 2020-01-21 | End: 2020-01-21 | Stop reason: SDUPTHER

## 2020-01-21 RX ORDER — PROPOFOL 10 MG/ML
INJECTION, EMULSION INTRAVENOUS PRN
Status: DISCONTINUED | OUTPATIENT
Start: 2020-01-21 | End: 2020-01-21 | Stop reason: SDUPTHER

## 2020-01-21 RX ORDER — PANTOPRAZOLE SODIUM 40 MG/10ML
40 INJECTION, POWDER, LYOPHILIZED, FOR SOLUTION INTRAVENOUS DAILY
Status: DISCONTINUED | OUTPATIENT
Start: 2020-01-21 | End: 2020-01-22 | Stop reason: HOSPADM

## 2020-01-21 RX ORDER — SCOLOPAMINE TRANSDERMAL SYSTEM 1 MG/1
1 PATCH, EXTENDED RELEASE TRANSDERMAL ONCE
Status: DISCONTINUED | OUTPATIENT
Start: 2020-01-21 | End: 2020-01-22 | Stop reason: HOSPADM

## 2020-01-21 RX ORDER — MIDAZOLAM HYDROCHLORIDE 1 MG/ML
INJECTION INTRAMUSCULAR; INTRAVENOUS PRN
Status: DISCONTINUED | OUTPATIENT
Start: 2020-01-21 | End: 2020-01-21 | Stop reason: SDUPTHER

## 2020-01-21 RX ORDER — LIDOCAINE HYDROCHLORIDE 20 MG/ML
INJECTION, SOLUTION INTRAVENOUS PRN
Status: DISCONTINUED | OUTPATIENT
Start: 2020-01-21 | End: 2020-01-21 | Stop reason: SDUPTHER

## 2020-01-21 RX ORDER — MEPERIDINE HYDROCHLORIDE 25 MG/ML
12.5 INJECTION INTRAMUSCULAR; INTRAVENOUS; SUBCUTANEOUS EVERY 5 MIN PRN
Status: DISCONTINUED | OUTPATIENT
Start: 2020-01-21 | End: 2020-01-21 | Stop reason: HOSPADM

## 2020-01-21 RX ORDER — SODIUM CHLORIDE 9 MG/ML
INJECTION, SOLUTION INTRAVENOUS CONTINUOUS
Status: DISCONTINUED | OUTPATIENT
Start: 2020-01-21 | End: 2020-01-22

## 2020-01-21 RX ORDER — SODIUM CHLORIDE, SODIUM LACTATE, POTASSIUM CHLORIDE, AND CALCIUM CHLORIDE .6; .31; .03; .02 G/100ML; G/100ML; G/100ML; G/100ML
IRRIGANT IRRIGATION PRN
Status: DISCONTINUED | OUTPATIENT
Start: 2020-01-21 | End: 2020-01-21 | Stop reason: ALTCHOICE

## 2020-01-21 RX ORDER — PROCHLORPERAZINE EDISYLATE 5 MG/ML
10 INJECTION INTRAMUSCULAR; INTRAVENOUS EVERY 6 HOURS PRN
Status: DISCONTINUED | OUTPATIENT
Start: 2020-01-21 | End: 2020-01-22 | Stop reason: HOSPADM

## 2020-01-21 RX ORDER — FENTANYL CITRATE 50 UG/ML
INJECTION, SOLUTION INTRAMUSCULAR; INTRAVENOUS PRN
Status: DISCONTINUED | OUTPATIENT
Start: 2020-01-21 | End: 2020-01-21 | Stop reason: SDUPTHER

## 2020-01-21 RX ORDER — SODIUM CHLORIDE 9 MG/ML
10 INJECTION INTRAVENOUS DAILY
Status: DISCONTINUED | OUTPATIENT
Start: 2020-01-21 | End: 2020-01-22 | Stop reason: HOSPADM

## 2020-01-21 RX ORDER — HYDRALAZINE HYDROCHLORIDE 20 MG/ML
5 INJECTION INTRAMUSCULAR; INTRAVENOUS EVERY 10 MIN PRN
Status: DISCONTINUED | OUTPATIENT
Start: 2020-01-21 | End: 2020-01-21 | Stop reason: HOSPADM

## 2020-01-21 RX ORDER — SODIUM CHLORIDE, SODIUM LACTATE, POTASSIUM CHLORIDE, CALCIUM CHLORIDE 600; 310; 30; 20 MG/100ML; MG/100ML; MG/100ML; MG/100ML
INJECTION, SOLUTION INTRAVENOUS CONTINUOUS
Status: DISCONTINUED | OUTPATIENT
Start: 2020-01-21 | End: 2020-01-22

## 2020-01-21 RX ORDER — MAGNESIUM HYDROXIDE 1200 MG/15ML
LIQUID ORAL CONTINUOUS PRN
Status: COMPLETED | OUTPATIENT
Start: 2020-01-21 | End: 2020-01-21

## 2020-01-21 RX ORDER — PHENYLEPHRINE HYDROCHLORIDE 10 MG/ML
INJECTION INTRAVENOUS PRN
Status: DISCONTINUED | OUTPATIENT
Start: 2020-01-21 | End: 2020-01-21 | Stop reason: SDUPTHER

## 2020-01-21 RX ORDER — 0.9 % SODIUM CHLORIDE 0.9 %
500 INTRAVENOUS SOLUTION INTRAVENOUS
Status: DISCONTINUED | OUTPATIENT
Start: 2020-01-21 | End: 2020-01-21 | Stop reason: HOSPADM

## 2020-01-21 RX ORDER — DIPHENHYDRAMINE HYDROCHLORIDE 50 MG/ML
12.5 INJECTION INTRAMUSCULAR; INTRAVENOUS
Status: DISCONTINUED | OUTPATIENT
Start: 2020-01-21 | End: 2020-01-21 | Stop reason: HOSPADM

## 2020-01-21 RX ORDER — ONDANSETRON 2 MG/ML
4 INJECTION INTRAMUSCULAR; INTRAVENOUS EVERY 6 HOURS PRN
Status: DISCONTINUED | OUTPATIENT
Start: 2020-01-21 | End: 2020-01-22 | Stop reason: HOSPADM

## 2020-01-21 RX ORDER — APREPITANT 40 MG/1
80 CAPSULE ORAL ONCE
Status: COMPLETED | OUTPATIENT
Start: 2020-01-21 | End: 2020-01-21

## 2020-01-21 RX ORDER — HYDROCODONE BITARTRATE AND ACETAMINOPHEN 5; 325 MG/1; MG/1
1 TABLET ORAL
Status: DISCONTINUED | OUTPATIENT
Start: 2020-01-21 | End: 2020-01-21 | Stop reason: HOSPADM

## 2020-01-21 RX ORDER — PROCHLORPERAZINE EDISYLATE 5 MG/ML
5 INJECTION INTRAMUSCULAR; INTRAVENOUS
Status: DISCONTINUED | OUTPATIENT
Start: 2020-01-21 | End: 2020-01-21 | Stop reason: HOSPADM

## 2020-01-21 RX ORDER — SODIUM CHLORIDE 0.9 % (FLUSH) 0.9 %
10 SYRINGE (ML) INJECTION PRN
Status: DISCONTINUED | OUTPATIENT
Start: 2020-01-21 | End: 2020-01-22 | Stop reason: HOSPADM

## 2020-01-21 RX ADMIN — HYDROMORPHONE HYDROCHLORIDE: 10 INJECTION, SOLUTION INTRAMUSCULAR; INTRAVENOUS; SUBCUTANEOUS at 15:38

## 2020-01-21 RX ADMIN — ONDANSETRON 4 MG: 2 INJECTION INTRAMUSCULAR; INTRAVENOUS at 12:51

## 2020-01-21 RX ADMIN — ROCURONIUM BROMIDE 100 MG: 10 INJECTION, SOLUTION INTRAVENOUS at 11:20

## 2020-01-21 RX ADMIN — MIDAZOLAM HYDROCHLORIDE 2 MG: 2 INJECTION, SOLUTION INTRAMUSCULAR; INTRAVENOUS at 11:15

## 2020-01-21 RX ADMIN — HYDROMORPHONE HYDROCHLORIDE 0.5 MG: 1 INJECTION, SOLUTION INTRAMUSCULAR; INTRAVENOUS; SUBCUTANEOUS at 15:42

## 2020-01-21 RX ADMIN — SODIUM CHLORIDE, SODIUM LACTATE, POTASSIUM CHLORIDE, AND CALCIUM CHLORIDE: 600; 310; 30; 20 INJECTION, SOLUTION INTRAVENOUS at 08:30

## 2020-01-21 RX ADMIN — PROPOFOL 300 MG: 10 INJECTION, EMULSION INTRAVENOUS at 11:20

## 2020-01-21 RX ADMIN — APREPITANT 80 MG: 40 CAPSULE ORAL at 08:16

## 2020-01-21 RX ADMIN — SODIUM CHLORIDE, SODIUM LACTATE, POTASSIUM CHLORIDE, AND CALCIUM CHLORIDE: 600; 310; 30; 20 INJECTION, SOLUTION INTRAVENOUS at 11:15

## 2020-01-21 RX ADMIN — PHENYLEPHRINE HYDROCHLORIDE 100 MCG: 10 INJECTION INTRAVENOUS at 11:42

## 2020-01-21 RX ADMIN — CEFAZOLIN 2 G: 10 INJECTION, POWDER, FOR SOLUTION INTRAVENOUS at 11:30

## 2020-01-21 RX ADMIN — FENTANYL CITRATE 50 MCG: 50 INJECTION INTRAMUSCULAR; INTRAVENOUS at 12:29

## 2020-01-21 RX ADMIN — FENTANYL CITRATE 50 MCG: 50 INJECTION INTRAMUSCULAR; INTRAVENOUS at 11:20

## 2020-01-21 RX ADMIN — FENTANYL CITRATE 100 MCG: 50 INJECTION INTRAMUSCULAR; INTRAVENOUS at 12:52

## 2020-01-21 RX ADMIN — Medication 10 ML: at 17:12

## 2020-01-21 RX ADMIN — ENOXAPARIN SODIUM 30 MG: 30 INJECTION SUBCUTANEOUS at 23:53

## 2020-01-21 RX ADMIN — HYDROMORPHONE HYDROCHLORIDE 0.5 MG: 1 INJECTION, SOLUTION INTRAMUSCULAR; INTRAVENOUS; SUBCUTANEOUS at 14:31

## 2020-01-21 RX ADMIN — ENOXAPARIN SODIUM 30 MG: 30 INJECTION SUBCUTANEOUS at 08:16

## 2020-01-21 RX ADMIN — LIDOCAINE HYDROCHLORIDE 100 MG: 20 INJECTION, SOLUTION INTRAVENOUS at 11:20

## 2020-01-21 RX ADMIN — SUGAMMADEX 100 MG: 100 INJECTION, SOLUTION INTRAVENOUS at 13:39

## 2020-01-21 RX ADMIN — SODIUM CHLORIDE: 9 INJECTION, SOLUTION INTRAVENOUS at 15:13

## 2020-01-21 RX ADMIN — PANTOPRAZOLE SODIUM 40 MG: 40 INJECTION, POWDER, FOR SOLUTION INTRAVENOUS at 17:09

## 2020-01-21 ASSESSMENT — PULMONARY FUNCTION TESTS
PIF_VALUE: 1
PIF_VALUE: 20
PIF_VALUE: 19
PIF_VALUE: 19
PIF_VALUE: 15
PIF_VALUE: 19
PIF_VALUE: 16
PIF_VALUE: 15
PIF_VALUE: 15
PIF_VALUE: 19
PIF_VALUE: 19
PIF_VALUE: 12
PIF_VALUE: 12
PIF_VALUE: 19
PIF_VALUE: 4
PIF_VALUE: 3
PIF_VALUE: 19
PIF_VALUE: 19
PIF_VALUE: 1
PIF_VALUE: 15
PIF_VALUE: 19
PIF_VALUE: 21
PIF_VALUE: 15
PIF_VALUE: 19
PIF_VALUE: 17
PIF_VALUE: 19
PIF_VALUE: 16
PIF_VALUE: 18
PIF_VALUE: 15
PIF_VALUE: 20
PIF_VALUE: 12
PIF_VALUE: 20
PIF_VALUE: 19
PIF_VALUE: 19
PIF_VALUE: 18
PIF_VALUE: 19
PIF_VALUE: 18
PIF_VALUE: 0
PIF_VALUE: 16
PIF_VALUE: 19
PIF_VALUE: 20
PIF_VALUE: 19
PIF_VALUE: 25
PIF_VALUE: 19
PIF_VALUE: 20
PIF_VALUE: 19
PIF_VALUE: 15
PIF_VALUE: 15
PIF_VALUE: 12
PIF_VALUE: 20
PIF_VALUE: 16
PIF_VALUE: 21
PIF_VALUE: 1
PIF_VALUE: 20
PIF_VALUE: 19
PIF_VALUE: 2
PIF_VALUE: 15
PIF_VALUE: 18
PIF_VALUE: 19
PIF_VALUE: 21
PIF_VALUE: 19
PIF_VALUE: 12
PIF_VALUE: 19
PIF_VALUE: 19
PIF_VALUE: 20
PIF_VALUE: 19
PIF_VALUE: 18
PIF_VALUE: 15
PIF_VALUE: 19
PIF_VALUE: 15
PIF_VALUE: 19
PIF_VALUE: 21
PIF_VALUE: 19
PIF_VALUE: 16
PIF_VALUE: 19
PIF_VALUE: 22
PIF_VALUE: 19
PIF_VALUE: 15
PIF_VALUE: 19
PIF_VALUE: 19
PIF_VALUE: 0
PIF_VALUE: 19
PIF_VALUE: 1
PIF_VALUE: 21
PIF_VALUE: 19
PIF_VALUE: 19
PIF_VALUE: 21
PIF_VALUE: 19
PIF_VALUE: 19
PIF_VALUE: 16
PIF_VALUE: 19
PIF_VALUE: 19
PIF_VALUE: 15
PIF_VALUE: 16
PIF_VALUE: 19
PIF_VALUE: 19
PIF_VALUE: 29
PIF_VALUE: 19
PIF_VALUE: 19
PIF_VALUE: 12
PIF_VALUE: 19
PIF_VALUE: 15
PIF_VALUE: 19
PIF_VALUE: 13
PIF_VALUE: 19
PIF_VALUE: 21
PIF_VALUE: 19
PIF_VALUE: 19
PIF_VALUE: 18
PIF_VALUE: 19
PIF_VALUE: 19
PIF_VALUE: 20
PIF_VALUE: 19
PIF_VALUE: 12
PIF_VALUE: 19
PIF_VALUE: 21
PIF_VALUE: 19
PIF_VALUE: 20
PIF_VALUE: 19
PIF_VALUE: 18
PIF_VALUE: 20
PIF_VALUE: 17
PIF_VALUE: 14
PIF_VALUE: 15
PIF_VALUE: 15
PIF_VALUE: 21
PIF_VALUE: 19
PIF_VALUE: 19
PIF_VALUE: 21
PIF_VALUE: 21
PIF_VALUE: 20
PIF_VALUE: 19
PIF_VALUE: 13
PIF_VALUE: 2
PIF_VALUE: 20
PIF_VALUE: 19
PIF_VALUE: 12
PIF_VALUE: 19
PIF_VALUE: 20
PIF_VALUE: 15
PIF_VALUE: 19
PIF_VALUE: 16
PIF_VALUE: 19
PIF_VALUE: 12
PIF_VALUE: 15
PIF_VALUE: 19
PIF_VALUE: 19

## 2020-01-21 ASSESSMENT — PAIN SCALES - GENERAL
PAINLEVEL_OUTOF10: 6
PAINLEVEL_OUTOF10: 7
PAINLEVEL_OUTOF10: 0
PAINLEVEL_OUTOF10: 8

## 2020-01-21 ASSESSMENT — PAIN - FUNCTIONAL ASSESSMENT
PAIN_FUNCTIONAL_ASSESSMENT: ACTIVITIES ARE NOT PREVENTED
PAIN_FUNCTIONAL_ASSESSMENT: 0-10

## 2020-01-21 ASSESSMENT — COPD QUESTIONNAIRES: CAT_SEVERITY: SEVERE

## 2020-01-21 ASSESSMENT — ENCOUNTER SYMPTOMS: SHORTNESS OF BREATH: 1

## 2020-01-21 ASSESSMENT — PAIN DESCRIPTION - FREQUENCY: FREQUENCY: CONTINUOUS

## 2020-01-21 ASSESSMENT — PAIN DESCRIPTION - LOCATION: LOCATION: ABDOMEN

## 2020-01-21 ASSESSMENT — LIFESTYLE VARIABLES: SMOKING_STATUS: 0

## 2020-01-21 ASSESSMENT — PAIN DESCRIPTION - DESCRIPTORS: DESCRIPTORS: SORE

## 2020-01-21 ASSESSMENT — PAIN DESCRIPTION - ORIENTATION: ORIENTATION: MID

## 2020-01-21 NOTE — ANESTHESIA PRE PROCEDURE
MOUTH ONE TIME A DAY 11/27/18  Yes Elton Hay MD   azelastine (ASTELIN) 0.1 % nasal spray 2 sprays by Nasal route 2 times daily Use in each nostril as directed  Patient taking differently: 2 sprays by Nasal route 2 times daily as needed Use in each nostril as directed 9/28/16  Yes Elena Mayer, DO   sodium chloride, Inhalant, 3 % nebulizer solution Take 15 mLs by nebulization 2 times daily 5/13/19   Elena Mayer, DO   albuterol sulfate HFA (PROAIR HFA) 108 (90 BASE) MCG/ACT inhaler Inhale 2 puffs into the lungs every 4 hours as needed for Wheezing or Shortness of Breath 12/12/16   Elena Mayer, DO       Current medications:    Current Facility-Administered Medications   Medication Dose Route Frequency Provider Last Rate Last Dose    0.9 % sodium chloride infusion   Intravenous Continuous Shira Scobey, DO        ceFAZolin (ANCEF) 2 g in dextrose 5 % 50 mL IVPB  2 g Intravenous Once Shira Kirti, DO        scopolamine (TRANSDERM-SCOP) transdermal patch 1 patch  1 patch Transdermal Once Shira Scobey, DO   1 patch at 01/21/20 0818    lactated ringers infusion   Intravenous Continuous Leonardo Moreau,  mL/hr at 01/21/20 0830      HYDROmorphone (DILAUDID) injection 0.25 mg  0.25 mg Intravenous Q5 Min PRN Suki Tineo, DO        HYDROmorphone (DILAUDID) injection 0.5 mg  0.5 mg Intravenous Q5 Min PRN Suki Tineo, DO        HYDROcodone-acetaminophen [facility]) 5-325 MG per tablet 1 tablet  1 tablet Oral Once PRN Suki Tineo, DO        diphenhydrAMINE (BENADRYL) injection 12.5 mg  12.5 mg Intravenous Once PRN Suki Tineo, DO        0.9 % sodium chloride bolus  500 mL Intravenous Once PRN Suki Tineo, DO        [facility]ron [facility]) injection 4 mg  4 mg Intravenous Once PRN Suki Tineo, DO        prochlorperazine (COMPAZINE) injection 5 mg  5 mg Intravenous Once PRN Suki Tineo, DO        hydrALAZINE (APRESOLINE) injection 5 mg  5 mg Intravenous Q10 Min PRN Trippe er, DO        meperidine (DEMEROL) injection 12.5 mg  12.5 mg Intravenous Q5 Min PRN Erinn Coelloer,            Allergies: Allergies   Allergen Reactions    Erythromycin Hives       Problem List:    Patient Active Problem List   Diagnosis Code    ILD (interstitial lung disease) ([facility] [address] J84.9    Goodpasture syndrome ([address] M31.0    Essential hypertension I10    Chronic respiratory failure ([address] J96.10    COPD, very severe ([address] J44.9    Chest pain R07.9    ESTRELLITA treated with BiPAP G47.33    CAD S/P percutaneous coronary angioplasty I25.10, Z98.61    Mixed hyperlipidemia E78.2    Coronary artery disease involving native coronary artery of native heart without angina pectoris I25.10    Diabetes ([address] E11.9    Polycythemia D75.1    Obesity (BMI 30-39. 9) E66.9    Chronic GERD K21.9    MDD (major depressive disorder) F32.9    Positive FIT (fecal immunochemical test) R19.5    Severe obesity (BMI 35.0-35.9 with comorbidity) (HCC) E66.01, Z68.35    Adenomatous polyp of transverse colon D12.3    Polyp of sigmoid colon D12.5    Tubular adenoma D36.9       Past Medical History:        Diagnosis Date    CAD S/P percutaneous coronary angioplasty     Chronic respiratory failure (HCC)     COPD exacerbation (HCC) 9/7/2016    Coronary artery disease involving native coronary artery of native heart with unstable angina pectoris (HCC)     Diabetes ([facility] [address]     Diabetes ([address]     Dyslipidemia     Dyspnea     GERD (gastroesophageal reflux disease)     Goodpasture syndrome ([address]     reason pt is on cellcept.   He will need lung transplant eventually    High risk medications (not anticoagulants) long-term use     HTN (hypertension)     Hyperlipidemia     Hypoxemia     ILD (interstitial lung disease) ([address]     Left ureteral stone 4/13/2018    MDD (major depressive disorder)     ESTRELLITA on CPAP     Polycythemia     Positive FIT (fecal immunochemical test)     Prediabetes     Tubular adenoma     colonoscopy 2019    Ureteral calculus of right kidney transplant        Past Surgical History:        Procedure Laterality Date    BRONCHOSCOPY      COLONOSCOPY      COLONOSCOPY N/A 2019    COLONOSCOPY POLYPECTOMY ABLATION HOT SNARE OF TRANSVERSE COLON POLYP AND SIGMOID POLYP performed by Sally Velazquez MD at [place] [address] CATH LAB PROCEDURE  2006??    ENDOSCOPY, COLON, DIAGNOSTIC      LUNG REMOVAL, PARTIAL Right 2005    Partial right lobectomy for diagnostic purpose.  Surgery performed in [place], [facility] N/A 2019    EGD BIOPSY performed by Crissy Rivera DO at [address] History:    Social History     Tobacco Use    Smoking status: Former Smoker     Packs/day: 3.00     Years: 20.00     Pack years: 60.00     Types: Cigarettes     Last attempt to quit: 2000     Years since quittin.0    Smokeless tobacco: Never Used   Substance Use Topics    Alcohol use: No     Alcohol/week: 0.0 standard drinks     Comment: seldom                                Counseling given: Not Answered      Vital Signs (Current):   Vitals:    20 0816 20 0735   BP:  115/74   Pulse:  88   Resp:  18   Temp:  97.6 °F (36.4 °C)   TempSrc:  Oral   SpO2:  (!) 88%   Weight: 257 lb (116.6 kg) 235 lb (106.6 kg)   Height: 5' 10\" (1.778 m) 5' 10\" (1.778 m)                                              BP Readings from Last 3 Encounters:   20 115/74   20 121/81   20 (!) 138/90       NPO Status: Time of last liquid consumption:                         Time of last solid consumption:                         Date of last liquid consumption: 20                        Date of last solid food consumption: 20    BMI:   Wt Readings from Last 3 Encounters:   20 235 lb (106.6 kg)   20 256 lb (116.1 kg)   20 257 lb (116.6 kg)     Body mass index is 33.72 kg/m².    CBC:   Lab Results   Component Value Date    WBC 6.6 01/09/2020    RBC 6.81 01/09/2020    HGB 18.7 01/09/2020    HCT 55.7 01/15/2020    MCV 82.4 01/09/2020    RDW 15.4 01/09/2020     01/09/2020       CMP:   Lab Results   Component Value Date     01/09/2020    K 3.9 01/09/2020    K 4.0 10/03/2018     01/09/2020    CO2 20 01/09/2020    BUN 16 01/09/2020    CREATININE 0.9 01/09/2020    GFRAA >60 01/09/2020    AGRATIO 1.6 08/07/2019    LABGLOM >60 01/09/2020    GLUCOSE 124 01/09/2020    PROT 7.6 01/15/2020    PROT 7.8 02/18/2013    CALCIUM 9.4 01/09/2020    BILITOT 1.2 01/15/2020    ALKPHOS 100 01/15/2020    AST 58 01/15/2020    ALT 90 01/15/2020       POC Tests:   Recent Labs     01/21/20  0816   POCGLU 88       Coags:   Lab Results   Component Value Date    PROTIME 12.2 04/10/2018    INR 1.08 04/10/2018    APTT 34.9 07/25/2017       HCG (If Applicable): No results found for: PREGTESTUR, PREGSERUM, HCG, HCGQUANT     ABGs:   Lab Results   Component Value Date    PHART 7.427 10/06/2017    PO2ART 60.7 10/06/2017    IIX8ABM 38.5 10/06/2017    BOK7PID 24.9 10/06/2017    BEART 1.2 10/06/2017    J3KPEIHQ 91.7 10/06/2017        Type & Screen (If Applicable):  No results found for: LABABO, [address]    Anesthesia Evaluation  Patient summary reviewed and Nursing notes reviewed no history of anesthetic complications:   Airway: Mallampati: II  TM distance: >3 FB   Neck ROM: full  Mouth opening: > = 3 FB Dental: normal exam         Pulmonary: breath sounds clear to auscultation  (+) COPD: severe,  shortness of breath:  sleep apnea: on CPAP,      (-) not a current smoker (quit 2005  was 3 ppd)                          ROS comment: Goodpasture syndrome,    Pt was on lung tx list  Off for now due to weight     Cardiovascular:  Exercise tolerance: good (>4 METS),   (+) hypertension: moderate, CAD (angioplasty 2018 MI   doing well since ):,     (-)  angina    NYHA Classification: II    Rhythm: regular  Rate: normal           Beta Blocker:  Not on Beta Blocker         Neuro/Psych:   Negative Neuro/Psych ROS              GI/Hepatic/Renal:   (+) GERD: well controlled,           Endo/Other:    (+) DiabetesType II DM, , .                 Abdominal:   (+) obese,         Vascular: negative vascular ROS. Anesthesia Plan      general     ASA 4       Induction: intravenous. MIPS: Postoperative opioids intended and Prophylactic antiemetics administered. Anesthetic plan and risks discussed with patient. Plan discussed with CRNA.     Attending anesthesiologist reviewed and agrees with Pre Eval content              Suki Tineo DO   1/21/2020

## 2020-01-21 NOTE — PROGRESS NOTES
Wife at bedside, sats run 90-01% at home, called [facility] CNP, no call back, Dr. Jesús Garcia at bedside, ok to maintain sats 89-91%

## 2020-01-21 NOTE — PROGRESS NOTES
PACU Transfer Note    Vitals:    01/21/20 1603   BP: 135/80   Pulse: 77   Resp: 14   Temp: 97.9 °F (36.6 °C)   SpO2: 90%       In: 99 [I.V.:99]  Out: -     Pain assessment:    Pain Level: 4    Report given to Receiving unit RN.    1/21/2020 4:06 PM

## 2020-01-21 NOTE — H&P
[facility] Same Day Surgery Update H & P  Department of General Surgery   Surgical Service   Pre-operative History and Physical  Last H & P within the last 30 days. DIAGNOSIS:   Morbid obesity ([address] [E66.01]  Hiatal hernia [K44.9]    PROCEDURE:  TX LAP, MUKUL RESTRICT PROC, LONGITUDINAL GASTRECTOMY [01504] (ROBOTIC ASSISTED LAPAROSCOPIC SLEEVE GASTRECTOMY/ LAPAROSCOPIC HIATAL HERNIA REPAIR)     HISTORY OF PRESENT ILLNESS:   Patient is an obese (Body mass index is 33.72 kg/m².), 48 y.o. male who presents today for the above procedure. Patient with a history of multiple weight loss attempts without long term success. Past Medical History:        Diagnosis Date    CAD S/P percutaneous coronary angioplasty     Chronic respiratory failure (HCC)     COPD exacerbation ([address] 9/7/2016    Coronary artery disease involving native coronary artery of native heart with unstable angina pectoris ([address]     Diabetes ([address]     Diabetes ([address]     Dyslipidemia     Dyspnea     GERD (gastroesophageal reflux disease)     Goodpasture syndrome ([address]     reason pt is on cellcept.   He will need lung transplant eventually    High risk medications (not anticoagulants) long-term use     HTN (hypertension)     Hyperlipidemia     Hypoxemia     ILD (interstitial lung disease) ([address]     Left ureteral stone 4/13/2018    MDD (major depressive disorder)     ESTRELLITA on CPAP     Polycythemia     Positive FIT (fecal immunochemical test)     Prediabetes     Tubular adenoma     colonoscopy 9/2019    Ureteral calculus of right kidney transplant 2005     Past Surgical History:        Procedure Laterality Date    BRONCHOSCOPY      COLONOSCOPY      COLONOSCOPY N/A 9/4/2019    COLONOSCOPY POLYPECTOMY ABLATION HOT SNARE OF TRANSVERSE COLON POLYP AND SIGMOID POLYP performed by Bernadette Vega MD at [place] [address] CATH LAB PROCEDURE  2006??    ENDOSCOPY, COLON, DIAGNOSTIC      LUNG REMOVAL, PARTIAL Right     Partial right lobectomy for diagnostic purpose. [facility] [place] [facility] [place] N/A 2019    EGD BIOPSY performed by Agnes Evans DO at [facility] ENDOSCOPY     Past Social History:  Social History     Socioeconomic History    Marital status:      Spouse name: None    Number of children: None    Years of education: None    Highest education level: None   Occupational History    None   Social Needs    Financial resource strain: None    Food insecurity:     Worry: None     Inability: None    Transportation needs:     Medical: None     Non-medical: None   Tobacco Use    Smoking status: Former Smoker     Packs/day: 3.00     Years: 20.00     Pack years: 60.00     Types: Cigarettes     Last attempt to quit: 2000     Years since quittin.0    Smokeless tobacco: Never Used   Substance and Sexual Activity    Alcohol use: No     Alcohol/week: 0.0 standard drinks     Comment: seldom    Drug use: No    Sexual activity: Yes     Partners: Female   Lifestyle    Physical activity:     Days per week: None     Minutes per session: None    Stress: None   Relationships    Social connections:     Talks on phone: None     Gets together: None     Attends Alevism service: None     Active member of club or organization: None     Attends meetings of clubs or organizations: None     Relationship status: None    Intimate partner violence:     Fear of current or ex partner: None     Emotionally abused: None     Physically abused: None     Forced sexual activity: None   Other Topics Concern    None   Social History Narrative    None         Medications Prior to Admission:      Prior to Admission medications    Medication Sig Start Date End Date Taking?  Authorizing Provider   amLODIPine (NORVASC) 5 MG tablet TAKE 1 TABLET BY MOUTH ONE TIME A DAY IN ADDITION TO LISINOPRIL FOR BLOOD PRESSURE 20  Yes Margie Rodriguez MD   INCRUSE ELLIPTA 62.5 MCG/INH AEPB INHALE 1 PUFF BY MOUTH ONE TIME A DAY 10/7/19  Yes Rico Mcguire DO   escitalopram (LEXAPRO) 10 MG tablet TAKE 1 TABLET BY MOUTH ONE TIME A DAY 8/7/19  Yes Miranda Segundo MD   carvedilol (COREG) 6.25 MG tablet TAKE 1 TABLET BY MOUTH 2 TIMES A DAY WITH MEALS 8/7/19  Yes Miranda Segundo MD   rosuvastatin (CRESTOR) 40 MG tablet TAKE ONE TABLET BY MOUTH EVERY EVENING 8/7/19  Yes Miranda Segundo MD   lisinopril (PRINIVIL;ZESTRIL) 20 MG tablet TAKE 1 TABLET BY MOUTH ONE TIME DAILY 8/7/19  Yes Miranda Segundo MD   omeprazole (PRILOSEC) 20 MG delayed release capsule TAKE ONE CAPSULE BY MOUTH TWICE A DAY 4/12/19  Yes Rico Mcguire DO   BREO ELLIPTA 200-25 MCG/INH AEPB INHALE 1 PUFF BY MOUTH ONE TIME A DAY 1/25/19  Yes Rico Mcguire DO   hydrocortisone (WESTCORT) 0.2 % cream Apply topically 2 times daily Apply topically 2 times daily. 1/9/19  Yes Margie Wright MD   nitroGLYCERIN (NITROSTAT) 0.4 MG SL tablet up to max of 3 total doses.  If no relief after 1 dose, call 911. 12/4/18  Yes Arie Garces MD   ASPIRIN LOW DOSE ADULT 81 MG chewable tablet CHEW AND SWALLOW ONE TABLET BY MOUTH ONE TIME A DAY 11/27/18  Yes Arie Garces MD   clopidogrel (PLAVIX) 75 MG tablet TAKE ONE TABLET BY MOUTH ONE TIME A DAY 11/27/18  Yes Arie Garces MD   azelastine (ASTELIN) 0.1 % nasal spray 2 sprays by Nasal route 2 times daily Use in each nostril as directed  Patient taking differently: 2 sprays by Nasal route 2 times daily as needed Use in each nostril as directed 9/28/16  Yes Rico Mcguire DO   sodium chloride, Inhalant, 3 % nebulizer solution Take 15 mLs by nebulization 2 times daily 5/13/19   Rico Mcguire DO   albuterol sulfate HFA (PROAIR HFA) 108 (90 BASE) MCG/ACT inhaler Inhale 2 puffs into the lungs every 4 hours as needed for Wheezing or Shortness of Breath 12/12/16   Rico Mcguire DO         Allergies: Erythromycin    PHYSICAL EXAM:      /74   Pulse 88   Temp 97.6 °F (36.4 °C) (Oral)   Resp 18   Ht 5' 10\" (1.778 m)   Wt 235 lb (106.6 kg)   SpO2 (!) 88% Comment: states this is his normal, wears o2 at night only, patient asymptomatic, denies sob, respirs easy and non-labored  BMI 33.72 kg/m²      Heart:  regular rate and rhythm, no murmur noted    Lungs:  No increased work of breathing, good air exchange, clear to auscultation bilaterally, no crackles or wheezing    Abdomen:  soft, non-distended, non-tender, no rebound tenderness or guarding, normal active bowel sounds and no masses palpated    ASSESSMENT AND PLAN:    1. Patient seen and focused exam done today- no new changes since last physical exam on 1/9/20    2. Access to ancillary services are available per request of the provider.     ZAN Gomez - CNP     1/21/2020

## 2020-01-21 NOTE — PROGRESS NOTES
Pt arrived from OR, s/p ROBOTIC SLEEVE GASTRECTOMY; ROBOTIC HIATAL HERNIA REPAIR (N/A , pt arrived to [facility] on left side with oral airway in, left arm IV was pulled in transit to pacu, blood all over bed, sat 84% on arrival.  Pt repositioned on back and pulled up in the bed. Wife notified to get the cpap from there car.  sats 88% on arrival to [address] [place] [address].   CRNA spoke with Dr. Jesús Garcia MD stated that sats were 88% on arrival

## 2020-01-22 ENCOUNTER — APPOINTMENT (OUTPATIENT)
Dept: GENERAL RADIOLOGY | Age: 55
End: 2020-01-22
Attending: SURGERY
Payer: COMMERCIAL

## 2020-01-22 VITALS
DIASTOLIC BLOOD PRESSURE: 78 MMHG | HEART RATE: 89 BPM | TEMPERATURE: 98.5 F | WEIGHT: 235 LBS | HEIGHT: 70 IN | BODY MASS INDEX: 33.64 KG/M2 | OXYGEN SATURATION: 91 % | SYSTOLIC BLOOD PRESSURE: 152 MMHG | RESPIRATION RATE: 16 BRPM

## 2020-01-22 LAB
ANION GAP SERPL CALCULATED.3IONS-SCNC: 16 MMOL/L (ref 3–16)
BUN BLDV-MCNC: 11 MG/DL (ref 7–20)
CALCIUM SERPL-MCNC: 9.1 MG/DL (ref 8.3–10.6)
CHLORIDE BLD-SCNC: 100 MMOL/L (ref 99–110)
CO2: 18 MMOL/L (ref 21–32)
CREAT SERPL-MCNC: 0.9 MG/DL (ref 0.9–1.3)
GFR AFRICAN AMERICAN: >60
GFR NON-AFRICAN AMERICAN: >60
GLUCOSE BLD-MCNC: 110 MG/DL (ref 70–99)
HCT VFR BLD CALC: 54.8 % (ref 40.5–52.5)
HEMOGLOBIN: 18 G/DL (ref 13.5–17.5)
MCH RBC QN AUTO: 28 PG (ref 26–34)
MCHC RBC AUTO-ENTMCNC: 32.8 G/DL (ref 31–36)
MCV RBC AUTO: 85.4 FL (ref 80–100)
PDW BLD-RTO: 15.9 % (ref 12.4–15.4)
PLATELET # BLD: 169 K/UL (ref 135–450)
PMV BLD AUTO: 8.5 FL (ref 5–10.5)
POTASSIUM SERPL-SCNC: 4.9 MMOL/L (ref 3.5–5.1)
RBC # BLD: 6.42 M/UL (ref 4.2–5.9)
SODIUM BLD-SCNC: 134 MMOL/L (ref 136–145)
WBC # BLD: 8.2 K/UL (ref 4–11)

## 2020-01-22 PROCEDURE — 2580000003 HC RX 258: Performed by: NURSE PRACTITIONER

## 2020-01-22 PROCEDURE — G0378 HOSPITAL OBSERVATION PER HR: HCPCS

## 2020-01-22 PROCEDURE — 96372 THER/PROPH/DIAG INJ SC/IM: CPT

## 2020-01-22 PROCEDURE — 80048 BASIC METABOLIC PNL TOTAL CA: CPT

## 2020-01-22 PROCEDURE — 6360000002 HC RX W HCPCS: Performed by: NURSE PRACTITIONER

## 2020-01-22 PROCEDURE — 2700000000 HC OXYGEN THERAPY PER DAY

## 2020-01-22 PROCEDURE — 96361 HYDRATE IV INFUSION ADD-ON: CPT

## 2020-01-22 PROCEDURE — 6360000002 HC RX W HCPCS: Performed by: SURGERY

## 2020-01-22 PROCEDURE — 85027 COMPLETE CBC AUTOMATED: CPT

## 2020-01-22 PROCEDURE — 94660 CPAP INITIATION&MGMT: CPT

## 2020-01-22 PROCEDURE — 74240 X-RAY XM UPR GI TRC 1CNTRST: CPT

## 2020-01-22 PROCEDURE — 94761 N-INVAS EAR/PLS OXIMETRY MLT: CPT

## 2020-01-22 PROCEDURE — 94640 AIRWAY INHALATION TREATMENT: CPT

## 2020-01-22 PROCEDURE — 6370000000 HC RX 637 (ALT 250 FOR IP): Performed by: NURSE PRACTITIONER

## 2020-01-22 PROCEDURE — 99219 PR INITIAL OBSERVATION CARE/DAY 50 MINUTES: CPT | Performed by: INTERNAL MEDICINE

## 2020-01-22 PROCEDURE — 96374 THER/PROPH/DIAG INJ IV PUSH: CPT

## 2020-01-22 PROCEDURE — C9113 INJ PANTOPRAZOLE SODIUM, VIA: HCPCS | Performed by: SURGERY

## 2020-01-22 PROCEDURE — 2580000003 HC RX 258: Performed by: SURGERY

## 2020-01-22 PROCEDURE — 36415 COLL VENOUS BLD VENIPUNCTURE: CPT

## 2020-01-22 RX ORDER — SODIUM CHLORIDE 9 MG/ML
INJECTION, SOLUTION INTRAVENOUS CONTINUOUS
Status: DISCONTINUED | OUTPATIENT
Start: 2020-01-22 | End: 2020-01-22 | Stop reason: HOSPADM

## 2020-01-22 RX ORDER — OXYCODONE HCL 5 MG/5 ML
5 SOLUTION, ORAL ORAL EVERY 4 HOURS PRN
Status: DISCONTINUED | OUTPATIENT
Start: 2020-01-22 | End: 2020-01-22 | Stop reason: HOSPADM

## 2020-01-22 RX ORDER — OMEPRAZOLE 20 MG/1
20 CAPSULE, DELAYED RELEASE ORAL DAILY
Qty: 30 CAPSULE | Refills: 3 | Status: SHIPPED | OUTPATIENT
Start: 2020-01-22 | End: 2020-01-22 | Stop reason: HOSPADM

## 2020-01-22 RX ORDER — 0.9 % SODIUM CHLORIDE 0.9 %
500 INTRAVENOUS SOLUTION INTRAVENOUS ONCE
Status: COMPLETED | OUTPATIENT
Start: 2020-01-22 | End: 2020-01-22

## 2020-01-22 RX ORDER — LISINOPRIL 10 MG/1
TABLET ORAL
Qty: 30 TABLET | Refills: 1 | Status: SHIPPED | OUTPATIENT
Start: 2020-01-22 | End: 2020-09-23 | Stop reason: SDUPTHER

## 2020-01-22 RX ORDER — ONDANSETRON 4 MG/1
4 TABLET, FILM COATED ORAL EVERY 6 HOURS PRN
Qty: 30 TABLET | Refills: 0 | Status: SHIPPED | OUTPATIENT
Start: 2020-01-22

## 2020-01-22 RX ORDER — ALBUTEROL SULFATE 2.5 MG/3ML
2.5 SOLUTION RESPIRATORY (INHALATION) 4 TIMES DAILY
Status: DISCONTINUED | OUTPATIENT
Start: 2020-01-22 | End: 2020-01-22 | Stop reason: HOSPADM

## 2020-01-22 RX ORDER — OXYCODONE HCL 5 MG/5 ML
10 SOLUTION, ORAL ORAL EVERY 4 HOURS PRN
Status: DISCONTINUED | OUTPATIENT
Start: 2020-01-22 | End: 2020-01-22 | Stop reason: HOSPADM

## 2020-01-22 RX ORDER — OXYCODONE HYDROCHLORIDE AND ACETAMINOPHEN 5; 325 MG/1; MG/1
1 TABLET ORAL EVERY 6 HOURS PRN
Qty: 28 TABLET | Refills: 0 | Status: SHIPPED | OUTPATIENT
Start: 2020-01-22 | End: 2020-01-29

## 2020-01-22 RX ORDER — HYOSCYAMINE SULFATE 0.12 MG/1
1 TABLET SUBLINGUAL EVERY 6 HOURS PRN
Qty: 30 EACH | Refills: 0 | Status: SHIPPED | OUTPATIENT
Start: 2020-01-22 | End: 2021-09-09 | Stop reason: CLARIF

## 2020-01-22 RX ADMIN — SODIUM CHLORIDE: 9 INJECTION, SOLUTION INTRAVENOUS at 07:41

## 2020-01-22 RX ADMIN — ENOXAPARIN SODIUM 30 MG: 30 INJECTION SUBCUTANEOUS at 07:48

## 2020-01-22 RX ADMIN — PANTOPRAZOLE SODIUM 40 MG: 40 INJECTION, POWDER, FOR SOLUTION INTRAVENOUS at 07:48

## 2020-01-22 RX ADMIN — SODIUM CHLORIDE 500 ML: 9 INJECTION, SOLUTION INTRAVENOUS at 07:49

## 2020-01-22 RX ADMIN — ALBUTEROL SULFATE 2.5 MG: 2.5 SOLUTION RESPIRATORY (INHALATION) at 16:11

## 2020-01-22 RX ADMIN — Medication 10 ML: at 07:48

## 2020-01-22 RX ADMIN — SODIUM CHLORIDE: 9 INJECTION, SOLUTION INTRAVENOUS at 06:38

## 2020-01-22 RX ADMIN — SODIUM CHLORIDE 500 ML: 900 INJECTION, SOLUTION INTRAVENOUS at 09:52

## 2020-01-22 RX ADMIN — OXYCODONE HYDROCHLORIDE 10 MG: 5 SOLUTION ORAL at 15:34

## 2020-01-22 RX ADMIN — OXYCODONE HYDROCHLORIDE 5 MG: 5 SOLUTION ORAL at 11:06

## 2020-01-22 ASSESSMENT — PAIN DESCRIPTION - ONSET: ONSET: ON-GOING

## 2020-01-22 ASSESSMENT — PAIN SCALES - GENERAL
PAINLEVEL_OUTOF10: 5
PAINLEVEL_OUTOF10: 8

## 2020-01-22 ASSESSMENT — PAIN DESCRIPTION - FREQUENCY
FREQUENCY: CONTINUOUS
FREQUENCY: CONTINUOUS

## 2020-01-22 ASSESSMENT — PAIN - FUNCTIONAL ASSESSMENT: PAIN_FUNCTIONAL_ASSESSMENT: ACTIVITIES ARE NOT PREVENTED

## 2020-01-22 ASSESSMENT — PAIN DESCRIPTION - LOCATION
LOCATION: ABDOMEN
LOCATION: ABDOMEN

## 2020-01-22 ASSESSMENT — PAIN DESCRIPTION - PAIN TYPE
TYPE: SURGICAL PAIN
TYPE: SURGICAL PAIN

## 2020-01-22 ASSESSMENT — PAIN DESCRIPTION - DESCRIPTORS: DESCRIPTORS: ACHING

## 2020-01-22 ASSESSMENT — PAIN DESCRIPTION - PROGRESSION: CLINICAL_PROGRESSION: NOT CHANGED

## 2020-01-22 NOTE — PROGRESS NOTES
Surgery Daily Progress Note  Jensen Davidson  CC: Morbid Obesity  Subjective :  No emesis or nausea. Pain tolerable. Sat up most of the night because that was a more comfortable position for him. He ambulated. Objective    Infusions:   sodium chloride 50 mL/hr at 01/21/20 1513    lactated ringers 100 mL/hr at 01/21/20 0830    HYDROmorphone          I/O:I/O last 3 completed shifts: In: 1600.4 [I.V.:1600.4]  Out: 775 [Urine:775]           Wt Readings from Last 1 Encounters:   01/21/20 235 lb (106.6 kg)                 Exam:BP (!) 143/90   Pulse 86   Temp 97.5 °F (36.4 °C) (Oral)   Resp 18   Ht 5' 10\" (1.778 m)   Wt 235 lb (106.6 kg)   SpO2 91%   BMI 33.72 kg/m²   General appearance: alert, appears stated age and cooperative  Lungs: clear to auscultation bilaterally  Heart: regular rate and rhythm, S1, S2 normal, no murmur, click, rub or gallop  Abdomen: soft, appropriately-tender; bowel sounds quiet  Trocar sites well approx      ASSESSMENT/PLAN: Pt. is a 48 y.o. male s/p Robotic Assisted Laparoscopic Sleeve Gastrectomy, HHR POD# 1  Overall, doing well  Low urinary output-bolus and increase IVF to 150 ml according to clinical pathway  Awaiting Imaging   Once imaging completed, will advance according to Pathway and start on Bariatric Clears  Pharmacy to review home meds -pills to be crushed for 2 weeks post op    Monitor progress throughout the day. D/C late afternoon as long as able to take adequate po to remain hydrated without IVF, voiding, pain and nausea tolerable with oral meds, using IS frequently and ambulating.     D/W Dr. Natalee Youssef 1/22/2020 6:20 AM  [phone]

## 2020-01-22 NOTE — PROGRESS NOTES
Patient vss. Patient alert and oriented x4. Patient surgical sites are clean, dry and intact. Patient has hypoactive bowel sounds. Patient urinating freely. PCA pump was discontinued. Patient denying any pain at this time. Patient on 4 L of O2. Patient ambulating in the hallways. Will continue to monitor.

## 2020-01-22 NOTE — PLAN OF CARE
Problem: Falls - Risk of:  Goal: Will remain free from falls  Description  Will remain free from falls  1/22/2020 1025 by Bossman Hinton RN  Outcome: Ongoing  Note:   Patient in bed with bed alarm on. Call light within reach. Will continue to monitor. Problem: Pain:  Goal: Pain level will decrease  Description  Pain level will decrease  1/22/2020 1025 by Bossman Hinton RN  Outcome: Ongoing  Note:   Patient denying any pain at this time. Will continue to monitor. Problem: Breathing Pattern - Ineffective:  Goal: Ability to achieve and maintain a regular respiratory rate will improve  Description  Ability to achieve and maintain a regular respiratory rate will improve  1/22/2020 1025 by Bossman Hinton RN  Outcome: Ongoing  Note:   Patient on 4 L of O2 stating at 91. Will continue to monitor.

## 2020-01-22 NOTE — CARE COORDINATION
Assistance Purchasing Medications: No  Does Patient want to participate in local refill/ meds to beds program?: Yes    Meds To Beds General Rules:  1. Can ONLY be done Monday- Friday between 8:30am-5pm  2. Prescription(s) must be in pharmacy by 3pm to be filled same day  3. Copy of patient's insurance/ prescription drug card and patient face sheet must be sent along with the prescription(s)  4. Cost of Rx cannot be added to hospital bill. If financial assistance is needed, please contact unit  or ;  or  CANNOT provide pharmacy voucher for patients co-pays  5.  Patients can then  the prescription on their way out of the hospital at discharge, or pharmacy can deliver to the bedside if staff is available. (payment due at time of pick-up or delivery - cash, check, or card accepted)     Able to afford home medications/ co-pay costs: Yes    ADLS  Support Systems: Spouse/Significant Other    PT AM-PAC:   /24  OT AM-PAC:   /24    [place]: from home independent with wife  Steps:     Plans to RETURN to current housing: Yes  Barriers to RETURNING to current housing: none    Sandie Davila 80  Currently ACTIVE with [address]: No  Home Care Agency: Not Applicable    Currently ACTIVE with [place] [facility]: No      Durable Medical Equipment  DME Provider: none  Equipment:     Home Oxygen and [address] prior to admission: No  [address]: Not Applicable  Other Respiratory Equipment:       Dialysis  Active with HD/PD prior to admission: No  Nephrologist:     HD Center:  Not Applicable    DISCHARGE PLAN:  Disposition: Home- No Services Needed    Transportation PLAN for discharge: family     Factors facilitating achievement of predicted outcomes: Family support, Cooperative and Pleasant    Barriers to discharge: none noted    Additional Case Management Notes: pt from home with wife, no needs at [place]    The Plan for Transition of Care is related to the following treatment goals of Morbid obesity ([facility] [E66.01]  Hiatal hernia [K44.9]  Severe obesity (BMI 35.0-35.9 with comorbidity) ([facility] [E66.01, Z68.35]    The Patient and/or patient representative Cynthia Cr and his family were provided with a choice of provider and agrees with the discharge plan Yes    Freedom of choice list was provided with basic dialogue that supports the patient's individualized plan of care/goals and shares the quality data associated with the providers.  Not Indicated      Care Transition patient: Yes    Prosper Rodriguez RN  [facility]  Case Management Department  Ph: [phone]   Fax: [phone]

## 2020-01-22 NOTE — PROGRESS NOTES
Pt's VSS, pt voiding well per urinal. Ambulated in [facility] with oxygen tank. Pt has been wearing home cpap while in bed with oxygen sating between 89-91% on 11L O2 on cpap. Pt using PCA pump. SCDs on. Will continue to monitor.

## 2020-01-22 NOTE — PROGRESS NOTES
Discharge order received. Patient informed of discharge order. Discharge instructions reviewed with patient and wife. Copy of discharge instructions given to patient. Patient and wife verbalized understanding, denies needs or questions at this time. IV and telemetry removed. All patient belongings packed and sent with patient upon discharge. Patient left in wheelchair to go home with wife.      Electronically signed by Christiana Craig RN on 1/22/2020 at 4:55 PM

## 2020-01-22 NOTE — PROGRESS NOTES
Resumed care of pt from [address] [facility]. Pt resting comfortably in bed. Pain controlled with Dilaudid PCA, see MAR. Pt denies any needs at this time. Will continue to monitor.

## 2020-01-22 NOTE — CARE COORDINATION
manager or  CANNOT provide pharmacy voucher for patients co-pays  5. Patients can then  the prescription on their way out of the hospital at discharge, or pharmacy can deliver to the bedside if staff is available. (payment due at time of pick-up or delivery - cash, check, or card accepted)     Able to afford home medications/ co-pay costs: Yes    ADLS:  Current PT AM-PAC Score:   /24  Current OT AM-PAC Score:   /24      DISCHARGE Disposition: Home- No Services Needed    LOC at discharge: Not Applicable  CATIE Completed: Not Indicated    Notification completed in HENS/PAS?:  Not Applicable    IMM Completed:   Not Indicated    Transportation:  Transportation PLAN for discharge: family   Mode of Transport: [address]:  [address] ordered at discharge: Not [address]: Not Applicable  Orders faxed: No    Durable Medical Equipment:  DME Provider: none  Equipment obtained during hospitalization:     Home Oxygen and Respiratory Equipment:  Oxygen needed at discharge?: Not [address]: Not Applicable  Portable tank available for discharge?: Not Indicated    Dialysis:  Dialysis patient: No    Dialysis Center:  Not Applicable        Additional CM Notes: pt from home with wife will return no needs    The Plan for Transition of Care is related to the following treatment goals of Morbid obesity ([address] [E66.01]  Hiatal hernia [K44.9]  Severe obesity (BMI 35.0-35.9 with comorbidity) ([address] [E66.01, Z68.35]    The Patient and/or patient representative Cynthia Cr and his family were provided with a choice of provider and agrees with the discharge plan Yes    Freedom of choice list was provided with basic dialogue that supports the patient's individualized plan of care/goals and shares the quality data associated with the providers.  Not Indicated    Care Transitions patient: Yes    Prosper Rodriguez RN  [facility]  Case Management Department  Ph: [phone]  Fax:

## 2020-01-22 NOTE — CONSULTS
Clinical Pharmacy Progress Note    Admit date: 1/21/2020     Asked to review home medications to see what can be crushed or changed to liquid formulation. · Amlodipine - tablets can be crushed. · Aspiring - chewable tablet formulations can be crushed. EC tablets cannot be crushed. · Carvedilol - tablets can be crushed. · Clopidogrel - tablets can be crushed. · Escitalopram - tablets can be crushed. · Lisinopril - tablets can be crushed. · Omeprazole - capsules can be opened and contents mixed with applesauce / pudding (do not chew granules). Packets for suspension are also available. · Rosuvastatin - tablets can be crushed.     Please call with questions--  Thanks--  Aislinn Redd, PharmD, [address] or [phone] (wireless)  1/22/2020 8:00 AM

## 2020-01-22 NOTE — CONSULTS
Pulmonary Consult Note      Reason for Consult: hypoxia after gastric sleeve   Requesting Physician: Stella Taylor    Subjective:     CHIEF COMPLAINT / HPI:                The patient is a 48 y.o. male with significant past medical history of CAD, Chronic respiratory failure on 2-4L of oxygen, ESTRELLITA on bipap with 1-2L bleed in, had a gastric sleeve yesterday, but has been on more O2 than he reports is his baseline. He complains of the expected postoperative pain from his gastric sleeve. Per he and his wife he where's 2L continuously and 3-4 with exertion as well as 1L with his home bipap. Patient carries a dx of follicular bronchiolitis and polycythemia. He is currently on 4L of oxygen and he's saturating well and in no distress. Past Medical History:      Diagnosis Date    CAD S/P percutaneous coronary angioplasty     Chronic respiratory failure (HCC)     COPD exacerbation ([address] 9/7/2016    Coronary artery disease involving native coronary artery of native heart with unstable angina pectoris ([address]     Diabetes ([address]     Diabetes ([address]     Dyslipidemia     Dyspnea     GERD (gastroesophageal reflux disease)     Goodpasture syndrome ([address]     reason pt is on cellcept.   He will need lung transplant eventually    High risk medications (not anticoagulants) long-term use     HTN (hypertension)     Hyperlipidemia     Hypoxemia     ILD (interstitial lung disease) ([address]     Left ureteral stone 4/13/2018    MDD (major depressive disorder)     ESTRELLITA on CPAP     Polycythemia     Positive FIT (fecal immunochemical test)     Prediabetes     Tubular adenoma     colonoscopy 9/2019    Ureteral calculus of right kidney transplant 2005      Past Surgical History:        Procedure Laterality Date    BRONCHOSCOPY      COLONOSCOPY      COLONOSCOPY N/A 9/4/2019    COLONOSCOPY POLYPECTOMY ABLATION HOT SNARE OF TRANSVERSE COLON POLYP AND SIGMOID POLYP performed by Fransisco Hendrix MD at [address] [place] [address] CARDIAC CATH LAB PROCEDURE  2006??    ENDOSCOPY, COLON, DIAGNOSTIC      LUNG REMOVAL, PARTIAL Right 2005    Partial right lobectomy for diagnostic purpose. Surgery performed in [address] N/A 1/21/2020    ROBOTIC SLEEVE GASTRECTOMY; ROBOTIC HIATAL HERNIA REPAIR performed by Adela Kearns DO at [address] N/A 9/4/2019    EGD BIOPSY performed by Adela Kearns DO at [facility] ENDOSCOPY     Current Medications:     albuterol  2.5 mg Nebulization 4x daily    scopolamine  1 patch Transdermal Once    sodium chloride flush  10 mL Intravenous 2 times per day    scopolamine  1 patch Transdermal Q72H    pantoprazole  40 mg Intravenous Daily    And    sodium chloride (PF)  10 mL Intravenous Daily    enoxaparin  30 mg Subcutaneous 2 times per day     Allergies: Allergies   Allergen Reactions    Erythromycin Hives     Social History:    TOBACCO:   reports that he quit smoking about 20 years ago. His smoking use included cigarettes. He has a 60.00 pack-year smoking history. He has never used smokeless tobacco.  ETOH:   reports no history of alcohol use. Family History:       Problem Relation Age of Onset    Diabetes Father     High Cholesterol Father     High Blood Pressure Father     Emphysema Neg Hx     Heart Failure Neg Hx     Hypertension Neg Hx        REVIEW OF SYSTEMS:    CONSTITUTIONAL:  negative for fevers, chills, diaphoresis, activity change, appetite change, fatigue, night sweats and unexpected weight change.    EYES:  negative for blurred vision, eye discharge, visual disturbance and icterus  HEENT:  negative for hearing loss, tinnitus, ear drainage, sinus pressure, nasal congestion, epistaxis and snoring  RESPIRATORY:  See HPI  CARDIOVASCULAR:  negative for chest pain, palpitations, exertional chest pressure/discomfort, edema, syncope  GASTROINTESTINAL:  + for post operative pain  GENITOURINARY:  negative for frequency, dysuria, urinary incontinence, decreased urine volume, and hematuria  HEMATOLOGIC/LYMPHATIC:  negative for easy bruising, bleeding and lymphadenopathy  ALLERGIC/IMMUNOLOGIC:  negative for recurrent infections, angioedema, anaphylaxis and drug reactions  ENDOCRINE:  negative for weight changes and diabetic symptoms including polyuria, polydipsia and polyphagia  MUSCULOSKELETAL:  negative for  pain, joint swelling, decreased range of motion and muscle weakness  NEUROLOGICAL:  negative for headaches, slurred speech, unilateral weakness  PSYCHIATRIC/BEHAVIORAL: negative for hallucinations, behavioral problems, confusion and agitation. Objective:   PHYSICAL EXAM:      VITALS:  BP (!) 152/78   Pulse 89   Temp 98.5 °F (36.9 °C) (Oral)   Resp 16   Ht 5' 10\" (1.778 m)   Wt 235 lb (106.6 kg)   SpO2 90%   BMI 33.72 kg/m²   24HR INTAKE/OUTPUT:      Intake/Output Summary (Last 24 hours) at 2020 1616  Last data filed at 2020 1532  Gross per 24 hour   Intake 1988.4 ml   Output 1825 ml   Net 163.4 ml     CURRENT PULSE OXIMETRY:  SpO2: 90 %  24HR PULSE OXIMETRY RANGE:  SpO2  Av.4 %  Min: 89 %  Max: 93 % on 4L    CONSTITUTIONAL:  awake, alert, cooperative, no distress, and appears stated age  NECK:  Supple, symmetrical, trachea midline, no adenopathy, thyroid symmetric, not enlarged and no tenderness, skin normal  LUNGS:  no increased work of breathing and clear to auscultation. no accessory muscle use  CARDIOVASCULAR:  normal S1 and S2, no edema and no JVD  ABDOMEN:  normal bowel sounds, non-distended and no masses palpated, and no tenderness to palpation. No hepatospleenomegaly  LYMPHADENOPATHY:  no axillary or supraclavicular adenopathy. No cervical adnenopathy  PSYCHIATRIC: Oriented to person place and time. No obvious depression or anxiety. MUSCULOSKELETAL: No obvious misalignment or effusion of the joints. No clubbing, cyanosis of the digits. SKIN:  normal skin color, texture, turgor and no redness, warmth, or swelling.  No palpable nodules    DATA:    Old records have been reviewed  CBC with Differential:    Lab Results   Component Value Date    WBC 8.2 01/22/2020    RBC 6.42 01/22/2020    HGB 18.0 01/22/2020    HCT 54.8 01/22/2020     01/22/2020    MCV 85.4 01/22/2020    MCH 28.0 01/22/2020    MCHC 32.8 01/22/2020    RDW 15.9 01/22/2020    SEGSPCT 76.0 03/25/2013    BANDSPCT 3 01/09/2020    LYMPHOPCT 35.0 01/09/2020    MONOPCT 11.0 01/09/2020    EOSPCT 2.4 04/23/2012    BASOPCT 3.0 01/09/2020    MONOSABS 0.7 01/09/2020    LYMPHSABS 2.3 01/09/2020    EOSABS 0.2 01/09/2020    BASOSABS 0.2 01/09/2020    DIFFTYPE Auto-K 03/25/2013     BMP:    Lab Results   Component Value Date     01/22/2020    K 4.9 01/22/2020    K 4.0 10/03/2018     01/22/2020    CO2 18 01/22/2020    BUN 11 01/22/2020    CREATININE 0.9 01/22/2020    CALCIUM 9.1 01/22/2020    GFRAA >60 01/22/2020    LABGLOM >60 01/22/2020    GLUCOSE 110 01/22/2020     Hepatic Function Panel:    Lab Results   Component Value Date    ALKPHOS 100 01/15/2020    ALT 90 01/15/2020    AST 58 01/15/2020    PROT 7.6 01/15/2020    PROT 7.8 02/18/2013    BILITOT 1.2 01/15/2020    BILIDIR 0.3 01/15/2020    IBILI 0.9 01/15/2020     ABG:    Lab Results   Component Value Date    SMA4GBT 24.9 10/06/2017    BEART 1.2 10/06/2017    Z2PVDOHQ 91.7 10/06/2017    PHART 7.427 10/06/2017    QKN9GSZ 38.5 10/06/2017    PO2ART 60.7 10/06/2017    HQH1ZDN 26.1 10/06/2017       Cultures:   Blood Culture:    Sputum Culture:        Radiology Review:  All pertinent images / reports were reviewed as a part of this visit. imaging reveals the following:  FL UGI W KUB   Final Result      NORMAL APPEARING POSTOPERATIVE LIMITED WATER-SOLUBLE UPPER GI STUDY DEMONSTRATING NO SIGN OF ANY LEAK WITH POSTSURGICAL CHANGES OF THE UPPER STOMACH AND BODY OF THE STOMACH.           Other diagnostic test:      Pulmonary Function Testing 11/2018  FEV1 1.34 L to 1.42 L  FVC  3.31 L to 3.68 L  FEV1/FVC 40  TLC 7.83 L DLCO 44%      Assessment/Plan   48 y.o. male with Acute on chronic hypoxemic respiratory failure following gastric sleeve    Patient appears to be at his respiratory baseline but on more oxygen than he reports at home. In reviewing Dr. Alireza Florentino note it appears that he's supposed to be on a higher flow than he's wearing and that he's at, or very near his baseline. I agree with Dr. Zaid Cartagena that his plethora/polycythemia is from chronic hypoxia. I see no issues with him going home today and he should reschedule his follow up appointment with Dr. Zaid Cartagena that he missed to get this procedure.            Ken Ramos

## 2020-01-22 NOTE — PLAN OF CARE
Problem: Falls - Risk of:  Goal: Will remain free from falls  Description  Will remain free from falls  Outcome: Ongoing  Note:   Pt a/o x4. Pt remains free from falls. Bed locked in lowest position, 2/4 rails up, bed alarm on, call light/table within reach, non-skid socks on. Will continue to monitor. Problem: Pain:  Goal: Pain level will decrease  Description  Pain level will decrease  Outcome: Ongoing  Note:   Pt encouraged to use PCA pump whenever having pain. Pt states walking has helped with pain. Problem: Venous Thromboembolism:  Goal: Will show no signs or symptoms of venous thromboembolism  Description  Will show no signs or symptoms of venous thromboembolism  Outcome: Ongoing  Note:   Pt ambulated in halls, lovenox given and pt has SCD pumps while in bed. Problem: Breathing Pattern - Ineffective:  Goal: Ability to achieve and maintain a regular respiratory rate will improve  Description  Ability to achieve and maintain a regular respiratory rate will improve  Outcome: Ongoing  Note:   Pt on home cpap with continuous pulse ox, Pt sating between 89-91% with 11L on cpap. Encouraged pt to do IS.

## 2020-01-22 NOTE — OP NOTE
[facility]) Physicians   [facility]  [address] [place] [address], [address] [place] [id number] . Phone: [phone]  Fax: [phone]             Procedure Note    Indications: The patient was evaluated by our multidisciplinary team and was found to be a good candidate for weight loss surgery. Body mass index is 33.72 kg/m². Pre-operative Diagnosis:   Patient Active Problem List   Diagnosis    ILD (interstitial lung disease) ([address]    Goodpasture syndrome ([address]    Essential hypertension    Chronic respiratory failure ([address]    COPD, very severe ([address]    Chest pain    ESTRELLITA treated with BiPAP    CAD S/P percutaneous coronary angioplasty    Mixed hyperlipidemia    Coronary artery disease involving native coronary artery of native heart without angina pectoris    Diabetes ([address]    Polycythemia    Obesity (BMI 30-39. 9)    Hiatal hernia with GERD    MDD (major depressive disorder)    Positive FIT (fecal immunochemical test)    Severe obesity (BMI 35.0-35.9 with comorbidity) (HCC)    Adenomatous polyp of transverse colon    Polyp of sigmoid colon    Tubular adenoma         Post-operative Diagnosis:   Same      Procedure:    - Robotic Sleeve Gastrectomy     - Robotic Hiatal Hernia Repair       Surgeon: Steve Ewing    Anesthesia: General endotracheal anesthesia        Procedure Details   The patient was seen again in the Holding Room. The risks, benefits, complications, treatment options, and expected outcomes were discussed with the patient and/or family. The possibilities of reaction to medication, pulmonary aspiration, perforation of viscus, bleeding, recurrent infection, strictures, leaks, failure to lose weight, regaining weight,  the need for additional procedures, failure to diagnose a condition, and creating a complication requiring transfusion or operation were discussed. There was concurrence with the proposed plan and informed consent was obtained.   The site of surgery was properly noted/marked. The patient was taken to Operating Room, identified as Kitty Olivera and the procedure verified as Laparoscopic Sleeve Gastrectomy. A Time Out was held and the above information confirmed. The patient was placed in the supine position and general anesthesia was induced, along with placement of orogastric tube, Venodyne boots, and a Waters catheter. Lovenox SQ given pre-operatively. IV Antibiotics given pre-operatively. All pressure points were padded properly. A left upper quadrant incision was made and a veres needle was inserted after confirming with saline drop test.  After adequate pneumoperitoneum was obtained, a 5 mm trocar was inserted. This was followed by a endoscope which confirmed intra-abdominal placement and there was no injury on initial trocar placement. Additional ports were placed in the standard positions under direct vision. The abdomen was initially explored and no abnormalities were identified. A liver retractor was inserted through a small incision in the upper midline, lifted the liver upward, and was then secured to the OR table. The pylorus was identified and measurement was taken approximately 4 cm from the pylorus along the greater curvature of the stomach. The vessel sealer was used to take down the attachments and short gastric vessels along the greater curve of the stomach. This was continued until all attachments were taken down and continued to the gastro-esophageal junction. A 36 [place] dilator was placed along the lesser curvature and into the pylorus. A hiatal hernia was identified. Began the dissection by dividing the pars flaccida and then mobilizing the esophagus at the right celia identifying the junction with the left celia lateral to the esophagus. Then divided the phrenoesophageal membrane anteriorly. The esophagus was freed from the left celia. That dissection was done to free & excise the hiatal hernia sac.   The esophagus was now mobilized at the hiatus. The crura was then closed anterior to the esophagus with  interrupted 0 ethibond. There was approximately 4 cm of Intraabdominal esophagus. Vagus nerves were protected and away from dissection. Made sure its not too tight on the esophagus and the Boogie as well as Edlich tube passed easily through it. A stapler was fired along the dialator to create an appropriate sized gastric sleeve pouch. 1 Green 2 Gold firing followed by blue firings were used to create the sleeve. The staple line looked very healthy and no bleeding from staple line. The stomach was confirmed to be completely divided with uniform shape,  no twist in the sleeve and wide patent incisura. A 2-0 vicryl suture was used to over-sew and imbricate the sleeve staple line. The staple line was completely over-sewn. The dilator was removed and an Edlich tube was advanced across the sleeve to ensure patency mainly at incisura, then retracted to just above the GE junction. The stomach distal to the staple line was clamped and methylene blue saline was injected under pressure confirming no obstruction and no leak. The abdomen was carefully inspected and there was no bleeding or any other abnormality. The stomach was brought out through the RUQ incision. Hemostasis was confirmed. The 12 port sites was closed using 0.0 Vicryl  suture at the level of the fascia. The trocar site skin wounds were closed using 4.0 Vicryl after copious Irrigation of the wounds. Local Anesthetic used at port sites then Dermabond applied. Instrument, sponge, and needle counts were correct at the conclusion of the case. Findings: Morbid Obesity. Estimated Blood Loss:  Minimal           Drains: none           Specimens: Stomach (Subtotal)            Complications:  None; patient tolerated the procedure well. Disposition: PACU - hemodynamically stable.            Condition: stable    Attending Attestation: I was present and scrubbed for the entire procedure.

## 2020-01-23 NOTE — DISCHARGE SUMMARY
Patient ID:  Dione Eden  [id number]  73 y.o.  1965    Admit date: 1/21/2020    Discharge date and time: 1/22/20    Admitting Physician: Cleveland Rutledge DO     Discharge Physician: same    Admission Diagnoses: Morbid obesity ([facility] [E66.01]  Hiatal hernia [K44.9]  Severe obesity (BMI 35.0-35.9 with comorbidity) ([facility] [E66.01, Z68.35]    Discharge Diagnoses: same plus Acute on chronic hypoxemic respiratory failure following gastric sleeve     Polycythemia, Good pasture's disease  Admission Condition: fair    Discharged Condition: stable    Indication for Admission: Surgery: Robotic assisted Laparoscopic Sleeve Gastrectomy, IV hydration, monitoring, pain and nausea management    Hospital Course: 48 y.o. male admitted with morbid obesity who underwent Robotic assisted laparoscopic sleeve gastrectomy. Surgery was uneventful and he was admitted to bariatric post-operative surgical floor in stable condition for IV hydration, monitoring and pain and nausea management. The following morning the pain was tolerable on po pain medication and was taking adequate po. He had difficulty getting weaned off oxygen. Pulmonary medicine was consulted for assistance in his management of Good Pasture's disease resulting in Acute on chronic hypoxemic respiratory failure following gastric sleeve. After his assistance it was determined he could be weaned off oxygen at home as he is already established with oxygen concentrator and pulse ox. Mr. Aric Lopes has experience in weaning himself down on oxygen before. He was discharged in stable condition. Treatments: IV hydration        Disposition: home    Patient Instructions: Activity: activity as tolerated and no driving while on analgesics  Diet: clear liquids  Wound Care: as directed    Follow-up with Dr. Lavonne Burnett in two weeks.         Signed:  Kraig Bee  1/23/2020  8:28 AM

## 2020-01-27 ENCOUNTER — TELEPHONE (OUTPATIENT)
Dept: PULMONOLOGY | Age: 55
End: 2020-01-27

## 2020-01-27 ENCOUNTER — TELEPHONE (OUTPATIENT)
Dept: BARIATRICS/WEIGHT MGMT | Age: 55
End: 2020-01-27

## 2020-01-27 NOTE — TELEPHONE ENCOUNTER
The patient stopped in and needs to have  some disability papers filled out. Please call the patient when these forms have been completed. He was seen 10/15/2019 for a preoperative clearance. Just had surgery last week.

## 2020-01-27 NOTE — TELEPHONE ENCOUNTER
Dr. Dedra Ballesteros please advise if you are ok completing the form. He is ok paying for the $35 form fee. He missed his last appointment last week due to surgery and currently doesn't have a future appointment.

## 2020-01-27 NOTE — TELEPHONE ENCOUNTER
Surgery Type: Sleeve    Surgery Date: 1/21/20    Surgeon: Dr. Kris Patiño    The patient was contacted to follow up on their recent bariatric surgery. The following topics were reviewed:    [x] Hydration is Adequate            --Patient is getting at least 48-64 oz of fluids a day, not including protein shakes. Powerade zero, water, crystal light     [x]Consuming Adequate Protein         [x]Consuming 2 protein shakes a day made w/ 1% milk                [x]Consuming 60-80 grams of protein a day    [x] Food intake is appropriate  [x]Adequately pureeing foods, so that there are no chunks left. [x]Taking in 1-2 oz at a time 1 oz  [] Eating 4-6 times a day 3X mashed pots, green beans, chix w/ broth  [x] Following the 30-30-30 rule  [x] Reminded patient to keep food diary to bring to their 2 week follow up appointment. [x] Pain relief techniques utilized  [] Taking pain medication as prescribed  No longer taking   [] Utilizing Lidoderm patches (if prescribed)   []Taking Tylenol instead of prescription pain medication   [x] Wearing abdominal binder  [] Using ice for incisional pain Encouraged as needed     [x] Activity is appropriate  [x] Walking 10 minutes out of every hour  [x]Avoiding heavy lifting (>10lbs)   [x] Utilizing their incentive spirometer    [] Issues with Nausea/Vomiting/Reflux Not need  [] Using Zofran PRN for nausea/vomiting   []Taking Prilosec for reflux    [] Issues with Constipation No issues  []Tried Colace  []Tried Miralax    All questions and concerns addressed including comfortable sleeping positions. Pt reports struggling to sleep in bed d/t wearing oxygen and c/o nasal congestion/drianage, prefers to sleep in chair. Pt is listening to stomach cues to determine portions and frequency of eating. Encourage pt to start keeping food record to review at 2 week visit. Meeting all fluid and protein goals at this time. Patient was asked to please fill out hospital survey if she receives one in the mail.

## 2020-01-31 RX ORDER — ASPIRIN 81 MG/1
TABLET, CHEWABLE ORAL
Qty: 90 TABLET | Refills: 5 | Status: SHIPPED | OUTPATIENT
Start: 2020-01-31 | End: 2021-03-08

## 2020-02-06 ENCOUNTER — OFFICE VISIT (OUTPATIENT)
Dept: BARIATRICS/WEIGHT MGMT | Age: 55
End: 2020-02-06

## 2020-02-06 ENCOUNTER — TELEPHONE (OUTPATIENT)
Dept: BARIATRICS/WEIGHT MGMT | Age: 55
End: 2020-02-06

## 2020-02-06 VITALS
SYSTOLIC BLOOD PRESSURE: 128 MMHG | HEART RATE: 94 BPM | DIASTOLIC BLOOD PRESSURE: 84 MMHG | BODY MASS INDEX: 31.5 KG/M2 | HEIGHT: 70 IN | WEIGHT: 220 LBS

## 2020-02-06 PROCEDURE — 99024 POSTOP FOLLOW-UP VISIT: CPT | Performed by: SURGERY

## 2020-02-06 NOTE — PATIENT INSTRUCTIONS
Patient received dietary handouts and education.     Goals:   Sip fluids  Move to phase 3 on Feb. 11, 2020

## 2020-02-06 NOTE — TELEPHONE ENCOUNTER
Returned pt call. He is asking if Peanut butter is allowed on Phase 3 diet. Instructed pt to wait until 6 weeks post-op to try nuts + nut butters. Pt verbalized understanding and has no further questions at this time.

## 2020-02-06 NOTE — TELEPHONE ENCOUNTER
Patient was seen today by Dr Edgar Riddle for 2wk post -op appt. ( s/p sleeve 1/21/2020)    He calls the office requesting to speak with a dietitian regarding phase 3 diet. Please call patient to discuss.     # [phone]

## 2020-03-05 ENCOUNTER — OFFICE VISIT (OUTPATIENT)
Dept: BARIATRICS/WEIGHT MGMT | Age: 55
End: 2020-03-05

## 2020-03-05 VITALS
HEART RATE: 69 BPM | HEIGHT: 70 IN | BODY MASS INDEX: 29.63 KG/M2 | DIASTOLIC BLOOD PRESSURE: 67 MMHG | WEIGHT: 207 LBS | SYSTOLIC BLOOD PRESSURE: 109 MMHG

## 2020-03-05 PROBLEM — E66.3 OVERWEIGHT (BMI 25.0-29.9): Status: ACTIVE | Noted: 2020-03-05

## 2020-03-05 PROBLEM — E66.9 OBESITY (BMI 30-39.9): Status: RESOLVED | Noted: 2019-07-18 | Resolved: 2020-03-05

## 2020-03-05 PROBLEM — E66.01 SEVERE OBESITY (BMI 35.0-35.9 WITH COMORBIDITY) (HCC): Status: RESOLVED | Noted: 2019-08-15 | Resolved: 2020-03-05

## 2020-03-05 PROCEDURE — 99024 POSTOP FOLLOW-UP VISIT: CPT | Performed by: SURGERY

## 2020-03-05 RX ORDER — CIPROFLOXACIN 500 MG/1
500 TABLET, FILM COATED ORAL 2 TIMES DAILY
COMMUNITY
End: 2020-08-18

## 2020-03-05 NOTE — PROGRESS NOTES
Dietary Assessment Note      Vitals:   Vitals:    03/05/20 1102   BP: 109/67   Pulse: 69   Weight: 207 lb (93.9 kg)   Height: 5' 10\" (1.778 m)   Patient lost 13 lbs over past 4 weeks. Pt thought he had a bladder infection, like when he passed kidney stones, lower pelvic pain - started about 1.5 weeks ago. Pain wakes him up at night.     Total Weight Loss: 55.4 lbs    Labs reviewed: no new labs    Protein intake: 60-80 grams/day - 1 protein shake per day (8oz 1% milk & cup of ice w/ 1 scoop powder)     Fluid intake: 48-64 oz/day - water / powerade zero / crystal light     Multivitamin/mineral intake: fusion - how many/day: 3-4 per day     Calcium intake: none     Other: none     Exercise: up moving around, no strenuous exercise     Nutrition Assessment: 6 week post-op visit.      Amount able to eat per sitting: ~1/2 cup or a little more     Following 30/30/30 rule: yes     Eating soft foods 4x/day plus 1 protein shake: lunch meat / CC / baked fish / hamburger / 1-rib (knows wasn't best choice)      Food Intolerances/issues: none     Client Concerns: some burping with fluids, believes he is drinking too fast     Goals:   - Sip fluids  - Move to phase 4     Plan: f/u at 6 weeks post-op    Marc Nichols

## 2020-03-05 NOTE — PROGRESS NOTES
 LUNG REMOVAL, PARTIAL Right 2005    Partial right lobectomy for diagnostic purpose. Surgery performed in [address] N/A 2020    ROBOTIC SLEEVE GASTRECTOMY; ROBOTIC HIATAL HERNIA REPAIR performed by Elisabet Res[facility]iz DO [facility] N/A 2019    EGD BIOPSY performed by Elisabet Resendiz DO at [facility] ENDOSCOPY     Family History   Problem Relation Age of Onset    Diabetes Father     High Cholesterol Father     High Blood Pressure Father     Emphysema Neg Hx     Heart Failure Neg Hx     Hypertension Neg Hx      Social History     Tobacco Use    Smoking status: Former Smoker     Packs/day: 3.00     Years: 20.00     Pack years: 60.00     Types: Cigarettes     Last attempt to quit: 2000     Years since quittin.1    Smokeless tobacco: Never Used   Substance Use Topics    Alcohol use: No     Alcohol/week: 0.0 standard drinks     Comment: seldom     I counseled the patient on the importance of not smoking and risks of ETOH. Allergies   Allergen Reactions    Erythromycin Hives     Vitals:    20 1102   BP: 109/67   Pulse: 69   Weight: 207 lb (93.9 kg)   Height: 5' 10\" (1.778 m)       Body mass index is 29.7 kg/m².       Current Outpatient Medications:     ciprofloxacin (CIPRO) 500 MG tablet, Take 500 mg by mouth 2 times daily, Disp: , Rfl:     aspirin (ASPIRIN LOW DOSE ADULT) 81 MG chewable tablet, Indications: restart in 5 days CHEW AND SWALLOW ONE TABLET BY MOUTH ONE TIME A DAY, Disp: 90 tablet, Rfl: 5    lisinopril (PRINIVIL;ZESTRIL) 10 MG tablet, TAKE 1 TABLET BY MOUTH ONE TIME DAILY, Disp: 30 tablet, Rfl: 1    Hyoscyamine Sulfate SL (LEVSIN/SL) 0.125 MG SUBL, Place 1 tablet under the tongue every 6 hours as needed (spasm), Disp: 30 each, Rfl: 0    ondansetron (ZOFRAN) 4 MG tablet, Take 1 tablet by mouth every 6 hours as needed for Nausea or Vomiting, Disp: 30 tablet, Rfl: 0    amLODIPine (NORVASC) 5 MG tablet, TAKE 1 TABLET BY MOUTH ONE TIME A DAY IN ADDITION TO LISINOPRIL FOR BLOOD PRESSURE, Disp: 90 tablet, Rfl: 1    INCRUSE ELLIPTA 62.5 MCG/INH AEPB, INHALE 1 PUFF BY MOUTH ONE TIME A DAY, Disp: 30 each, Rfl: 6    escitalopram (LEXAPRO) 10 MG tablet, TAKE 1 TABLET BY MOUTH ONE TIME A DAY, Disp: 90 tablet, Rfl: 1    carvedilol (COREG) 6.25 MG tablet, TAKE 1 TABLET BY MOUTH 2 TIMES A DAY WITH MEALS, Disp: 180 tablet, Rfl: 1    rosuvastatin (CRESTOR) 40 MG tablet, TAKE ONE TABLET BY MOUTH EVERY EVENING, Disp: 90 tablet, Rfl: 1    sodium chloride, Inhalant, 3 % nebulizer solution, Take 15 mLs by nebulization 2 times daily, Disp: 900 mL, Rfl: 0    omeprazole (PRILOSEC) 20 MG delayed release capsule, TAKE ONE CAPSULE BY MOUTH TWICE A DAY, Disp: 180 capsule, Rfl: 3    BREO ELLIPTA 200-25 MCG/INH AEPB, INHALE 1 PUFF BY MOUTH ONE TIME A DAY, Disp: 1 each, Rfl: 11    hydrocortisone (WESTCORT) 0.2 % cream, Apply topically 2 times daily Apply topically 2 times daily. , Disp: 60 g, Rfl: 0    nitroGLYCERIN (NITROSTAT) 0.4 MG SL tablet, up to max of 3 total doses.  If no relief after 1 dose, call 911., Disp: 25 tablet, Rfl: 3    clopidogrel (PLAVIX) 75 MG tablet, TAKE ONE TABLET BY MOUTH ONE TIME A DAY, Disp: 90 tablet, Rfl: 3    albuterol sulfate HFA (PROAIR HFA) 108 (90 BASE) MCG/ACT inhaler, Inhale 2 puffs into the lungs every 4 hours as needed for Wheezing or Shortness of Breath, Disp: 1 Inhaler, Rfl: 6    azelastine (ASTELIN) 0.1 % nasal spray, 2 sprays by Nasal route 2 times daily Use in each nostril as directed (Patient taking differently: 2 sprays by Nasal route 2 times daily as needed Use in each nostril as directed), Disp: 1 Bottle, Rfl: 3      Review of Systems - History obtained from the patient  General ROS: negative  Psychological ROS: negative  Respiratory ROS: negative  Cardiovascular ROS: negative  Gastrointestinal ROS:negative  Genito-Urinary ROS: negative  Musculoskeletal ROS: negative   Skin ROS: negative    Physical Exam surgery care. I spent 15 minutes face to face with patient with more than 50% of the time counseling and/or coordinating care for post-bariatric surgical care.     RTC in 6 weeks    Rosa Ramirez

## 2020-03-11 ENCOUNTER — HOSPITAL ENCOUNTER (OUTPATIENT)
Dept: CT IMAGING | Age: 55
Discharge: HOME | End: 2020-03-11
Payer: COMMERCIAL

## 2020-03-11 ENCOUNTER — OFFICE VISIT (OUTPATIENT)
Dept: FAMILY MEDICINE CLINIC | Age: 55
End: 2020-03-11
Payer: COMMERCIAL

## 2020-03-11 VITALS
HEART RATE: 62 BPM | SYSTOLIC BLOOD PRESSURE: 113 MMHG | HEIGHT: 70 IN | BODY MASS INDEX: 29.63 KG/M2 | DIASTOLIC BLOOD PRESSURE: 70 MMHG | TEMPERATURE: 97 F | WEIGHT: 207 LBS

## 2020-03-11 LAB
A/G RATIO: 1.8 (ref 1.1–2.2)
ACANTHOCYTES: ABNORMAL
ALBUMIN SERPL-MCNC: 4.4 G/DL (ref 3.4–5)
ALP BLD-CCNC: 87 U/L (ref 40–129)
ALT SERPL-CCNC: 20 U/L (ref 10–40)
ANION GAP SERPL CALCULATED.3IONS-SCNC: 13 MMOL/L (ref 3–16)
ANISOCYTOSIS: ABNORMAL
AST SERPL-CCNC: 23 U/L (ref 15–37)
ATYPICAL LYMPHOCYTE RELATIVE PERCENT: 9 % (ref 0–6)
BASOPHILS ABSOLUTE: 0.1 K/UL (ref 0–0.2)
BASOPHILS RELATIVE PERCENT: 1 %
BILIRUB SERPL-MCNC: 0.6 MG/DL (ref 0–1)
BILIRUBIN URINE: ABNORMAL
BILIRUBIN, POC: ABNORMAL
BLOOD URINE, POC: ABNORMAL
BLOOD, URINE: ABNORMAL
BUN BLDV-MCNC: 10 MG/DL (ref 7–20)
CALCIUM SERPL-MCNC: 9.4 MG/DL (ref 8.3–10.6)
CHLORIDE BLD-SCNC: 107 MMOL/L (ref 99–110)
CLARITY, POC: ABNORMAL
CLARITY: ABNORMAL
CO2: 22 MMOL/L (ref 21–32)
COLOR, POC: ABNORMAL
COLOR: ABNORMAL
CREAT SERPL-MCNC: 0.9 MG/DL (ref 0.9–1.3)
EOSINOPHILS ABSOLUTE: 0.5 K/UL (ref 0–0.6)
EOSINOPHILS RELATIVE PERCENT: 8 %
EPITHELIAL CELLS, UA: 0 /HPF (ref 0–5)
GFR AFRICAN AMERICAN: >60
GFR NON-AFRICAN AMERICAN: >60
GLOBULIN: 2.5 G/DL
GLUCOSE BLD-MCNC: 72 MG/DL (ref 70–99)
GLUCOSE URINE, POC: NEGATIVE
GLUCOSE URINE: NEGATIVE MG/DL
HCT VFR BLD CALC: 49.7 % (ref 40.5–52.5)
HEMOGLOBIN: 16.9 G/DL (ref 13.5–17.5)
HYALINE CASTS: 0 /LPF (ref 0–8)
KETONES, POC: NEGATIVE
KETONES, URINE: ABNORMAL MG/DL
LEUKOCYTE EST, POC: NEGATIVE
LEUKOCYTE ESTERASE, URINE: ABNORMAL
LYMPHOCYTES ABSOLUTE: 1.9 K/UL (ref 1–5.1)
LYMPHOCYTES RELATIVE PERCENT: 19 %
MCH RBC QN AUTO: 28.2 PG (ref 26–34)
MCHC RBC AUTO-ENTMCNC: 34 G/DL (ref 31–36)
MCV RBC AUTO: 83.1 FL (ref 80–100)
MICROSCOPIC EXAMINATION: YES
MONOCYTES ABSOLUTE: 0.4 K/UL (ref 0–1.3)
MONOCYTES RELATIVE PERCENT: 6 %
NEUTROPHILS ABSOLUTE: 3.9 K/UL (ref 1.7–7.7)
NEUTROPHILS RELATIVE PERCENT: 57 %
NITRITE, POC: NEGATIVE
NITRITE, URINE: NEGATIVE
OVALOCYTES: ABNORMAL
PDW BLD-RTO: 15.6 % (ref 12.4–15.4)
PH UA: 6 (ref 5–8)
PH, POC: 6
PLATELET # BLD: 201 K/UL (ref 135–450)
PMV BLD AUTO: 9.2 FL (ref 5–10.5)
POTASSIUM SERPL-SCNC: 4.8 MMOL/L (ref 3.5–5.1)
PROTEIN UA: 30 MG/DL
PROTEIN, POC: 30
RBC # BLD: 5.98 M/UL (ref 4.2–5.9)
RBC UA: >900 /HPF (ref 0–4)
SCHISTOCYTES: ABNORMAL
SODIUM BLD-SCNC: 142 MMOL/L (ref 136–145)
SPECIFIC GRAVITY UA: >1.03 (ref 1–1.03)
SPECIFIC GRAVITY, POC: >=1.03
TOTAL PROTEIN: 6.9 G/DL (ref 6.4–8.2)
URINE TYPE: ABNORMAL
UROBILINOGEN, POC: 1
UROBILINOGEN, URINE: 1 E.U./DL
WBC # BLD: 6.8 K/UL (ref 4–11)
WBC UA: 3 /HPF (ref 0–5)

## 2020-03-11 PROCEDURE — 36415 COLL VENOUS BLD VENIPUNCTURE: CPT | Performed by: FAMILY MEDICINE

## 2020-03-11 PROCEDURE — 3017F COLORECTAL CA SCREEN DOC REV: CPT | Performed by: FAMILY MEDICINE

## 2020-03-11 PROCEDURE — 81002 URINALYSIS NONAUTO W/O SCOPE: CPT | Performed by: FAMILY MEDICINE

## 2020-03-11 PROCEDURE — 74176 CT ABD & PELVIS W/O CONTRAST: CPT

## 2020-03-11 PROCEDURE — G8427 DOCREV CUR MEDS BY ELIG CLIN: HCPCS | Performed by: FAMILY MEDICINE

## 2020-03-11 PROCEDURE — 99214 OFFICE O/P EST MOD 30 MIN: CPT | Performed by: FAMILY MEDICINE

## 2020-03-11 PROCEDURE — G8482 FLU IMMUNIZE ORDER/ADMIN: HCPCS | Performed by: FAMILY MEDICINE

## 2020-03-11 PROCEDURE — G8417 CALC BMI ABV UP PARAM F/U: HCPCS | Performed by: FAMILY MEDICINE

## 2020-03-11 PROCEDURE — 1036F TOBACCO NON-USER: CPT | Performed by: FAMILY MEDICINE

## 2020-03-11 NOTE — PROGRESS NOTES
percutaneous coronary angioplasty     Chronic respiratory failure (HCC)     COPD exacerbation ([facility] 2016    Coronary artery disease involving native coronary artery of native heart with unstable angina pectoris ([address]     Diabetes ([address]     Diabetes ([facility]     Dyslipidemia     Dyspnea     GERD (gastroesophageal reflux disease)     Goodpasture syndrome ([address]     reason pt is on cellcept. He will need lung transplant eventually    High risk medications (not anticoagulants) long-term use     HTN (hypertension)     Hyperlipidemia     Hypoxemia     ILD (interstitial lung disease) ([address]     Left ureteral stone 2018    MDD (major depressive disorder)     ESTRELLITA on CPAP     Polycythemia     Positive FIT (fecal immunochemical test)     Prediabetes     Tubular adenoma     colonoscopy 2019    Ureteral calculus of right kidney transplant        Past Surgical History:   Procedure Laterality Date    BRONCHOSCOPY      COLONOSCOPY      COLONOSCOPY N/A 2019    COLONOSCOPY POLYPECTOMY ABLATION HOT SNARE OF TRANSVERSE COLON POLYP AND SIGMOID POLYP performed by Rafael Durand MD at [place] [address] CATH LAB PROCEDURE  2006??    ENDOSCOPY, COLON, DIAGNOSTIC      LUNG REMOVAL, PARTIAL Right 2005    Partial right lobectomy for diagnostic purpose.  Surgery performed in [address] N/A 2020    ROBOTIC SLEEVE GASTRECTOMY; ROBOTIC HIATAL HERNIA REPAIR performed by Doris Dunne DO at [address] [facility] N/A 2019    EGD BIOPSY performed by Doris Dunne DO at [address] History     Tobacco Use    Smoking status: Former Smoker     Packs/day: 3.00     Years: 20.00     Pack years: 60.00     Types: Cigarettes     Last attempt to quit: 2000     Years since quittin.2    Smokeless tobacco: Never Used   Substance Use Topics    Alcohol use: No     Alcohol/week: 0.0 standard drinks     Comment: seldom Family History   Problem Relation Age of Onset    Diabetes Father     High Cholesterol Father     High Blood Pressure Father     Emphysema Neg Hx     Heart Failure Neg Hx     Hypertension Neg Hx              ROS:  As documented in HPI    PHYSICAL EXAM:  Vitals:    03/11/20 1428   BP: 113/70   Pulse: 62   Temp: 97 °F (36.1 °C)   TempSrc: Oral   Weight: 207 lb (93.9 kg)   Height: 5' 10\" (1.778 m)     Body mass index is 29.7 kg/m². Constitutional:   · Reviewed vitals above  · Well Nourished, well developed, no distress       HENT:  · Normal external nose without lesions  · Bilateral TMs translucent with normal light reflex and bony landmarks  · Normal oropharynx without erythema or exudate  · Normal nasal mucosa without swelling or erythema  Neck:  · Symmetric and without masses  · No thyromegaly  Resp:  · Normal effort  · Clear to auscultation bilaterally without rhonchi, wheezing or crackles  Cardiovascular:  · On auscultation, normal S1 and S2 without murmurs, rubs or gallops  · No bruits of bilateral carotids and no JVD  Gastrointestinal:  · Some suprapubic tenderness with examination, without rebound or guarding   · nontender to the abdomen, nondistended, and no masses  · No hepatosplenomegaly  · No CVA tenderness bilaterally  Musculoskeletal:  · Normal Gait  · All extremities without clubbing, cyanosis or edema  Skin:  · No rashes on inspection  · No areas of increased heat or induration on palpation  Psych:  · Normal mood and affect  · Normal insight and judgement      Results for POC orders placed in visit on 03/11/20   POCT Urinalysis no Micro   Result Value Ref Range    Color, UA      Clarity, UA      Glucose, UA POC negative     Bilirubin, UA small     Ketones, UA negative     Spec Grav, UA >=1.030     Blood, UA POC large     pH, UA 6.0     Protein, UA POC 30     Urobilinogen, UA 1.0     Leukocytes, UA negative     Nitrite, UA negative         ASSESSMENT/PLAN:  1.  Hematuria, unspecified type/ flank pain/dysuria  Poc UA shows large blood and small bilirubin, with high specific gravity suggesting some dehydration, along with some protein. We will send to the lab for microscopic urinalysis    Also sending for culture. While he did have a catheter, it was only for 1 day, and this was almost 2 months ago. Discussed why it so unusual for a man to have a UTI. Kidney stones high in differential, given his history, and recent symptoms of intermittent flank pain, and feelings of passing small sand during urination. Ordering stat CT to look for kidney stone. Patient understands if he does have a kidney stone and develops a fever at any point in time, that this is a medical emergency and he needs to go to the emergency room immediately.     Also checking the following to ensure all are within normal limits and not indicative of a different underlying etiology:  - Comprehensive Metabolic Panel  - CBC Auto Differential      We will follow-up on all results and act accordingly

## 2020-03-12 ENCOUNTER — TELEPHONE (OUTPATIENT)
Dept: FAMILY MEDICINE CLINIC | Age: 55
End: 2020-03-12

## 2020-03-12 LAB — URINE CULTURE, ROUTINE: NORMAL

## 2020-03-12 RX ORDER — HYDROCODONE BITARTRATE AND ACETAMINOPHEN 5; 325 MG/1; MG/1
1 TABLET ORAL EVERY 6 HOURS PRN
Qty: 20 TABLET | Refills: 0 | Status: SHIPPED | OUTPATIENT
Start: 2020-03-12 | End: 2020-03-17

## 2020-03-30 ENCOUNTER — TELEPHONE (OUTPATIENT)
Dept: FAMILY MEDICINE CLINIC | Age: 55
End: 2020-03-30

## 2020-03-30 NOTE — TELEPHONE ENCOUNTER
Patient said when he was seen Dr. Jose Juan Jarvis was going to recommend something on [employer] for dry mouth so he can wear his cpap also Dr. Fahad Flores faxed some paperwork over and he wants to know if it was completed,it looks like it was scanned into the media section

## 2020-03-31 NOTE — TELEPHONE ENCOUNTER
bob    I don't knwo what you are referring to as far as paperwork.     Please have MD in office help with this

## 2020-04-24 ENCOUNTER — OFFICE VISIT (OUTPATIENT)
Dept: BARIATRICS/WEIGHT MGMT | Age: 55
End: 2020-04-24

## 2020-04-24 VITALS — BODY MASS INDEX: 27.92 KG/M2 | HEIGHT: 70 IN | WEIGHT: 195 LBS

## 2020-04-24 PROCEDURE — 99999 PR OFFICE/OUTPT VISIT,PROCEDURE ONLY: CPT

## 2020-04-24 NOTE — PATIENT INSTRUCTIONS
Due to the COVID-19 restrictions on close contact interactions the patient's visit was conducted via telephone in emmie of a face to face visit. The patient is here through telemedicine for their 12 week postop visit.

## 2020-07-16 ENCOUNTER — VIRTUAL VISIT (OUTPATIENT)
Dept: BARIATRICS/WEIGHT MGMT | Age: 55
End: 2020-07-16
Payer: COMMERCIAL

## 2020-07-16 PROCEDURE — 1036F TOBACCO NON-USER: CPT | Performed by: SURGERY

## 2020-07-16 PROCEDURE — G8428 CUR MEDS NOT DOCUMENT: HCPCS | Performed by: SURGERY

## 2020-07-16 PROCEDURE — 3017F COLORECTAL CA SCREEN DOC REV: CPT | Performed by: SURGERY

## 2020-07-16 PROCEDURE — 99213 OFFICE O/P EST LOW 20 MIN: CPT | Performed by: SURGERY

## 2020-07-16 PROCEDURE — G8417 CALC BMI ABV UP PARAM F/U: HCPCS | Performed by: SURGERY

## 2020-07-16 NOTE — PROGRESS NOTES
[facility] ([facility]) Physicians   [employer]  Austin Healy DO       TELEHEALTH EVALUATION -- Audio/Visual (During FMVWU-40 public health emergency)     Chief Complaint   Patient presents with    Weight Management           HPI:    Due to the COVID-19 pandemic restrictions on close contact interactions as recommended by CDC and health authorities, the patient's visit was conducted via telemedicine in lieu of a face to face visit. Patient verbally consented and agreed to proceed. Kristofer Garcia is a very pleasant 48 y.o.  male  And multiple medical problems who is presenting for bariatric follow up care. Lydia Kemp is s/p laparoscopic sleeve gastrectomy by me. Comes today to the clinic without any complaints. Patient denies any nausea, vomiting, fevers, chills, shortness of breath, chest pain, constipation or urinary symptoms. Denies any heartburn nor dysphagia. Lydia Kemp is feeling very well, and is very active. Patient is very pleased with the weight loss and resolution of co-morbid conditions. Past Medical History:   Diagnosis Date    CAD S/P percutaneous coronary angioplasty     Chronic respiratory failure (HCC)     COPD exacerbation ([address] 9/7/2016    Coronary artery disease involving native coronary artery of native heart with unstable angina pectoris ([address]     Diabetes ([address]     Diabetes ([address]     Dyslipidemia     Dyspnea     GERD (gastroesophageal reflux disease)     Goodpasture syndrome ([address]     reason pt is on cellcept.   He will need lung transplant eventually    High risk medications (not anticoagulants) long-term use     HTN (hypertension)     Hyperlipidemia     Hypoxemia     ILD (interstitial lung disease) ([address]     Left ureteral stone 4/13/2018    MDD (major depressive disorder)     ESTRELLITA on CPAP     Polycythemia     Positive FIT (fecal immunochemical test)     Prediabetes     Tubular adenoma     colonoscopy 9/2019    Ureteral calculus of right kidney transplant 2005 Past Surgical History:   Procedure Laterality Date    BRONCHOSCOPY      COLONOSCOPY      COLONOSCOPY N/A 2019    COLONOSCOPY POLYPECTOMY ABLATION HOT SNARE OF TRANSVERSE COLON POLYP AND SIGMOID POLYP performed by Linda Trivedi MD at [place] [address] CATH LAB PROCEDURE  2006??    ENDOSCOPY, COLON, DIAGNOSTIC      LUNG REMOVAL, PARTIAL Right 2005    Partial right lobectomy for diagnostic purpose. Surgery performed in [address] N/A 2020    ROBOTIC SLEEVE GASTRECTOMY; ROBOTIC HIATAL HERNIA REPAIR performed by Christiana Kumar DO at [facility] N/A 2019    EGD BIOPSY performed by Christiana Kumar DO at [facility] ENDOSCOPY     Family History   Problem Relation Age of Onset    Diabetes Father     High Cholesterol Father     High Blood Pressure Father     Emphysema Neg Hx     Heart Failure Neg Hx     Hypertension Neg Hx      Social History     Tobacco Use    Smoking status: Former Smoker     Packs/day: 3.00     Years: 20.00     Pack years: 60.00     Types: Cigarettes     Last attempt to quit: 2000     Years since quittin.5    Smokeless tobacco: Never Used   Substance Use Topics    Alcohol use: No     Alcohol/week: 0.0 standard drinks     Comment: seldom     I counseled the patient on the importance of not smoking and risks of ETOH. Allergies   Allergen Reactions    Erythromycin Hives     There were no vitals filed for this visit.       Lab Results   Component Value Date    WBC 6.8 2020    RBC 5.98 2020    HGB 16.9 2020    HCT 49.7 2020    MCV 83.1 2020    MCH 28.2 2020    MCHC 34.0 2020    MPV 9.2 2020    NEUTOPHILPCT 57.0 2020    LYMPHOPCT 19.0 2020    MONOPCT 6.0 2020    EOSRELPCT 8.0 2020    BASOPCT 1.0 2020    NEUTROABS 3.9 2020    LYMPHSABS 1.9 2020    MONOSABS 0.4 2020    EOSABS 0.5 2020     Lab Results Component Value Date     03/11/2020    K 4.8 03/11/2020    K 4.0 10/03/2018     03/11/2020    CO2 22 03/11/2020    ANIONGAP 13 03/11/2020    GLUCOSE 72 03/11/2020    BUN 10 03/11/2020    CREATININE 0.9 03/11/2020    LABGLOM >60 03/11/2020    GFRAA >60 03/11/2020    CALCIUM 9.4 03/11/2020    PROT 6.9 03/11/2020    PROT 7.8 02/18/2013    LABALBU 4.4 03/11/2020    AGRATIO 1.8 03/11/2020    BILITOT 0.6 03/11/2020    ALKPHOS 87 03/11/2020    ALT 20 03/11/2020    AST 23 03/11/2020    GLOB 2.5 03/11/2020     Lab Results   Component Value Date    CHOL 101 08/07/2019    TRIG 115 08/07/2019    HDL 31 08/07/2019    LDLCALC 47 08/07/2019    LABVLDL 23 08/07/2019     Lab Results   Component Value Date    TSHREFLEX 1.28 08/07/2019     Lab Results   Component Value Date    IRON 80 08/07/2019    TIBC 373 08/07/2019    LABIRON 21 08/07/2019     Lab Results   Component Value Date    PKAUKXOF09 677 08/07/2019    FOLATE 4.37 08/07/2019     Lab Results   Component Value Date    VITD25 22.8 08/07/2019     Lab Results   Component Value Date    LABA1C 7.0 08/07/2019    .2 08/07/2019         Current Outpatient Medications:     ciprofloxacin (CIPRO) 500 MG tablet, Take 500 mg by mouth 2 times daily, Disp: , Rfl:     aspirin (ASPIRIN LOW DOSE ADULT) 81 MG chewable tablet, Indications: restart in 5 days CHEW AND SWALLOW ONE TABLET BY MOUTH ONE TIME A DAY, Disp: 90 tablet, Rfl: 5    lisinopril (PRINIVIL;ZESTRIL) 10 MG tablet, TAKE 1 TABLET BY MOUTH ONE TIME DAILY, Disp: 30 tablet, Rfl: 1    Hyoscyamine Sulfate SL (LEVSIN/SL) 0.125 MG SUBL, Place 1 tablet under the tongue every 6 hours as needed (spasm), Disp: 30 each, Rfl: 0    ondansetron (ZOFRAN) 4 MG tablet, Take 1 tablet by mouth every 6 hours as needed for Nausea or Vomiting, Disp: 30 tablet, Rfl: 0    amLODIPine (NORVASC) 5 MG tablet, TAKE 1 TABLET BY MOUTH ONE TIME A DAY IN ADDITION TO LISINOPRIL FOR BLOOD PRESSURE, Disp: 90 tablet, Rfl: 1    INCRUSE ELLIPTA 62.5 MCG/INH AEPB, INHALE 1 PUFF BY MOUTH ONE TIME A DAY, Disp: 30 each, Rfl: 6    escitalopram (LEXAPRO) 10 MG tablet, TAKE 1 TABLET BY MOUTH ONE TIME A DAY, Disp: 90 tablet, Rfl: 1    carvedilol (COREG) 6.25 MG tablet, TAKE 1 TABLET BY MOUTH 2 TIMES A DAY WITH MEALS, Disp: 180 tablet, Rfl: 1    rosuvastatin (CRESTOR) 40 MG tablet, TAKE ONE TABLET BY MOUTH EVERY EVENING, Disp: 90 tablet, Rfl: 1    sodium chloride, Inhalant, 3 % nebulizer solution, Take 15 mLs by nebulization 2 times daily, Disp: 900 mL, Rfl: 0    omeprazole (PRILOSEC) 20 MG delayed release capsule, TAKE ONE CAPSULE BY MOUTH TWICE A DAY, Disp: 180 capsule, Rfl: 3    BREO ELLIPTA 200-25 MCG/INH AEPB, INHALE 1 PUFF BY MOUTH ONE TIME A DAY, Disp: 1 each, Rfl: 11    hydrocortisone (WESTCORT) 0.2 % cream, Apply topically 2 times daily Apply topically 2 times daily. , Disp: 60 g, Rfl: 0    nitroGLYCERIN (NITROSTAT) 0.4 MG SL tablet, up to max of 3 total doses.  If no relief after 1 dose, call 911., Disp: 25 tablet, Rfl: 3    clopidogrel (PLAVIX) 75 MG tablet, TAKE ONE TABLET BY MOUTH ONE TIME A DAY, Disp: 90 tablet, Rfl: 3    albuterol sulfate HFA (PROAIR HFA) 108 (90 BASE) MCG/ACT inhaler, Inhale 2 puffs into the lungs every 4 hours as needed for Wheezing or Shortness of Breath, Disp: 1 Inhaler, Rfl: 6    azelastine (ASTELIN) 0.1 % nasal spray, 2 sprays by Nasal route 2 times daily Use in each nostril as directed (Patient taking differently: 2 sprays by Nasal route 2 times daily as needed Use in each nostril as directed), Disp: 1 Bottle, Rfl: 3      Review of Systems - History obtained from the patient  General ROS: negative  Psychological ROS: negative  Ophthalmic ROS: negative  Neurological ROS: negative  ENT ROS: negative  Allergy and Immunology ROS: negative  Hematological and Lymphatic ROS: negative  Endocrine ROS: negative  Breast ROS: negative  Respiratory ROS: negative  Cardiovascular ROS: negative  Gastrointestinal ROS:negative  Genito-Urinary ROS: negative  Musculoskeletal ROS: negative   Skin ROS: negative    Physical Exam   Deferred     A/P:    Miguel Villalobos was seen today for weight management. Diagnoses and all orders for this visit:    Overweight (BMI 25.0-29. 9)    Status post laparoscopic sleeve gastrectomy          Abbey Chahal is 48 y.o. male , now with There is no height or weight on file to calculate BMI. s/p Sleeve gastrectomy, has lost 18 lbs since last visit, total of 85 lbs weight loss. The patient underwent dietary counseling with myself &/or registered dietician. I have reviewed, discussed and agree with the dietary plan. Patient is trying hard to keep good dietary and behavior modifications. Patient is monitoring portion sizes, food choices and liquid calories. Patient is trying to exercise regularly. Patient pleased with the surgery outcomes. We discussed how his weight affects his overall health including:  Patient Active Problem List   Diagnosis    ILD (interstitial lung disease) ([address]    Goodpasture syndrome ([address]    Essential hypertension    Chronic respiratory failure ([address]    COPD, very severe ([address]    Chest pain    ESTRELLITA treated with BiPAP    CAD S/P percutaneous coronary angioplasty    Mixed hyperlipidemia    Coronary artery disease involving native coronary artery of native heart without angina pectoris    Diabetes ([address]    Polycythemia    Hiatal hernia with GERD    MDD (major depressive disorder)    Positive FIT (fecal immunochemical test)    Adenomatous polyp of transverse colon    Polyp of sigmoid colon    Tubular adenoma    Overweight (BMI 25.0-29.9)    and importance of weight loss to alleviate those co morbid conditions. I encouraged the patient to continue exercise and keeping healthy eating habits. Also counseled the patient extensively on post surgery care.      Obesity as a disease is considered a high risk to patients overall health and should therefore be considered a high risk disease state. I spent a total of 15 minutes via telemedicine (Video) with the patient and greater than 50% of the time was spent in counseling, answering questions, addressing concerns, reviewing labs and/or other studies with the patient as well as coordinating care. RTC in 3 months  Healthy Diet and Exercise   Nutrition labs     Patient advised that its their responsibility to follow up for care, studies, referrals and/or labs ordered today. Pursuant to the emergency declaration under the [facility], [facility] waiver authority and the [employer] and [employer] Act, this Virtual Visit was conducted, with patient's consent, to reduce the patient's risk of exposure to COVID-19 and provide continuity of care for an established patient. Services were provided through a video synchronous / telephone discussion virtually to substitute for in-person clinic visit.

## 2020-08-17 ENCOUNTER — NURSE TRIAGE (OUTPATIENT)
Dept: OTHER | Facility: CLINIC | Age: 55
End: 2020-08-17

## 2020-08-17 ENCOUNTER — TELEPHONE (OUTPATIENT)
Dept: FAMILY MEDICINE CLINIC | Age: 55
End: 2020-08-17

## 2020-08-17 NOTE — TELEPHONE ENCOUNTER
Reason for Disposition   Patient wants to be seen    Answer Assessment - Initial Assessment Questions  1. LOCATION: \"Where does it hurt? \"      Right lower  2. RADIATION: \"Does the pain shoot anywhere else? \" (e.g., chest, back)    To back and to left side  3. ONSET: \"When did the pain begin? \" (Minutes, hours or days ago)      4 days  4. SUDDEN: \"Gradual or sudden onset? \"      yes  5. PATTERN \"Does the pain come and go, or is it constant? \"     - If constant: \"Is it getting better, staying the same, or worsening? \"       (Note: Constant means the pain never goes away completely; most serious pain is constant and it progresses)      - If intermittent: \"How long does it last?\" \"Do you have pain now? \"      (Note: Intermittent means the pain goes away completely between bouts)    Comes and goes but is getting more constant  6. SEVERITY: \"How bad is the pain? \"  (e.g., Scale 1-10; mild, moderate, or severe)     - MILD (1-3): doesn't interfere with normal activities, abdomen soft and not tender to touch      - MODERATE (4-7): interferes with normal activities or awakens from sleep, tender to touch      - SEVERE (8-10): excruciating pain, doubled over, unable to do any normal activities       Pain at 8,9  7. RECURRENT SYMPTOM: \"Have you ever had this type of abdominal pain before? \" If so, ask: \"When was the last time? \" and \"What happened that time? \"     Yes and gets kidney stones  8. CAUSE: \"What do you think is causing the abdominal pain? \"     unknown  9. RELIEVING/AGGRAVATING FACTORS: \"What makes it better or worse? \" (e.g., movement, antacids, bowel movement)     Nothing helps  10. OTHER SYMPTOMS: \"Has there been any vomiting, diarrhea, constipation, or urine problems? \"     Regular BM    Protocols used: ABDOMINAL PAIN - MALE-ADULT-OH    Has had abd pain for 4 days. Is having regular BM and able to urinate normally.  Recommend per protocol to be seen by PCP within the next 2 days and he agrees ad can do a VV but prefers a office appt    Received call from [facility]. Call soft transferred to [address] to schedule appointment. Please do not reply to the triage nurse through this encounter. Any subsequent communication should be directly with the patient.

## 2020-08-17 NOTE — TELEPHONE ENCOUNTER
Abdominal pain lower right hand side x 4 days. No nausea, vomiting, nor fever. Wanting an appt. No availability. Please advise.  Pt is reachable [phone]

## 2020-08-18 ENCOUNTER — OFFICE VISIT (OUTPATIENT)
Dept: FAMILY MEDICINE CLINIC | Age: 55
End: 2020-08-18
Payer: COMMERCIAL

## 2020-08-18 VITALS
HEART RATE: 61 BPM | BODY MASS INDEX: 26.2 KG/M2 | WEIGHT: 183 LBS | DIASTOLIC BLOOD PRESSURE: 65 MMHG | HEIGHT: 70 IN | SYSTOLIC BLOOD PRESSURE: 105 MMHG

## 2020-08-18 LAB
A/G RATIO: 1.6 (ref 1.1–2.2)
ALBUMIN SERPL-MCNC: 4.6 G/DL (ref 3.4–5)
ALP BLD-CCNC: 86 U/L (ref 40–129)
ALT SERPL-CCNC: 10 U/L (ref 10–40)
AMYLASE: 57 U/L (ref 25–115)
ANION GAP SERPL CALCULATED.3IONS-SCNC: 11 MMOL/L (ref 3–16)
AST SERPL-CCNC: 15 U/L (ref 15–37)
BASOPHILS ABSOLUTE: 0 K/UL (ref 0–0.2)
BASOPHILS RELATIVE PERCENT: 0.6 %
BILIRUB SERPL-MCNC: 1.2 MG/DL (ref 0–1)
BUN BLDV-MCNC: 11 MG/DL (ref 7–20)
CALCIUM SERPL-MCNC: 9.7 MG/DL (ref 8.3–10.6)
CHLORIDE BLD-SCNC: 106 MMOL/L (ref 99–110)
CO2: 25 MMOL/L (ref 21–32)
CREAT SERPL-MCNC: 0.8 MG/DL (ref 0.9–1.3)
EOSINOPHILS ABSOLUTE: 0.1 K/UL (ref 0–0.6)
EOSINOPHILS RELATIVE PERCENT: 2.4 %
GFR AFRICAN AMERICAN: >60
GFR NON-AFRICAN AMERICAN: >60
GLOBULIN: 2.8 G/DL
GLUCOSE BLD-MCNC: 90 MG/DL (ref 70–99)
HCT VFR BLD CALC: 53 % (ref 40.5–52.5)
HEMOGLOBIN: 17.9 G/DL (ref 13.5–17.5)
LIPASE: 15 U/L (ref 13–60)
LYMPHOCYTES ABSOLUTE: 1.6 K/UL (ref 1–5.1)
LYMPHOCYTES RELATIVE PERCENT: 27.1 %
MCH RBC QN AUTO: 29.3 PG (ref 26–34)
MCHC RBC AUTO-ENTMCNC: 33.8 G/DL (ref 31–36)
MCV RBC AUTO: 86.7 FL (ref 80–100)
MONOCYTES ABSOLUTE: 0.6 K/UL (ref 0–1.3)
MONOCYTES RELATIVE PERCENT: 9.4 %
NEUTROPHILS ABSOLUTE: 3.7 K/UL (ref 1.7–7.7)
NEUTROPHILS RELATIVE PERCENT: 60.5 %
PDW BLD-RTO: 13.6 % (ref 12.4–15.4)
PLATELET # BLD: 165 K/UL (ref 135–450)
PMV BLD AUTO: 8.6 FL (ref 5–10.5)
POTASSIUM SERPL-SCNC: 4.6 MMOL/L (ref 3.5–5.1)
RBC # BLD: 6.12 M/UL (ref 4.2–5.9)
SODIUM BLD-SCNC: 142 MMOL/L (ref 136–145)
TOTAL PROTEIN: 7.4 G/DL (ref 6.4–8.2)
WBC # BLD: 6 K/UL (ref 4–11)

## 2020-08-18 PROCEDURE — 1036F TOBACCO NON-USER: CPT | Performed by: NURSE PRACTITIONER

## 2020-08-18 PROCEDURE — 99213 OFFICE O/P EST LOW 20 MIN: CPT | Performed by: NURSE PRACTITIONER

## 2020-08-18 PROCEDURE — 36415 COLL VENOUS BLD VENIPUNCTURE: CPT | Performed by: NURSE PRACTITIONER

## 2020-08-18 PROCEDURE — G8427 DOCREV CUR MEDS BY ELIG CLIN: HCPCS | Performed by: NURSE PRACTITIONER

## 2020-08-18 PROCEDURE — 3017F COLORECTAL CA SCREEN DOC REV: CPT | Performed by: NURSE PRACTITIONER

## 2020-08-18 PROCEDURE — G8417 CALC BMI ABV UP PARAM F/U: HCPCS | Performed by: NURSE PRACTITIONER

## 2020-08-18 NOTE — PROGRESS NOTES
PROGRESS NOTE     [facility] ([facility]) Physicians  Eleazar Hay [address] [address]  [phone] office  [phone] fax    Date of Service:  8/18/2020    Subjective:      Patient ID: Bartolo Flower is a 48 y.o. male      CC: Abdominal pain    HPI  Acute GI Symptoms:  Patient complains of a 5 day history of abdominal pain- right lower quadrant, continuous, achy, moderate in intensity, without radiation. Symptoms have been worsening with time. Associated symptoms include none. Patient denies chest pain, nausea, vomiting, constipation, diarrhea, urinary frequency and fevers. Symptoms are exacerbated by nothing in particular. Prior history of similar symptoms: no.  Relevant PMH: bariatric surgery- gastric sleeve procedure. New exposures: none. Therapy to date: none. Prior diagnostic testing: none. Patient does have history of kidney stones but does not feel like this is the same.     Last BM yesterday and was normal.       Vitals:    08/18/20 1025   BP: 105/65   Pulse: 61   Weight: 183 lb (83 kg)   Height: 5' 10\" (1.778 m)       Outpatient Medications Marked as Taking for the 8/18/20 encounter (Office Visit) with ZAN Hernández - CNP   Medication Sig Dispense Refill    aspirin (ASPIRIN LOW DOSE ADULT) 81 MG chewable tablet Indications: restart in 5 days CHEW AND SWALLOW ONE TABLET BY MOUTH ONE TIME A DAY 90 tablet 5    lisinopril (PRINIVIL;ZESTRIL) 10 MG tablet TAKE 1 TABLET BY MOUTH ONE TIME DAILY 30 tablet 1    Hyoscyamine Sulfate SL (LEVSIN/SL) 0.125 MG SUBL Place 1 tablet under the tongue every 6 hours as needed (spasm) 30 each 0    ondansetron (ZOFRAN) 4 MG tablet Take 1 tablet by mouth every 6 hours as needed for Nausea or Vomiting 30 tablet 0    amLODIPine (NORVASC) 5 MG tablet TAKE 1 TABLET BY MOUTH ONE TIME A DAY IN ADDITION TO LISINOPRIL FOR BLOOD PRESSURE 90 tablet 1    INCRUSE ELLIPTA 62.5 MCG/INH AEPB INHALE 1 PUFF BY MOUTH ONE TIME A DAY 30 each 6  escitalopram (LEXAPRO) 10 MG tablet TAKE 1 TABLET BY MOUTH ONE TIME A DAY 90 tablet 1    carvedilol (COREG) 6.25 MG tablet TAKE 1 TABLET BY MOUTH 2 TIMES A DAY WITH MEALS 180 tablet 1    rosuvastatin (CRESTOR) 40 MG tablet TAKE ONE TABLET BY MOUTH EVERY EVENING 90 tablet 1    sodium chloride, Inhalant, 3 % nebulizer solution Take 15 mLs by nebulization 2 times daily 900 mL 0    omeprazole (PRILOSEC) 20 MG delayed release capsule TAKE ONE CAPSULE BY MOUTH TWICE A  capsule 3    BREO ELLIPTA 200-25 MCG/INH AEPB INHALE 1 PUFF BY MOUTH ONE TIME A DAY 1 each 11    hydrocortisone (WESTCORT) 0.2 % cream Apply topically 2 times daily Apply topically 2 times daily. 60 g 0    nitroGLYCERIN (NITROSTAT) 0.4 MG SL tablet up to max of 3 total doses. If no relief after 1 dose, call 911. 25 tablet 3    clopidogrel (PLAVIX) 75 MG tablet TAKE ONE TABLET BY MOUTH ONE TIME A DAY 90 tablet 3    albuterol sulfate HFA (PROAIR HFA) 108 (90 BASE) MCG/ACT inhaler Inhale 2 puffs into the lungs every 4 hours as needed for Wheezing or Shortness of Breath 1 Inhaler 6    azelastine (ASTELIN) 0.1 % nasal spray 2 sprays by Nasal route 2 times daily Use in each nostril as directed (Patient taking differently: 2 sprays by Nasal route 2 times daily as needed Use in each nostril as directed) 1 Bottle 3       Past Medical History:   Diagnosis Date    CAD S/P percutaneous coronary angioplasty     Chronic respiratory failure (HCC)     COPD exacerbation (HCC) 9/7/2016    Coronary artery disease involving native coronary artery of native heart with unstable angina pectoris (HCC)     Diabetes ([address]     Diabetes ([address]     Dyslipidemia     Dyspnea     GERD (gastroesophageal reflux disease)     Goodpasture syndrome ([address]     reason pt is on cellcept.   He will need lung transplant eventually    High risk medications (not anticoagulants) long-term use     HTN (hypertension)     Hyperlipidemia     Hypoxemia     ILD [facility]interstitial lung disease) [facility] [address]     Left ureteral stone 2018    MDD (major depressive disorder)     ESTRELLITA on CPAP     Polycythemia     Positive FIT (fecal immunochemical test)     Prediabetes     Tubular adenoma     colonoscopy 2019    Ureteral calculus of right kidney transplant        Past Surgical History:   Procedure Laterality Date    BRONCHOSCOPY      COLONOSCOPY      COLONOSCOPY N/A 2019    COLONOSCOPY POLYPECTOMY ABLATION HOT SNARE OF TRANSVERSE COLON POLYP AND SIGMOID POLYP performed by Julia De La Garza MD at [address] [place] [address] CATH LAB PROCEDURE  2006??    ENDOSCOPY, COLON, DIAGNOSTIC      LUNG REMOVAL, PARTIAL Right 2005    Partial right lobectomy for diagnostic purpose.  Surgery performed in [address] N/A 2020    ROBOTIC SLEEVE GASTRECTOMY; ROBOTIC HIATAL HERNIA REPAIR performed by Bernice Arizmendi DO at [address] [facility] N/A 2019    EGD BIOPSY performed by Bernice Arizmendi DO at [address] ENDOSCOPY       Social History     Tobacco Use    Smoking status: Former Smoker     Packs/day: 3.00     Years: 20.00     Pack years: 60.00     Types: Cigarettes     Last attempt to quit: 2000     Years since quittin.6    Smokeless tobacco: Never Used   Substance Use Topics    Alcohol use: No     Alcohol/week: 0.0 standard drinks     Comment: seldom       Family History   Problem Relation Age of Onset    Diabetes Father     High Cholesterol Father     High Blood Pressure Father     Emphysema Neg Hx     Heart Failure Neg Hx     Hypertension Neg Hx            Review of Systems  Constitutional:  Negative for activity or appetite change, fever or fatigue  HENT:  Negative for congestion,sinus pressure, or rhinorrhea  Eyes:  Negative for eye pain or visual changes  Resp:  Negative for SOB, chest tightness, cough  Cardiovascular: Negative for CP, palpitations, BLACKBURN, orthopnea, PND, LE edema  Gastrointestinal: Negative for melena, BRBPR, N/V/D. + abdominal pain  Endocrine:  Negative for polydipsia and polyuria  :  Negative for dysuria, flank pain or urinary frequency  Musculoskeletal:  Negative for back pain or myalgias  Neuro:  Negative for dizziness or lightheadedness  Psych: negative for depression or anxiety      Objective:   Constitutional:   · Reviewed vitals above  · Well Nourished, well developed, no distress       HENT:  · Normal external nose without lesions  · Bilateral TMs translucent with normal light reflex and bony landmarks  · Normal oropharynx without erythema or exudate  · Normal nasal mucosa without swelling or erythema  Neck:  · Symmetric and without masses  · No thyromegaly  Resp:  · Normal effort  · Clear to auscultation bilaterally without rhonchi, wheezing or crackles  Cardiovascular:  · On auscultation, normal S1 and S2 without murmurs, rubs or gallops  · No bruits of bilateral carotids and no JVD  Gastrointestinal:  · Nondistended, and no masses  · + tenderness to right and left lower quadrant. No rebound tenderness or guarding. · No hepatosplenomegaly  Musculoskeletal:  · Normal Gait  · All extremities without clubbing, cyanosis or edema  Skin:  · No rashes on inspection  · No areas of increased heat or induration on palpation  Psych:  · Normal mood and affect  · Normal insight and judgement    Assessment / Plan:     1. Lower abdominal pain  Unknown cause but differentials include diverticulitis, appendicitis, pancreatitis and bowel obstruction. Patient did just have recent CT of abdomen in March which showed kidney stone. Diverticulosis was not noted on that CAT scan. Patient is in no acute distress and does not appear septic. Will start with lab work to rule out pancreatitis and evaluate white blood cell count, liver and kidney function. We will also schedule CT of abdomen pelvis to rule out diverticulitis, appendicitis and bowel obstruction. Patient in agreement with plan. Awaiting results.     - Amylase  - Lipase  - Comprehensive Metabolic Panel  - CBC Auto Differential  - CT ABDOMEN PELVIS W WO CONTRAST Additional Contrast? Radiologist Recommendation;  Future

## 2020-08-21 RX ORDER — IBUPROFEN 200 MG
200 TABLET ORAL EVERY 6 HOURS PRN
COMMUNITY

## 2020-08-21 RX ORDER — SIMVASTATIN 40 MG
TABLET ORAL
COMMUNITY
End: 2020-08-25

## 2020-08-21 RX ORDER — FUROSEMIDE 20 MG/1
TABLET ORAL
COMMUNITY
End: 2021-09-09 | Stop reason: CLARIF

## 2020-08-24 ENCOUNTER — HOSPITAL ENCOUNTER (OUTPATIENT)
Dept: CT IMAGING | Age: 55
Discharge: HOME | End: 2020-08-24
Payer: COMMERCIAL

## 2020-08-24 PROCEDURE — 74177 CT ABD & PELVIS W/CONTRAST: CPT

## 2020-08-24 PROCEDURE — 6360000004 HC RX CONTRAST MEDICATION: Performed by: NURSE PRACTITIONER

## 2020-08-24 RX ADMIN — IOHEXOL 50 ML: 240 INJECTION, SOLUTION INTRATHECAL; INTRAVASCULAR; INTRAVENOUS; ORAL at 10:20

## 2020-08-24 RX ADMIN — IOPAMIDOL 75 ML: 755 INJECTION, SOLUTION INTRAVENOUS at 10:21

## 2020-08-24 NOTE — PROGRESS NOTES
Refill    ibuprofen (ADVIL;MOTRIN) 200 MG tablet ibuprofen 200 MG Oral Tablet        Active      furosemide (LASIX) 20 MG tablet furosemide 20 MG Oral Tablet  by mouth  prn    Active      aspirin (ASPIRIN LOW DOSE ADULT) 81 MG chewable tablet Indications: restart in 5 days CHEW AND SWALLOW ONE TABLET BY MOUTH ONE TIME A DAY 90 tablet 5    lisinopril (PRINIVIL;ZESTRIL) 10 MG tablet TAKE 1 TABLET BY MOUTH ONE TIME DAILY 30 tablet 1    Hyoscyamine Sulfate SL (LEVSIN/SL) 0.125 MG SUBL Place 1 tablet under the tongue every 6 hours as needed (spasm) 30 each 0    ondansetron (ZOFRAN) 4 MG tablet Take 1 tablet by mouth every 6 hours as needed for Nausea or Vomiting 30 tablet 0    amLODIPine (NORVASC) 5 MG tablet TAKE 1 TABLET BY MOUTH ONE TIME A DAY IN ADDITION TO LISINOPRIL FOR BLOOD PRESSURE 90 tablet 1    INCRUSE ELLIPTA 62.5 MCG/INH AEPB INHALE 1 PUFF BY MOUTH ONE TIME A DAY 30 each 6    escitalopram (LEXAPRO) 10 MG tablet TAKE 1 TABLET BY MOUTH ONE TIME A DAY 90 tablet 1    carvedilol (COREG) 6.25 MG tablet TAKE 1 TABLET BY MOUTH 2 TIMES A DAY WITH MEALS 180 tablet 1    rosuvastatin (CRESTOR) 40 MG tablet TAKE ONE TABLET BY MOUTH EVERY EVENING 90 tablet 1    sodium chloride, Inhalant, 3 % nebulizer solution Take 15 mLs by nebulization 2 times daily 900 mL 0    omeprazole (PRILOSEC) 20 MG delayed release capsule TAKE ONE CAPSULE BY MOUTH TWICE A  capsule 3    BREO ELLIPTA 200-25 MCG/INH AEPB INHALE 1 PUFF BY MOUTH ONE TIME A DAY 1 each 11    hydrocortisone (WESTCORT) 0.2 % cream Apply topically 2 times daily Apply topically 2 times daily. 60 g 0    nitroGLYCERIN (NITROSTAT) 0.4 MG SL tablet up to max of 3 total doses.  If no relief after 1 dose, call 911. 25 tablet 3    clopidogrel (PLAVIX) 75 MG tablet TAKE ONE TABLET BY MOUTH ONE TIME A DAY 90 tablet 3    albuterol sulfate HFA (PROAIR HFA) 108 (90 BASE) MCG/ACT inhaler Inhale 2 puffs into the lungs every 4 hours as needed for Wheezing or Shortness of Breath 1 Inhaler 6    azelastine (ASTELIN) 0.1 % nasal spray 2 sprays by Nasal route 2 times daily Use in each nostril as directed (Patient taking differently: 2 sprays by Nasal route 2 times daily as needed Use in each nostril as directed) 1 Bottle 3     No current facility-administered medications for this visit. Physical Exam:   /75   Pulse 62   Temp 96.4 °F (35.8 °C)   Ht 5' 10\" (1.778 m)   Wt 182 lb 12.8 oz (82.9 kg) Comment: with shoes  SpO2 92%   BMI 26.23 kg/m²   No intake or output data in the 24 hours ending 08/25/20 1405  Wt Readings from Last 2 Encounters:   08/25/20 182 lb 12.8 oz (82.9 kg)   08/18/20 183 lb (83 kg)     Constitutional: He is oriented to person, place, and time. He appears well-developed and well-nourished. In no acute distress. Head: Normocephalic and atraumatic. Neck: Neck supple. No JVD present. Carotid bruit is not present. No mass and no thyromegaly present. No lymphadenopathy present. Cardiovascular: Normal rate, regular rhythm, normal heart sounds and intact distal pulses. Exam reveals no gallop and no friction rub. No murmur heard. Pulmonary/Chest: Effort normal and breath sounds normal. No respiratory distress. He has no wheezes, rhonchi or rales. Abdominal: Soft, non-tender. Bowel sounds and aorta are normal. He exhibits no organomegaly, mass or bruit. Extremities: No edema, cyanosis, or clubbing. Pulses are 2+ radial/carotid/dorsalis pedis and posterior tibial bilaterally. Neurological: He is alert and oriented to person, place, and time. He has normal reflexes. No cranial nerve deficit. Coordination normal.   Skin: Skin is warm and dry. There is no rash or diaphoresis. Psychiatric: He has a normal mood and affect.  His speech is normal and behavior is normal.     EKG Interpretation 8/26/19: Sinus rhythm with prior septal infarct  EKG Interpretation 8/25/20: Sinus rhythm with prior septal infarct      Procedures:     Cath 7/26/17  INTERVENTIONS PERFORMED:  1.  We intervened on the second diagonal branch of the left anterior  descending and performed a balloon angioplasty only using 2.5 x 12-mm  balloon catheter.  This was an 80% stenotic lesion which was reduced  to 20%. 2.  Distal left anterior descending artery also had stenosis, this was  90% and after balloon angioplasty reduced to 20%. [facility] 4/17/18  1. Normal right heart pressures with a right atrial pressure of 1 and a  pulmonary capillary wedge pressure of 8.  2.  No evidence of pulmonary hypertension with an instantaneous pressure of  26/14 and a mean pulmonary arterial pressure of 20.  3.  Normal cardiac outputs by thermodilution at 6.73 liters per minute with  a cardiac index of 2.95 liters per kilogram per minute. By second equation  the cardiac output was 4.76 with a cardiac index of 2.09. Of note, the  patient does have erythrocytosis. 4.  Pulmonary vascular resistance 147.  5.  Normal mixed venous oxygen saturations with a superior vena cava  saturation of 70 and a pulmonary arterial saturation of 73%. Imaging:     Echo 9/2016  Summary  Normal LV size and systolic function: EF is   60%. Grade I diastolic  dysfunction. Left atrium is of normal size. Normal right ventricular size.     Stress Test: 9/2016  Summary    Pharmacological Stress/MPI Results:        1. Technically a satisfactory study.    2. Normal pharmacological stress portion of the study.    3. No evidence of Ischemia by Myocardial Perfusion Imaging.    4. Gated Study shows normal LV size and Systolic function; EF is 70 %.        Lexiscan stress test 9/12/17   Summary    There is no obvious ischemia in this study.  There is an area of decrease    tracer uptake at the apex both at stress and with rest that could represent    an area of prior infarct but is probably more bowel artifact.    Diaphragmatic and bowel attenuation present.    Non-diagnostic EKG response due to failure to reach target heart rate.    Appropriate hemodynamic response to Lexiscan with no significant ST changes.      Echo 10/6/17  Left ventricular size is normal.  Mild concentric left ventricular hypertrophy is present. Global left ventricular function is normal with ejection fraction estimated from 55 % to 60 %. Normal right ventricular size and function. Diastolic filling parameters suggests grade I diastolic dysfunction .     Stress perfusion 10/3/18  There is no reversible ischemia noted in this study.   Lorilee Keturah is a small area of perfusion defect in the basal to mid inferior wall    both with stress and rest. There is breast and diaphragm attenuation    suggesting more artifact than scar.    Normal LV size and systolic function.    Non-diagnostic EKG response due to failure to reach target heart rate.    Overall findings represent a low risk study. Lab Review:   Lab Results   Component Value Date    TRIG 115 08/07/2019    HDL 31 08/07/2019    LDLCALC 47 08/07/2019    LABVLDL 23 08/07/2019     Lab Results   Component Value Date     08/18/2020    K 4.6 08/18/2020    K 4.0 10/03/2018    BUN 11 08/18/2020    CREATININE 0.8 08/18/2020       Assessment:  1. CAD of native arteries without angina  2. ILD  3. Essential hypertension  4. Hyperlipidemia LDL goal <70mg/dL  5. Obesity, unspecified    Plan:  I think that Mr. Davidson  is entirely stable from a cardiovascular standpoint. I see no need to make any changes currently in his medical regimen. He is not endorsing any symptoms representing angina and his blood pressure is well controlled. I will have him complete a fasting lipid profile with AST and ALT in the next few weeks. I will see him in office for follow up in 1 year. This note was scribed in the presence of Therese Austin MD by [employer], RN. Physician Attestation:  The scribes documentation has been prepared under my direction and personally reviewed by me in its entirety.      I,  Roland Gallagher personally performed the services described in this documentation as scribed by my RN,  Irina Lundberg in my presence, and I confirm that the note above accurately reflects all work, treatment, procedures, and medical decision making performed by me.

## 2020-08-25 ENCOUNTER — OFFICE VISIT (OUTPATIENT)
Dept: CARDIOLOGY CLINIC | Age: 55
End: 2020-08-25
Payer: COMMERCIAL

## 2020-08-25 VITALS
HEIGHT: 70 IN | SYSTOLIC BLOOD PRESSURE: 103 MMHG | BODY MASS INDEX: 26.17 KG/M2 | DIASTOLIC BLOOD PRESSURE: 75 MMHG | OXYGEN SATURATION: 92 % | HEART RATE: 62 BPM | TEMPERATURE: 96.4 F | WEIGHT: 182.8 LBS

## 2020-08-25 PROCEDURE — 93000 ELECTROCARDIOGRAM COMPLETE: CPT | Performed by: INTERNAL MEDICINE

## 2020-08-25 PROCEDURE — 3017F COLORECTAL CA SCREEN DOC REV: CPT | Performed by: INTERNAL MEDICINE

## 2020-08-25 PROCEDURE — G8427 DOCREV CUR MEDS BY ELIG CLIN: HCPCS | Performed by: INTERNAL MEDICINE

## 2020-08-25 PROCEDURE — 1036F TOBACCO NON-USER: CPT | Performed by: INTERNAL MEDICINE

## 2020-08-25 PROCEDURE — G8417 CALC BMI ABV UP PARAM F/U: HCPCS | Performed by: INTERNAL MEDICINE

## 2020-08-25 PROCEDURE — 99214 OFFICE O/P EST MOD 30 MIN: CPT | Performed by: INTERNAL MEDICINE

## 2020-08-26 ENCOUNTER — TELEPHONE (OUTPATIENT)
Dept: FAMILY MEDICINE CLINIC | Age: 55
End: 2020-08-26

## 2020-08-26 NOTE — TELEPHONE ENCOUNTER
PT called in regarding the results of his CT. Informed PT of Dori's note. PT would like to know what if anything should he do next to ease the pain or find out what could be causing the pain.      Please call PT back: [phone]

## 2020-08-28 ENCOUNTER — HOSPITAL ENCOUNTER (OUTPATIENT)
Dept: CT IMAGING | Age: 55
Discharge: HOME | End: 2020-08-28
Payer: COMMERCIAL

## 2020-08-28 ENCOUNTER — OFFICE VISIT (OUTPATIENT)
Dept: FAMILY MEDICINE CLINIC | Age: 55
End: 2020-08-28
Payer: COMMERCIAL

## 2020-08-28 VITALS
OXYGEN SATURATION: 91 % | DIASTOLIC BLOOD PRESSURE: 71 MMHG | WEIGHT: 181.6 LBS | HEART RATE: 71 BPM | SYSTOLIC BLOOD PRESSURE: 111 MMHG | BODY MASS INDEX: 26 KG/M2 | TEMPERATURE: 98.7 F | RESPIRATION RATE: 16 BRPM | HEIGHT: 70 IN

## 2020-08-28 DIAGNOSIS — E78.5 HYPERLIPIDEMIA LDL GOAL <70: ICD-10-CM

## 2020-08-28 PROBLEM — F32.5 MAJOR DEPRESSIVE DISORDER WITH SINGLE EPISODE, IN FULL REMISSION (HCC): Status: ACTIVE | Noted: 2020-08-28

## 2020-08-28 LAB
ALT SERPL-CCNC: 10 U/L (ref 10–40)
AST SERPL-CCNC: 16 U/L (ref 15–37)
BACTERIA URINE, POC: NORMAL
BILIRUBIN URINE: 0 MG/DL
BLOOD, URINE: NEGATIVE
CASTS URINE, POC: NORMAL
CHOLESTEROL, FASTING: 227 MG/DL (ref 0–199)
CLARITY: CLEAR
COLOR: NORMAL
CRYSTALS URINE, POC: NORMAL
EPI CELLS URINE, POC: NORMAL
GLUCOSE BLD-MCNC: 86 MG/DL (ref 70–99)
GLUCOSE URINE: NORMAL
HDLC SERPL-MCNC: 38 MG/DL (ref 40–60)
KETONES, URINE: NEGATIVE
LDL CHOLESTEROL CALCULATED: 165 MG/DL
LEUKOCYTE EST, POC: NORMAL
NITRITE, URINE: NEGATIVE
PH UA: 5.5 (ref 4.5–8)
PROTEIN UA: NEGATIVE
RBC URINE, POC: NORMAL
SPECIFIC GRAVITY UA: 1.03 (ref 1–1.03)
TRIGLYCERIDE, FASTING: 121 MG/DL (ref 0–150)
UROBILINOGEN, URINE: NORMAL
VLDLC SERPL CALC-MCNC: 24 MG/DL
WBC URINE, POC: NORMAL
YEAST URINE, POC: NORMAL

## 2020-08-28 PROCEDURE — G8427 DOCREV CUR MEDS BY ELIG CLIN: HCPCS | Performed by: FAMILY MEDICINE

## 2020-08-28 PROCEDURE — 1036F TOBACCO NON-USER: CPT | Performed by: FAMILY MEDICINE

## 2020-08-28 PROCEDURE — 3017F COLORECTAL CA SCREEN DOC REV: CPT | Performed by: FAMILY MEDICINE

## 2020-08-28 PROCEDURE — 99214 OFFICE O/P EST MOD 30 MIN: CPT | Performed by: FAMILY MEDICINE

## 2020-08-28 PROCEDURE — 74176 CT ABD & PELVIS W/O CONTRAST: CPT

## 2020-08-28 PROCEDURE — 36415 COLL VENOUS BLD VENIPUNCTURE: CPT | Performed by: FAMILY MEDICINE

## 2020-08-28 PROCEDURE — 81000 URINALYSIS NONAUTO W/SCOPE: CPT | Performed by: FAMILY MEDICINE

## 2020-08-28 PROCEDURE — G8417 CALC BMI ABV UP PARAM F/U: HCPCS | Performed by: FAMILY MEDICINE

## 2020-08-28 RX ORDER — METHYLPREDNISOLONE 4 MG/1
TABLET ORAL
Qty: 1 KIT | Refills: 0 | Status: SHIPPED | OUTPATIENT
Start: 2020-08-28 | End: 2020-09-03

## 2020-08-28 NOTE — PROGRESS NOTES
PROGRESS NOTE     Isabelle Engel MD  [address] [facility] [address]  [address] [place] [address]  [phone] office  [phone] fax    Date of Service:  8/28/2020    Subjective:      Patient ID: .Leonel Rai is a 48 y.o. male      CC: LLQ and RLQ abd pain    HPI     Patient presenting with 2 weeks of sharp shooting pain on RLQ and LLQ. Pain in LLQ radiate around to left flank. RLQ abd pain is constant, 5/10 in severity usually. LLQ abd pain is intermittent, occurring 15-20 times an hour:  Lasting a couple seconds at a time. This pain is a 7/10 in severity. Nothing makes pain better or worse. APAP doesn't help. Stool alternates from loose to hard since gastric bypass surgery in January 2020, but no recent change. Pain doesn't change with type of stool consistency. No BRBPR, no melena. No fevers. Saw urology earlier this year for kidney stone and had lithotripsy in March 2020 w/o need for ureteral stent. He thinks that pain was similar in quality, but that pain was more constant. No hematuria currently, no urinary frequency, no painful urination. CT scan abd/pelvis 3/11/20  Impression    5 mm obstructing calculus within the distal right ureter.         Kidneys are otherwise unremarkable in appearance.         Dependent opacities at the lung bases likely represent atelectasis.         Chronic cystic changes again noted which are nonspecific but previously    evaluated by HRCT 01/21/2019. .                Pt saw my NP on 8/18/20, and blood work and CT scan were ordered. CT was done with contrast only, however; and therefore could not assess for the possibility of kidney stones.   Results below:    Patient did just have a colonoscopy last November, but prep was only fair,    FINDINGS colonoscopy November 2019:  DIGITAL RECTAL EXAM: normal  TERMINAL ILEUM: not intubated  COLORECTUM:              1. Polyp in transverse colon, ~4 mm, completely excised using hot snare  Basophils Absolute 08/18/2020 0.0         Vitals:    08/28/20 1133   BP: 111/71   Pulse: 71   Resp: 16   Temp: 98.7 °F (37.1 °C)   SpO2: 91%   Weight: 181 lb 9.6 oz (82.4 kg)   Height: 5' 10\" (1.778 m)       Outpatient Medications Marked as Taking for the 8/28/20 encounter (Office Visit) with Tiki Durham MD   Medication Sig Dispense Refill    ibuprofen (ADVIL;MOTRIN) 200 MG tablet ibuprofen 200 MG Oral Tablet        Active      furosemide (LASIX) 20 MG tablet furosemide 20 MG Oral Tablet  by mouth  prn    Active      aspirin (ASPIRIN LOW DOSE ADULT) 81 MG chewable tablet Indications: restart in 5 days CHEW AND SWALLOW ONE TABLET BY MOUTH ONE TIME A DAY 90 tablet 5    lisinopril (PRINIVIL;ZESTRIL) 10 MG tablet TAKE 1 TABLET BY MOUTH ONE TIME DAILY 30 tablet 1    Hyoscyamine Sulfate SL (LEVSIN/SL) 0.125 MG SUBL Place 1 tablet under the tongue every 6 hours as needed (spasm) 30 each 0    ondansetron (ZOFRAN) 4 MG tablet Take 1 tablet by mouth every 6 hours as needed for Nausea or Vomiting 30 tablet 0    amLODIPine (NORVASC) 5 MG tablet TAKE 1 TABLET BY MOUTH ONE TIME A DAY IN ADDITION TO LISINOPRIL FOR BLOOD PRESSURE 90 tablet 1    INCRUSE ELLIPTA 62.5 MCG/INH AEPB INHALE 1 PUFF BY MOUTH ONE TIME A DAY 30 each 6    escitalopram (LEXAPRO) 10 MG tablet TAKE 1 TABLET BY MOUTH ONE TIME A DAY 90 tablet 1    carvedilol (COREG) 6.25 MG tablet TAKE 1 TABLET BY MOUTH 2 TIMES A DAY WITH MEALS 180 tablet 1    rosuvastatin (CRESTOR) 40 MG tablet TAKE ONE TABLET BY MOUTH EVERY EVENING 90 tablet 1    sodium chloride, Inhalant, 3 % nebulizer solution Take 15 mLs by nebulization 2 times daily 900 mL 0    omeprazole (PRILOSEC) 20 MG delayed release capsule TAKE ONE CAPSULE BY MOUTH TWICE A  capsule 3    BREO ELLIPTA 200-25 MCG/INH AEPB INHALE 1 PUFF BY MOUTH ONE TIME A DAY 1 each 11    hydrocortisone (WESTCORT) 0.2 % cream Apply topically 2 times daily Apply topically 2 times daily.  60 g 0    nitroGLYCERIN (NITROSTAT) 0.4 MG SL tablet up to max of 3 total doses. If no relief after 1 dose, call 911. 25 tablet 3    clopidogrel (PLAVIX) 75 MG tablet TAKE ONE TABLET BY MOUTH ONE TIME A DAY 90 tablet 3    albuterol sulfate HFA (PROAIR HFA) 108 (90 BASE) MCG/ACT inhaler Inhale 2 puffs into the lungs every 4 hours as needed for Wheezing or Shortness of Breath 1 Inhaler 6    azelastine (ASTELIN) 0.1 % nasal spray 2 sprays by Nasal route 2 times daily Use in each nostril as directed (Patient taking differently: 2 sprays by Nasal route 2 times daily as needed Use in each nostril as directed) 1 Bottle 3       Past Medical History:   Diagnosis Date    CAD S/P percutaneous coronary angioplasty     Chronic respiratory failure (HCC)     COPD exacerbation (HCC) 9/7/2016    Coronary artery disease involving native coronary artery of native heart with unstable angina pectoris (HCC)     Diabetes ([address]     Diabetes ([address]     Dyslipidemia     Dyspnea     GERD (gastroesophageal reflux disease)     Goodpasture syndrome ([address]     reason pt is on cellcept.   He will need lung transplant eventually    High risk medications (not anticoagulants) long-term use     HTN (hypertension)     Hyperlipidemia     Hypoxemia     ILD (interstitial lung disease) ([address]     Left ureteral stone 4/13/2018    MDD (major depressive disorder)     ESTRELLITA on CPAP     Polycythemia     Positive FIT (fecal immunochemical test)     Prediabetes     Tubular adenoma     colonoscopy 9/2019    Ureteral calculus of right kidney transplant 2005       Past Surgical History:   Procedure Laterality Date    BRONCHOSCOPY      COLONOSCOPY      COLONOSCOPY N/A 9/4/2019    COLONOSCOPY POLYPECTOMY ABLATION HOT SNARE OF TRANSVERSE COLON POLYP AND SIGMOID POLYP performed by Anna Park MD at [place] [address] CATH LAB PROCEDURE  2006??    ENDOSCOPY, COLON, DIAGNOSTIC      LUNG REMOVAL, PARTIAL Right 2005    Partial right lobectomy for diagnostic purpose.  Surgery performed in [address] N/A 2020    ROBOTIC SLEEVE GASTRECTOMY; ROBOTIC HIATAL HERNIA REPAIR performed by Nahun Juan DO at  [facility] N/A 2019    EGD BIOPSY performed by Nahun Juan DO at [facility] ENDOSCOPY       Social History     Tobacco Use    Smoking status: Former Smoker     Packs/day: 3.00     Years: 20.00     Pack years: 60.00     Types: Cigarettes     Last attempt to quit: 2000     Years since quittin.6    Smokeless tobacco: Never Used   Substance Use Topics    Alcohol use: No     Alcohol/week: 0.0 standard drinks     Comment: seldom       Family History   Problem Relation Age of Onset    Diabetes Father     High Cholesterol Father     High Blood Pressure Father     Emphysema Neg Hx     Heart Failure Neg Hx     Hypertension Neg Hx            Review of Systems  Constitutional:  Negative for activity or appetite change, fever or fatigue  HENT:  Negative for congestion,sinus pressure, or rhinorrhea  Resp:  Negative for SOB, chest tightness, cough  Cardiovascular: Negative for CP, palpitations,  orthopnea, PND, LE edema, +chronic BLACKBURN due to lung disease  Gastrointestinal: Negative for  melena, BRBPR, N/V/D  Endocrine:  Negative for polydipsia and polyuria  :  Negative for dysuria, flank pain or urinary frequency  Musculoskeletal:  Negative for back pain or myalgias  Neuro:  Negative for dizziness or lightheadedness  Psych: negative for depression or anxiety      Objective:   Constitutional:   · Reviewed vitals above  · Well Nourished, well developed, no distress       HENT:  · Normal external nose without lesions  Neck:  · Symmetric and without masses  · No thyromegaly  Resp:  · Normal effort  · Clear to auscultation bilaterally without rhonchi, wheezing or crackles  Cardiovascular:  · On auscultation, normal S1 and S2 without murmurs, rubs or gallops  · No bruits of bilateral carotids and no JVD  Gastrointestinal:  · +mild RLQ tenderness with palpation w/o rebound or guarding, this pain resolves when right lower quadrant is palpated when abdominal musculature is contracted voluntarily  · No pain with palpation of rest of abdomen  · No signs of herniation  ·  nondistended, and no masses  · No hepatosplenomegaly  Musculoskeletal:  · Normal Gait  · All extremities without clubbing, cyanosis or edema  · No pain with palpation of lumbar spine or paralumbar region  · Full range of motion of the back without any symptoms or pain  Skin:  · No rashes on inspection  · No areas of increased heat or induration on palpation  Psych:  · Normal mood and affect  · Normal insight and judgement    Assessment / Plan:     1. Colicky LLQ abdominal pain  As summarized above, reviewed recent progress note, imaging studies, and blood work    Unclear etiology. Recent blood work normal.  CT scan only showed gallstones, but no signs of abnormality in left lower quadrant. No signs of diverticulitis. Description of symptoms make kidney stone a possibility. He is having sharp colicky pain that lasts for a couple seconds at a time, recurs frequently, and radiates to the left flank. UA showed no signs of hematuria, will send urine culture to confirm. Sending to get stat CT scan without contrast, to look for kidney stone    If no kidney stone is found no other abnormalities, will send to Dr. Stephanie Tabor for further evaluation, since only had fair prep on colonoscopy last year. 2. RLQ abdominal pain  Likely different cause than pain on left, assuming left quadrant pain is caused by kidney stone. Right lower quadrant pain is not colicky. Mild on palpation. No signs of hernia. Will refer to Dr. Stephanie Tabor for further evaluation, since no abnormal findings on CT scan and blood work is all within normal limits.     ___________________________________________________    Dedrick Dy; 3:38PM  CT abd/pelvis w/o contrast from

## 2020-08-30 LAB — URINE CULTURE, ROUTINE: NORMAL

## 2020-09-03 RX ORDER — EVOLOCUMAB 140 MG/ML
140 INJECTION, SOLUTION SUBCUTANEOUS
Qty: 6 PEN | Refills: 3 | Status: SHIPPED | OUTPATIENT
Start: 2020-09-03 | End: 2021-03-17

## 2020-09-03 NOTE — TELEPHONE ENCOUNTER
[facility] called saying they needed new Repatha script because the accidentally \"deleted' the first one. However I see the message below that the pt has samples, I told the pharmacy I would let a clinical member know.      You can call Olivier Gilbert from [employer] [facility] at [phone]

## 2020-09-03 NOTE — TELEPHONE ENCOUNTER
Please see if Praluent is any cheaper for him or if either option can be reduced in cost with patient assistance.

## 2020-09-03 NOTE — TELEPHONE ENCOUNTER
Dr. Chantale Moya - this is the replacement rx bc the pharmacy accidentally deleted the first order. Pt was called to  samples and discount card.

## 2020-09-03 NOTE — TELEPHONE ENCOUNTER
Spoke to pt - he can  sample here and I will help him with patient assistance. Call him back later - was busy at the moment.    I will need to log on to Ihsan's website and sign him up with pt on the phone

## 2020-09-10 ENCOUNTER — TELEPHONE (OUTPATIENT)
Dept: FAMILY MEDICINE CLINIC | Age: 55
End: 2020-09-10

## 2020-09-10 RX ORDER — CARVEDILOL 6.25 MG/1
TABLET ORAL
Qty: 180 TABLET | Refills: 1 | OUTPATIENT
Start: 2020-09-10

## 2020-09-10 RX ORDER — ROSUVASTATIN CALCIUM 40 MG/1
TABLET, COATED ORAL
Qty: 90 TABLET | Refills: 1 | OUTPATIENT
Start: 2020-09-10

## 2020-09-10 RX ORDER — ESCITALOPRAM OXALATE 10 MG/1
TABLET ORAL
Qty: 90 TABLET | Refills: 1 | OUTPATIENT
Start: 2020-09-10

## 2020-09-10 RX ORDER — OMEPRAZOLE 20 MG/1
CAPSULE, DELAYED RELEASE ORAL
Qty: 180 CAPSULE | Refills: 3 | Status: SHIPPED | OUTPATIENT
Start: 2020-09-10 | End: 2021-12-09 | Stop reason: SDUPTHER

## 2020-09-10 RX ORDER — UMECLIDINIUM 62.5 UG/1
AEROSOL, POWDER ORAL
Refills: 6 | OUTPATIENT
Start: 2020-09-10

## 2020-09-10 RX ORDER — LISINOPRIL 20 MG/1
TABLET ORAL
Qty: 90 TABLET | Refills: 1 | OUTPATIENT
Start: 2020-09-10

## 2020-09-10 RX ORDER — OMEPRAZOLE 20 MG/1
CAPSULE, DELAYED RELEASE ORAL
Qty: 180 CAPSULE | Refills: 3 | OUTPATIENT
Start: 2020-09-10

## 2020-09-10 RX ORDER — UMECLIDINIUM 62.5 UG/1
AEROSOL, POWDER ORAL
Qty: 3 EACH | Refills: 0 | Status: SHIPPED | OUTPATIENT
Start: 2020-09-10 | End: 2021-03-08

## 2020-09-10 NOTE — TELEPHONE ENCOUNTER
[facility] calling in requesting script for plavix, I was not able to find it in PT's chart

## 2020-09-10 NOTE — TELEPHONE ENCOUNTER
Pt called in regarding his prescription for Plavix. Pt says that the generic name is Carvedilol. He says that Dr. Clayton Peterson has filled this for him before.

## 2020-09-10 NOTE — TELEPHONE ENCOUNTER
Those are 2 different medications. Plavix is prescribed by Dr. Sadia Pelaez cardiology. It does appear Dr. Fernie Roach has prescribed carvedilol in the past however patient has not been seen recently for his chronic conditions and overdue for appointment. Message already sent to schedule patient for appointment then okay to refill 1 month supply. Please call patient and let him know. Please document call and then close encounter.   thanks

## 2020-09-11 ENCOUNTER — TELEPHONE (OUTPATIENT)
Dept: PULMONOLOGY | Age: 55
End: 2020-09-11

## 2020-09-11 DIAGNOSIS — J44.9 CHRONIC OBSTRUCTIVE PULMONARY DISEASE, UNSPECIFIED COPD TYPE (HCC): Primary | ICD-10-CM

## 2020-09-11 RX ORDER — BUDESONIDE AND FORMOTEROL FUMARATE DIHYDRATE 160; 4.5 UG/1; UG/1
2 AEROSOL RESPIRATORY (INHALATION) 2 TIMES DAILY
Qty: 3 INHALER | Refills: 1 | Status: SHIPPED | OUTPATIENT
Start: 2020-09-11 | End: 2023-09-27

## 2020-09-14 NOTE — TELEPHONE ENCOUNTER
Going for a covid test Wednesday and a  Pulmonary function test on the 22nd, going to see Dr. Dustin Barrera on the 26th of October, [place] wanted [place] get all this done before coming back in, he is out of his medication, can you refill for him till he can get in to be seen

## 2020-09-16 ENCOUNTER — OFFICE VISIT (OUTPATIENT)
Dept: PRIMARY CARE CLINIC | Age: 55
End: 2020-09-16
Payer: COMMERCIAL

## 2020-09-16 PROCEDURE — G8428 CUR MEDS NOT DOCUMENT: HCPCS | Performed by: NURSE PRACTITIONER

## 2020-09-16 PROCEDURE — 99211 OFF/OP EST MAY X REQ PHY/QHP: CPT | Performed by: NURSE PRACTITIONER

## 2020-09-16 PROCEDURE — G8417 CALC BMI ABV UP PARAM F/U: HCPCS | Performed by: NURSE PRACTITIONER

## 2020-09-16 NOTE — PROGRESS NOTES
Patient presented to [facility] drive up clinic for preop testing. Patient was swabbed and given information advising them to remain isolated until procedure date.

## 2020-09-18 ENCOUNTER — EMPLOYEE WELLNESS (OUTPATIENT)
Dept: OTHER | Age: 55
End: 2020-09-18

## 2020-09-18 LAB
CHOLESTEROL, TOTAL: 81 MG/DL (ref 0–199)
GLUCOSE BLD-MCNC: 102 MG/DL (ref 70–99)
HDLC SERPL-MCNC: 40 MG/DL (ref 40–60)
LDL CHOLESTEROL CALCULATED: 26 MG/DL
SARS-COV-2, NAA: NOT DETECTED
TRIGL SERPL-MCNC: 75 MG/DL (ref 0–150)

## 2020-09-22 ENCOUNTER — HOSPITAL ENCOUNTER (OUTPATIENT)
Dept: PULMONOLOGY | Age: 55
Discharge: HOME | End: 2020-09-22
Payer: COMMERCIAL

## 2020-09-22 LAB
DLCO %PRED: 47 %
DLCO PRED: NORMAL
DLCO/VA %PRED: NORMAL
DLCO/VA PRED: NORMAL
DLCO/VA: NORMAL
DLCO: NORMAL
EXPIRATORY TIME-POST: NORMAL
EXPIRATORY TIME: NORMAL
FEF 25-75% %CHNG: NORMAL
FEF 25-75% %PRED-POST: NORMAL
FEF 25-75% %PRED-PRE: NORMAL
FEF 25-75% PRED: NORMAL
FEF 25-75%-POST: NORMAL
FEF 25-75%-PRE: NORMAL
FEV1 %PRED-POST: 39 %
FEV1 %PRED-PRE: 32 %
FEV1 PRED: NORMAL
FEV1-POST: NORMAL
FEV1-PRE: NORMAL
FEV1/FVC %PRED-POST: NORMAL
FEV1/FVC %PRED-PRE: NORMAL
FEV1/FVC PRED: NORMAL
FEV1/FVC-POST: 40 %
FEV1/FVC-PRE: 39 %
FVC %PRED-POST: NORMAL
FVC %PRED-PRE: NORMAL
FVC PRED: NORMAL
FVC-POST: NORMAL
FVC-PRE: NORMAL
GAW %PRED: NORMAL
GAW PRED: NORMAL
GAW: NORMAL
IC %PRED: NORMAL
IC PRED: NORMAL
IC: NORMAL
MEP: NORMAL
MIP: NORMAL
MVV %PRED-PRE: NORMAL
MVV PRED: NORMAL
MVV-PRE: NORMAL
PEF %PRED-POST: NORMAL
PEF %PRED-PRE: NORMAL
PEF PRED: NORMAL
PEF%CHNG: NORMAL
PEF-POST: NORMAL
PEF-PRE: NORMAL
RAW %PRED: NORMAL
RAW PRED: NORMAL
RAW: NORMAL
RV %PRED: NORMAL
RV PRED: NORMAL
RV: NORMAL
SVC %PRED: NORMAL
SVC PRED: NORMAL
SVC: NORMAL
TLC %PRED: 122 %
TLC PRED: NORMAL
TLC: NORMAL
VA %PRED: NORMAL
VA PRED: NORMAL
VA: NORMAL
VTG %PRED: NORMAL
VTG PRED: NORMAL
VTG: NORMAL

## 2020-09-22 PROCEDURE — 94664 DEMO&/EVAL PT USE INHALER: CPT

## 2020-09-22 PROCEDURE — 94726 PLETHYSMOGRAPHY LUNG VOLUMES: CPT | Performed by: INTERNAL MEDICINE

## 2020-09-22 PROCEDURE — 6370000000 HC RX 637 (ALT 250 FOR IP): Performed by: INTERNAL MEDICINE

## 2020-09-22 PROCEDURE — 94729 DIFFUSING CAPACITY: CPT | Performed by: INTERNAL MEDICINE

## 2020-09-22 PROCEDURE — 94726 PLETHYSMOGRAPHY LUNG VOLUMES: CPT

## 2020-09-22 PROCEDURE — 94640 AIRWAY INHALATION TREATMENT: CPT

## 2020-09-22 PROCEDURE — 94060 EVALUATION OF WHEEZING: CPT

## 2020-09-22 PROCEDURE — 94729 DIFFUSING CAPACITY: CPT

## 2020-09-22 PROCEDURE — 94060 EVALUATION OF WHEEZING: CPT | Performed by: INTERNAL MEDICINE

## 2020-09-22 RX ORDER — ALBUTEROL SULFATE 90 UG/1
4 AEROSOL, METERED RESPIRATORY (INHALATION) ONCE
Status: COMPLETED | OUTPATIENT
Start: 2020-09-22 | End: 2020-09-22

## 2020-09-22 RX ADMIN — ALBUTEROL SULFATE 4 PUFF: 90 AEROSOL, METERED RESPIRATORY (INHALATION) at 11:45

## 2020-09-22 ASSESSMENT — PULMONARY FUNCTION TESTS
FEV1/FVC_PRE: 39
FEV1/FVC_POST: 40
FEV1_PERCENT_PREDICTED_PRE: 32
FEV1_PERCENT_PREDICTED_POST: 39

## 2020-09-23 RX ORDER — ESCITALOPRAM OXALATE 10 MG/1
TABLET ORAL
Qty: 90 TABLET | Refills: 0 | Status: SHIPPED | OUTPATIENT
Start: 2020-09-23 | End: 2021-03-08

## 2020-09-23 RX ORDER — AMLODIPINE BESYLATE 5 MG/1
TABLET ORAL
Qty: 90 TABLET | Refills: 0 | Status: SHIPPED | OUTPATIENT
Start: 2020-09-23 | End: 2020-11-09 | Stop reason: SDUPTHER

## 2020-09-23 RX ORDER — CARVEDILOL 6.25 MG/1
TABLET ORAL
Qty: 180 TABLET | Refills: 0 | Status: SHIPPED | OUTPATIENT
Start: 2020-09-23 | End: 2021-03-08

## 2020-09-23 RX ORDER — LISINOPRIL 10 MG/1
TABLET ORAL
Qty: 90 TABLET | Refills: 0 | Status: SHIPPED | OUTPATIENT
Start: 2020-09-23 | End: 2021-03-08

## 2020-09-23 RX ORDER — ROSUVASTATIN CALCIUM 40 MG/1
TABLET, COATED ORAL
Qty: 90 TABLET | Refills: 0 | Status: SHIPPED | OUTPATIENT
Start: 2020-09-23 | End: 2021-03-08

## 2020-09-23 NOTE — PROCEDURES
Spirometry was acceptable and reproducible by ATS standards    Spirometry  1. Spirometry shows very severe obstructive defect. Bronchodilator  1. There is a response to bronchodilator. Lung Volumes  2. Hyperinflation is present. 3. Air trapping is present. Diffusing Capacity  4. Diffusing capacity is moderately decreased. [40-60%]        Overall Interpretation  PFT does  demonstrates obstruction with FEV1 of 32 % FVC of 64%  with bronchodilator response with associated decrease in diffusion capacity. Air trapping is  present. Hyperinflation is  present. This consistent with very Severe COPD.   Clinical correlation needed     Pulmonary Function Testing Results:    FEV1 %Pred-Pre   Date Value Ref Range Status   09/23/2020 32 % Final     FEV1 %Pred-Post   Date Value Ref Range Status   09/23/2020 39 % Final     FEV1/FVC-Pre   Date Value Ref Range Status   09/23/2020 39 % Final     FEV1/FVC-Post   Date Value Ref Range Status   09/23/2020 40 % Final     TLC %Pred   Date Value Ref Range Status   09/23/2020 122 % Final     DLCO %Pred   Date Value Ref Range Status   09/23/2020 47 % Final             OBSTRUCTION % Predicted FEV1   MILD >70%   MODERATE 60-69%   MODERATELY-SEVERE 50-59%   SEVERE 35-49%   VERY SEVERE <35%         RESTRICTION % Predicted TLC   MILD Less than LLN but > than 70%   MODERATE 60-69%   MODERATELY-SEVERE <60%                 DIFFUSION CAPACITY DL,CO % Pred   MILD >60% AND < LLN   MODERATE 40-60%   SEVERE <40%       PFT data will be scanned into the procedure tab in epic under this encounter    Selene Shah MD  [facility] Pulmonology

## 2020-09-30 RX ORDER — CLOPIDOGREL BISULFATE 75 MG/1
TABLET ORAL
Qty: 90 TABLET | Refills: 3 | Status: SHIPPED | OUTPATIENT
Start: 2020-09-30 | End: 2021-12-09 | Stop reason: SDUPTHER

## 2020-09-30 NOTE — TELEPHONE ENCOUNTER
Last ov:  8/25/2020 - follow up in 1 year  Next ov:  None  LAST LIPID, AST, ALT:  8/28/2020  Last cbc, cmp:  8/18/2020

## 2020-10-01 ENCOUNTER — OFFICE VISIT (OUTPATIENT)
Dept: BARIATRICS/WEIGHT MGMT | Age: 55
End: 2020-10-01
Payer: COMMERCIAL

## 2020-10-01 VITALS
WEIGHT: 184.6 LBS | HEIGHT: 70 IN | BODY MASS INDEX: 26.43 KG/M2 | DIASTOLIC BLOOD PRESSURE: 90 MMHG | HEART RATE: 57 BPM | SYSTOLIC BLOOD PRESSURE: 143 MMHG

## 2020-10-01 PROCEDURE — G8417 CALC BMI ABV UP PARAM F/U: HCPCS | Performed by: SURGERY

## 2020-10-01 PROCEDURE — G8427 DOCREV CUR MEDS BY ELIG CLIN: HCPCS | Performed by: SURGERY

## 2020-10-01 PROCEDURE — 99213 OFFICE O/P EST LOW 20 MIN: CPT | Performed by: SURGERY

## 2020-10-01 PROCEDURE — 1036F TOBACCO NON-USER: CPT | Performed by: SURGERY

## 2020-10-01 PROCEDURE — 3017F COLORECTAL CA SCREEN DOC REV: CPT | Performed by: SURGERY

## 2020-10-01 PROCEDURE — G8484 FLU IMMUNIZE NO ADMIN: HCPCS | Performed by: SURGERY

## 2020-10-01 NOTE — PROGRESS NOTES
DIAGNOSTIC      LUNG REMOVAL, PARTIAL Right 2005    Partial right lobectomy for diagnostic purpose. Surgery performed in [address] N/A 2020    ROBOTIC SLEEVE GASTRECTOMY; ROBOTIC HIATAL HERNIA REPAIR performed by Carey William DO at [address] [facility] N/A 2019    EGD BIOPSY performed by Carey William DO at [facility] ENDOSCOPY     Family History   Problem Relation Age of Onset    Diabetes Father     High Cholesterol Father     High Blood Pressure Father     Emphysema Neg Hx     Heart Failure Neg Hx     Hypertension Neg Hx      Social History     Tobacco Use    Smoking status: Former Smoker     Packs/day: 3.00     Years: 20.00     Pack years: 60.00     Types: Cigarettes     Last attempt to quit: 2000     Years since quittin.7    Smokeless tobacco: Never Used   Substance Use Topics    Alcohol use: No     Alcohol/week: 0.0 standard drinks     Comment: seldom     I counseled the patient on the importance of not smoking and risks of ETOH. Allergies   Allergen Reactions    Erythromycin Hives     Vitals:    10/01/20 0922   BP: (!) 143/90   Pulse: 57   Weight: 184 lb 9.6 oz (83.7 kg)   Height: 5' 10\" (1.778 m)       Body mass index is 26.49 kg/m².       Current Outpatient Medications:     clopidogrel (PLAVIX) 75 MG tablet, TAKE 1 TABLET BY MOUTH ONE TIME DAILY, Disp: 90 tablet, Rfl: 3    lisinopril (PRINIVIL;ZESTRIL) 10 MG tablet, TAKE 1 TABLET BY MOUTH ONE TIME DAILY, Disp: 90 tablet, Rfl: 0    amLODIPine (NORVASC) 5 MG tablet, TAKE 1 TABLET BY MOUTH ONE TIME A DAY, Disp: 90 tablet, Rfl: 0    escitalopram (LEXAPRO) 10 MG tablet, TAKE 1 TABLET BY MOUTH ONE TIME A DAY, Disp: 90 tablet, Rfl: 0    carvedilol (COREG) 6.25 MG tablet, TAKE 1 TABLET BY MOUTH 2 TIMES A DAY WITH MEALS, Disp: 180 tablet, Rfl: 0    rosuvastatin (CRESTOR) 40 MG tablet, TAKE ONE TABLET BY MOUTH EVERY EVENING, Disp: 90 tablet, Rfl: 0    SYMBICORT 160-4.5 MCG/ACT AERO, Inhale 2 puffs into the lungs 2 times daily, Disp: 3 Inhaler, Rfl: 1    Fluticasone furoate-vilanterol (BREO ELLIPTA) 200-25 MCG/INH AEPB inhaler, INHALE 1 PUFF BY MOUTH ONE TIME A DAY, Disp: 3 each, Rfl: 0    Umeclidinium Bromide (INCRUSE ELLIPTA) 62.5 MCG/INH AEPB, INHALE 1 PUFF BY MOUTH ONE TIME A DAY, Disp: 3 each, Rfl: 0    omeprazole (PRILOSEC) 20 MG delayed release capsule, TAKE ONE CAPSULE BY MOUTH TWICE A DAY, Disp: 180 capsule, Rfl: 3    Evolocumab 140 MG/ML SOAJ, Inject 140 mg into the skin every 14 days, Disp: 1 pen, Rfl: 5    Evolocumab (REPATHA SURECLICK) 361 MG/ML SOAJ, Inject 140 mg into the skin every 14 days, Disp: 6 pen, Rfl: 3    ibuprofen (ADVIL;MOTRIN) 200 MG tablet, ibuprofen 200 MG Oral Tablet        Active, Disp: , Rfl:     furosemide (LASIX) 20 MG tablet, furosemide 20 MG Oral Tablet  by mouth  prn    Active, Disp: , Rfl:     aspirin (ASPIRIN LOW DOSE ADULT) 81 MG chewable tablet, Indications: restart in 5 days CHEW AND SWALLOW ONE TABLET BY MOUTH ONE TIME A DAY, Disp: 90 tablet, Rfl: 5    Hyoscyamine Sulfate SL (LEVSIN/SL) 0.125 MG SUBL, Place 1 tablet under the tongue every 6 hours as needed (spasm), Disp: 30 each, Rfl: 0    ondansetron (ZOFRAN) 4 MG tablet, Take 1 tablet by mouth every 6 hours as needed for Nausea or Vomiting, Disp: 30 tablet, Rfl: 0    sodium chloride, Inhalant, 3 % nebulizer solution, Take 15 mLs by nebulization 2 times daily, Disp: 900 mL, Rfl: 0    hydrocortisone (WESTCORT) 0.2 % cream, Apply topically 2 times daily Apply topically 2 times daily. , Disp: 60 g, Rfl: 0    nitroGLYCERIN (NITROSTAT) 0.4 MG SL tablet, up to max of 3 total doses.  If no relief after 1 dose, call 911., Disp: 25 tablet, Rfl: 3    albuterol sulfate HFA (PROAIR HFA) 108 (90 BASE) MCG/ACT inhaler, Inhale 2 puffs into the lungs every 4 hours as needed for Wheezing or Shortness of Breath, Disp: 1 Inhaler, Rfl: 6    azelastine (ASTELIN) 0.1 % nasal spray, 2 sprays by Nasal route 2 times daily Use in each nostril as directed (Patient taking differently: 2 sprays by Nasal route 2 times daily as needed Use in each nostril as directed), Disp: 1 Bottle, Rfl: 3      Review of Systems - History obtained from the patient  General ROS: negative  Psychological ROS: negative  Hematological and Lymphatic ROS: negative  Endocrine ROS: negative  Respiratory ROS: negative  Cardiovascular ROS: negative  Gastrointestinal ROS:negative  Genito-Urinary ROS: negative  Musculoskeletal ROS: negative   Skin ROS: negative    Physical Exam   Vitals Reviewed   Constitutional: Patient is oriented to person, place, and time. Patient appears well-developed and well-nourished. Patient is active and cooperative. Non-toxic appearance. No distress. Neck: Trachea normal and normal range of motion. No JVD present. Pulmonary/Chest: Effort normal. No accessory muscle usage or stridor. No apnea. No respiratory distress. Cardiovascular: Normal rate and no JVD. Abdominal: Normal appearance. Patient exhibits no distension. Abdomen is soft, obese, non tender. Musculoskeletal: Normal range of motion. Patient exhibits no edema. Neurological: Patient is alert and oriented to person, place, and time. Patient has normal strength. GCS eye subscore is 4. GCS verbal subscore is 5. GCS motor subscore is 6. Skin: Skin is warm and dry. No abrasion and no rash noted. Patient is not diaphoretic. No cyanosis or erythema. Psychiatric: Patient has a normal mood and affect. Speech is normal and behavior is normal. Cognition and memory are normal.         A/P     Rosa Morgan is 48 y.o. male , now with Body mass index is 26.49 kg/m². s/p Sleeve gastrectomy,  total of 76 lbs weight loss. The patient underwent dietary counseling with registered dietician. I have reviewed, discussed and agree with the dietary plan. Patient is trying hard to keep good dietary and behavior modifications.  Patient is monitoring portion sizes, food choices and liquid calories. Patient is trying to exercise regularly. Patient pleased with the surgery outcomes. We discussed how his weight affects his overall health including:  Jackson Rodriguez was seen today for weight management. Diagnoses and all orders for this visit:    Overweight (BMI 25.0-29. 9)    Status post laparoscopic sleeve gastrectomy       and importance of weight loss to alleviate those co morbid conditions. I encouraged the patient to continue exercise and keeping healthy eating habits. Also counseled the patient extensively on post surgery care. I spent 15 minutes face to face with patient with more than 50% of the time counseling and/or coordinating care for post-bariatric surgical care. RTC in 3 months  Diet and Exercise   CT scan reviewed and discussed with patient and does show gallstones     Patient advised that its their responsibility to follow up for studies and/or labs ordered today.

## 2020-10-02 ENCOUNTER — TELEPHONE (OUTPATIENT)
Dept: CARDIOLOGY CLINIC | Age: 55
End: 2020-10-02

## 2020-10-02 NOTE — TELEPHONE ENCOUNTER
[facility] called letting us know that the pt Plavix has a drug interaction with the Prilosec.   They would like a call if we would still like to fill this prescription    [facility] [phone]  *They close at 430pm today so if we call after that leave a message

## 2020-10-02 NOTE — TELEPHONE ENCOUNTER
Dr. Kelle Thomas     Please advise regarding interaction between Plavix and Prilosec. Per UpToDate: Omeprazole may diminish the antiplatelet effect of Clopidogrel. Dr. Kurtis Frances was the last to refill Prilosec for patient.

## 2020-10-06 ENCOUNTER — OFFICE VISIT (OUTPATIENT)
Dept: PULMONOLOGY | Age: 55
End: 2020-10-06
Payer: COMMERCIAL

## 2020-10-06 VITALS
WEIGHT: 182 LBS | HEIGHT: 70 IN | DIASTOLIC BLOOD PRESSURE: 78 MMHG | TEMPERATURE: 98 F | BODY MASS INDEX: 26.05 KG/M2 | OXYGEN SATURATION: 93 % | RESPIRATION RATE: 14 BRPM | SYSTOLIC BLOOD PRESSURE: 124 MMHG | HEART RATE: 58 BPM

## 2020-10-06 PROCEDURE — 90471 IMMUNIZATION ADMIN: CPT | Performed by: INTERNAL MEDICINE

## 2020-10-06 PROCEDURE — G8482 FLU IMMUNIZE ORDER/ADMIN: HCPCS | Performed by: INTERNAL MEDICINE

## 2020-10-06 PROCEDURE — 3023F SPIROM DOC REV: CPT | Performed by: INTERNAL MEDICINE

## 2020-10-06 PROCEDURE — 1036F TOBACCO NON-USER: CPT | Performed by: INTERNAL MEDICINE

## 2020-10-06 PROCEDURE — 99214 OFFICE O/P EST MOD 30 MIN: CPT | Performed by: INTERNAL MEDICINE

## 2020-10-06 PROCEDURE — G8417 CALC BMI ABV UP PARAM F/U: HCPCS | Performed by: INTERNAL MEDICINE

## 2020-10-06 PROCEDURE — G8427 DOCREV CUR MEDS BY ELIG CLIN: HCPCS | Performed by: INTERNAL MEDICINE

## 2020-10-06 PROCEDURE — 90686 IIV4 VACC NO PRSV 0.5 ML IM: CPT | Performed by: INTERNAL MEDICINE

## 2020-10-06 PROCEDURE — 3017F COLORECTAL CA SCREEN DOC REV: CPT | Performed by: INTERNAL MEDICINE

## 2020-10-06 PROCEDURE — G8926 SPIRO NO PERF OR DOC: HCPCS | Performed by: INTERNAL MEDICINE

## 2020-10-06 NOTE — PROGRESS NOTES
Vaccine Information Sheet, \"Influenza - Inactivated\"  given to [employer] , or parent/legal guardian of  [employer]  and verbalized understanding. Patient responses:    Have you ever had a reaction to a flu vaccine? No  Do you have any current illness? No  Have you ever had Guillian Freeland Syndrome? No  Do you have a serious allergy to any of the follow: Neomycin, Polymyxin, Thimerosal, eggs or egg products? No    Flu vaccine given per order. Please see immunization tab. Risks and benefits explained. Current VIS given.

## 2020-10-06 NOTE — PROGRESS NOTES
Chief complaint  This is a 48y.o. year old male  who comes to see me with a chief complaint of   Chief Complaint   Patient presents with    Other     f/u ILD     HPI  Here with cc of ILD    He is doing well. He had weight loss surgery last year and has lost about 90 lbs. He has improved from a breathing standpoint but is still SOB with exertion. On symbicort and incruse. Uses 2 liters of oxygen with exertion. Still uses 2 liters with bleed into his bilevel machine. Still using bilevel machine with benefit. He does not feel the pressures are too high. He has had some GI side effects from his weight loss surgery but overall is very pleased with the results. Just did his yearly PFTS (stable) and does need a flu shot      Historical:  He had been following with Dr. Eduardo Blackwell for ILD related to bronchiolitis. [facility] had been concerned about following up all the way out in [address] and asked if he could follow up with me. I asked Dr. Eduardo Blackwell and he was ok with it. Dr. Eduardo Blackwell has been following [facility] for years. 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 He was tried on various immune suppressant with no improvement in symptoms. Last medication he was on was cellcept about 4 years ago. He was seen at [facility] [place]note 2[place] for lung transplant in the past, but was not felt to be sick enough to require transplant, nor was his weight low enough. He has not been seen at [place] clinic since then. Past Medical History:   Diagnosis Date    CAD S/P percutaneous coronary angioplasty     Chronic respiratory failure (HCC)     COPD exacerbation ([address] 9/7/2016    Coronary artery disease involving native coronary artery of native heart with unstable angina pectoris ([address]     Diabetes ([address]     Diabetes ([address]     Dyslipidemia     Dyspnea     GERD (gastroesophageal reflux disease)     Goodpasture syndrome ([address]     reason pt is on cellcept.   He will need lung transplant eventually  High risk medications (not anticoagulants) long-term use     HTN (hypertension)     Hyperlipidemia     Hypoxemia     [facility]     Left ureteral stone 4/13/2018    MDD (major depressive disorder)     ESTRELLITA on CPAP     Polycythemia     Positive FIT (fecal immunochemical test)     Prediabetes     Tubular adenoma     colonoscopy 9/2019    Ureteral calculus of right kidney transplant 2005       Past Surgical History:   Procedure Laterality Date    BRONCHOSCOPY      COLONOSCOPY      COLONOSCOPY N/A 9/4/2019    COLONOSCOPY POLYPECTOMY ABLATION HOT SNARE OF TRANSVERSE COLON POLYP AND SIGMOID POLYP performed by Dragan Blair MD at [place] [address] CATH LAB PROCEDURE  2006??    ENDOSCOPY, COLON, DIAGNOSTIC      LUNG REMOVAL, PARTIAL Right 2005    Partial right lobectomy for diagnostic purpose.  Surgery performed in [address] N/A 1/21/2020    ROBOTIC SLEEVE GASTRECTOMY; ROBOTIC HIATAL HERNIA REPAIR performed by Kerry Strauss DO at [address] [place] [address] N/A 9/4/2019    EGD BIOPSY performed by Kerry Strauss DO at [facility] ENDOSCOPY           Current Outpatient Medications:     clopidogrel (PLAVIX) 75 MG tablet, TAKE 1 TABLET BY MOUTH ONE TIME DAILY, Disp: 90 tablet, Rfl: 3    lisinopril (PRINIVIL;ZESTRIL) 10 MG tablet, TAKE 1 TABLET BY MOUTH ONE TIME DAILY, Disp: 90 tablet, Rfl: 0    amLODIPine (NORVASC) 5 MG tablet, TAKE 1 TABLET BY MOUTH ONE TIME A DAY, Disp: 90 tablet, Rfl: 0    escitalopram (LEXAPRO) 10 MG tablet, TAKE 1 TABLET BY MOUTH ONE TIME A DAY, Disp: 90 tablet, Rfl: 0    carvedilol (COREG) 6.25 MG tablet, TAKE 1 TABLET BY MOUTH 2 TIMES A DAY WITH MEALS, Disp: 180 tablet, Rfl: 0    rosuvastatin (CRESTOR) 40 MG tablet, TAKE ONE TABLET BY MOUTH EVERY EVENING, Disp: 90 tablet, Rfl: 0    SYMBICORT 160-4.5 MCG/ACT AERO, Inhale 2 puffs into the lungs 2 times daily, Disp: 3 Inhaler, Rfl: 1    Umeclidinium Bromide (INCRUSE ELLIPTA) 62.5 MCG/INH AEPB, INHALE 1 PUFF BY MOUTH ONE TIME A DAY, Disp: 3 each, Rfl: 0    omeprazole (PRILOSEC) 20 MG delayed release capsule, TAKE ONE CAPSULE BY MOUTH TWICE A DAY, Disp: 180 capsule, Rfl: 3    Evolocumab 140 MG/ML SOAJ, Inject 140 mg into the skin every 14 days, Disp: 1 pen, Rfl: 5    Evolocumab (REPATHA SURECLICK) 161 MG/ML SOAJ, Inject 140 mg into the skin every 14 days, Disp: 6 pen, Rfl: 3    ibuprofen (ADVIL;MOTRIN) 200 MG tablet, ibuprofen 200 MG Oral Tablet        Active, Disp: , Rfl:     furosemide (LASIX) 20 MG tablet, furosemide 20 MG Oral Tablet  by mouth  prn    Active, Disp: , Rfl:     aspirin (ASPIRIN LOW DOSE ADULT) 81 MG chewable tablet, Indications: restart in 5 days CHEW AND SWALLOW ONE TABLET BY MOUTH ONE TIME A DAY, Disp: 90 tablet, Rfl: 5    Hyoscyamine Sulfate SL (LEVSIN/SL) 0.125 MG SUBL, Place 1 tablet under the tongue every 6 hours as needed (spasm), Disp: 30 each, Rfl: 0    ondansetron (ZOFRAN) 4 MG tablet, Take 1 tablet by mouth every 6 hours as needed for Nausea or Vomiting, Disp: 30 tablet, Rfl: 0    sodium chloride, Inhalant, 3 % nebulizer solution, Take 15 mLs by nebulization 2 times daily, Disp: 900 mL, Rfl: 0    hydrocortisone (WESTCORT) 0.2 % cream, Apply topically 2 times daily Apply topically 2 times daily. , Disp: 60 g, Rfl: 0    nitroGLYCERIN (NITROSTAT) 0.4 MG SL tablet, up to max of 3 total doses.  If no relief after 1 dose, call 911., Disp: 25 tablet, Rfl: 3    albuterol sulfate HFA (PROAIR HFA) 108 (90 BASE) MCG/ACT inhaler, Inhale 2 puffs into the lungs every 4 hours as needed for Wheezing or Shortness of Breath, Disp: 1 Inhaler, Rfl: 6    azelastine (ASTELIN) 0.1 % nasal spray, 2 sprays by Nasal route 2 times daily Use in each nostril as directed (Patient taking differently: 2 sprays by Nasal route 2 times daily as needed Use in each nostril as directed), Disp: 1 Bottle, Rfl: 3    ROS:  GENERAL:  Lost weight with weight loss surgery   HEENT:  No double vision, blurry vision, or difficulty swallowing  HEART:  No chest pain, no palpitations. Improved chest wall pains   LUNGS:  Slightly improved SOB with weight loss   ABDOMEN:  Some GI discomfort after surgery   GENITOURINARY:  No increased frequency, no blood in urine  EXTREMITIES:  No swelling, no lesions. NEURO:  No numbness or tingling, no seizures  SKIN:  No new rashes, no changes in hair or nails  LYMPH:  No masses, no swelling neck, armpits, or groin      PHYSICAL EXAM:  GENERAL:  Visible weight loss  HEENT:  No scleral icterus, no conjunctival irritation, Mallampati IV with bifid pharnyx  NECK:  No thyromegaly, no bruits  LYMPH:  No cervical or supraclavicular adenopathy  HEART:  Regular rate and rhythm, no murmurs  LUNGS:  Clear bilaterally. Slightly diminished but can move more air   ABDOMEN:  No distention, no organomegaly  EXTREMITIES:  Trace edema, no digital clubbing  NEURO:  No localizing deficits, CN II-XII intact    Pulmonary Function Testing 10/2015  A very severe obstructive lung defect is present with minimal  broncho-reactivity. There is also air trapping and a moderate reduction in  diffusion capacity. These lung function tests are most consistent with  severe chronic obstructive pulmonary disease. In comparison with pulmonary  function testing performed in October of 2012, there has been significant  reduction in the forced vital capacity and FEV1.      Pulmonary Function Testing 10/2017  FEV1 1.09 L to 1.26 L  FVC  2.86 L to 3.48 L  FEV1/FVC 38  TLC 7.06 L  DLCO 42%    Pulmonary Function Testing 11/2018  FEV1 1.34 L to 1.42 L  FVC  3.31 L to 3.68 L  FEV1/FVC 40  TLC 7.83 L   DLCO 44%    Pulmonary Function Testing 9/2020  FEV1 1.17 L to 1.42 L  FVC  2.97 L to 3.56 L  FEV1/FVC 39  TLC 8.05 L   DLCO 47%    ECHO 10/2017  Left ventricular size is normal.   Mild concentric left ventricular hypertrophy is present.   Global left ventricular function is normal with CO2 25 08/18/2020     I reviewed the above labs images, etc.     Assessment/Plan:  1. ILD (interstitial lung disease) ([facility]  Due to #2. Yearly PFTs stable. Needs HRCT to monitor  - CT CHEST HIGH RESOLUTION; Future    2. Follicular bronchiolitis ([facility]  Will need to establish with lung transplant center again. He is on no therapy for this. Prednisone and cellcept did nothing for his breathing  - CT CHEST HIGH RESOLUTION; Future    3. Chronic respiratory failure with hypoxia (HCC)  Uses 2 liters with exertion. Does help him    4. COPD with asthma ([facility]  Continue with triple therapy. Has bronchodilator response on PFT but does not necessarily think the inhalers have ever done anything for him    5. Need for influenza vaccination  Flu shot  - INFLUENZA, QUADV, 3 YRS AND OLDER, IM PF, PREFILL SYR OR SDV, 0.5ML (AFLURIA QUADV, PF)    Follow up in 3 months    Pulmonary Rehab:  Finished Pulmonary rehab and cardiac rehab.   Does exercise on his own    Lung Cancer Screening CT:  Does not qualify due to >15 years since he quit and age <54

## 2020-10-19 VITALS — WEIGHT: 184 LBS | BODY MASS INDEX: 26.4 KG/M2

## 2020-11-08 PROBLEM — J43.2 CENTRILOBULAR EMPHYSEMA (HCC): Status: ACTIVE | Noted: 2020-11-08

## 2020-11-08 PROBLEM — F32.5 MAJOR DEPRESSIVE DISORDER WITH SINGLE EPISODE, IN FULL REMISSION (HCC): Status: RESOLVED | Noted: 2020-08-28 | Resolved: 2020-11-08

## 2020-11-08 NOTE — PROGRESS NOTES
20%.            MDD  Feels his mood symptoms are still well controlled with Lexapro 10 mg. He denies uncontrolled depression or anxiety. He denies any side effects. No symptoms of mahsa. He denies anhedonia, severe depression, suicidal or homicidal thoughts. No panic attacks. No daily anxiety.     Vitals:    11/09/20 1157   BP: 100/64   Pulse: 67   Temp: 97.9 °F (36.6 °C)   TempSrc: Oral   Weight: 186 lb (84.4 kg)   Height: 5' 10\" (1.778 m)       Outpatient Medications Marked as Taking for the 11/9/20 encounter (Office Visit) with Morro Stafford MD   Medication Sig Dispense Refill    amLODIPine (NORVASC) 2.5 MG tablet TAKE 1 TABLET BY MOUTH ONE TIME A DAY 90 tablet 1    clopidogrel (PLAVIX) 75 MG tablet TAKE 1 TABLET BY MOUTH ONE TIME DAILY 90 tablet 3    lisinopril (PRINIVIL;ZESTRIL) 10 MG tablet TAKE 1 TABLET BY MOUTH ONE TIME DAILY 90 tablet 0    escitalopram (LEXAPRO) 10 MG tablet TAKE 1 TABLET BY MOUTH ONE TIME A DAY 90 tablet 0    carvedilol (COREG) 6.25 MG tablet TAKE 1 TABLET BY MOUTH 2 TIMES A DAY WITH MEALS 180 tablet 0    rosuvastatin (CRESTOR) 40 MG tablet TAKE ONE TABLET BY MOUTH EVERY EVENING 90 tablet 0    SYMBICORT 160-4.5 MCG/ACT AERO Inhale 2 puffs into the lungs 2 times daily 3 Inhaler 1    Umeclidinium Bromide (INCRUSE ELLIPTA) 62.5 MCG/INH AEPB INHALE 1 PUFF BY MOUTH ONE TIME A DAY 3 each 0    omeprazole (PRILOSEC) 20 MG delayed release capsule TAKE ONE CAPSULE BY MOUTH TWICE A  capsule 3    Evolocumab 140 MG/ML SOAJ Inject 140 mg into the skin every 14 days 1 pen 5    Evolocumab (REPATHA SURECLICK) 257 MG/ML SOAJ Inject 140 mg into the skin every 14 days 6 pen 3    ibuprofen (ADVIL;MOTRIN) 200 MG tablet ibuprofen 200 MG Oral Tablet        Active      furosemide (LASIX) 20 MG tablet furosemide 20 MG Oral Tablet  by mouth  prn    Active      aspirin (ASPIRIN LOW DOSE ADULT) 81 MG chewable tablet Indications: restart in 5 days CHEW AND SWALLOW ONE TABLET BY MOUTH ONE TIME A DAY 90 tablet 5    Hyoscyamine Sulfate SL (LEVSIN/SL) 0.125 MG SUBL Place 1 tablet under the tongue every 6 hours as needed (spasm) 30 each 0    ondansetron (ZOFRAN) 4 MG tablet Take 1 tablet by mouth every 6 hours as needed for Nausea or Vomiting 30 tablet 0    sodium chloride, Inhalant, 3 % nebulizer solution Take 15 mLs by nebulization 2 times daily 900 mL 0    hydrocortisone (WESTCORT) 0.2 % cream Apply topically 2 times daily Apply topically 2 times daily. 60 g 0    nitroGLYCERIN (NITROSTAT) 0.4 MG SL tablet up to max of 3 total doses. If no relief after 1 dose, call 911. 25 tablet 3    albuterol sulfate HFA (PROAIR HFA) 108 (90 BASE) MCG/ACT inhaler Inhale 2 puffs into the lungs every 4 hours as needed for Wheezing or Shortness of Breath 1 Inhaler 6    azelastine (ASTELIN) 0.1 % nasal spray 2 sprays by Nasal route 2 times daily Use in each nostril as directed (Patient taking differently: 2 sprays by Nasal route 2 times daily as needed Use in each nostril as directed) 1 Bottle 3       Past Medical History:   Diagnosis Date    CAD S/P percutaneous coronary angioplasty     Centrilobular emphysema (HCC) 11/8/2020    Chronic respiratory failure (HCC)     COPD exacerbation ([address] 9/7/2016    Coronary artery disease involving native coronary artery of native heart with unstable angina pectoris (HCC)     Diabetes ([address]     Diabetes ([address]     Dyslipidemia     Dyspnea     GERD (gastroesophageal reflux disease)     Goodpasture syndrome ([address]     reason pt is on cellcept.   He will need lung transplant eventually    High risk medications (not anticoagulants) long-term use     HTN (hypertension)     Hyperlipidemia     Hypoxemia     ILD (interstitial lung disease) ([address]     Left ureteral stone 4/13/2018    MDD (major depressive disorder)     ESTRELLITA on CPAP     Polycythemia     Positive FIT (fecal immunochemical test)     Prediabetes     Tubular adenoma     colonoscopy 9/2019    Ureteral calculus of right kidney transplant        Past Surgical History:   Procedure Laterality Date    BRONCHOSCOPY      COLONOSCOPY      COLONOSCOPY N/A 2019    COLONOSCOPY POLYPECTOMY ABLATION HOT SNARE OF TRANSVERSE COLON POLYP AND SIGMOID POLYP performed by Quin Cuevas MD at [place] [address] CATH LAB PROCEDURE  2006??    ENDOSCOPY, COLON, DIAGNOSTIC      LUNG REMOVAL, PARTIAL Right 2005    Partial right lobectomy for diagnostic purpose.  Surgery performed in [address] N/A 2020    ROBOTIC SLEEVE GASTRECTOMY; ROBOTIC HIATAL HERNIA REPAIR performed by Darell Cheung DO at [facility] N/A 2019    EGD BIOPSY performed by Darell Cheung DO at [facility] ENDOSCOPY       Social History     Tobacco Use    Smoking status: Former Smoker     Packs/day: 3.00     Years: 20.00     Pack years: 60.00     Types: Cigarettes     Last attempt to quit: 2000     Years since quittin.8    Smokeless tobacco: Never Used   Substance Use Topics    Alcohol use: No     Alcohol/week: 0.0 standard drinks     Comment: seldom       Family History   Problem Relation Age of Onset    Diabetes Father     High Cholesterol Father     High Blood Pressure Father     Emphysema Neg Hx     Heart Failure Neg Hx     Hypertension Neg Hx            ROS  Constitutional:  Negative for activity or appetite change, fever or fatigue  HENT:  Negative for congestion, sinus pressure, or rhinorrhea  Eyes:  Negative for eye pain or visual changes  Resp:  Negative for chest tightness, cough  Cardiovascular: Negative for palpitations, orthopnea, PND, LE edema, +chronic BLACKBURN  Gastrointestinal: Negative for abd pain, melena, BRBPR, N/V/D  Endocrine:  Negative for polydipsia and polyuria  Neuro:  Negative for dizziness or lightheadedness  Psych: no depressed mood or anxiety      Objective:   Constitutional:   · Reviewed vitals above  · Well Nourished, well developed, no distress       HENT:  · Normal external nose without lesions  · Bilateral TMs translucent with normal light reflex and bony landmarks  · Normal oropharynx without erythema or exudate  · Normal nasal mucosa without swelling or erythema  Neck:  · Symmetric and without masses  · No thyromegaly  Resp:  · Normal effort  · Clear to auscultation bilaterally but diminished  · No rhonchi, wheezing or crackles  Cardiovascular:  · On auscultation, normal S1 and S2 without murmurs, rubs or gallops  · No bruits of bilateral carotids and no JVD  Gastrointestinal:  · Nontender, nondistended, and no masses  · No hepatosplenomegaly  Musculoskeletal:  · All extremities without clubbing, cyanosis or edema  · Normal gait  Skin:  · No rashes on inspection  Psych:  · Normal mood and affect  · Normal insight and judgement    FOOT EXAM:    +1 distal tibialis anterior and dorsalis pedis pulses bilaterally  Normal sensation to monofilament testing bilaterally  No calluses  No ulcers  Non mycotic toe nails. Assessment / Plan:      ILD (interstitial lung disease) (HCC)/Chronic respiratory failure with hypoxia (HCC)  As summarized above, reviewed Dr. Myles Valencia last note     This is a progressive disease that could eventually lead to the patient's early death. This is why patient took drastic measures of having weight loss surgery so he could eventually have lung transplant. He is waiting to establish with transplant center in [place], until COVID-19 is improved. Medications to treat the follicular bronchiolitis that was causing his interstitial lung disease have not helped this. In the meantime he is using oxygen with exertion for the respiratory failure. Patient to continue BiPAP with oxygen at nighttime. For his emphysema he is taking inhalers, but unclear if they help him much, as most of his respiratory failure is thought to be secondary to the interstitial lung disease.     Patient to let me know if there is any sudden worsening of his symptoms that should warrant more urgent referral to lung transplant center. Secondary polycythemia    This is thought to be secondary to his hypoxia from his lung disease. Dr. Demetrio Segal only recommending phlebotomy if hemoglobin becomes greater than 20    Type 2 diabetes mellitus without complication, without long-term current use of insulin (HCC)  A1c=5.2 today w/o meds!!! The large amount of weight loss after gastric sleeve surgery certainly has reverse this. We will continue to monitor every 6 months his A1c. Congratulated him and encouraged him to continue to eat healthy. Essential hypertension  BP lower than usual with all the weight loss. Decreasing Norvasc to 2.5 mg daily. Patient to return for nurse visit in 1 week for blood pressure reevaluation. If still low, will discontinue altogether. Patient to continue carvedilol 6.25 mg b.i.d., lisinopril 10 mg daily,     Reviewed recent BMP and it is within normal limits. Pure hypercholesterolemia  Pt to continue ASA, Crestor 40mg, and Repatha. .      Past it was started in August by Dr. Charis Solorzano when patient had an LDL of 165. On his  well blood work, 3 weeks later, LDL was down to 26. This was prior to starting the [address] [place] [address]. This is quite unusual, it makes me wonder if there was a mixup with the blood work. Will forward to Dr. Charis Solorzano to make him aware. CAD  Patient is on aspirin, Plavix, beta-blocker, and statin. As summarized above, reviewed Dr. Bernadette Banerjee most recent consult note. He added Repatha after finding an elevated LDL level, but somehow LDL was down 130 points just 3 weeks later without medication change. There is a question if there was a blood mixup. Moderate episode of recurrent major depressive disorder (HCC)   improved on Lexapro 10 mg. Patient to continue.         HM   1st shingrix vaccine given today with VIS form    Colonoscopy done 9/4/19: next due 2021 (had tubular adenoma and only fair prep)    -discussed prostates cancer testing controversy, including different recommendations from different medical associations. Pt making informed decision to not have PSA or rectal exam performed.     40 minutes were spent on this visit, >50% spent with counseling (discussed healthy eating, medication usage, progression of his lung disease)

## 2020-11-09 ENCOUNTER — OFFICE VISIT (OUTPATIENT)
Dept: FAMILY MEDICINE CLINIC | Age: 55
End: 2020-11-09
Payer: COMMERCIAL

## 2020-11-09 VITALS
HEIGHT: 70 IN | TEMPERATURE: 97.9 F | WEIGHT: 186 LBS | BODY MASS INDEX: 26.63 KG/M2 | HEART RATE: 67 BPM | DIASTOLIC BLOOD PRESSURE: 64 MMHG | SYSTOLIC BLOOD PRESSURE: 100 MMHG

## 2020-11-09 LAB — HBA1C MFR BLD: 5.2 %

## 2020-11-09 PROCEDURE — 99215 OFFICE O/P EST HI 40 MIN: CPT | Performed by: FAMILY MEDICINE

## 2020-11-09 PROCEDURE — 90471 IMMUNIZATION ADMIN: CPT | Performed by: FAMILY MEDICINE

## 2020-11-09 PROCEDURE — 2022F DILAT RTA XM EVC RTNOPTHY: CPT | Performed by: FAMILY MEDICINE

## 2020-11-09 PROCEDURE — 3023F SPIROM DOC REV: CPT | Performed by: FAMILY MEDICINE

## 2020-11-09 PROCEDURE — G8427 DOCREV CUR MEDS BY ELIG CLIN: HCPCS | Performed by: FAMILY MEDICINE

## 2020-11-09 PROCEDURE — 1036F TOBACCO NON-USER: CPT | Performed by: FAMILY MEDICINE

## 2020-11-09 PROCEDURE — 3044F HG A1C LEVEL LT 7.0%: CPT | Performed by: FAMILY MEDICINE

## 2020-11-09 PROCEDURE — G8482 FLU IMMUNIZE ORDER/ADMIN: HCPCS | Performed by: FAMILY MEDICINE

## 2020-11-09 PROCEDURE — G8417 CALC BMI ABV UP PARAM F/U: HCPCS | Performed by: FAMILY MEDICINE

## 2020-11-09 PROCEDURE — 83036 HEMOGLOBIN GLYCOSYLATED A1C: CPT | Performed by: FAMILY MEDICINE

## 2020-11-09 PROCEDURE — G8926 SPIRO NO PERF OR DOC: HCPCS | Performed by: FAMILY MEDICINE

## 2020-11-09 PROCEDURE — 3017F COLORECTAL CA SCREEN DOC REV: CPT | Performed by: FAMILY MEDICINE

## 2020-11-09 PROCEDURE — 90750 HZV VACC RECOMBINANT IM: CPT | Performed by: FAMILY MEDICINE

## 2020-11-09 RX ORDER — AMLODIPINE BESYLATE 2.5 MG/1
TABLET ORAL
Qty: 90 TABLET | Refills: 1 | Status: SHIPPED | OUTPATIENT
Start: 2020-11-09 | End: 2021-12-09 | Stop reason: SDUPTHER

## 2020-12-07 ENCOUNTER — TELEPHONE (OUTPATIENT)
Dept: CARDIOLOGY CLINIC | Age: 55
End: 2020-12-07

## 2020-12-07 NOTE — TELEPHONE ENCOUNTER
This med was already approved until - letter in [employer] submitted medication and it is approved for 8.00    Spoke to pt.  -

## 2020-12-07 NOTE — TELEPHONE ENCOUNTER
Jensen called in this morning stating his pharmacy said he needs pre-authorization to fill the medication Thjuanl Mark.       Sarai Ray can be reached at [address] [facility] [address], [address]

## 2020-12-15 ENCOUNTER — TELEPHONE (OUTPATIENT)
Dept: CARDIOLOGY CLINIC | Age: 55
End: 2020-12-15

## 2020-12-15 NOTE — TELEPHONE ENCOUNTER
Spoke to Kathrin Anthony - we have written approval  She stated that she has that information now and rx has to be sent to [facility] and it has been taken care of

## 2020-12-15 NOTE — TELEPHONE ENCOUNTER
Jesus Organ from [employer] called has aquestion concerning the [employer], pt stated Dr Jeanette Pa said it was covered,however ins is stating it is not. Would like a call please.     Jesus Organ # [phone]

## 2021-01-01 NOTE — TELEPHONE ENCOUNTER
2021 15:21 Pt states Marisel Setting will be around $500 a month. He wants to know if there is something different.     Please call pt at [phone]

## 2021-01-05 ENCOUNTER — OFFICE VISIT (OUTPATIENT)
Dept: PULMONOLOGY | Age: 56
End: 2021-01-05
Payer: COMMERCIAL

## 2021-01-05 ENCOUNTER — HOSPITAL ENCOUNTER (OUTPATIENT)
Dept: CT IMAGING | Age: 56
Discharge: HOME | End: 2021-01-05
Payer: COMMERCIAL

## 2021-01-05 VITALS
RESPIRATION RATE: 14 BRPM | DIASTOLIC BLOOD PRESSURE: 74 MMHG | TEMPERATURE: 96 F | WEIGHT: 189.2 LBS | BODY MASS INDEX: 27.09 KG/M2 | HEART RATE: 59 BPM | HEIGHT: 70 IN | SYSTOLIC BLOOD PRESSURE: 122 MMHG | OXYGEN SATURATION: 91 %

## 2021-01-05 DIAGNOSIS — J42 FOLLICULAR BRONCHIOLITIS (HCC): Primary | ICD-10-CM

## 2021-01-05 DIAGNOSIS — J42 FOLLICULAR BRONCHIOLITIS (HCC): ICD-10-CM

## 2021-01-05 DIAGNOSIS — G47.33 OSA TREATED WITH BIPAP: ICD-10-CM

## 2021-01-05 DIAGNOSIS — J44.9 COPD WITH ASTHMA (HCC): ICD-10-CM

## 2021-01-05 DIAGNOSIS — J84.9 ILD (INTERSTITIAL LUNG DISEASE) (HCC): ICD-10-CM

## 2021-01-05 DIAGNOSIS — J96.11 CHRONIC RESPIRATORY FAILURE WITH HYPOXIA (HCC): ICD-10-CM

## 2021-01-05 PROCEDURE — 3023F SPIROM DOC REV: CPT | Performed by: INTERNAL MEDICINE

## 2021-01-05 PROCEDURE — G8482 FLU IMMUNIZE ORDER/ADMIN: HCPCS | Performed by: INTERNAL MEDICINE

## 2021-01-05 PROCEDURE — G8427 DOCREV CUR MEDS BY ELIG CLIN: HCPCS | Performed by: INTERNAL MEDICINE

## 2021-01-05 PROCEDURE — 71250 CT THORAX DX C-: CPT

## 2021-01-05 PROCEDURE — 99214 OFFICE O/P EST MOD 30 MIN: CPT | Performed by: INTERNAL MEDICINE

## 2021-01-05 PROCEDURE — G8417 CALC BMI ABV UP PARAM F/U: HCPCS | Performed by: INTERNAL MEDICINE

## 2021-01-05 PROCEDURE — G8926 SPIRO NO PERF OR DOC: HCPCS | Performed by: INTERNAL MEDICINE

## 2021-01-05 PROCEDURE — 1036F TOBACCO NON-USER: CPT | Performed by: INTERNAL MEDICINE

## 2021-01-05 PROCEDURE — 3017F COLORECTAL CA SCREEN DOC REV: CPT | Performed by: INTERNAL MEDICINE

## 2021-01-05 ASSESSMENT — SLEEP AND FATIGUE QUESTIONNAIRES
HOW LIKELY ARE YOU TO NOD OFF OR FALL ASLEEP WHILE SITTING AND READING: 0
ESS TOTAL SCORE: 0
HOW LIKELY ARE YOU TO NOD OFF OR FALL ASLEEP IN A CAR, WHILE STOPPED FOR A FEW MINUTES IN TRAFFIC: 0
HOW LIKELY ARE YOU TO NOD OFF OR FALL ASLEEP WHILE SITTING AND TALKING TO SOMEONE: 0
HOW LIKELY ARE YOU TO NOD OFF OR FALL ASLEEP WHILE LYING DOWN TO REST IN THE AFTERNOON WHEN CIRCUMSTANCES PERMIT: 0
HOW LIKELY ARE YOU TO NOD OFF OR FALL ASLEEP WHILE SITTING INACTIVE IN A PUBLIC PLACE: 0
HOW LIKELY ARE YOU TO NOD OFF OR FALL ASLEEP WHILE WATCHING TV: 0

## 2021-01-05 NOTE — PROGRESS NOTES
Chief complaint  This is a 50y.o. year old male  who comes to see me with a chief complaint of   Chief Complaint   Patient presents with    Follow-up     ILD and ESTRELLITA on Bipap     HPI  Here with cc of ILD    No new changes. Still down about 90 lbs since weight loss surgery. On triple inhaler therapy. Says he is trying to stay active but it is harder due to being colder outside. Bilevel machine is not working and it was sent in for repairs. Uses oxygen up to 3 liters with exertion only when needed. Weight loss surgery has been life changing for him    Historical:  He had been following with Dr. Maurisio Mackey for ILD related to bronchiolitis. Ree Damico had been concerned about following up all the way out in [address] and asked if he could follow up with me. I asked Dr. Maurisio Mackey and he was ok with it. Dr. Maurisio Mackey has been following Ree Damico for years. 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 He was tried on various immune suppressant with no improvement in symptoms. Last medication he was on was cellcept about 4 years ago. He was seen at [facility] for lung transplant in the past, but was not felt to be sick enough to require transplant, nor was his weight low enough. He has not been seen at [facility] clinic since then. Past Medical History:   Diagnosis Date    CAD S/P percutaneous coronary angioplasty     Centrilobular emphysema (HCC) 11/8/2020    Chronic respiratory failure (HCC)     COPD exacerbation ([address] 9/7/2016    Coronary artery disease involving native coronary artery of native heart with unstable angina pectoris ([address]     Diabetes ([address]     Diabetes ([address]     Dyslipidemia     Dyspnea     GERD (gastroesophageal reflux disease)     Goodpasture syndrome ([address]     reason pt is on cellcept.   He will need lung transplant eventually    High risk medications (not anticoagulants) long-term use     HTN (hypertension)     Hyperlipidemia     Hypoxemia     ILD (interstitial lung disease) ([address]     Left ureteral stone 4/13/2018    MDD (major depressive disorder)     ESTRELLITA on CPAP     Polycythemia     Positive FIT (fecal immunochemical test)     Prediabetes     Tubular adenoma     colonoscopy 9/2019    Ureteral calculus of right kidney transplant 2005       Past Surgical History:   Procedure Laterality Date    BRONCHOSCOPY      COLONOSCOPY      COLONOSCOPY N/A 9/4/2019    COLONOSCOPY POLYPECTOMY ABLATION HOT SNARE OF TRANSVERSE COLON POLYP AND SIGMOID POLYP performed by Titus Gillette MD at [place] [address] CATH LAB PROCEDURE  2006??    ENDOSCOPY, COLON, DIAGNOSTIC      LUNG REMOVAL, PARTIAL Right 2005    Partial right lobectomy for diagnostic purpose.  Surgery performed in [place], [address] N/A 1/21/2020    ROBOTIC SLEEVE GASTRECTOMY; ROBOTIC HIATAL HERNIA REPAIR performed by Yessica Mae DO at [address] N/A 9/4/2019    EGD BIOPSY performed by Yessica Mae DO at [facility] ENDOSCOPY           Current Outpatient Medications:     amLODIPine (NORVASC) 2.5 MG tablet, TAKE 1 TABLET BY MOUTH ONE TIME A DAY, Disp: 90 tablet, Rfl: 1    clopidogrel (PLAVIX) 75 MG tablet, TAKE 1 TABLET BY MOUTH ONE TIME DAILY, Disp: 90 tablet, Rfl: 3    lisinopril (PRINIVIL;ZESTRIL) 10 MG tablet, TAKE 1 TABLET BY MOUTH ONE TIME DAILY, Disp: 90 tablet, Rfl: 0    escitalopram (LEXAPRO) 10 MG tablet, TAKE 1 TABLET BY MOUTH ONE TIME A DAY, Disp: 90 tablet, Rfl: 0    carvedilol (COREG) 6.25 MG tablet, TAKE 1 TABLET BY MOUTH 2 TIMES A DAY WITH MEALS, Disp: 180 tablet, Rfl: 0    rosuvastatin (CRESTOR) 40 MG tablet, TAKE ONE TABLET BY MOUTH EVERY EVENING, Disp: 90 tablet, Rfl: 0    SYMBICORT 160-4.5 MCG/ACT AERO, Inhale 2 puffs into the lungs 2 times daily, Disp: 3 Inhaler, Rfl: 1    Umeclidinium Bromide (INCRUSE ELLIPTA) 62.5 MCG/INH AEPB, INHALE 1 PUFF BY MOUTH ONE TIME A DAY, Disp: 3 each, Rfl: 0    omeprazole (PRILOSEC) 20 MG delayed release capsule, TAKE ONE CAPSULE BY MOUTH TWICE A DAY, Disp: 180 capsule, Rfl: 3    Evolocumab 140 MG/ML SOAJ, Inject 140 mg into the skin every 14 days, Disp: 1 pen, Rfl: 5    Evolocumab (REPATHA SURECLICK) 214 MG/ML SOAJ, Inject 140 mg into the skin every 14 days, Disp: 6 pen, Rfl: 3    ibuprofen (ADVIL;MOTRIN) 200 MG tablet, ibuprofen 200 MG Oral Tablet        Active, Disp: , Rfl:     furosemide (LASIX) 20 MG tablet, furosemide 20 MG Oral Tablet  by mouth  prn    Active, Disp: , Rfl:     aspirin (ASPIRIN LOW DOSE ADULT) 81 MG chewable tablet, Indications: restart in 5 days CHEW AND SWALLOW ONE TABLET BY MOUTH ONE TIME A DAY, Disp: 90 tablet, Rfl: 5    Hyoscyamine Sulfate SL (LEVSIN/SL) 0.125 MG SUBL, Place 1 tablet under the tongue every 6 hours as needed (spasm), Disp: 30 each, Rfl: 0    ondansetron (ZOFRAN) 4 MG tablet, Take 1 tablet by mouth every 6 hours as needed for Nausea or Vomiting, Disp: 30 tablet, Rfl: 0    sodium chloride, Inhalant, 3 % nebulizer solution, Take 15 mLs by nebulization 2 times daily, Disp: 900 mL, Rfl: 0    hydrocortisone (WESTCORT) 0.2 % cream, Apply topically 2 times daily Apply topically 2 times daily. , Disp: 60 g, Rfl: 0    nitroGLYCERIN (NITROSTAT) 0.4 MG SL tablet, up to max of 3 total doses. If no relief after 1 dose, call 911., Disp: 25 tablet, Rfl: 3    albuterol sulfate HFA (PROAIR HFA) 108 (90 BASE) MCG/ACT inhaler, Inhale 2 puffs into the lungs every 4 hours as needed for Wheezing or Shortness of Breath, Disp: 1 Inhaler, Rfl: 6    azelastine (ASTELIN) 0.1 % nasal spray, 2 sprays by Nasal route 2 times daily Use in each nostril as directed (Patient taking differently: 2 sprays by Nasal route 2 times daily as needed Use in each nostril as directed), Disp: 1 Bottle, Rfl: 3    ROS:  GENERAL:  Weight remains stable after losing 90 lbs   HEENT:  No double vision, blurry vision, or difficulty swallowing  HEART:  No chest pain, no palpitations.   Improved chest wall pains   LUNGS:  Controlled breathing   ABDOMEN:  GI discomfort has improved   GENITOURINARY:  No increased frequency, no blood in urine  EXTREMITIES:  No swelling, no lesions. NEURO:  No numbness or tingling, no seizures  SKIN:  No new rashes, no changes in hair or nails  LYMPH:  No masses, no swelling neck, armpits, or groin      PHYSICAL EXAM:  GENERAL:  Visible weight loss  HEENT:  No scleral icterus, no conjunctival irritation, Mallampati IV with bifid pharnyx  NECK:  No thyromegaly, no bruits  LYMPH:  No cervical or supraclavicular adenopathy  HEART:  Regular rate and rhythm, no murmurs  LUNGS:  Clear but diminished   ABDOMEN:  No distention, no organomegaly  EXTREMITIES:  Trace edema, no digital clubbing  NEURO:  No localizing deficits, CN II-XII intact    Pulmonary Function Testing 10/2015  A very severe obstructive lung defect is present with minimal  broncho-reactivity. There is also air trapping and a moderate reduction in  diffusion capacity. These lung function tests are most consistent with  severe chronic obstructive pulmonary disease. In comparison with pulmonary  function testing performed in October of 2012, there has been significant  reduction in the forced vital capacity and FEV1. Pulmonary Function Testing 10/2017  FEV1 1.09 L to 1.26 L  FVC  2.86 L to 3.48 L  FEV1/FVC 38  TLC 7.06 L  DLCO 42%    Pulmonary Function Testing 11/2018  FEV1 1.34 L to 1.42 L  FVC  3.31 L to 3.68 L  FEV1/FVC 40  TLC 7.83 L   DLCO 44%    Pulmonary Function Testing 9/2020  FEV1 1.17 L to 1.42 L  FVC  2.97 L to 3.56 L  FEV1/FVC 39  TLC 8.05 L   DLCO 47%    ECHO 10/2017  Left ventricular size is normal.   Mild concentric left ventricular hypertrophy is present.   Global left ventricular function is normal with ejection fraction estimated   from 55 % to 60 %.   Normal right ventricular size and function.     Stress test 9/2017  Jony Almeida is no obvious ischemia in this study.  There is an area of decrease  tracer uptake at the apex both at stress and with rest that could represent    an area of prior infarct but is probably more bowel artifact.    Diaphragmatic and bowel attenuation present.    Non-diagnostic EKG response due to failure to reach target heart rate.    Appropriate hemodynamic response to Lexiscan with no significant ST changes. Chest imaging from 1/2021 is reviewed and compared to previous CTs. My interpretation is stable appearance of cysts and emphysema. Alpha-1 Anti-trypsin levels:  120, Phenotype:  M1M1       ABG 10/2017:  7.43/39/61.  92%    RHC 4/2018  1. Normal right heart pressures with a right atrial pressure of 1 and a  pulmonary capillary wedge pressure of 8.  2.  No evidence of pulmonary hypertension with an instantaneous pressure of  26/14 and a mean pulmonary arterial pressure of 20.  3.  Normal cardiac outputs by thermodilution at 6.73 liters per minute with  a cardiac index of 2.95 liters per kilogram per minute. By second equation  the cardiac output was 4.76 with a cardiac index of 2.09. Of note, the  patient does have erythrocytosis. 4.  Pulmonary vascular resistance 147.  5.  Normal mixed venous oxygen saturations with a superior vena cava  saturation of 70 and a pulmonary arterial saturation of 73%. Lab Results   Component Value Date    WBC 6.0 08/18/2020    HGB 17.9 (H) 08/18/2020    HCT 53.0 (H) 08/18/2020    MCV 86.7 08/18/2020     08/18/2020       No results found for: BNP    Lab Results   Component Value Date    CREATININE 0.8 (L) 08/18/2020    BUN 11 08/18/2020     08/18/2020    K 4.6 08/18/2020     08/18/2020    CO2 25 08/18/2020     I reviewed the above labs images, etc.     Assessment/Plan:  1. Follicular bronchiolitis ([address]  Historical diagnosis. Does not appear to active disease. Failed therapy included cellcept and prednisone. HRCT today showed stable disease. PFT from 9/2020 shows stable values     2.  Chronic respiratory failure with hypoxia (HCC)  Uses 2-3 liters with exertion. Does help him when he needs it    3. COPD with asthma ([address]  Continue with triple inhaler therapy. Significant bronchodilator response on PFTs    4. ESTRELLITA treated with BiPAP  Has not been able to use machine as it broke and is out for repair. He will let me know if this is repairable and if I need to send a new order    Follow up in 4 months    Pulmonary Rehab:  Finished Pulmonary rehab and cardiac rehab. Does exercise on his own    Lung Cancer Screening CT:  Does not qualify due to >15 years since he quit and age <55    His life has changed with weight loss surgery.   He is a different person and I am happy for him

## 2021-01-21 ENCOUNTER — OFFICE VISIT (OUTPATIENT)
Dept: BARIATRICS/WEIGHT MGMT | Age: 56
End: 2021-01-21
Payer: COMMERCIAL

## 2021-01-21 VITALS
WEIGHT: 189.6 LBS | HEIGHT: 70 IN | HEART RATE: 75 BPM | BODY MASS INDEX: 27.14 KG/M2 | SYSTOLIC BLOOD PRESSURE: 118 MMHG | DIASTOLIC BLOOD PRESSURE: 77 MMHG

## 2021-01-21 DIAGNOSIS — Z98.84 STATUS POST LAPAROSCOPIC SLEEVE GASTRECTOMY: Primary | ICD-10-CM

## 2021-01-21 DIAGNOSIS — E66.3 OVERWEIGHT (BMI 25.0-29.9): ICD-10-CM

## 2021-01-21 PROCEDURE — G8417 CALC BMI ABV UP PARAM F/U: HCPCS | Performed by: SURGERY

## 2021-01-21 PROCEDURE — 3017F COLORECTAL CA SCREEN DOC REV: CPT | Performed by: SURGERY

## 2021-01-21 PROCEDURE — G8427 DOCREV CUR MEDS BY ELIG CLIN: HCPCS | Performed by: SURGERY

## 2021-01-21 PROCEDURE — 1036F TOBACCO NON-USER: CPT | Performed by: SURGERY

## 2021-01-21 PROCEDURE — G8482 FLU IMMUNIZE ORDER/ADMIN: HCPCS | Performed by: SURGERY

## 2021-01-21 PROCEDURE — 99213 OFFICE O/P EST LOW 20 MIN: CPT | Performed by: SURGERY

## 2021-01-21 NOTE — PATIENT INSTRUCTIONS
Patient received dietary handouts and education.     Goals:   - Continue plan  - Increase exercise as able  - Limit/avoid night time snack

## 2021-01-21 NOTE — PROGRESS NOTES
Dietary Assessment Note  Vitals:   Vitals:    21 0931   BP: 118/77   Pulse: 75   Weight: 189 lb 9.6 oz (86 kg)   Height: 5' 10\" (1.778 m)   Patient gained 5 lbs over past ~3.5 months. Total Weight Loss: 72.8 lbs    Labs reviewed: 20 HgbA1C 5.2    Protein intake: 60-80 grams/day     Fluid intake: 48-64 oz/day - water / sf lemonade    Multivitamin/mineral intake: yes - 4 fusion per day    Calcium intake: no    Other: none    Exercise: yes - doing ~1-1.5 miles per day, was getting about 3-4/day (limited d/t cold & lung condition)    Nutrition Assessment: 1 year post-op visit. Pt is eating ~4x/day, very structured. Added, evening snack more often.      B- egg w/ pacheco (1-2) or sausage (1)  S- none  L- ham OR turkey w/ cheese   S- banana OR orange  D- grilled fish OR chicken w/ green beans (some red meat) & some potatoes OR CC  S- sometimes pretzels w/ cheese cup    Amount able to eat per sittin.25 cups volume    Following  rule: yes    Food Intolerances/issues: wings/deep fried foods    Client Concerns: none    Goals:   - Continue plan  - Increase exercise as able  - Limit/avoid night time snack    Plan: f/u as directed    [employer]

## 2021-01-21 NOTE — PROGRESS NOTES
[facility]) Physicians   General & Laparoscopic Surgery  [employer]       HPI:     Angelique Doran is a very pleasant 50 y.o. obese male , Body mass index is 27.2 kg/m². Ana Eaton And multiple medical problems who is presenting for bariatric follow up care. Angelique Doran is s/p laparoscopic sleeve gastrectomy by me   Comes today to the clinic without any complaints. Patient denies any nausea, vomiting, fevers, chills, shortness of breath, chest pain, constipation or urinary symptoms. Denies any heartburn nor dysphagia. Patient is feeling very well, and is very active. Patient is very pleased with the weight loss and resolution of co-morbid conditions. Past Medical History:   Diagnosis Date    CAD S/P percutaneous coronary angioplasty     Centrilobular emphysema (HCC) 11/8/2020    Chronic respiratory failure (HCC)     COPD exacerbation ([address] 9/7/2016    Coronary artery disease involving native coronary artery of native heart with unstable angina pectoris ([address]     Diabetes ([address]     Diabetes ([address]     Dyslipidemia     Dyspnea     GERD (gastroesophageal reflux disease)     Goodpasture syndrome ([address]     reason pt is on cellcept.   He will need lung transplant eventually    High risk medications (not anticoagulants) long-term use     HTN (hypertension)     Hyperlipidemia     Hypoxemia     ILD (interstitial lung disease) ([address]     Left ureteral stone 4/13/2018    MDD (major depressive disorder)     ESTRELLITA on CPAP     Polycythemia     Positive FIT (fecal immunochemical test)     Prediabetes     Tubular adenoma     colonoscopy 9/2019    Ureteral calculus of right kidney transplant 2005     Past Surgical History:   Procedure Laterality Date    BRONCHOSCOPY      COLONOSCOPY      COLONOSCOPY N/A 9/4/2019    COLONOSCOPY POLYPECTOMY ABLATION HOT SNARE OF TRANSVERSE COLON POLYP AND SIGMOID POLYP performed by Te Alvarez MD at [place] [address] CATH LAB PROCEDURE  2006??    ENDOSCOPY, COLON, DIAGNOSTIC      LUNG REMOVAL, PARTIAL Right 2005    Partial right lobectomy for diagnostic purpose. Surgery performed in [address] N/A 2020    ROBOTIC SLEEVE GASTRECTOMY; ROBOTIC HIATAL HERNIA REPAIR performed by Carina Osorio DO at [facility] N/A 2019    EGD BIOPSY performed by Carina Osorio DO at [facility] ENDOSCOPY     Family History   Problem Relation Age of Onset    Diabetes Father     High Cholesterol Father     High Blood Pressure Father     Emphysema Neg Hx     Heart Failure Neg Hx     Hypertension Neg Hx      Social History     Tobacco Use    Smoking status: Former Smoker     Packs/day: 3.00     Years: 20.00     Pack years: 60.00     Types: Cigarettes     Quit date: 2000     Years since quittin.0    Smokeless tobacco: Never Used   Substance Use Topics    Alcohol use: No     Alcohol/week: 0.0 standard drinks     Comment: seldom     I counseled the patient on the importance of not smoking and risks of ETOH. Allergies   Allergen Reactions    Erythromycin Hives     Vitals:    21 0931   BP: 118/77   Pulse: 75   Weight: 189 lb 9.6 oz (86 kg)   Height: 5' 10\" (1.778 m)       Body mass index is 27.2 kg/m².       Current Outpatient Medications:     amLODIPine (NORVASC) 2.5 MG tablet, TAKE 1 TABLET BY MOUTH ONE TIME A DAY, Disp: 90 tablet, Rfl: 1    clopidogrel (PLAVIX) 75 MG tablet, TAKE 1 TABLET BY MOUTH ONE TIME DAILY, Disp: 90 tablet, Rfl: 3    lisinopril (PRINIVIL;ZESTRIL) 10 MG tablet, TAKE 1 TABLET BY MOUTH ONE TIME DAILY, Disp: 90 tablet, Rfl: 0    escitalopram (LEXAPRO) 10 MG tablet, TAKE 1 TABLET BY MOUTH ONE TIME A DAY, Disp: 90 tablet, Rfl: 0    carvedilol (COREG) 6.25 MG tablet, TAKE 1 TABLET BY MOUTH 2 TIMES A DAY WITH MEALS, Disp: 180 tablet, Rfl: 0    rosuvastatin (CRESTOR) 40 MG tablet, TAKE ONE TABLET BY MOUTH EVERY EVENING, Disp: 90 tablet, Rfl: 0    SYMBICORT 160-4.5 MCG/ACT AERO, Inhale 2 puffs into the lungs 2 times daily, Disp: 3 Inhaler, Rfl: 1    Umeclidinium Bromide (INCRUSE ELLIPTA) 62.5 MCG/INH AEPB, INHALE 1 PUFF BY MOUTH ONE TIME A DAY, Disp: 3 each, Rfl: 0    omeprazole (PRILOSEC) 20 MG delayed release capsule, TAKE ONE CAPSULE BY MOUTH TWICE A DAY, Disp: 180 capsule, Rfl: 3    Evolocumab 140 MG/ML SOAJ, Inject 140 mg into the skin every 14 days, Disp: 1 pen, Rfl: 5    Evolocumab (REPATHA SURECLICK) 229 MG/ML SOAJ, Inject 140 mg into the skin every 14 days, Disp: 6 pen, Rfl: 3    ibuprofen (ADVIL;MOTRIN) 200 MG tablet, ibuprofen 200 MG Oral Tablet        Active, Disp: , Rfl:     furosemide (LASIX) 20 MG tablet, furosemide 20 MG Oral Tablet  by mouth  prn    Active, Disp: , Rfl:     aspirin (ASPIRIN LOW DOSE ADULT) 81 MG chewable tablet, Indications: restart in 5 days CHEW AND SWALLOW ONE TABLET BY MOUTH ONE TIME A DAY, Disp: 90 tablet, Rfl: 5    Hyoscyamine Sulfate SL (LEVSIN/SL) 0.125 MG SUBL, Place 1 tablet under the tongue every 6 hours as needed (spasm), Disp: 30 each, Rfl: 0    ondansetron (ZOFRAN) 4 MG tablet, Take 1 tablet by mouth every 6 hours as needed for Nausea or Vomiting, Disp: 30 tablet, Rfl: 0    sodium chloride, Inhalant, 3 % nebulizer solution, Take 15 mLs by nebulization 2 times daily, Disp: 900 mL, Rfl: 0    hydrocortisone (WESTCORT) 0.2 % cream, Apply topically 2 times daily Apply topically 2 times daily. , Disp: 60 g, Rfl: 0    nitroGLYCERIN (NITROSTAT) 0.4 MG SL tablet, up to max of 3 total doses.  If no relief after 1 dose, call 911., Disp: 25 tablet, Rfl: 3    albuterol sulfate HFA (PROAIR HFA) 108 (90 BASE) MCG/ACT inhaler, Inhale 2 puffs into the lungs every 4 hours as needed for Wheezing or Shortness of Breath, Disp: 1 Inhaler, Rfl: 6    azelastine (ASTELIN) 0.1 % nasal spray, 2 sprays by Nasal route 2 times daily Use in each nostril as directed (Patient taking differently: 2 sprays by Nasal route 2 times daily as needed Use in each nostril as directed), Disp: 1 Bottle, Rfl: 3      Review of Systems - History obtained from the patient  General ROS: negative  Psychological ROS: negative  Hematological and Lymphatic ROS: negative  Endocrine ROS: negative  Respiratory ROS: negative  Cardiovascular ROS: negative  Gastrointestinal ROS:negative  Genito-Urinary ROS: negative  Musculoskeletal ROS: negative   Skin ROS: negative    Physical Exam   Vitals Reviewed   Constitutional: Patient is oriented to person, place, and time. Patient appears well-developed and well-nourished. Patient is active and cooperative. Non-toxic appearance. No distress. Neck: Trachea normal and normal range of motion. No JVD present. Pulmonary/Chest: Effort normal. No accessory muscle usage or stridor. No apnea. No respiratory distress. Cardiovascular: Normal rate and no JVD. Abdominal: Normal appearance. Patient exhibits no distension. Abdomen is soft, obese, non tender. Musculoskeletal: Normal range of motion. Patient exhibits no edema. Neurological: Patient is alert and oriented to person, place, and time. Patient has normal strength. GCS eye subscore is 4. GCS verbal subscore is 5. GCS motor subscore is 6. Skin: Skin is warm and dry. No abrasion and no rash noted. Patient is not diaphoretic. No cyanosis or erythema. Psychiatric: Patient has a normal mood and affect. Speech is normal and behavior is normal. Cognition and memory are normal.         A/P     Juan David Egan is 50 y.o. male , now with Body mass index is 27.2 kg/m². s/p Sleeve gastrectomy, has lost gained 5 lbs since last visit, total of 73 lbs weight loss. The patient underwent dietary counseling with registered dietician. I have reviewed, discussed and agree with the dietary plan. Patient is trying hard to keep good dietary and behavior modifications. Patient is monitoring portion sizes, food choices and liquid calories. Patient is trying to exercise regularly.  Patient pleased with the surgery outcomes. We discussed how his weight affects his overall health including:  Claudia Fonseca was seen today for bariatrics post op follow up. Diagnoses and all orders for this visit:    Status post laparoscopic sleeve gastrectomy    Overweight (BMI 25.0-29.9)       and importance of weight loss to alleviate those co morbid conditions. I encouraged the patient to continue exercise and keeping healthy eating habits. Also counseled the patient extensively on post surgery care. Total encounter time:  20 minutes including any number of the following: review of labs, imaging, provider notes, outside hospital records; performing examination/evaluation; counseling patient and family; ordering medications/tests; placing referrals and communication with referring physicians; coordination of care, and documentation in the EHR. RTC in 6 months  Diet and Exercise      Patient advised that its their responsibility to follow up for studies and/or labs ordered today.

## 2021-03-08 DIAGNOSIS — F33.1 MODERATE EPISODE OF RECURRENT MAJOR DEPRESSIVE DISORDER (HCC): ICD-10-CM

## 2021-03-08 DIAGNOSIS — I10 ESSENTIAL HYPERTENSION: ICD-10-CM

## 2021-03-08 RX ORDER — CARVEDILOL 6.25 MG/1
TABLET ORAL
Qty: 180 TABLET | Refills: 0 | Status: SHIPPED | OUTPATIENT
Start: 2021-03-08 | End: 2021-12-09 | Stop reason: SDUPTHER

## 2021-03-08 RX ORDER — LISINOPRIL 10 MG/1
TABLET ORAL
Qty: 90 TABLET | Refills: 0 | Status: SHIPPED | OUTPATIENT
Start: 2021-03-08 | End: 2021-12-09 | Stop reason: SDUPTHER

## 2021-03-08 RX ORDER — ASPIRIN 81 MG/1
TABLET, CHEWABLE ORAL
Qty: 90 TABLET | Refills: 5 | Status: SHIPPED | OUTPATIENT
Start: 2021-03-08 | End: 2021-12-09 | Stop reason: SDUPTHER

## 2021-03-08 RX ORDER — ESCITALOPRAM OXALATE 10 MG/1
TABLET ORAL
Qty: 90 TABLET | Refills: 0 | Status: SHIPPED | OUTPATIENT
Start: 2021-03-08 | End: 2021-12-09 | Stop reason: SDUPTHER

## 2021-03-08 RX ORDER — ROSUVASTATIN CALCIUM 40 MG/1
TABLET, COATED ORAL
Qty: 90 TABLET | Refills: 0 | Status: SHIPPED | OUTPATIENT
Start: 2021-03-08 | End: 2021-12-09 | Stop reason: SDUPTHER

## 2021-03-08 NOTE — TELEPHONE ENCOUNTER
PT called in regarding message. PT cancelled his appointment for May and will call us back to adv if he wants to schedule with Dr. Carola Beltran or make one last appointment with Dr. Cortney Mederos for April.

## 2021-03-11 ENCOUNTER — TELEPHONE (OUTPATIENT)
Dept: PULMONOLOGY | Age: 56
End: 2021-03-11

## 2021-03-11 NOTE — TELEPHONE ENCOUNTER
[address] [facility] called stating the incruse inhaler is not on patient's formulary and will cost over $300. She stated the Spiriva Respimat was on his formulary. If agreeable can a prescription be sent to [address] [place] [address].

## 2021-03-17 ENCOUNTER — TELEPHONE (OUTPATIENT)
Dept: PHARMACY | Age: 56
End: 2021-03-17

## 2021-03-17 DIAGNOSIS — E78.2 MIXED HYPERLIPIDEMIA: Primary | ICD-10-CM

## 2021-03-17 NOTE — TELEPHONE ENCOUNTER
Specialty Medication Service    Patient's Name: Gil Rodriguez YOB: 1965      Gil Rodriguez is a 50 y.o. male presenting today for Specialty Medication Service visit follow up. Patient last seen by [address] [place] [address] Pharmacist on 12/17/20. Medication list updated. Specialty Medication: Repatha   Frequency: Inject 140 mg into the skin every 14 days  Indication: Mixed Hyperlipidemia, prevention of cardiovascular events in patients with established cardiovascular disease  Initially Diagnosed: 2018  Additional Therapy:   · Aspirin 81 mg daily, Rosuvastatin 40 mg daily  Previous Therapy:   · N/A    Specialist: Dr. Kirill Jha  Specialist Progress Note Available: Yes  Last Specialist Visit: 8/25/20 for annual follow up (not yet scheduled for 2021)    Allergies   Allergen Reactions    Erythromycin Hives       Past Medical History:   Diagnosis Date    CAD S/P percutaneous coronary angioplasty     Centrilobular emphysema ([address] 11/8/2020    Chronic respiratory failure ([address]     COPD exacerbation ([address] 9/7/2016    Coronary artery disease involving native coronary artery of native heart with unstable angina pectoris ([address]     Diabetes ([address]     Diabetes ([address]     Dyslipidemia     Dyspnea     GERD (gastroesophageal reflux disease)     Goodpasture syndrome ([address]     reason pt is on cellcept.   He will need lung transplant eventually    High risk medications (not anticoagulants) long-term use     HTN (hypertension)     Hyperlipidemia     Hypoxemia     ILD (interstitial lung disease) ([address]     Left ureteral stone 4/13/2018    MDD (major depressive disorder)     ESTRELLITA on CPAP     Polycythemia     Positive FIT (fecal immunochemical test)     Prediabetes     Tubular adenoma     colonoscopy 9/2019    Ureteral calculus of right kidney transplant 2005      Social History     Tobacco Use    Smoking status: Former Smoker     Packs/day: 3.00     Years: 20.00     Pack years: 60. 00     Types: Cigarettes     Quit date: 2000     Years since quittin.2    Smokeless tobacco: Never Used   Substance Use Topics    Alcohol use: No     Alcohol/week: 0.0 standard drinks     Comment: seldom     Family History   Problem Relation Age of Onset    Diabetes Father     High Cholesterol Father     High Blood Pressure Father     Emphysema Neg Hx     Heart Failure Neg Hx     Hypertension Neg Hx      INTERM HISTORY  Have you been diagnosed with any additional conditions since we last talked? no  Have you developed any new allergies since we last talked? no  Have you stopped taking any medications or supplements since we last talked? no  Have you started taking any additional medications or supplements since we last talked? no    REVIEW OF CURRENT DISEASE STATE  Hyperlipidemia: Patient has diagnosis of hyperlipidemia/CAD presents for evaluation related to specialty medication use. There is a family history of hyperlipidemia. Current symptoms: non-existent. Cardiac risk factors include dyslipidemia, hypertension and male gender. The ASCVD Risk score (Kavya Johnson et al., 2013) failed to calculate for the following reasons: The valid total cholesterol range is 130 to 320 mg/dL     · Considering all the ways in which this condition and others affects you, how are you doing (0-10)? 0  · During the past 4 weeks, have you missed any planned activities of daily living due to condition (work/school/other plans)? No  · During the past 4 weeks, have you had to seek urgent care, ER admission, Unplanned doctor office visit, or hospital admission? No    MEDICATIONS  Medication Sig Comments    tiotropium (SPIRIVA RESPIMAT) 2.5 MCG/ACT AERS inhaler Inhale 2 puffs into the lungs daily Per 3/11/21 encounter, Spiriva is formulary preferred to Incruse. Removed Incruse and transferred patient to [facility] to get set up with coupon card for delivery.     rosuvastatin (CRESTOR) 40 MG tablet TAKE 1 TABLET BY MOUTH Taking as prescribed    Patient requesting [facility] Rx [facility]. Will send in separate encounter.  albuterol sulfate HFA (PROAIR HFA) 108 (90 BASE) MCG/ACT inhaler Inhale 2 puffs into the lungs every 4 hours as needed for Wheezing or Shortness of Breath Taking as prescribed      azelastine (ASTELIN) 0.1 % nasal spray 2 sprays by Nasal route 2 times daily Use in each nostril as directed (Patient taking differently: 2 sprays by Nasal route 2 times daily as needed Use in each nostril as directed) Taking as prescribed         Current Specialty Medication: Evolocumab (Repatha)  Homozygous familial hypercholesterolemia: SubQ: 420 mg once monthly. Hyperlipidemia, primary: SubQ: 140 mg every 2 weeks or 420 mg once monthly. Prevention of cardiovascular events in patients with established cardiovascular disease: SubQ: 140 mg every 2 weeks or 420 mg once monthly. Warnings/Precautions: Hypersensitivity reactions  Recommended monitoring: Lipid profile (baseline, 4-8 weeks after start, then every 3-12 months after)  Storage: Store refrigerated at 2°C to 8°C (36°F to 46°F) in the original carton. Alternatively, can be kept at room temperature at 68°F to 77°F (20°C to 25°C) in the original carton  Handing: Must be used within 30 days if stored at room temperature. If not used within the 30 days, discard REPATHA. Protect REPATHA from direct light and do not expose to temperatures above 25°C (77°F). Do not use beyond date printed on carton.      Specialty Medication Start Date: 9/4/20  Appropriate Dose: Yes    Side Effects: No  Describe your medication adherence over the last 4 weeks: Very Good  How many doses have you missed in the last 4 weeks, if any? 0  How confident are you to follow the injection process and the treatment plan? (0-10) 10    LABS  Lab Results   Component Value Date/Time    CREATININE 0.8 (L) 08/18/2020 10:48 AM    CREATININE 0.9 03/11/2020 03:05 PM    CREATININE 0.9 01/22/2020 05:33 AM    BUN 11 08/18/2020 10:48 AM    BUN 10 03/11/2020 03:05 PM    BUN 11 01/22/2020 05:33 AM    CrCl cannot be calculated (Patient's most recent lab result is older than the maximum 120 days allowed. ).   Lab Results   Component Value Date/Time    CHOL 81 09/18/2020 12:12 PM    CHOL 101 08/07/2019 10:50 AM    CHOL 109 05/14/2019 09:26 AM    HDL 40 09/18/2020 12:12 PM    HDL 38 (L) 08/28/2020 01:02 PM    HDL 31 (L) 08/07/2019 10:50 AM    LDLCALC 26 09/18/2020 12:12 PM    LDLCALC 165 (H) 08/28/2020 01:02 PM    LDLCALC 47 08/07/2019 10:50 AM    LABVLDL 24 08/28/2020 01:02 PM    LABVLDL 23 08/07/2019 10:50 AM    LABVLDL 25 05/14/2018 10:28 AM    TRIG 75 09/18/2020 12:12 PM    TRIG 115 08/07/2019 10:50 AM    TRIG 135 05/14/2019 09:26 AM     Lab Results   Component Value Date/Time    ALKPHOS 86 08/18/2020 10:48 AM    ALKPHOS 87 03/11/2020 03:05 PM    ALKPHOS 100 01/15/2020 03:58 PM    ALT 10 08/28/2020 01:02 PM    ALT 10 08/18/2020 10:48 AM    ALT 20 03/11/2020 03:05 PM    AST 16 08/28/2020 01:02 PM    AST 15 08/18/2020 10:48 AM    AST 23 03/11/2020 03:05 PM    BILITOT 1.2 (H) 08/18/2020 10:48 AM    BILITOT 0.6 03/11/2020 03:05 PM    BILITOT 1.2 (H) 01/15/2020 03:58 PM    BILIDIR 0.3 01/15/2020 03:58 PM    BILIDIR 0.3 01/09/2020 10:20 AM    BILIDIR <0.2 01/09/2019 10:44 AM    IBILI 0.9 01/15/2020 03:58 PM    IBILI 1.2 (H) 01/09/2020 10:20 AM    IBILI see below 01/09/2019 10:44 AM     IMMUNIZATIONS  Immunization History   Administered Date(s) Administered    Influenza Vaccine, unspecified formulation 11/04/2008, 10/27/2009    Influenza Virus Vaccine 11/13/2011, 10/01/2012, 12/13/2013, 10/17/2014    Influenza, High Dose (Fluzone 65 yrs and older) 10/23/2015    Influenza, Rebecca Hobbs, 6 mo and older, IM, PF (Flulaval, Fluarix) 10/03/2018    Influenza, Quadv, IM, (6 mo and older Fluzone, Flulaval, Fluarix and 3 yrs and older Afluria) 12/12/2016, 09/27/2017    Influenza, Quadv, IM, PF (6 mo and older Fluzone, Flulaval, Fluarix, and 3 yrs and older Afluria) 10/15/2019, 10/06/2020    Pneumococcal Conjugate 13-valent ([id number]) 10/01/2018    Pneumococcal Polysaccharide ([id number]) 06/22/2016    Tdap (Boostrix, Adacel) 06/22/2016    Zoster Recombinant (Shingrix) 11/09/2020      Immunization status: up to date and documented, missing doses of Shingrix (second dose). ASSESSMENTS  Fall Risk 3/17/2021   2 or more falls in past year? no   Fall with injury in past year? no     PROMIS 10 completed on 12/17/20 by [facility] with follow up due 365 days from initial assessment. 1. Lipids   Garrett Melo is a 50 y.o. male being treated for hyperlipidemia. Medication Reconciliation completed, no significant drug-drug interactions identified (Clopidogrel/Rosuvastatin: Clopidogrel may increase the serum concentration of Rosuvastatin (Monitor for increased toxicity of rosuvastatin (e.g., myalgia, rhabdomyolysis, liver function test abnormalities [8/28/20 AST 16 (15-37) and ALT 10 (10-40)])). Clopidogrel/Omeprazole: Omeprazole may diminish the antiplatelet effect of Clopidogrel. (MD lipscomb DDI 10/5/2020), Escitalopram/ibuprofen: SSRI may enhance antiplatelet effect and NSAID may diminish therapeutic efficacy of SSRI (patient is taking a PPI). Allergy and diagnosis info reviewed and updated. Garrett Melo has a history remarkable for the following conditions: CAD s/p percutaneous coronary angioplasty, Hypertension, GERD, Goodpasture's Syndrome, polycythemia, overweight, obstructive sleep apnea, Type 2 diabetes, Depression, ILD, COPD.  Current therapy includes: Repatha 140 mg every 14 days, Rosuvastatin 40 mg daily   Medication Effectiveness: Patient disease is  well controlled on current therapy.  Patient had no questions regarding the medication's warnings, precautions, and contraindications. Confirmed appropriate storage and disposal.   Patient had no concerns with the administration process.    Current disease state symptoms include: None   No side effects/adverse events reported, and no adherence issues identified.  Patient is not considered high risk. Based on patient feedback/results of the assessment, the therapy is still appropriate.  No medication-related problem(s) or patient need(s) identified that would require a care plan. Follow up in 180 days as patient has been on therapy for > 6 months. 2. Immunizations  Immunization status: up to date and documented, missing doses of Shingrix (second dose). 3. Drug Interactions  See above  4. Other Identified Potential Issues  N/A    PLAN  Goals of therapy, common side effects, medication storage, and administration reviewed with patient. Discussed with patient the Pharmacist Collaborative Practice Agreement. Patient provided verbal and/or electronic (ex[employer]) consent to participate in the collaborative practice agreement between the pharmacist and referred patient. This is in lieu of paper consent due to COVID-19 precautions and the use of remote/virtual visits. continue Repatha 140 mg every 14 days. Patient has one refill remaining on current Rx. No PA required for this medication any longer. Recommended monitoring to complete: Lipid panel every 3-12 months, last checked 9/18/20    Recommended vaccinations: Second dose of Shingrix    Keep all scheduled appointments. Return to clinic in 6 month(s). or call with any questions or concerns.

## 2021-03-18 RX ORDER — NITROGLYCERIN 0.4 MG/1
TABLET SUBLINGUAL
Qty: 25 TABLET | Refills: 3 | Status: SHIPPED | OUTPATIENT
Start: 2021-03-18

## 2021-03-18 NOTE — TELEPHONE ENCOUNTER
Dr. Kiki Daigle,    Patient is requesting refill of Nitroglycerin to be sent to [address] as his current supply may be . Please see pended prescription for your convenience.      Thank you,    Suzanne Gunter, PharmD, Ludivina crowley [facility]  Ambulatory Care Clinical Pharmacist  Phone: [phone]

## 2021-03-19 NOTE — TELEPHONE ENCOUNTER
Discussed patient with Dr. Kathryn Luther. Patient provided verbal consent for SMS participation in [facility]. Current Rx for Repatha 140 mg every 14 days is for 1+4 refills (written on 9/4/20) from Dr. Madeline Herman. Patient filled on 12/16, 1/11, 2/3, 3/1, so has 1 fill remaining.  Will rewrite as SMS Rx.

## 2021-04-19 DIAGNOSIS — E78.2 MIXED HYPERLIPIDEMIA: ICD-10-CM

## 2021-04-19 RX ORDER — EVOLOCUMAB 140 MG/ML
140 INJECTION, SOLUTION SUBCUTANEOUS
Qty: 2 ML | Refills: 5 | Status: SHIPPED | OUTPATIENT
Start: 2021-04-19 | End: 2021-11-01 | Stop reason: SDUPTHER

## 2021-04-19 NOTE — TELEPHONE ENCOUNTER
[facility] for Repatha 140 mg/ml (140 mg every 14 days) with 1+5 refills. Will rewrite as SMS Rx since patient has signed consent form on file.

## 2021-04-19 NOTE — TELEPHONE ENCOUNTER
Pt was seen 8/2020 and told to return in a year 8/2021. Pt is on our recall list and we will be reaching out to him in June to schedule his yearly fu. Please send refill.

## 2021-04-19 NOTE — TELEPHONE ENCOUNTER
Pt is over due for a follow up with Dr. Cyril Garcia. Please call to schedule an appointment for him.

## 2021-04-22 ENCOUNTER — NURSE ONLY (OUTPATIENT)
Dept: FAMILY MEDICINE CLINIC | Age: 56
End: 2021-04-22
Payer: COMMERCIAL

## 2021-04-22 DIAGNOSIS — Z23 NEED FOR SHINGLES VACCINE: Primary | ICD-10-CM

## 2021-04-22 PROCEDURE — 90750 HZV VACC RECOMBINANT IM: CPT | Performed by: FAMILY MEDICINE

## 2021-04-22 PROCEDURE — 90471 IMMUNIZATION ADMIN: CPT | Performed by: FAMILY MEDICINE

## 2021-05-05 ENCOUNTER — OFFICE VISIT (OUTPATIENT)
Dept: PULMONOLOGY | Age: 56
End: 2021-05-05
Payer: COMMERCIAL

## 2021-05-05 VITALS
HEIGHT: 70 IN | HEART RATE: 61 BPM | TEMPERATURE: 95 F | DIASTOLIC BLOOD PRESSURE: 70 MMHG | RESPIRATION RATE: 16 BRPM | BODY MASS INDEX: 27.49 KG/M2 | OXYGEN SATURATION: 90 % | WEIGHT: 192 LBS | SYSTOLIC BLOOD PRESSURE: 112 MMHG

## 2021-05-05 DIAGNOSIS — J44.9 COPD WITH ASTHMA (HCC): ICD-10-CM

## 2021-05-05 DIAGNOSIS — J96.11 CHRONIC RESPIRATORY FAILURE WITH HYPOXIA (HCC): ICD-10-CM

## 2021-05-05 DIAGNOSIS — G47.33 OSA TREATED WITH BIPAP: ICD-10-CM

## 2021-05-05 DIAGNOSIS — J42 FOLLICULAR BRONCHIOLITIS (HCC): Primary | ICD-10-CM

## 2021-05-05 PROCEDURE — 3023F SPIROM DOC REV: CPT | Performed by: INTERNAL MEDICINE

## 2021-05-05 PROCEDURE — 1036F TOBACCO NON-USER: CPT | Performed by: INTERNAL MEDICINE

## 2021-05-05 PROCEDURE — 3017F COLORECTAL CA SCREEN DOC REV: CPT | Performed by: INTERNAL MEDICINE

## 2021-05-05 PROCEDURE — G8417 CALC BMI ABV UP PARAM F/U: HCPCS | Performed by: INTERNAL MEDICINE

## 2021-05-05 PROCEDURE — G8427 DOCREV CUR MEDS BY ELIG CLIN: HCPCS | Performed by: INTERNAL MEDICINE

## 2021-05-05 PROCEDURE — G8926 SPIRO NO PERF OR DOC: HCPCS | Performed by: INTERNAL MEDICINE

## 2021-05-05 PROCEDURE — 99213 OFFICE O/P EST LOW 20 MIN: CPT | Performed by: INTERNAL MEDICINE

## 2021-05-05 ASSESSMENT — COPD QUESTIONNAIRES
CAT_TOTALSCORE: 13
QUESTION8_ENERGYLEVEL: 2
QUESTION3_CHESTTIGHTNESS: 0
QUESTION5_HOMEACTIVITIES: 2
QUESTION1_COUGHFREQUENCY: 1

## 2021-05-05 ASSESSMENT — SLEEP AND FATIGUE QUESTIONNAIRES
HOW LIKELY ARE YOU TO NOD OFF OR FALL ASLEEP IN A CAR, WHILE STOPPED FOR A FEW MINUTES IN TRAFFIC: 0
HOW LIKELY ARE YOU TO NOD OFF OR FALL ASLEEP WHILE SITTING INACTIVE IN A PUBLIC PLACE: 0
ESS TOTAL SCORE: 0
HOW LIKELY ARE YOU TO NOD OFF OR FALL ASLEEP WHILE SITTING QUIETLY AFTER LUNCH WITHOUT ALCOHOL: 0

## 2021-05-05 NOTE — PROGRESS NOTES
Chief complaint  This is a 50y.o. year old male  who comes to see me with a chief complaint of   Chief Complaint   Patient presents with    Sleep Apnea    COPD    Other     ILD     HPI  Here with cc of ILD, ESTRELLITA    He is doing well. Has lost 90 lbs overall since weight loss surgery. Able to get around better and less SOB. Only on spiriva and needs refill on symbicort. Has oxygen that he uses 2-3 liters when needed. He is going out of the country and wants to make sure it is ok. He is planning on bringing his POC while on the plane. Has not have COVID vaccine yet. Machine:  Patient is using a bilevel machine. Average usage is 8 hrs 37 min 00 sec. He is meeting 4 or more hours per night 70% of the time. Average AHI is 2.4. Historical:  He had been following with Dr. Linda Thompson for ILD related to bronchiolitis. Tez Aguila had been concerned about following up all the way out in [address] and asked if he could follow up with me. I asked Dr. Linda Thompson and he was ok with it. Dr. Linda Thompson has been following Wisamchristo Dardenchristo for years. 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 He was tried on various immune suppressant with no improvement in symptoms. Last medication he was on was cellcept about 4 years ago. He was seen at [facility] for lung transplant in the past, but was not felt to be sick enough to require transplant, nor was his weight low enough. He has not been seen at [facility] clinic since then.           Past Medical History:   Diagnosis Date    CAD S/P percutaneous coronary angioplasty     Centrilobular emphysema (HCC) 11/8/2020    Chronic respiratory failure (HCC)     COPD exacerbation ([address] 9/7/2016    Coronary artery disease involving native coronary artery of native heart with unstable angina pectoris ([address]     Diabetes ([address]     Diabetes ([address]     Dyslipidemia     Dyspnea     GERD (gastroesophageal reflux disease)     Goodpasture syndrome ([address]     reason pt is on cellcept. He will need lung transplant eventually    High risk medications (not anticoagulants) long-term use     HTN (hypertension)     Hyperlipidemia     Hypoxemia     [facility]     Left ureteral stone 4/13/2018    MDD (major depressive disorder)     ESTRELLITA on CPAP     Polycythemia     Positive FIT (fecal immunochemical test)     Prediabetes     Tubular adenoma     colonoscopy 9/2019    Ureteral calculus of right kidney transplant 2005       Past Surgical History:   Procedure Laterality Date    BRONCHOSCOPY      COLONOSCOPY      COLONOSCOPY N/A 9/4/2019    COLONOSCOPY POLYPECTOMY ABLATION HOT SNARE OF TRANSVERSE COLON POLYP AND SIGMOID POLYP performed by Lieutenant Aurelio MD at [place] [address] CATH LAB PROCEDURE  2006??    ENDOSCOPY, COLON, DIAGNOSTIC      LUNG REMOVAL, PARTIAL Right 2005    Partial right lobectomy for diagnostic purpose. Surgery performed in [address] N/A 1/21/2020    ROBOTIC SLEEVE GASTRECTOMY; ROBOTIC HIATAL HERNIA REPAIR performed by Alyse Saunders DO at [place] [address] N/A 9/4/2019    EGD BIOPSY performed by Alyse Saunders DO at [facility] ENDOSCOPY           Current Outpatient Medications:     Evolocumab (REPATHA SURECLICK) 237 MG/ML SOAJ, Inject 140 mg into the skin every 14 days, Disp: 2 mL, Rfl: 5    nitroGLYCERIN (NITROSTAT) 0.4 MG SL tablet, up to max of 3 total doses.  If no relief after 1 dose, call 911., Disp: 25 tablet, Rfl: 3    tiotropium (SPIRIVA RESPIMAT) 2.5 MCG/ACT AERS inhaler, Inhale 2 puffs into the lungs daily, Disp: 3 Inhaler, Rfl: 3    rosuvastatin (CRESTOR) 40 MG tablet, TAKE 1 TABLET BY MOUTH ONE TIME A DAY IN THE EVENING, Disp: 90 tablet, Rfl: 0    aspirin (ASPIRIN LOW DOSE) 81 MG chewable tablet, RESTART IN 5 DAYS CHEW AND SWALLOW ONE TABLET BY MOUTH ONE TIME A DAY, Disp: 90 tablet, Rfl: 5    lisinopril (PRINIVIL;ZESTRIL) 10 MG tablet, TAKE 1 TABLET

## 2021-07-15 ENCOUNTER — OFFICE VISIT (OUTPATIENT)
Dept: BARIATRICS/WEIGHT MGMT | Age: 56
End: 2021-07-15
Payer: COMMERCIAL

## 2021-07-15 VITALS — HEIGHT: 70 IN | WEIGHT: 185 LBS | BODY MASS INDEX: 26.48 KG/M2

## 2021-07-15 DIAGNOSIS — Z98.84 STATUS POST LAPAROSCOPIC SLEEVE GASTRECTOMY: ICD-10-CM

## 2021-07-15 DIAGNOSIS — E66.3 OVERWEIGHT (BMI 25.0-29.9): Primary | ICD-10-CM

## 2021-07-15 PROCEDURE — 3017F COLORECTAL CA SCREEN DOC REV: CPT | Performed by: SURGERY

## 2021-07-15 PROCEDURE — G8417 CALC BMI ABV UP PARAM F/U: HCPCS | Performed by: SURGERY

## 2021-07-15 PROCEDURE — 99213 OFFICE O/P EST LOW 20 MIN: CPT | Performed by: SURGERY

## 2021-07-15 PROCEDURE — 1036F TOBACCO NON-USER: CPT | Performed by: SURGERY

## 2021-07-15 PROCEDURE — G8427 DOCREV CUR MEDS BY ELIG CLIN: HCPCS | Performed by: SURGERY

## 2021-07-15 NOTE — PROGRESS NOTES
[facility]) Physicians   General & Laparoscopic Surgery  [employer]       HPI:     Boom Gallagher is a very pleasant 50 y.o. obese male , Body mass index is 26.54 kg/m². Laral Spinner And multiple medical problems who is presenting for bariatric follow up care. Boom Gallagher is s/p laparoscopic sleeve gastrectomy by me   Comes today to the clinic without any complaints. Patient denies any nausea, vomiting, fevers, chills, shortness of breath, chest pain, constipation or urinary symptoms. Denies any heartburn nor dysphagia. Patient is feeling very well, and is very active. Patient is very pleased with the weight loss and resolution of co-morbid conditions. Past Medical History:   Diagnosis Date    CAD S/P percutaneous coronary angioplasty     Centrilobular emphysema (HCC) 11/8/2020    Chronic respiratory failure (HCC)     COPD exacerbation ([address] 9/7/2016    Coronary artery disease involving native coronary artery of native heart with unstable angina pectoris ([address]     Diabetes ([address]     Diabetes ([address]     Dyslipidemia     Dyspnea     GERD (gastroesophageal reflux disease)     Goodpasture syndrome ([address]     reason pt is on cellcept.   He will need lung transplant eventually    High risk medications (not anticoagulants) long-term use     HTN (hypertension)     Hyperlipidemia     Hypoxemia     ILD (interstitial lung disease) ([address]     Left ureteral stone 4/13/2018    MDD (major depressive disorder)     ESTRELLITA on CPAP     Polycythemia     Positive FIT (fecal immunochemical test)     Prediabetes     Tubular adenoma     colonoscopy 9/2019    Ureteral calculus of right kidney transplant 2005     Past Surgical History:   Procedure Laterality Date    BRONCHOSCOPY      COLONOSCOPY      COLONOSCOPY N/A 9/4/2019    COLONOSCOPY POLYPECTOMY ABLATION HOT SNARE OF TRANSVERSE COLON POLYP AND SIGMOID POLYP performed by Dalila Duffy MD at [place] [address] CATH LAB PROCEDURE  2006??    ENDOSCOPY, COLON, DIAGNOSTIC      LUNG REMOVAL, PARTIAL Right 2005    Partial right lobectomy for diagnostic purpose. Surgery performed in [address] N/A 2020    ROBOTIC SLEEVE GASTRECTOMY; ROBOTIC HIATAL HERNIA REPAIR performed by Sheri English DO at [address] N/A 2019    EGD BIOPSY performed by Sheri English DO at [facility] ENDOSCOPY     Family History   Problem Relation Age of Onset    Diabetes Father     High Cholesterol Father     High Blood Pressure Father     Emphysema Neg Hx     Heart Failure Neg Hx     Hypertension Neg Hx      Social History     Tobacco Use    Smoking status: Former Smoker     Packs/day: 3.00     Years: 20.00     Pack years: 60.00     Types: Cigarettes     Quit date: 2000     Years since quittin.5    Smokeless tobacco: Never Used   Substance Use Topics    Alcohol use: No     Alcohol/week: 0.0 standard drinks     Comment: seldom     I counseled the patient on the importance of not smoking and risks of ETOH. Allergies   Allergen Reactions    Erythromycin Hives     Vitals:    07/15/21 0916   Weight: 185 lb (83.9 kg)   Height: 5' 10\" (1.778 m)       Body mass index is 26.54 kg/m². Current Outpatient Medications:     Evolocumab (REPATHA SURECLICK) 146 MG/ML SOAJ, Inject 140 mg into the skin every 14 days, Disp: 2 mL, Rfl: 5    nitroGLYCERIN (NITROSTAT) 0.4 MG SL tablet, up to max of 3 total doses.  If no relief after 1 dose, call 911., Disp: 25 tablet, Rfl: 3    tiotropium (SPIRIVA RESPIMAT) 2.5 MCG/ACT AERS inhaler, Inhale 2 puffs into the lungs daily, Disp: 3 Inhaler, Rfl: 3    rosuvastatin (CRESTOR) 40 MG tablet, TAKE 1 TABLET BY MOUTH ONE TIME A DAY IN THE EVENING, Disp: 90 tablet, Rfl: 0    aspirin (ASPIRIN LOW DOSE) 81 MG chewable tablet, RESTART IN 5 DAYS CHEW AND SWALLOW ONE TABLET BY MOUTH ONE TIME A DAY, Disp: 90 tablet, Rfl: 5    lisinopril (PRINIVIL;ZESTRIL) 10 MG tablet, ROS: negative  Psychological ROS: negative  Hematological and Lymphatic ROS: negative  Endocrine ROS: negative  Respiratory ROS: negative  Cardiovascular ROS: negative  Gastrointestinal ROS:negative  Genito-Urinary ROS: negative  Musculoskeletal ROS: negative   Skin ROS: negative    Physical Exam   Vitals Reviewed   Constitutional: Patient is oriented to person, place, and time. Patient appears well-developed and well-nourished. Patient is active and cooperative. Non-toxic appearance. No distress. Neck: Trachea normal and normal range of motion. No JVD present. Pulmonary/Chest: Effort normal. No accessory muscle usage or stridor. No apnea. No respiratory distress. Cardiovascular: Normal rate and no JVD. Abdominal: Normal appearance. Patient exhibits no distension. Abdomen is soft, obese, non tender. Musculoskeletal: Normal range of motion. Patient exhibits no edema. Neurological: Patient is alert and oriented to person, place, and time. Patient has normal strength. GCS eye subscore is 4. GCS verbal subscore is 5. GCS motor subscore is 6. Skin: Skin is warm and dry. No abrasion and no rash noted. Patient is not diaphoretic. No cyanosis or erythema. Psychiatric: Patient has a normal mood and affect. Speech is normal and behavior is normal. Cognition and memory are normal.         A/P     Sony Cheeks is 50 y.o. male , now with Body mass index is 26.54 kg/m². s/p Sleeve gastrectomy, has lost 4 lbs since last visit, total of 77 lbs weight loss. The patient underwent dietary counseling with registered dietician. I have reviewed, discussed and agree with the dietary plan. Patient is trying hard to keep good dietary and behavior modifications. Patient is monitoring portion sizes, food choices and liquid calories. Patient is trying to exercise regularly. Patient pleased with the surgery outcomes.   We discussed how his weight affects his overall health including:  [place]ores Hod[place]on was seen today for bariatrics post op follow up. Diagnoses and all orders for this visit:    Overweight (BMI 25.0-29. 9)    Status post laparoscopic sleeve gastrectomy       and importance of weight loss to alleviate those co morbid conditions. I encouraged the patient to continue exercise and keeping healthy eating habits. Also counseled the patient extensively on post surgery care. Total encounter time:  20 minutes including any number of the following: review of labs, imaging, provider notes, outside hospital records; performing examination/evaluation; counseling patient and family; ordering medications/tests; placing referrals and communication with referring physicians; coordination of care, and documentation in the EHR. RTC in 6 months  Diet and Exercise      Patient advised that its their responsibility to follow up for studies and/or labs ordered today.

## 2021-07-15 NOTE — PROGRESS NOTES
Dietary Assessment Note  Vitals:   Vitals:    07/15/21 0916   Weight: 185 lb (83.9 kg)   Height: 5' 10\" (1.778 m)   Patient lost 4.6 lbs over past ~6 months. Total Weight Loss: 77.4 lbs    Labs reviewed: no new labs    Protein intake: 60-80 grams/day     Fluid intake: >64 oz/day- water / sf lemonade    Multivitamin/mineral intake: yes - 4 fusion per day    Calcium intake: no    Other: none    Exercise: yes - walking 3 miles per day 7 days/wk    Nutrition Assessment: 1 year 6 month post-op visit. Refocused on not eating late. Pt continues with structured eating 4x/day. Will use protein shakes occasionally in place of meal/snack. Solid protein choice with meals: eggs / turkey / chicken / Junella Bosworth / PB. Will have a couple jen kisses.     Amount able to eat per sitting: ~1.25 cups volume    Following 30/30/30 rule: yes    Food Intolerances/issues: wings / deep fried food    Client Concerns: none    Goals:   - Continue plan    Plan: f/u as directed    Johana Hunt, RD, LD

## 2021-09-09 ENCOUNTER — HOSPITAL ENCOUNTER (OUTPATIENT)
Age: 56
Discharge: HOME | End: 2021-09-09
Payer: COMMERCIAL

## 2021-09-09 ENCOUNTER — OFFICE VISIT (OUTPATIENT)
Dept: PRIMARY CARE CLINIC | Age: 56
End: 2021-09-09
Payer: COMMERCIAL

## 2021-09-09 VITALS
DIASTOLIC BLOOD PRESSURE: 76 MMHG | HEIGHT: 68 IN | SYSTOLIC BLOOD PRESSURE: 120 MMHG | WEIGHT: 185.6 LBS | HEART RATE: 86 BPM | OXYGEN SATURATION: 93 % | BODY MASS INDEX: 28.13 KG/M2 | RESPIRATION RATE: 16 BRPM

## 2021-09-09 DIAGNOSIS — D75.1 POLYCYTHEMIA: Primary | ICD-10-CM

## 2021-09-09 DIAGNOSIS — E78.2 MIXED HYPERLIPIDEMIA: ICD-10-CM

## 2021-09-09 DIAGNOSIS — I10 ESSENTIAL HYPERTENSION: ICD-10-CM

## 2021-09-09 DIAGNOSIS — Z98.61 CAD S/P PERCUTANEOUS CORONARY ANGIOPLASTY: ICD-10-CM

## 2021-09-09 DIAGNOSIS — J43.2 CENTRILOBULAR EMPHYSEMA (HCC): ICD-10-CM

## 2021-09-09 DIAGNOSIS — E11.9 TYPE 2 DIABETES MELLITUS WITHOUT COMPLICATION, WITHOUT LONG-TERM CURRENT USE OF INSULIN (HCC): ICD-10-CM

## 2021-09-09 DIAGNOSIS — Z11.4 SCREENING FOR HIV (HUMAN IMMUNODEFICIENCY VIRUS): ICD-10-CM

## 2021-09-09 DIAGNOSIS — F32.5 MAJOR DEPRESSIVE DISORDER WITH SINGLE EPISODE, IN FULL REMISSION (HCC): ICD-10-CM

## 2021-09-09 DIAGNOSIS — J96.11 CHRONIC RESPIRATORY FAILURE WITH HYPOXIA (HCC): ICD-10-CM

## 2021-09-09 DIAGNOSIS — Z11.59 NEED FOR HEPATITIS C SCREENING TEST: ICD-10-CM

## 2021-09-09 DIAGNOSIS — J84.9 ILD (INTERSTITIAL LUNG DISEASE) (HCC): ICD-10-CM

## 2021-09-09 DIAGNOSIS — I25.10 CAD S/P PERCUTANEOUS CORONARY ANGIOPLASTY: ICD-10-CM

## 2021-09-09 LAB
A/G RATIO: 1.4 (ref 1.1–2.2)
ALBUMIN SERPL-MCNC: 4.9 G/DL (ref 3.4–5)
ALP BLD-CCNC: 91 U/L (ref 40–129)
ALT SERPL-CCNC: 15 U/L (ref 10–40)
ANION GAP SERPL CALCULATED.3IONS-SCNC: 15 MMOL/L (ref 3–16)
AST SERPL-CCNC: 23 U/L (ref 15–37)
BILIRUB SERPL-MCNC: 1.9 MG/DL (ref 0–1)
BUN BLDV-MCNC: 17 MG/DL (ref 7–20)
CALCIUM SERPL-MCNC: 10 MG/DL (ref 8.3–10.6)
CHLORIDE BLD-SCNC: 101 MMOL/L (ref 99–110)
CHOLESTEROL, TOTAL: 73 MG/DL (ref 0–199)
CO2: 24 MMOL/L (ref 21–32)
CREAT SERPL-MCNC: 1.1 MG/DL (ref 0.9–1.3)
CREATININE URINE: 202.7 MG/DL (ref 39–259)
GFR AFRICAN AMERICAN: >60
GFR NON-AFRICAN AMERICAN: >60
GLOBULIN: 3.4 G/DL
GLUCOSE BLD-MCNC: 86 MG/DL (ref 70–99)
HDLC SERPL-MCNC: 36 MG/DL (ref 40–60)
LDL CHOLESTEROL CALCULATED: 22 MG/DL
MICROALBUMIN UR-MCNC: <1.2 MG/DL
MICROALBUMIN/CREAT UR-RTO: NORMAL MG/G (ref 0–30)
POTASSIUM SERPL-SCNC: 4.6 MMOL/L (ref 3.5–5.1)
SODIUM BLD-SCNC: 140 MMOL/L (ref 136–145)
TOTAL PROTEIN: 8.3 G/DL (ref 6.4–8.2)
TRIGL SERPL-MCNC: 77 MG/DL (ref 0–150)
VLDLC SERPL CALC-MCNC: 15 MG/DL

## 2021-09-09 PROCEDURE — 3023F SPIROM DOC REV: CPT | Performed by: FAMILY MEDICINE

## 2021-09-09 PROCEDURE — 86803 HEPATITIS C AB TEST: CPT

## 2021-09-09 PROCEDURE — 1036F TOBACCO NON-USER: CPT | Performed by: FAMILY MEDICINE

## 2021-09-09 PROCEDURE — G8417 CALC BMI ABV UP PARAM F/U: HCPCS | Performed by: FAMILY MEDICINE

## 2021-09-09 PROCEDURE — 2022F DILAT RTA XM EVC RTNOPTHY: CPT | Performed by: FAMILY MEDICINE

## 2021-09-09 PROCEDURE — 99214 OFFICE O/P EST MOD 30 MIN: CPT | Performed by: FAMILY MEDICINE

## 2021-09-09 PROCEDURE — 82043 UR ALBUMIN QUANTITATIVE: CPT

## 2021-09-09 PROCEDURE — G8427 DOCREV CUR MEDS BY ELIG CLIN: HCPCS | Performed by: FAMILY MEDICINE

## 2021-09-09 PROCEDURE — 87390 HIV-1 AG IA: CPT

## 2021-09-09 PROCEDURE — 80053 COMPREHEN METABOLIC PANEL: CPT

## 2021-09-09 PROCEDURE — G8926 SPIRO NO PERF OR DOC: HCPCS | Performed by: FAMILY MEDICINE

## 2021-09-09 PROCEDURE — 82570 ASSAY OF URINE CREATININE: CPT

## 2021-09-09 PROCEDURE — 86701 HIV-1ANTIBODY: CPT

## 2021-09-09 PROCEDURE — 36415 COLL VENOUS BLD VENIPUNCTURE: CPT

## 2021-09-09 PROCEDURE — 3046F HEMOGLOBIN A1C LEVEL >9.0%: CPT | Performed by: FAMILY MEDICINE

## 2021-09-09 PROCEDURE — 83036 HEMOGLOBIN GLYCOSYLATED A1C: CPT

## 2021-09-09 PROCEDURE — 80061 LIPID PANEL: CPT

## 2021-09-09 PROCEDURE — 86702 HIV-2 ANTIBODY: CPT

## 2021-09-09 PROCEDURE — 3017F COLORECTAL CA SCREEN DOC REV: CPT | Performed by: FAMILY MEDICINE

## 2021-09-09 NOTE — PATIENT INSTRUCTIONS
Get PFTs and CT in January 2022    Schedule diabetic eye exam    Call Dr Hardik Bullard for colonoscopy:    [phone]

## 2021-09-09 NOTE — PROGRESS NOTES
PROGRESS NOTE     Alejandro Scott MD  [address], [address], [address] [facility] [place] [address]         Phone: [phone]    Date of Service:  9/9/2021     Patient ID: Vanessa Lacy is a 50 y.o. 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  Disease has seemed stable for some time. He recommends annual CTs and PFTs, next due January 2022. For his COPD, patient was asked to research the cost of Breztri to replace symbicort and spiriva. He was continued on 2 to 3 L of oxygen with exertion and at night. .    Summarized below, medications for follicular bronchiolitis did not help in the past     Patient to continue BiPAP with oxygen at nighttime.     No longer considering lung transplant at this point time.         Secondary polycythemia    This is thought to be secondary to his hypoxia from his lung disease. Dr. Semaj Belle only recommending phlebotomy if hemoglobin becomes greater than 20. According to patient recently saw Dr. Semaj Belle, and hemoglobin was 18. He sees them every 6 months. Will request records.     Type 2 diabetes mellitus without complication, without long-term current use of insulin ([facility])  Checking A1c. Last A1c was within normal limits after losing so much weight after gastric sleeve surgery. Foot exam was normal today. Encourage patient to get diabetic eye exam.  The large amount of weight loss after gastric sleeve surgery certainly      Essential hypertension  At goal.    Patient to continue  amlodipine 2.5 mg, carvedilol 6.25 mg b.i.d., lisinopril 10 mg daily     Checking CMP.      Pure hypercholesterolemia  Patient is fasting. Checking fasted lipid panel. Pt to continue ASA, Crestor 40mg, and Repatha. We will make adjustments if needed depending on cholesterol panel. Patient has lost significant amount of weight after sleeve surgery.        CAD  Asymptomatic. Continue medical management. Patient is on aspirin, Plavix, beta-blocker, and statin.            Moderate episode of recurrent major depressive disorder (HCC)  Well-controlled on Lexapro 10 mg. Patient to continue.              Colonoscopy done 9/4/19: next due 2021 (had tubular adenoma and only fair prep). Encouraged to schedule with Dr Nayeli Gasca vaccine :   Pt declining. Highly encouraged him to also discuss with Dr. Emely Nicolas. Discussed morbidity mortality that could occur if he contracted Covid. Flu vaccine going to wait for dr Estelita Mary. HIV and Hep C screen ordered today    Subjective:     CC: polycythemia, DM2, HTN, HLD, CAD, ILD, MDD, chronic respiratory failure    HPI  14-year-old white male here for reevaluation of the above chronic medical conditions. Patient is taking all medicine as prescribed. He denies any side effects. He is happy to report he has lost 106 pounds since his gastric sleeve surgery. Since losing weight, his dyspnea on exertion has improved. It does wax and wane in severity. He still uses oxygen at night and with exertion. Patient states he is compliant with his BiPAP every night. He feels his depression is well controlled with Lexapro and wishes to continue. Last saw Dr. Charis Solorzano on 8/25/2020: According to progress note, he felt patient was stable from a cardiovascular standpoint. He did add Repatha a couple days later when his LDL was found to be elevated at 165. Note, a month later on be well screen LDL was 26, making me concerned that possibly there was a mixup of blood. Patient states he is taking Repatha and is fasting today.     Seen Dr. Demetrio Segal for polycythemia, thought to be secondary to arterial pressure of 20.  3.  Normal cardiac outputs by thermodilution at 6.73 liters per minute with  a cardiac index of 2.95 liters per kilogram per minute.  By second equation  the cardiac output was 4.76 with a cardiac index of 2.09.  Of note, the  patient does have erythrocytosis. 4.  Pulmonary vascular resistance 147.  5.  Normal mixed venous oxygen saturations with a superior vena cava  saturation of 70 and a pulmonary arterial saturation of 73%.     Lab Results   Component Value Date    LABA1C 5.2 11/09/2020    LABA1C 7.0 08/07/2019    LABA1C 8.1 05/14/2019     Lab Results   Component Value Date    LABMICR YES 03/11/2020    CREATININE 0.8 (L) 08/18/2020     Lab Results   Component Value Date    ALT 10 08/28/2020    AST 16 08/28/2020     Lab Results   Component Value Date    CHOL 81 09/18/2020    TRIG 75 09/18/2020    HDL 40 09/18/2020    LDLCALC 26 09/18/2020          ROS:    Constitutional:  Negative for activity or appetite change, fever or fatigue  HENT:  Negative for congestion, sinus pressure, or rhinorrhea  Eyes:  Negative for eye pain or visual changes  Resp:  Negative for current  SOB, chest tightness, cough  Cardiovascular: Negative for CP, palpitations,  orthopnea, PND, LE edema, +chronic BLACKBURN though is improved since weight loss  Gastrointestinal: Negative for abd pain, melena, BRBPR, N/V/D  Endocrine:  Negative for polydipsia and polyuria  :  Negative for dysuria, flank pain or urinary frequency  Musculoskeletal:  Negative for back pain or myalgias  Neuro:  Negative for dizziness or lightheadedness  Psych: negative for depression or anxiety      Vitals:    09/09/21 1423   BP: 120/76   Pulse: 86   Resp: 16   SpO2: 93%   Weight: 185 lb 9.6 oz (84.2 kg)   Height: 5' 8.31\" (1.735 m)       Outpatient Medications Marked as Taking for the 9/9/21 encounter (Office Visit) with Analisa Moore MD   Medication Sig Dispense Refill    Evolocumab (REPATHA SURECLICK) 147 MG/ML SOAJ Inject 140 mg into thyromegaly  Resp:  · Normal effort  · Clear to auscultation bilaterally without rhonchi, wheezing or crackles  Cardiovascular:  · On auscultation, normal S1 and S2 without murmurs, rubs or gallops  · No bruits of bilateral carotids and no JVD  Gastrointestinal:  · Nontender, nondistended, and no masses  · No hepatosplenomegaly  Musculoskeletal:  · Normal Gait  · All extremities without clubbing, cyanosis or edema  Skin:  · No rashes on inspection  · No areas of increased heat or induration on palpation  Psych:  · Normal mood and affect  · Normal insight and judgement    FOOT EXAM:    +1 distal tibialis anterior and dorsalis pedis pulses bilaterally  Normal sensation to monofilament testing bilaterally  No calluses  No ulcers  Non mycotic toe nails. EMR Dragon/transcription disclaimer:  Much of this encounter note is electronic transcription/translation of spoken language to printed texts. The electronic translation of spoken language may be erroneous, or at times, nonsensical words or phrases may be inadvertently transcribed.   Although I have reviewed the note for such errors, some may still exist.

## 2021-09-10 LAB
ESTIMATED AVERAGE GLUCOSE: 114 MG/DL
HBA1C MFR BLD: 5.6 %
HEPATITIS C ANTIBODY INTERPRETATION: NORMAL
HIV AG/AB: NORMAL
HIV ANTIGEN: NORMAL
HIV-1 ANTIBODY: NORMAL
HIV-2 AB: NORMAL

## 2021-09-13 ENCOUNTER — OFFICE VISIT (OUTPATIENT)
Dept: PULMONOLOGY | Age: 56
End: 2021-09-13
Payer: COMMERCIAL

## 2021-09-13 VITALS
TEMPERATURE: 97.7 F | HEART RATE: 70 BPM | DIASTOLIC BLOOD PRESSURE: 60 MMHG | OXYGEN SATURATION: 91 % | BODY MASS INDEX: 27.81 KG/M2 | SYSTOLIC BLOOD PRESSURE: 110 MMHG | RESPIRATION RATE: 20 BRPM | HEIGHT: 69 IN | WEIGHT: 187.8 LBS

## 2021-09-13 DIAGNOSIS — J44.9 COPD WITH ASTHMA (HCC): ICD-10-CM

## 2021-09-13 DIAGNOSIS — R17 ELEVATED BILIRUBIN: Primary | ICD-10-CM

## 2021-09-13 DIAGNOSIS — G47.33 OSA TREATED WITH BIPAP: ICD-10-CM

## 2021-09-13 DIAGNOSIS — J96.11 CHRONIC RESPIRATORY FAILURE WITH HYPOXIA (HCC): ICD-10-CM

## 2021-09-13 DIAGNOSIS — J42 FOLLICULAR BRONCHIOLITIS (HCC): Primary | ICD-10-CM

## 2021-09-13 PROCEDURE — G8427 DOCREV CUR MEDS BY ELIG CLIN: HCPCS | Performed by: INTERNAL MEDICINE

## 2021-09-13 PROCEDURE — 99214 OFFICE O/P EST MOD 30 MIN: CPT | Performed by: INTERNAL MEDICINE

## 2021-09-13 PROCEDURE — G8926 SPIRO NO PERF OR DOC: HCPCS | Performed by: INTERNAL MEDICINE

## 2021-09-13 PROCEDURE — 3017F COLORECTAL CA SCREEN DOC REV: CPT | Performed by: INTERNAL MEDICINE

## 2021-09-13 PROCEDURE — G8417 CALC BMI ABV UP PARAM F/U: HCPCS | Performed by: INTERNAL MEDICINE

## 2021-09-13 PROCEDURE — 3023F SPIROM DOC REV: CPT | Performed by: INTERNAL MEDICINE

## 2021-09-13 PROCEDURE — 1036F TOBACCO NON-USER: CPT | Performed by: INTERNAL MEDICINE

## 2021-09-13 ASSESSMENT — SLEEP AND FATIGUE QUESTIONNAIRES
HOW LIKELY ARE YOU TO NOD OFF OR FALL ASLEEP WHILE WATCHING TV: 0
HOW LIKELY ARE YOU TO NOD OFF OR FALL ASLEEP WHILE SITTING AND TALKING TO SOMEONE: 0
HOW LIKELY ARE YOU TO NOD OFF OR FALL ASLEEP WHILE LYING DOWN TO REST IN THE AFTERNOON WHEN CIRCUMSTANCES PERMIT: 0
HOW LIKELY ARE YOU TO NOD OFF OR FALL ASLEEP WHILE SITTING QUIETLY AFTER LUNCH WITHOUT ALCOHOL: 0
ESS TOTAL SCORE: 0
HOW LIKELY ARE YOU TO NOD OFF OR FALL ASLEEP WHILE SITTING INACTIVE IN A PUBLIC PLACE: 0
HOW LIKELY ARE YOU TO NOD OFF OR FALL ASLEEP WHEN YOU ARE A PASSENGER IN A CAR FOR AN HOUR WITHOUT A BREAK: 0
HOW LIKELY ARE YOU TO NOD OFF OR FALL ASLEEP IN A CAR, WHILE STOPPED FOR A FEW MINUTES IN TRAFFIC: 0
HOW LIKELY ARE YOU TO NOD OFF OR FALL ASLEEP WHILE SITTING AND READING: 0

## 2021-09-13 ASSESSMENT — COPD QUESTIONNAIRES
QUESTION5_HOMEACTIVITIES: 1
QUESTION1_COUGHFREQUENCY: 1
QUESTION3_CHESTTIGHTNESS: 1
QUESTION8_ENERGYLEVEL: 2
CAT_TOTALSCORE: 12
QUESTION4_WALKINCLINE: 4
QUESTION6_LEAVINGHOUSE: 1
QUESTION2_CHESTPHLEGM: 1
QUESTION7_SLEEPQUALITY: 1

## 2021-09-13 NOTE — PATIENT INSTRUCTIONS
Check cost of Breztri to replace both symbicort and spiriva    Use albuterol as needed    Continue with oxygen    CT Chest and PFT in January     Follow up in 4 months

## 2021-09-13 NOTE — PROGRESS NOTES
Chief complaint  This is a 50y.o. year old male  who comes to see me with a chief complaint of   Chief Complaint   Patient presents with    Follow-up     4m    COPD    Sleep Apnea     HPI  Here with cc of ILD, ESTRELLITA    Continues to do well. On triple inhalers and oxygen up to 3 liters with exertion. Has continued to keep his weight off since weight loss surgery with overall weight loss of 90 lbs. He has done well for months. No other changes. Still has not had COVID vaccine as of yet     Remains on bilevel machine with sleep      Historical:  He had been following with Dr. Irwin Gonzales for ILD related to bronchiolitis. Fransisca Mack had been concerned about following up all the way out in [address] and asked if he could follow up with me. I asked Dr. Irwin Gonzales and he was ok with it. Dr. Irwin Gonzales has been following Fransisca Mack for years. 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 He was tried on various immune suppressant with no improvement in symptoms. Last medication he was on was cellcept >5 years ago. He was seen at [facility] for lung transplant in the past, but was not felt to be sick enough to require transplant, nor was his weight low enough. He has not been seen at [facility] clinic since then. Current Outpatient Medications:     Evolocumab (REPATHA SURECLICK) 075 MG/ML SOAJ, Inject 140 mg into the skin every 14 days, Disp: 2 mL, Rfl: 5    nitroGLYCERIN (NITROSTAT) 0.4 MG SL tablet, up to max of 3 total doses.  If no relief after 1 dose, call 911., Disp: 25 tablet, Rfl: 3    tiotropium (SPIRIVA RESPIMAT) 2.5 MCG/ACT AERS inhaler, Inhale 2 puffs into the lungs daily, Disp: 3 Inhaler, Rfl: 3    rosuvastatin (CRESTOR) 40 MG tablet, TAKE 1 TABLET BY MOUTH ONE TIME A DAY IN THE EVENING, Disp: 90 tablet, Rfl: 0    aspirin (ASPIRIN LOW DOSE) 81 MG chewable tablet, RESTART IN 5 DAYS CHEW AND SWALLOW ONE TABLET BY MOUTH ONE TIME A DAY, Disp: 90 tablet, Rfl: 5    lisinopril (PRINIVIL;ZESTRIL) 10 MG tablet, TAKE 1 TABLET BY MOUTH ONE TIME A DAY, Disp: 90 tablet, Rfl: 0    escitalopram (LEXAPRO) 10 MG tablet, TAKE 1 TABLET BY MOUTH ONE TIME A DAY, Disp: 90 tablet, Rfl: 0    carvedilol (COREG) 6.25 MG tablet, TAKE 1 TABLET BY MOUTH 2 TIMES A DAY WITH MEALS, Disp: 180 tablet, Rfl: 0    amLODIPine (NORVASC) 2.5 MG tablet, TAKE 1 TABLET BY MOUTH ONE TIME A DAY, Disp: 90 tablet, Rfl: 1    clopidogrel (PLAVIX) 75 MG tablet, TAKE 1 TABLET BY MOUTH ONE TIME DAILY, Disp: 90 tablet, Rfl: 3    SYMBICORT 160-4.5 MCG/ACT AERO, Inhale 2 puffs into the lungs 2 times daily, Disp: 3 Inhaler, Rfl: 1    omeprazole (PRILOSEC) 20 MG delayed release capsule, TAKE ONE CAPSULE BY MOUTH TWICE A DAY, Disp: 180 capsule, Rfl: 3    ibuprofen (ADVIL;MOTRIN) 200 MG tablet, Take 200 mg by mouth every 6 hours as needed for Pain , Disp: , Rfl:     ondansetron (ZOFRAN) 4 MG tablet, Take 1 tablet by mouth every 6 hours as needed for Nausea or Vomiting, Disp: 30 tablet, Rfl: 0    sodium chloride, Inhalant, 3 % nebulizer solution, Take 15 mLs by nebulization 2 times daily, Disp: 900 mL, Rfl: 0    hydrocortisone (WESTCORT) 0.2 % cream, Apply topically 2 times daily Apply topically 2 times daily. , Disp: 60 g, Rfl: 0    albuterol sulfate HFA (PROAIR HFA) 108 (90 BASE) MCG/ACT inhaler, Inhale 2 puffs into the lungs every 4 hours as needed for Wheezing or Shortness of Breath, Disp: 1 Inhaler, Rfl: 6    azelastine (ASTELIN) 0.1 % nasal spray, 2 sprays by Nasal route 2 times daily Use in each nostril as directed (Patient taking differently: 2 sprays by Nasal route 2 times daily as needed Use in each nostril as directed), Disp: 1 Bottle, Rfl: 3        PHYSICAL EXAM:  GENERAL:  Visible weight loss  HEENT:  No scleral icterus, no conjunctival irritation, Mallampati IV with bifid pharnyx  NECK:  No thyromegaly, no bruits  LYMPH:  No cervical or supraclavicular adenopathy  HEART:  Regular rate and rhythm, no murmurs  LUNGS: Clear and diminished   ABDOMEN:  No distention, no organomegaly  EXTREMITIES:  Trace edema, no digital clubbing  NEURO:  No localizing deficits, CN II-XII intact    Pulmonary Function Testing 10/2015  A very severe obstructive lung defect is present with minimal  broncho-reactivity. There is also air trapping and a moderate reduction in  diffusion capacity. These lung function tests are most consistent with  severe chronic obstructive pulmonary disease. In comparison with pulmonary  function testing performed in October of 2012, there has been significant  reduction in the forced vital capacity and FEV1. Pulmonary Function Testing 10/2017  FEV1 1.09 L to 1.26 L  FVC  2.86 L to 3.48 L  FEV1/FVC 38  TLC 7.06 L  DLCO 42%    Pulmonary Function Testing 11/2018  FEV1 1.34 L to 1.42 L  FVC  3.31 L to 3.68 L  FEV1/FVC 40  TLC 7.83 L   DLCO 44%    Pulmonary Function Testing 9/2020  FEV1 1.17 L to 1.42 L  FVC  2.97 L to 3.56 L  FEV1/FVC 39  TLC 8.05 L   DLCO 47%       Chest imaging from 1/2021 is reviewed and compared to previous CTs. My interpretation is stable appearance of cysts and emphysema. Alpha-1 Anti-trypsin levels:  120, Phenotype:  M1M1       ABG 10/2017:  7.43/39/61.  92%    RHC 4/2018  1. Normal right heart pressures with a right atrial pressure of 1 and a  pulmonary capillary wedge pressure of 8.  2.  No evidence of pulmonary hypertension with an instantaneous pressure of  26/14 and a mean pulmonary arterial pressure of 20.  3.  Normal cardiac outputs by thermodilution at 6.73 liters per minute with  a cardiac index of 2.95 liters per kilogram per minute. By second equation  the cardiac output was 4.76 with a cardiac index of 2.09. Of note, the  patient does have erythrocytosis. 4.  Pulmonary vascular resistance 147.  5.  Normal mixed venous oxygen saturations with a superior vena cava  saturation of 70 and a pulmonary arterial saturation of 73%.     I reviewed the above labs images, etc. Assessment/Plan:  1. 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 Quiescent. Has not been on therapy for years. Imaging and PFTs have remained stable. Updated testing due 1/22  - CT CHEST HIGH RESOLUTION; Future  - Full PFT Study With Bronchodilator; Future    2. COPD with asthma ([facility]  Continue with triple therapy. Research the cost of Breztri to save money  - Full PFT Study With Bronchodilator; Future    3. Chronic respiratory failure with hypoxia (HCC)  Uses up to 3 liters of oxygen with exertion. He is mobile and does benefit from using oxygen    4. ESTRELLITA treated with BiPAP  Benefiting and compliant. Benefiting from therapy by a low [place] score, good energy levels, low fatigue, and control of chronic medical problems    Follow up in 4 months    Pulmonary Rehab:  Finished Pulmonary rehab and cardiac rehab.   Does exercise on his own    Lung Cancer Screening CT:  Does not qualify due to >15 years since he quit and age <54

## 2021-11-01 DIAGNOSIS — E78.2 MIXED HYPERLIPIDEMIA: ICD-10-CM

## 2021-11-01 RX ORDER — EVOLOCUMAB 140 MG/ML
140 INJECTION, SOLUTION SUBCUTANEOUS
Qty: 2 ML | Refills: 1 | Status: SHIPPED | OUTPATIENT
Start: 2021-11-01 | End: 2021-11-03 | Stop reason: SDUPTHER

## 2021-11-01 NOTE — TELEPHONE ENCOUNTER
Medication Refill    Medication needing refilled:  Evolocumab    Dosage of the medication:  140 MG/ML SOAJ   How are you taking this medication (QD, BID, TID, QID, PRN):   Inject 140 mg into the skin every 14 days    30 or 90 day supply called in: 30    Which Pharmacy are we sending the medication to?:  Tristan Pugh [facility] [phone] - F [phone]   [address] [address]   Phone:  [phone]  Fax:  [phone]

## 2021-11-02 DIAGNOSIS — E78.2 MIXED HYPERLIPIDEMIA: ICD-10-CM

## 2021-11-02 NOTE — TELEPHONE ENCOUNTER
CLINICAL PHARMACY NOTE - SPECIALTY MEDICATION SERVICE     Stacy Arriaga is a 50 y.o. male enrolled in the Specialty Medication Service. We received a refill request for Repatha on 11/2/2021. Patient has a signed CPA on file dated 3/17/2021 . Patient last met with SMS on 3/15/2021 and is due for follow up on 9/6/2021. Follow up visit is not scheduled at this time. Patient's last visit with specialist (cardiology) was on 8/25/2020 and patient is due for follow up visit on 8/25/2021 per office visit documentation. Follow up visit is scheduled at this time 12/17/2021. Notes can be found in Epic. Original Rx was written for 1+1 fills on 11/1/2021.      Thank you,    Jose Roberto Beauchamp    Requested Prescriptions     Pending Prescriptions Disp Refills    Evolocumab (REPATHA SURECLICK) 018 MG/ML SOAJ 2 mL 1     Sig: Inject 140 mg into the skin every 14 days

## 2021-11-03 RX ORDER — EVOLOCUMAB 140 MG/ML
140 INJECTION, SOLUTION SUBCUTANEOUS
Qty: 2 ML | Refills: 1 | Status: SHIPPED | OUTPATIENT
Start: 2021-11-03 | End: 2022-01-06

## 2021-11-19 ENCOUNTER — TELEPHONE (OUTPATIENT)
Dept: PULMONOLOGY | Age: 56
End: 2021-11-19

## 2021-11-19 NOTE — TELEPHONE ENCOUNTER
Patient is wanting to know if he can walk in and get a flu shot or does he need appt. Will FWD to Dr Silvino Ojeda to see if he would like patient to come in for flu shot.

## 2021-11-22 NOTE — TELEPHONE ENCOUNTER
Called Antonio Diaz and let him know he can come in for Flu shot.   Asked that he call ahead if he is coming in to make sure there is adequate staffing and to make sure a physician is in clinic that day

## 2021-11-22 NOTE — TELEPHONE ENCOUNTER
He can come in for flu shot if you can accommodate him.   This does not require my approval for any future patients

## 2021-12-09 ENCOUNTER — NURSE ONLY (OUTPATIENT)
Dept: PRIMARY CARE CLINIC | Age: 56
End: 2021-12-09
Payer: COMMERCIAL

## 2021-12-09 DIAGNOSIS — F33.1 MODERATE EPISODE OF RECURRENT MAJOR DEPRESSIVE DISORDER (HCC): ICD-10-CM

## 2021-12-09 DIAGNOSIS — I10 ESSENTIAL HYPERTENSION: ICD-10-CM

## 2021-12-09 DIAGNOSIS — Z23 NEED FOR INFLUENZA VACCINATION: Primary | ICD-10-CM

## 2021-12-09 PROCEDURE — 90674 CCIIV4 VAC NO PRSV 0.5 ML IM: CPT | Performed by: FAMILY MEDICINE

## 2021-12-09 PROCEDURE — 90471 IMMUNIZATION ADMIN: CPT | Performed by: FAMILY MEDICINE

## 2021-12-09 RX ORDER — ESCITALOPRAM OXALATE 10 MG/1
10 TABLET ORAL DAILY
Qty: 90 TABLET | Refills: 0 | Status: SHIPPED | OUTPATIENT
Start: 2021-12-09 | End: 2022-09-29 | Stop reason: SDUPTHER

## 2021-12-09 RX ORDER — AMLODIPINE BESYLATE 2.5 MG/1
TABLET ORAL
Qty: 90 TABLET | Refills: 0 | Status: SHIPPED | OUTPATIENT
Start: 2021-12-09 | End: 2022-09-29 | Stop reason: SDUPTHER

## 2021-12-09 RX ORDER — ROSUVASTATIN CALCIUM 40 MG/1
TABLET, COATED ORAL
Qty: 90 TABLET | Refills: 0 | Status: SHIPPED | OUTPATIENT
Start: 2021-12-09 | End: 2022-09-29 | Stop reason: SDUPTHER

## 2021-12-09 RX ORDER — OMEPRAZOLE 20 MG/1
CAPSULE, DELAYED RELEASE ORAL
Qty: 180 CAPSULE | Refills: 1 | Status: SHIPPED | OUTPATIENT
Start: 2021-12-09 | End: 2022-09-29 | Stop reason: SDUPTHER

## 2021-12-09 RX ORDER — CARVEDILOL 6.25 MG/1
6.25 TABLET ORAL 2 TIMES DAILY WITH MEALS
Qty: 180 TABLET | Refills: 0 | Status: SHIPPED | OUTPATIENT
Start: 2021-12-09 | End: 2022-09-29 | Stop reason: SDUPTHER

## 2021-12-09 RX ORDER — LISINOPRIL 10 MG/1
TABLET ORAL
Qty: 90 TABLET | Refills: 0 | Status: SHIPPED | OUTPATIENT
Start: 2021-12-09 | End: 2022-09-29 | Stop reason: SDUPTHER

## 2021-12-09 RX ORDER — CLOPIDOGREL BISULFATE 75 MG/1
75 TABLET ORAL DAILY
Qty: 90 TABLET | Refills: 3 | Status: SHIPPED | OUTPATIENT
Start: 2021-12-09 | End: 2022-09-29 | Stop reason: SDUPTHER

## 2021-12-09 RX ORDER — ASPIRIN 81 MG/1
TABLET, CHEWABLE ORAL
Qty: 90 TABLET | Refills: 3 | Status: SHIPPED | OUTPATIENT
Start: 2021-12-09 | End: 2022-09-29 | Stop reason: SDUPTHER

## 2021-12-09 NOTE — TELEPHONE ENCOUNTER
Last ov 8/25/20  Pending appt 12/17/21  Last refill plavix 9/30/20 #90x3, aspirin 3/8/21 #90x5  Last labs 9/9/21

## 2021-12-10 ENCOUNTER — TELEPHONE (OUTPATIENT)
Dept: PRIMARY CARE CLINIC | Age: 56
End: 2021-12-10

## 2021-12-10 NOTE — TELEPHONE ENCOUNTER
----- Message from Marguerite Bentley sent at 12/10/2021  8:48 AM EST -----  Subject: Message to Provider    QUESTIONS  Information for Provider? [id number] Lorelei Bcaon called and needs to know for   insurance purposes either where the patient will be applying or how long   the prescription for hydrocortisone (WESTCORT) 0.2 % cream is suppose to   last.  ---------------------------------------------------------------------------  --------------  CALL BACK INFO  What is the best way for the office to contact you? OK to leave message on   voicemail  Preferred Call Back Phone Number? [phone]  ---------------------------------------------------------------------------  --------------  SCRIPT ANSWERS  Relationship to Patient? Third Party  Representative Name?  [id number]

## 2021-12-16 NOTE — PROGRESS NOTES
Shane Medrano Appl  1965 December 17, 2021      CC: \"    HPI: The patient is 50 y.o. male with a past medical history significant for CAD, HTN and COPD. He presented to the hospital on 7/24/17 with complaints of chest pain and worsening shortness of breath. A left heart catheterization was performed by Dr. Fabián Freed which revealed significant 2nd diagonal and distal LAD disease. Only balloon angioplasty was performed to the 80% diagonal lesion and more distal 90% LAD lesion resulting in 20% residual stenosis. He presents today for follow up. He reports feeling well overall. He denies chest pain with rest or exertion. His breathing has been comfortable. 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 Patient denies exertional chest pain/pressure, dyspnea at rest, BLACKBURN, PND, orthopnea, palpitations, lightheadedness, weight changes, changes in LE edema, and syncope. He reports medication compliance and is tolerating. Prior to his last heart catheterization, he was experiencing shortness of breath. This has been stable for him and did improved with his last PTCA. Review of Systems:  Constitutional: No fatigue, weakness, night sweats or fever. HEENT: No new vision difficulties or ringing in the ears. Respiratory: Chronic SOB. No PND, orthopnea or cough. Cardiovascular: See HPI   GI: No n/v, diarrhea, constipation, abdominal pain or changes in bowel habits. No melena, no hematochezia  : No urinary frequency, urgency, incontinence, hematuria or dysuria. Skin: No cyanosis or skin lesions. Musculoskeletal: No new muscle or joint pain. Neurological: No syncope or TIA-like symptoms.   Psychiatric: No anxiety, insomnia or depression    Allergies   Allergen Reactions    Erythromycin Hives     Current Outpatient Medications   Medication Sig Dispense Refill    hydrocortisone (WESTCORT) 0.2 % cream Apply topically 2 times daily Apply topically 2 times daily. 60 g 0    amLODIPine (NORVASC) 2.5 MG tablet TAKE 1 TABLET BY MOUTH ONE TIME A DAY 90 tablet 0    rosuvastatin (CRESTOR) 40 MG tablet TAKE 1 TABLET BY MOUTH ONE TIME A DAY IN THE EVENING 90 tablet 0    lisinopril (PRINIVIL;ZESTRIL) 10 MG tablet TAKE 1 TABLET BY MOUTH ONE TIME A DAY 90 tablet 0    escitalopram (LEXAPRO) 10 MG tablet Take 1 tablet by mouth daily 90 tablet 0    carvedilol (COREG) 6.25 MG tablet Take 1 tablet by mouth 2 times daily (with meals) 180 tablet 0    clopidogrel (PLAVIX) 75 MG tablet Take 1 tablet by mouth daily 90 tablet 3    aspirin (ASPIRIN LOW DOSE) 81 MG chewable tablet RESTART IN 5 DAYS CHEW AND SWALLOW ONE TABLET BY MOUTH ONE TIME A DAY 90 tablet 3    omeprazole (PRILOSEC) 20 MG delayed release capsule TAKE ONE CAPSULE BY MOUTH TWICE A  capsule 1    Evolocumab (REPATHA SURECLICK) 119 MG/ML SOAJ Inject 140 mg into the skin every 14 days 2 mL 1    nitroGLYCERIN (NITROSTAT) 0.4 MG SL tablet up to max of 3 total doses.  If no relief after 1 dose, call 911. 25 tablet 3    tiotropium (SPIRIVA RESPIMAT) 2.5 MCG/ACT AERS inhaler Inhale 2 puffs into the lungs daily 3 Inhaler 3    SYMBICORT 160-4.5 MCG/ACT AERO Inhale 2 puffs into the lungs 2 times daily 3 Inhaler 1    ibuprofen (ADVIL;MOTRIN) 200 MG tablet Take 200 mg by mouth every 6 hours as needed for Pain       ondansetron (ZOFRAN) 4 MG tablet Take 1 tablet by mouth every 6 hours as needed for Nausea or Vomiting 30 tablet 0    sodium chloride, Inhalant, 3 % nebulizer solution Take 15 mLs by nebulization 2 times daily 900 mL 0    albuterol sulfate HFA (PROAIR HFA) 108 (90 BASE) MCG/ACT inhaler Inhale 2 puffs into the lungs every 4 hours as needed for Wheezing or Shortness of Breath 1 Inhaler 6    azelastine (ASTELIN) 0.1 % nasal spray 2 sprays by Nasal route 2 times daily Use in each nostril as directed (Patient taking differently: 2 sprays by Nasal route 2 times daily as needed Use in each nostril as directed) 1 Bottle 3     No current facility-administered medications for this visit. Physical Exam:   /86   Pulse 65   Ht 5' 9\" (1.753 m)   Wt 194 lb 3.2 oz (88.1 kg)   SpO2 95%   BMI 28.68 kg/m²   No intake or output data in the 24 hours ending 12/17/21 1202  Wt Readings from Last 2 Encounters:   12/17/21 194 lb 3.2 oz (88.1 kg)   09/13/21 187 lb 12.8 oz (85.2 kg)     Constitutional: He is oriented to person, place, and time. He appears well-developed and well-nourished. In no acute distress. Head: Normocephalic and atraumatic. Neck: Neck supple. No JVD present. Carotid bruit is not present. No mass and no thyromegaly present. No lymphadenopathy present. Cardiovascular: Normal rate, regular rhythm, normal heart sounds and intact distal pulses. Exam reveals no gallop and no friction rub. No murmur heard. Pulmonary/Chest: Effort normal and breath sounds normal. No respiratory distress. He has no wheezes, rhonchi or rales. Abdominal: Soft, non-tender. Bowel sounds and aorta are normal. He exhibits no organomegaly, mass or bruit. Extremities: No edema, cyanosis, or clubbing. Pulses are 2+ radial/carotid/dorsalis pedis and posterior tibial bilaterally. Neurological: He is alert and oriented to person, place, and time. He has normal reflexes. No cranial nerve deficit. Coordination normal.   Skin: Skin is warm and dry. There is no rash or diaphoresis. Psychiatric: He has a normal mood and affect. His speech is normal and behavior is normal.     Personally reviewed and interpreted   EKG Interpretation 8/26/19: Sinus rhythm with prior septal infarct  EKG Interpretation 8/25/20: Sinus rhythm with prior septal infarct  EKG Interpretation 12/17/21: Sinus rhythm       Procedures:     Cath 7/26/17  INTERVENTIONS PERFORMED:  1.  We intervened on the second diagonal branch of the left anterior  descending and performed a balloon angioplasty only using 2.5 x 12-mm  balloon catheter.  This was an 80% stenotic lesion which was reduced  to 20%. 2.  Distal left anterior descending artery also had stenosis, this was  90% and after balloon angioplasty reduced to 20%. [address] 4/17/18  1. Normal right heart pressures with a right atrial pressure of 1 and a  pulmonary capillary wedge pressure of 8.  2.  No evidence of pulmonary hypertension with an instantaneous pressure of  26/14 and a mean pulmonary arterial pressure of 20.  3.  Normal cardiac outputs by thermodilution at 6.73 liters per minute with  a cardiac index of 2.95 liters per kilogram per minute. By second equation  the cardiac output was 4.76 with a cardiac index of 2.09. Of note, the  patient does have erythrocytosis. 4.  Pulmonary vascular resistance 147.  5.  Normal mixed venous oxygen saturations with a superior vena cava  saturation of 70 and a pulmonary arterial saturation of 73%. Imaging:     Echo 9/2016  Summary  Normal LV size and systolic function: EF is   60%. Grade I diastolic  dysfunction. Left atrium is of normal size. Normal right ventricular size.     Stress Test: 9/2016  Summary    Pharmacological Stress/MPI Results:        1. Technically a satisfactory study.    2. Normal pharmacological stress portion of the study.    3. No evidence of Ischemia by Myocardial Perfusion Imaging.    4. Gated Study shows normal LV size and Systolic function; EF is 70 %.        Lexiscan stress test 9/12/17   Summary    There is no obvious ischemia in this study.  There is an area of decrease    tracer uptake at the apex both at stress and with rest that could represent    an area of prior infarct but is probably more bowel artifact.    Diaphragmatic and bowel attenuation present.    Non-diagnostic EKG response due to failure to reach target heart rate.    Appropriate hemodynamic response to Hulon Grates with no significant ST changes.      Echo 10/6/17  Left ventricular size is normal.  Mild concentric left ventricular hypertrophy is present. Global left ventricular function is normal with ejection fraction estimated from 55 % to 60 %. Normal right ventricular size and function. Diastolic filling parameters suggests grade I diastolic dysfunction .     Stress perfusion 10/3/18  There is no reversible ischemia noted in this study.   Rheta Tim is a small area of perfusion defect in the basal to mid inferior wall    both with stress and rest. There is breast and diaphragm attenuation    suggesting more artifact than scar.    Normal LV size and systolic function.    Non-diagnostic EKG response due to failure to reach target heart rate.    Overall findings represent a low risk study. Lab Review:   Lab Results   Component Value Date    TRIG 77 09/09/2021    HDL 36 09/09/2021    LDLCALC 22 09/09/2021    LABVLDL 15 09/09/2021     Lab Results   Component Value Date     09/09/2021    K 4.6 09/09/2021    K 4.0 10/03/2018    BUN 17 09/09/2021    CREATININE 1.1 09/09/2021       Assessment:  1. CAD of native arteries without angina  2. ILD  3. Essential hypertension  4. Hyperlipidemia LDL goal <70 mg/dL  5. Obesity, unspecified    Plan:  He is not endorsing any symptoms representing angina and his blood pressure is well controlled today in the office. His most recent lipid profile was favorable with his current statin therapy. I have encouraged him to increase his aerobic activity and adhere to a heart healthy diet. I have personally reviewed all previous testing for this visit today including imaging, lab results and EKG as detailed above. I congratulated him on his almost 100lbs weight loss. I will see him in office for follow up in 1 year. This note was scribed in the presence of Niki Mariee MD by [employer], RN. Physician Attestation:  The scribes documentation has been prepared under my direction and personally reviewed by me in its entirety.      I, Dr. Karla Keith personally performed the services described in this documentation as scribed by my RN in my presence, and I confirm that the note above accurately reflects all work, treatment, procedures, and medical decision making performed by me.

## 2021-12-17 ENCOUNTER — OFFICE VISIT (OUTPATIENT)
Dept: CARDIOLOGY CLINIC | Age: 56
End: 2021-12-17
Payer: COMMERCIAL

## 2021-12-17 VITALS
WEIGHT: 194.2 LBS | HEIGHT: 69 IN | OXYGEN SATURATION: 95 % | BODY MASS INDEX: 28.76 KG/M2 | DIASTOLIC BLOOD PRESSURE: 86 MMHG | SYSTOLIC BLOOD PRESSURE: 122 MMHG | HEART RATE: 65 BPM

## 2021-12-17 DIAGNOSIS — E66.9 OBESITY WITHOUT SERIOUS COMORBIDITY, UNSPECIFIED CLASSIFICATION, UNSPECIFIED OBESITY TYPE: ICD-10-CM

## 2021-12-17 DIAGNOSIS — I25.10 CORONARY ARTERY DISEASE INVOLVING NATIVE CORONARY ARTERY OF NATIVE HEART WITHOUT ANGINA PECTORIS: Primary | ICD-10-CM

## 2021-12-17 DIAGNOSIS — E78.5 HYPERLIPIDEMIA LDL GOAL <70: ICD-10-CM

## 2021-12-17 DIAGNOSIS — I10 ESSENTIAL HYPERTENSION: ICD-10-CM

## 2021-12-17 PROCEDURE — G8427 DOCREV CUR MEDS BY ELIG CLIN: HCPCS | Performed by: INTERNAL MEDICINE

## 2021-12-17 PROCEDURE — 99214 OFFICE O/P EST MOD 30 MIN: CPT | Performed by: INTERNAL MEDICINE

## 2021-12-17 PROCEDURE — 1036F TOBACCO NON-USER: CPT | Performed by: INTERNAL MEDICINE

## 2021-12-17 PROCEDURE — 3017F COLORECTAL CA SCREEN DOC REV: CPT | Performed by: INTERNAL MEDICINE

## 2021-12-17 PROCEDURE — G8482 FLU IMMUNIZE ORDER/ADMIN: HCPCS | Performed by: INTERNAL MEDICINE

## 2021-12-17 PROCEDURE — 93000 ELECTROCARDIOGRAM COMPLETE: CPT | Performed by: INTERNAL MEDICINE

## 2021-12-17 PROCEDURE — G8417 CALC BMI ABV UP PARAM F/U: HCPCS | Performed by: INTERNAL MEDICINE

## 2022-01-04 DIAGNOSIS — E78.2 MIXED HYPERLIPIDEMIA: ICD-10-CM

## 2022-01-06 RX ORDER — EVOLOCUMAB 140 MG/ML
INJECTION, SOLUTION SUBCUTANEOUS
Qty: 6 PEN | Refills: 3 | Status: SHIPPED | OUTPATIENT
Start: 2022-01-06

## 2022-01-18 ENCOUNTER — TELEPHONE (OUTPATIENT)
Dept: CARDIOLOGY CLINIC | Age: 57
End: 2022-01-18

## 2022-01-18 NOTE — TELEPHONE ENCOUNTER
Emilyamrik Nadege is calling in from [facility] pharmacy wanting to notify Dr. Rigoberto Nuñez that he was contacted about his repatha back in November to be enrolled so the medication can be sent via mail. Pt stated that he had a death in the family and they called him back a week later. They have called him 4 times since then with no response. [facility] didn't want to cancel the order without notifying Dr. Rigoberto Nuñez.        Martina Light can be reached at [phone] option 6

## 2022-01-19 NOTE — TELEPHONE ENCOUNTER
Please try reaching patient and discuss with him that [facility] has been trying to reach him regarding his Repatha. Please have him call them to arrange.

## 2022-01-20 ENCOUNTER — OFFICE VISIT (OUTPATIENT)
Dept: BARIATRICS/WEIGHT MGMT | Age: 57
End: 2022-01-20
Payer: COMMERCIAL

## 2022-01-20 VITALS — BODY MASS INDEX: 27.77 KG/M2 | WEIGHT: 194 LBS | HEIGHT: 70 IN

## 2022-01-20 DIAGNOSIS — Z98.84 STATUS POST LAPAROSCOPIC SLEEVE GASTRECTOMY: ICD-10-CM

## 2022-01-20 DIAGNOSIS — E66.3 OVERWEIGHT (BMI 25.0-29.9): Primary | ICD-10-CM

## 2022-01-20 PROCEDURE — 3017F COLORECTAL CA SCREEN DOC REV: CPT | Performed by: SURGERY

## 2022-01-20 PROCEDURE — G8427 DOCREV CUR MEDS BY ELIG CLIN: HCPCS | Performed by: SURGERY

## 2022-01-20 PROCEDURE — 99213 OFFICE O/P EST LOW 20 MIN: CPT | Performed by: SURGERY

## 2022-01-20 PROCEDURE — 1036F TOBACCO NON-USER: CPT | Performed by: SURGERY

## 2022-01-20 PROCEDURE — G8417 CALC BMI ABV UP PARAM F/U: HCPCS | Performed by: SURGERY

## 2022-01-20 PROCEDURE — G8482 FLU IMMUNIZE ORDER/ADMIN: HCPCS | Performed by: SURGERY

## 2022-01-20 NOTE — PATIENT INSTRUCTIONS
Patient received dietary handouts and education.     Goals:    - Use protein shakes 1-2x/day as needed  - Prep/plan meals & snacks  - Regroup with exercise, start walking in the next month

## 2022-01-20 NOTE — PROGRESS NOTES
[facility]) Physicians   General & Laparoscopic Surgery  [employer]       HPI:     Chance Vaughn is a very pleasant 49 y.o. obese male , Body mass index is 27.84 kg/m². Thurl Mutton And multiple medical problems who is presenting for bariatric follow up care. Chance Vaughn is s/p laparoscopic sleeve gastrectomy by me   Comes today to the clinic without any complaints. Patient denies any nausea, vomiting, fevers, chills, shortness of breath, chest pain, constipation or urinary symptoms. Denies any heartburn nor dysphagia. Patient is feeling very well, and is very active. Patient is very pleased with the weight loss and resolution of co-morbid conditions.         Past Medical History:   Diagnosis Date    CAD S/P percutaneous coronary angioplasty     Centrilobular emphysema (HCC) 11/08/2020    Chronic respiratory failure (HCC)     COPD exacerbation ([address] 09/07/2016    Coronary artery disease involving native coronary artery of native heart with unstable angina pectoris ([address]     Diabetes ([address]     Diabetes ([address]     Dyslipidemia     Dyspnea     GERD (gastroesophageal reflux disease)     High risk medications (not anticoagulants) long-term use     HTN (hypertension)     Hyperlipidemia     Hypoxemia     ILD (interstitial lung disease) ([address]     Left ureteral stone 04/13/2018    MDD (major depressive disorder)     ESTRELLITA on CPAP     Polycythemia     Positive FIT (fecal immunochemical test)     Prediabetes     Tubular adenoma     colonoscopy 9/2019    Ureteral calculus of right kidney transplant 2005     Past Surgical History:   Procedure Laterality Date    BRONCHOSCOPY      COLONOSCOPY      COLONOSCOPY N/A 9/4/2019    COLONOSCOPY POLYPECTOMY ABLATION HOT SNARE OF TRANSVERSE COLON POLYP AND SIGMOID POLYP performed by Radha Garcia MD at [place] [address] CATH LAB PROCEDURE  2006??    ENDOSCOPY, COLON, DIAGNOSTIC      LUNG REMOVAL, PARTIAL Right 2005 Partial right lobectomy for diagnostic purpose. Surgery performed in [place], [address] [place] [address] N/A 2020    ROBOTIC SLEEVE GASTRECTOMY; ROBOTIC HIATAL HERNIA REPAIR performed by Tim Gary DO at [facility] N/A 2019    EGD BIOPSY performed by Tim Gary DO at [address] ENDOSCOPY     Family History   Problem Relation Age of Onset    Diabetes Father     High Cholesterol Father     High Blood Pressure Father     Emphysema Neg Hx     Heart Failure Neg Hx     Hypertension Neg Hx      Social History     Tobacco Use    Smoking status: Former Smoker     Packs/day: 3.00     Years: 20.00     Pack years: 60.00     Types: Cigarettes     Quit date: 2000     Years since quittin.0    Smokeless tobacco: Never Used   Substance Use Topics    Alcohol use: No     Alcohol/week: 0.0 standard drinks     Comment: seldom     I counseled the patient on the importance of not smoking and risks of ETOH. Allergies   Allergen Reactions    Erythromycin Hives     Vitals:    22 1024   Weight: 194 lb (88 kg)   Height: 5' 10\" (1.778 m)       Body mass index is 27.84 kg/m². Current Outpatient Medications:     REPATHA SURECLICK 291 MG/ML SOAJ, INJECT 140 MG UNDER THE SKIN EVERY 14 DAYS, Disp: 6 pen, Rfl: 3    hydrocortisone (WESTCORT) 0.2 % cream, Apply topically 2 times daily Apply topically 2 times daily. , Disp: 60 g, Rfl: 0    amLODIPine (NORVASC) 2.5 MG tablet, TAKE 1 TABLET BY MOUTH ONE TIME A DAY, Disp: 90 tablet, Rfl: 0    rosuvastatin (CRESTOR) 40 MG tablet, TAKE 1 TABLET BY MOUTH ONE TIME A DAY IN THE EVENING, Disp: 90 tablet, Rfl: 0    lisinopril (PRINIVIL;ZESTRIL) 10 MG tablet, TAKE 1 TABLET BY MOUTH ONE TIME A DAY, Disp: 90 tablet, Rfl: 0    escitalopram (LEXAPRO) 10 MG tablet, Take 1 tablet by mouth daily, Disp: 90 tablet, Rfl: 0    carvedilol (COREG) 6.25 MG tablet, Take 1 tablet by mouth 2 times daily (with meals), Disp: 180 tablet, Rfl: 0    clopidogrel (PLAVIX) 75 MG tablet, Take 1 tablet by mouth daily, Disp: 90 tablet, Rfl: 3    aspirin (ASPIRIN LOW DOSE) 81 MG chewable tablet, RESTART IN 5 DAYS CHEW AND SWALLOW ONE TABLET BY MOUTH ONE TIME A DAY, Disp: 90 tablet, Rfl: 3    omeprazole (PRILOSEC) 20 MG delayed release capsule, TAKE ONE CAPSULE BY MOUTH TWICE A DAY, Disp: 180 capsule, Rfl: 1    nitroGLYCERIN (NITROSTAT) 0.4 MG SL tablet, up to max of 3 total doses.  If no relief after 1 dose, call 911., Disp: 25 tablet, Rfl: 3    tiotropium (SPIRIVA RESPIMAT) 2.5 MCG/ACT AERS inhaler, Inhale 2 puffs into the lungs daily, Disp: 3 Inhaler, Rfl: 3    SYMBICORT 160-4.5 MCG/ACT AERO, Inhale 2 puffs into the lungs 2 times daily, Disp: 3 Inhaler, Rfl: 1    ibuprofen (ADVIL;MOTRIN) 200 MG tablet, Take 200 mg by mouth every 6 hours as needed for Pain , Disp: , Rfl:     ondansetron (ZOFRAN) 4 MG tablet, Take 1 tablet by mouth every 6 hours as needed for Nausea or Vomiting, Disp: 30 tablet, Rfl: 0    sodium chloride, Inhalant, 3 % nebulizer solution, Take 15 mLs by nebulization 2 times daily, Disp: 900 mL, Rfl: 0    albuterol sulfate HFA (PROAIR HFA) 108 (90 BASE) MCG/ACT inhaler, Inhale 2 puffs into the lungs every 4 hours as needed for Wheezing or Shortness of Breath, Disp: 1 Inhaler, Rfl: 6    azelastine (ASTELIN) 0.1 % nasal spray, 2 sprays by Nasal route 2 times daily Use in each nostril as directed (Patient taking differently: 2 sprays by Nasal route 2 times daily as needed Use in each nostril as directed), Disp: 1 Bottle, Rfl: 3      Review of Systems - History obtained from the patient  General ROS: negative  Psychological ROS: negative  Hematological and Lymphatic ROS: negative  Endocrine ROS: negative  Respiratory ROS: negative  Cardiovascular ROS: negative  Gastrointestinal ROS:negative  Genito-Urinary ROS: negative  Musculoskeletal ROS: negative   Skin ROS: negative    Physical Exam   Vitals Reviewed   Constitutional: Patient is oriented to person, place, and time. Patient appears well-developed and well-nourished. Patient is active and cooperative. Non-toxic appearance. No distress. Neck: Trachea normal and normal range of motion. No JVD present. Pulmonary/Chest: Effort normal. No accessory muscle usage or stridor. No apnea. No respiratory distress. Cardiovascular: Normal rate and no JVD. Abdominal: Normal appearance. Patient exhibits no distension. Abdomen is soft, obese, non tender. Musculoskeletal: Normal range of motion. Patient exhibits no edema. Neurological: Patient is alert and oriented to person, place, and time. Patient has normal strength. GCS eye subscore is 4. GCS verbal subscore is 5. GCS motor subscore is 6. Skin: Skin is warm and dry. No abrasion and no rash noted. Patient is not diaphoretic. No cyanosis or erythema. Psychiatric: Patient has a normal mood and affect. Speech is normal and behavior is normal. Cognition and memory are normal.         A/P     Mary Jo Nevarez is 49 y.o. male , now with Body mass index is 27.84 kg/m². s/p Sleeve gastrectomy, has lost gained 12 lbs since last visit, total of 65 lbs weight loss. The patient underwent dietary counseling with registered dietician. I have reviewed, discussed and agree with the dietary plan. Patient is trying hard to keep good dietary and behavior modifications. Patient is monitoring portion sizes, food choices and liquid calories. Patient is trying to exercise regularly. Patient pleased with the surgery outcomes. We discussed how his weight affects his overall health including:    Overweight  S/P sleeve gastrectomy     and importance of weight loss to alleviate those co morbid conditions. I encouraged the patient to continue exercise and keeping healthy eating habits. Also counseled the patient extensively on post surgery care.      Total encounter time:  20 minutes including any number of the following: review of labs, imaging, provider notes, outside hospital records; performing examination/evaluation; counseling patient and family; ordering medications/tests; placing referrals and communication with referring physicians; coordination of care, and documentation in the EHR. RTC in 6 months  Diet and Exercise   Goal is to lose 12# by next visit       Patient advised that its their responsibility to follow up for studies and/or labs ordered today.

## 2022-01-20 NOTE — PROGRESS NOTES
Dietary Assessment Note  Vitals:   Vitals:    01/20/22 1024   Weight: 194 lb (88 kg)   Height: 5' 10\" (1.778 m)   Patient gained 12 lbs over past ~6 months. Pt states it has been a rough 3 months lost his dad & mother-in-law. Spending a lot of time in the car, not able to focus on what he needs to me doing. Total Weight Loss: 65.5 lbs    Labs reviewed: Reviewed 9/9/21 labs    Protein intake: 60-80 grams/day     Fluid intake: >64 oz/day- water / sf lemonade     Multivitamin/mineral intake: yes - 4 fusion    Calcium intake: no    Other: none    Exercise: no - sitting in the car a lot the past 3 months / use to walking daily 1-1.5 hours    Nutrition Assessment: 2 year post-op visit. Pt states the quality of the food has been down. Hoping things calm down soon to get back on track. Eating a lot more fast food, trying to make good options but overall eating out the quality is just poor. Eating ~2x/day, uses protein shakes at times. States he is drinking a lot more coffee.     Amount able to eat per sitting: ~1.25 cups volume    Following 30/30/30 rule: yes    Food Intolerances/issues: wings / deep fried foods / sometimes chili     Client Concerns: wt gain    Goals:    - Use protein shakes 1-2x/day as needed  - Prep/plan meals & snacks  - Regroup with exercise, start walking in the next month    Plan: f/u as needed    Naren Luke RD, LD

## 2022-03-28 ENCOUNTER — HOSPITAL ENCOUNTER (OUTPATIENT)
Age: 57
Discharge: HOME | End: 2022-03-28
Payer: COMMERCIAL

## 2022-03-28 ENCOUNTER — TELEPHONE (OUTPATIENT)
Dept: PRIMARY CARE CLINIC | Age: 57
End: 2022-03-28

## 2022-03-28 ENCOUNTER — OFFICE VISIT (OUTPATIENT)
Dept: PRIMARY CARE CLINIC | Age: 57
End: 2022-03-28
Payer: COMMERCIAL

## 2022-03-28 VITALS
TEMPERATURE: 97.9 F | HEART RATE: 64 BPM | HEIGHT: 70 IN | DIASTOLIC BLOOD PRESSURE: 76 MMHG | BODY MASS INDEX: 29.18 KG/M2 | SYSTOLIC BLOOD PRESSURE: 114 MMHG | OXYGEN SATURATION: 91 % | RESPIRATION RATE: 18 BRPM | WEIGHT: 203.8 LBS

## 2022-03-28 DIAGNOSIS — I10 ESSENTIAL HYPERTENSION: ICD-10-CM

## 2022-03-28 DIAGNOSIS — I25.10 CAD S/P PERCUTANEOUS CORONARY ANGIOPLASTY: ICD-10-CM

## 2022-03-28 DIAGNOSIS — E78.2 MIXED HYPERLIPIDEMIA: ICD-10-CM

## 2022-03-28 DIAGNOSIS — J96.11 CHRONIC RESPIRATORY FAILURE WITH HYPOXIA (HCC): ICD-10-CM

## 2022-03-28 DIAGNOSIS — G47.33 OSA TREATED WITH BIPAP: ICD-10-CM

## 2022-03-28 DIAGNOSIS — D75.1 POLYCYTHEMIA: Primary | ICD-10-CM

## 2022-03-28 DIAGNOSIS — J84.9 ILD (INTERSTITIAL LUNG DISEASE) (HCC): ICD-10-CM

## 2022-03-28 DIAGNOSIS — L71.0 PERIORAL DERMATITIS: ICD-10-CM

## 2022-03-28 DIAGNOSIS — E11.9 TYPE 2 DIABETES MELLITUS WITHOUT COMPLICATION, WITHOUT LONG-TERM CURRENT USE OF INSULIN (HCC): ICD-10-CM

## 2022-03-28 DIAGNOSIS — Z98.61 CAD S/P PERCUTANEOUS CORONARY ANGIOPLASTY: ICD-10-CM

## 2022-03-28 DIAGNOSIS — F32.5 MAJOR DEPRESSIVE DISORDER WITH SINGLE EPISODE, IN FULL REMISSION (HCC): ICD-10-CM

## 2022-03-28 PROCEDURE — G8417 CALC BMI ABV UP PARAM F/U: HCPCS | Performed by: FAMILY MEDICINE

## 2022-03-28 PROCEDURE — 3017F COLORECTAL CA SCREEN DOC REV: CPT | Performed by: FAMILY MEDICINE

## 2022-03-28 PROCEDURE — 2022F DILAT RTA XM EVC RTNOPTHY: CPT | Performed by: FAMILY MEDICINE

## 2022-03-28 PROCEDURE — G8482 FLU IMMUNIZE ORDER/ADMIN: HCPCS | Performed by: FAMILY MEDICINE

## 2022-03-28 PROCEDURE — 1036F TOBACCO NON-USER: CPT | Performed by: FAMILY MEDICINE

## 2022-03-28 PROCEDURE — 83036 HEMOGLOBIN GLYCOSYLATED A1C: CPT

## 2022-03-28 PROCEDURE — 36415 COLL VENOUS BLD VENIPUNCTURE: CPT

## 2022-03-28 PROCEDURE — 80048 BASIC METABOLIC PNL TOTAL CA: CPT

## 2022-03-28 PROCEDURE — G8427 DOCREV CUR MEDS BY ELIG CLIN: HCPCS | Performed by: FAMILY MEDICINE

## 2022-03-28 PROCEDURE — 3046F HEMOGLOBIN A1C LEVEL >9.0%: CPT | Performed by: FAMILY MEDICINE

## 2022-03-28 PROCEDURE — 99214 OFFICE O/P EST MOD 30 MIN: CPT | Performed by: FAMILY MEDICINE

## 2022-03-28 RX ORDER — METRONIDAZOLE 7.5 MG/G
GEL TOPICAL
Qty: 60 G | Refills: 0 | Status: SHIPPED | OUTPATIENT
Start: 2022-03-28

## 2022-03-28 SDOH — ECONOMIC STABILITY: TRANSPORTATION INSECURITY
IN THE PAST 12 MONTHS, HAS THE LACK OF TRANSPORTATION KEPT YOU FROM MEDICAL APPOINTMENTS OR FROM GETTING MEDICATIONS?: NO

## 2022-03-28 SDOH — ECONOMIC STABILITY: FOOD INSECURITY: WITHIN THE PAST 12 MONTHS, THE FOOD YOU BOUGHT JUST DIDN'T LAST AND YOU DIDN'T HAVE MONEY TO GET MORE.: NEVER TRUE

## 2022-03-28 SDOH — ECONOMIC STABILITY: TRANSPORTATION INSECURITY
IN THE PAST 12 MONTHS, HAS LACK OF TRANSPORTATION KEPT YOU FROM MEETINGS, WORK, OR FROM GETTING THINGS NEEDED FOR DAILY LIVING?: NO

## 2022-03-28 SDOH — ECONOMIC STABILITY: FOOD INSECURITY: WITHIN THE PAST 12 MONTHS, YOU WORRIED THAT YOUR FOOD WOULD RUN OUT BEFORE YOU GOT MONEY TO BUY MORE.: NEVER TRUE

## 2022-03-28 ASSESSMENT — ENCOUNTER SYMPTOMS
VOMITING: 0
SHORTNESS OF BREATH: 0
COUGH: 0
SORE THROAT: 0
DIARRHEA: 0
NAUSEA: 0

## 2022-03-28 ASSESSMENT — PATIENT HEALTH QUESTIONNAIRE - PHQ9
SUM OF ALL RESPONSES TO PHQ QUESTIONS 1-9: 0
1. LITTLE INTEREST OR PLEASURE IN DOING THINGS: 0
2. FEELING DOWN, DEPRESSED OR HOPELESS: 0
SUM OF ALL RESPONSES TO PHQ QUESTIONS 1-9: 0
SUM OF ALL RESPONSES TO PHQ9 QUESTIONS 1 & 2: 0

## 2022-03-28 ASSESSMENT — SOCIAL DETERMINANTS OF HEALTH (SDOH): HOW HARD IS IT FOR YOU TO PAY FOR THE VERY BASICS LIKE FOOD, HOUSING, MEDICAL CARE, AND HEATING?: NOT HARD AT ALL

## 2022-03-28 ASSESSMENT — LIFESTYLE VARIABLES: HOW OFTEN DO YOU HAVE A DRINK CONTAINING ALCOHOL: NEVER

## 2022-03-28 NOTE — TELEPHONE ENCOUNTER
Please request [facility] to send us Dr Ayla Ross most recent note.   We don't need a release as i'm the referring physician    Thanks    Document and then close

## 2022-03-28 NOTE — PROGRESS NOTES
After medical student evaluation and exam, I independently gathered patients history, independently did a physical and agree with A&P as written in medical student's note. --only omission, is that BMP also ordered for medication management  --requesting recent records from dr Daniel Leon  --pt aware he is overdue for CT chest and PFTs. Plans to get prior to upcoming appt with dr Raymond FRANCOIS      Colonoscopy done 9/4/19: next due 2021 (had tubular adenoma and only fair prep).   Encouraged to schedule with Dr Kristine Andrew vaccine : encouraged to get covid booster

## 2022-03-28 NOTE — PROGRESS NOTES
[address] [facility]                                             [address] [facility], [address] [place] [address]        Phone: [phone]      Name:  Jase Abraham  :    1965    Consultants:   Patient Care Team:  Verenice Luis MD as PCP - General (Family Medicine)  Verenice Luis MD as PCP - [facility]  Florencia Tristan MD as Consulting Physician (Pulmonology)    Chief Complaint:     Jase Abraham is a 49 y.o. male  who presents today for an established patient care visit with Personalized Prevention Plan Services as noted below.     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 Caridad Berg in pulmonology   -per pt, will follow up with him soon   -will obtain yearly CT and PFTs at that time    Secondary Polycythemia  -chronic problem, felt to be due to patient's chronic respiratory failure  -follows with Dr. Alfredo Chen in hematology   -per pt, last saw him a few months ago   -was having BLACKBURN then and his blood counts were \"high\", but patient states that he was not advised that he needed a blood transfusion at that time   -per heme, phlebotomy recommended if hgb >18   -this office does not have the records from this office visit to verify     T2DM  -most recent A1c wnl at 5.6 (21)  -had diabetic foot exam and eye exam within last year (foot exam at last visit in this office and eye exam in October)  -not currently on any medications due to well-controlled A1c in setting of patient's recent weight loss after weight loss surgery    Essential HTN  -Patient continues on amlodipine 2.5 mg, carvedilol 6.25 mg b.i.d., lisinopril 10 mg daily  -BP well-controlled in the office today at 114/76     Pure hypercholesterolemia  - Pt continues on ASA, Crestor 40mg, and Repath  -Last lipid panel wnl except for low HDL at 36 (9/9/21)    MDD  -Patient continues on lexapro 20 mg  -Mood sx well-controlled per pt, even if setting of recent family stressors     HM  -Received one dose of J&J since lats seen here (11/5)  -Was due for colonoscopy in 2021 (most recent in 2019, had tubular adenoma and only fair prep)    Patient Active Problem List   Diagnosis    ILD (interstitial lung disease) ([address]    Essential hypertension    Chronic respiratory failure ([address]    ESTRELLITA treated with BiPAP    CAD S/P percutaneous coronary angioplasty    Mixed hyperlipidemia    Coronary artery disease involving native coronary artery of native heart without angina pectoris    Diabetes ([address]    Polycythemia    Hiatal hernia with GERD    MDD (major depressive disorder)    Tubular adenoma    Overweight (BMI 25.0-29. 9)    Centrilobular emphysema ([address]         Past Medical History:    Past Medical History:   Diagnosis Date    CAD S/P percutaneous coronary angioplasty     Centrilobular emphysema ([address] 11/08/2020    Chronic respiratory failure (HCC)     COPD exacerbation ([address] 09/07/2016    Coronary artery disease involving native coronary artery of native heart with unstable angina pectoris (HCC)     Diabetes ([address]     Diabetes ([address]     Dyslipidemia     Dyspnea     GERD (gastroesophageal reflux disease)     High risk medications (not anticoagulants) long-term use     HTN (hypertension)     Hyperlipidemia     Hypoxemia     ILD (interstitial lung disease) Yes Alba Arambula MD   clopidogrel (PLAVIX) 75 MG tablet Take 1 tablet by mouth daily Yes Beatrice Roach MD   aspirin (ASPIRIN LOW DOSE) 81 MG chewable tablet RESTART IN 5 DAYS CHEW AND SWALLOW ONE TABLET BY MOUTH ONE TIME A DAY Yes Beatrice Roach MD   omeprazole (PRILOSEC) 20 MG delayed release capsule TAKE ONE CAPSULE BY MOUTH TWICE A DAY Yes Nery Code, DO   nitroGLYCERIN (NITROSTAT) 0.4 MG SL tablet up to max of 3 total doses. If no relief after 1 dose, call 911.  Yes Beatrice Roach MD   tiotropium (SPIRIVA RESPIMAT) 2.5 MCG/ACT AERS inhaler Inhale 2 puffs into the lungs daily Yes Nery Romeo, DO   SYMBICORT 160-4.5 MCG/ACT AERO Inhale 2 puffs into the lungs 2 times daily Yes Nery Code, DO   ibuprofen (ADVIL;MOTRIN) 200 MG tablet Take 200 mg by mouth every 6 hours as needed for Pain  Yes Historical Provider, MD   ondansetron (ZOFRAN) 4 MG tablet Take 1 tablet by mouth every 6 hours as needed for Nausea or Vomiting Yes ZAN Suggs CNP   sodium chloride, Inhalant, 3 % nebulizer solution Take 15 mLs by nebulization 2 times daily Yes Nery Code, DO   albuterol sulfate HFA (PROAIR HFA) 108 (90 BASE) MCG/ACT inhaler Inhale 2 puffs into the lungs every 4 hours as needed for Wheezing or Shortness of Breath Yes Nery Code, DO   azelastine (ASTELIN) 0.1 % nasal spray 2 sprays by Nasal route 2 times daily Use in each nostril as directed  Patient taking differently: 2 sprays by Nasal route 2 times daily as needed Use in each nostril as directed Yes Nery Romeo, DO       Allergies:    Erythromycin    Family History:       Problem Relation Age of Onset    Diabetes Father     High Cholesterol Father     High Blood Pressure Father     Emphysema Neg Hx     Heart Failure Neg Hx     Hypertension Neg Hx          Health Maintenance Completed:  Health Maintenance   Topic Date Due    Depression Monitoring  Never done    Diabetic retinal exam  Never done    Hepatitis B vaccine (1 of 3 - Risk 3-dose series) Never done    COVID-19 Vaccine (2 - Booster for Yasmine series) 12/31/2021    Colorectal Cancer Screen  09/04/2022    Diabetic foot exam  09/09/2022    A1C test (Diabetic or Prediabetic)  09/09/2022    Diabetic microalbuminuria test  09/09/2022    Lipid screen  09/09/2022    Potassium monitoring  09/09/2022    Creatinine monitoring  09/09/2022    DTaP/Tdap/Td vaccine (2 - Td or Tdap) 06/22/2026    Pneumococcal 0-64 years Vaccine (2 of 2 - PPSV23) 12/19/2032    Flu vaccine  Completed    Shingles Vaccine  Completed    Hepatitis C screen  Completed    HIV screen  Completed    Hepatitis A vaccine  Aged Out    Hib vaccine  Aged Out    Meningococcal (ACWY) vaccine  Aged Out          Immunization History   Administered Date(s) Administered    COVID-19, J&J, PF, 0.5 mL 11/05/2021    Influenza Vaccine, unspecified formulation 11/04/2008, 10/27/2009    Influenza Virus Vaccine 11/13/2011, 10/01/2012, 12/13/2013, 10/17/2014    Influenza, High Dose (Fluzone 65 yrs and older) 10/23/2015    Influenza, MDCK Quadv, IM, PF (Flucelvax 2 yrs and older) 12/09/2021    Influenza, Marshall Froylanssman, 6 mo and older, IM, PF (Flulaval, Fluarix) 10/03/2018    Influenza, Quadv, IM, (6 mo and older Fluzone, Flulaval, Fluarix and 3 yrs and older Afluria) 12/12/2016, 09/27/2017    Influenza, Quadv, IM, PF (6 mo and older Fluzone, Flulaval, Fluarix, and 3 yrs and older Afluria) 10/15/2019, 10/06/2020    Pneumococcal Conjugate 13-valent ([id number]) 10/01/2018    Pneumococcal Polysaccharide ([id number]) 06/22/2016    Tdap (Boostrix, Adacel) 06/22/2016    Zoster Recombinant (Shingrix) 11/09/2020, 04/22/2021         Review of Systems:  Review of Systems   Constitutional: Negative for chills and fever. HENT: Negative for congestion and sore throat. Respiratory: Negative for cough and shortness of breath.          Mild Value    Cholesterol, Total 09/09/2021 73     Triglycerides 09/09/2021 77     HDL 09/09/2021 36*    LDL Calculated 09/09/2021 22     VLDL Cholesterol Calcula* 09/09/2021 15     Sodium 09/09/2021 140     Potassium 09/09/2021 4.6     Chloride 09/09/2021 101     CO2 09/09/2021 24     Anion Gap 09/09/2021 15     Glucose 09/09/2021 86     BUN 09/09/2021 17     CREATININE 09/09/2021 1.1     GFR Non- 09/09/2021 >60     GFR  09/09/2021 >60     Calcium 09/09/2021 10.0     Total Protein 09/09/2021 8.3*    Albumin 09/09/2021 4.9     Albumin/Globulin Ratio 09/09/2021 1.4     Total Bilirubin 09/09/2021 1.9*    Alkaline Phosphatase 09/09/2021 91     ALT 09/09/2021 15     AST 09/09/2021 23     Globulin 09/09/2021 3.4     Hep C Ab Interp 09/09/2021 Non-reactive     HIV Ag/Ab 09/09/2021 Non-Reactive     HIV-1 Antibody 09/09/2021 Non-Reactive     HIV ANTIGEN 09/09/2021 Non-Reactive     HIV-2 Ab 09/09/2021 Non-Reactive     Microalbumin, Random Uri* 09/09/2021 <1.20     Creatinine, Ur 09/09/2021 202.7     Microalbumin Creatinine * 09/09/2021 see below     Hemoglobin A1C 09/09/2021 5.6     eAG 09/09/2021 114.0           Assessment/Plan:  Gamaliel Jefferson was seen today for 6 month follow-up and skin problem. Diagnoses and all orders for this visit:    Polycythemia    Type 2 diabetes mellitus without complication, without long-term current use of insulin (HCC)  -     Hemoglobin A1C; Future    Essential hypertension  -     Basic Metabolic Panel; Future    Mixed hyperlipidemia    CAD S/P percutaneous coronary angioplasty    ILD (interstitial lung disease) (HCC)    Major depressive disorder with single episode, in full remission ([facility]    Chronic respiratory failure with hypoxia (HCC)    ESTRELLITA treated with BiPAP    Perioral dermatitis  -     metroNIDAZOLE (METROGEL) 0.75 % gel; Apply topically 2 times daily.         Facial Rash  Due to patient's recent cortisone use on the face, felt to be most likely with possible perioral dermatitis. Also considered seborrheic dermatitis, although patient has been using anti-dandruff shampoo as face wash and has not noticed a difference. Atopic dermatitis felt to be less likely due to clinical appearance of rash.   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 Tasha Hassan in pulmonology   -will obtain yearly CT and PFTs at time of that visit  -Continue on triple inhaler therapy and Bipap with O2 at night    Secondary Polycythemia  -Will obtain records of recent visits with Dr. Ladonna Edwards in hematology (most recent visit we have record of is 2019)    T2DM  -will recheck A1c today   -if remains well-controlled, no medication indicated at this time  -encouraged pt to continue lifestyle change he has made in setting of recent weight loss    Essential HTN  -Well-controlled  -Continues on amlodipine 2.5 mg, carvedilol 6.25 mg b.i.d., lisinopril 10 mg daily    Pure hypercholesterolemia  -Well-controlled  -Continue on ASA, Crestor 40mg, and Repath    MDD  -Well-controlled  -Patient continues on lexapro 20 mg    HM  -Encouraged patient to receive Pfizer/Moderna booster dose as soon as possible, especially d/t his increased risk profile if he did have a COVID infection  -Advised pt to schedule colonoscopy asap since he is now overdue      Health Maintenance Due:  Health Maintenance Due   Topic Date Due    Depression Monitoring  Never done    Diabetic retinal exam  Never done    Hepatitis B vaccine (1 of 3 - Risk 3-dose series) Never done    COVID-19 Vaccine (2 - Booster for Yasmine series) 12/31/2021          Health care decision maker:  <72years old      Health Maintenance: (USPSTF Recommendations)  (F) Breast Cancer Screen: (40-49 (C), 50-74 biennial screening mammogram (B))  (F) Cervical Cancer Screen: (21-29 q3yr cytology alone; 30-65 q3yr cytology alone, q5yr with hrHPV alone, or q5yr cytology+hrHPV (A))  (M) Prostate Cancer Screen: (54-77 yo discuss benefits/harm, does not recommend testing PSA in men >73 yo (D):   (M) AAA Screen: (men 73-69 yo who has ever smoked (B), consider in nonsmokers if high risk):  CRC/Colonoscopy Screening: (adults 39-53 (B), 50-75 (A))  Lung Ca Screening: Annual LDCT (+smoker age 49-80, smoked within 15 years, total of 20 pack yr history (B)):  DEXA Screen: (women >65 and older, <65 if at risk/postmenopausal (B))  HIV Screen: (16-65 yr old, and all pregnant patients (A)): Hep C Screen: (18-79 yr old (B)):  HCC Screen: (all pts with cirrhosis and high risk Hep B (US q6 mo)):  Immunizations:    RTC:  Return in about 6 months (around 9/28/2022). EMR Dragon/transcription disclaimer:  Much of this encounter note is electronic transcription/translation of spoken language to printed texts. The electronic translation of spoken language may be erroneous, or at times, nonsensical words or phrases may be inadvertently transcribed.   Although I have reviewed the note for such errors, some may still exist.

## 2022-03-29 LAB
ANION GAP SERPL CALCULATED.3IONS-SCNC: 21 MMOL/L (ref 3–16)
BUN BLDV-MCNC: 16 MG/DL (ref 7–20)
CALCIUM SERPL-MCNC: 10.4 MG/DL (ref 8.3–10.6)
CHLORIDE BLD-SCNC: 105 MMOL/L (ref 99–110)
CO2: 18 MMOL/L (ref 21–32)
CREAT SERPL-MCNC: 1.1 MG/DL (ref 0.9–1.3)
ESTIMATED AVERAGE GLUCOSE: 114 MG/DL
GFR AFRICAN AMERICAN: >60
GFR NON-AFRICAN AMERICAN: >60
GLUCOSE BLD-MCNC: 79 MG/DL (ref 70–99)
HBA1C MFR BLD: 5.6 %
POTASSIUM SERPL-SCNC: 4.8 MMOL/L (ref 3.5–5.1)
SODIUM BLD-SCNC: 144 MMOL/L (ref 136–145)

## 2022-03-31 ENCOUNTER — TELEPHONE (OUTPATIENT)
Dept: PRIMARY CARE CLINIC | Age: 57
End: 2022-03-31

## 2022-03-31 NOTE — TELEPHONE ENCOUNTER
----- Message from Dale Lockett sent at 3/31/2022  4:40 PM EDT -----  Subject: Medication Problem    QUESTIONS  Name of Medication? metroNIDAZOLE (METROGEL) 0.75 % gel  Patient-reported dosage and instructions? .75% gel 2 times daily  What question or problem do you have with the medication? Patient stated   the medication was $160 out of pocket. Patient would like to know if there   is another option for this medication  Preferred Pharmacy? [facility], [address] [phone]  Pharmacy phone number (if available)? [phone]  Additional Information for Provider?   ---------------------------------------------------------------------------  --------------  CALL BACK INFO  What is the best way for the office to contact you? OK to leave message on   voicemail  Preferred Call Back Phone Number? [phone]  ---------------------------------------------------------------------------  --------------  SCRIPT ANSWERS  Relationship to Patient?  Self

## 2022-03-31 NOTE — TELEPHONE ENCOUNTER
Metronidazole cream sent to [facility] where good rx coupon allows it to be $31    Pt informed

## 2022-05-12 ENCOUNTER — TELEPHONE (OUTPATIENT)
Dept: PULMONOLOGY | Age: 57
End: 2022-05-12

## 2022-05-12 DIAGNOSIS — J84.9 ILD (INTERSTITIAL LUNG DISEASE) (HCC): Primary | ICD-10-CM

## 2022-05-12 NOTE — TELEPHONE ENCOUNTER
Pt believes Dr Jose Bardales wants him to have a PFT and a CT scan. He says he tried to schedule them and they said that the orders were . Can he get new orders to get these tests taken care of before his appt at the end of the month?

## 2022-05-20 ENCOUNTER — HOSPITAL ENCOUNTER (OUTPATIENT)
Dept: PULMONOLOGY | Age: 57
Discharge: HOME | End: 2022-05-20
Payer: COMMERCIAL

## 2022-05-20 ENCOUNTER — HOSPITAL ENCOUNTER (OUTPATIENT)
Dept: CT IMAGING | Age: 57
Discharge: HOME | End: 2022-05-20
Payer: COMMERCIAL

## 2022-05-20 DIAGNOSIS — J84.9 ILD (INTERSTITIAL LUNG DISEASE) (HCC): ICD-10-CM

## 2022-05-20 LAB
DLCO %PRED: 52 %
DLCO PRED: NORMAL
DLCO/VA %PRED: 59 %
DLCO/VA PRED: NORMAL
DLCO/VA: 2.7 ML/MIN/MMHG
DLCO: 15.54 ML/MIN/MMHG
EXPIRATORY TIME-POST: NORMAL
EXPIRATORY TIME: NORMAL
FEF 25-75% %CHNG: NORMAL
FEF 25-75% %PRED-POST: NORMAL
FEF 25-75% %PRED-PRE: NORMAL
FEF 25-75% PRED: NORMAL
FEF 25-75%-POST: NORMAL
FEF 25-75%-PRE: NORMAL
FEV1 %PRED-POST: 40 %
FEV1 %PRED-PRE: 32 %
FEV1 PRED: NORMAL
FEV1-POST: NORMAL
FEV1-PRE: NORMAL
FEV1/FVC %PRED-POST: NORMAL
FEV1/FVC %PRED-PRE: NORMAL
FEV1/FVC PRED: NORMAL
FEV1/FVC-POST: 33 %
FEV1/FVC-PRE: 32 %
FVC %PRED-POST: NORMAL
FVC %PRED-PRE: NORMAL
FVC PRED: NORMAL
FVC-POST: NORMAL
FVC-PRE: NORMAL
GAW %PRED: NORMAL
GAW PRED: NORMAL
GAW: NORMAL
IC %PRED: NORMAL
IC PRED: NORMAL
IC: NORMAL
MEP: NORMAL
MIP: NORMAL
MVV %PRED-PRE: NORMAL
MVV PRED: NORMAL
MVV-PRE: NORMAL
PEF %PRED-POST: NORMAL
PEF %PRED-PRE: NORMAL
PEF PRED: NORMAL
PEF%CHNG: NORMAL
PEF-POST: NORMAL
PEF-PRE: NORMAL
RAW %PRED: NORMAL
RAW PRED: NORMAL
RAW: NORMAL
RV %PRED: NORMAL
RV PRED: NORMAL
RV: NORMAL
SVC %PRED: NORMAL
SVC PRED: NORMAL
SVC: NORMAL
TLC %PRED: 120 %
TLC PRED: NORMAL
TLC: NORMAL
VA %PRED: 87 %
VA PRED: NORMAL
VA: 5.75 L
VTG %PRED: NORMAL
VTG PRED: NORMAL
VTG: NORMAL

## 2022-05-20 PROCEDURE — 94729 DIFFUSING CAPACITY: CPT | Performed by: INTERNAL MEDICINE

## 2022-05-20 PROCEDURE — 94726 PLETHYSMOGRAPHY LUNG VOLUMES: CPT | Performed by: INTERNAL MEDICINE

## 2022-05-20 PROCEDURE — 94729 DIFFUSING CAPACITY: CPT

## 2022-05-20 PROCEDURE — 6370000000 HC RX 637 (ALT 250 FOR IP): Performed by: INTERNAL MEDICINE

## 2022-05-20 PROCEDURE — 94060 EVALUATION OF WHEEZING: CPT | Performed by: INTERNAL MEDICINE

## 2022-05-20 PROCEDURE — 94664 DEMO&/EVAL PT USE INHALER: CPT

## 2022-05-20 PROCEDURE — 94726 PLETHYSMOGRAPHY LUNG VOLUMES: CPT

## 2022-05-20 PROCEDURE — 94640 AIRWAY INHALATION TREATMENT: CPT

## 2022-05-20 PROCEDURE — 94060 EVALUATION OF WHEEZING: CPT

## 2022-05-20 PROCEDURE — G1010 CDSM STANSON: HCPCS

## 2022-05-20 RX ORDER — ALBUTEROL SULFATE 90 UG/1
4 AEROSOL, METERED RESPIRATORY (INHALATION) ONCE
Status: COMPLETED | OUTPATIENT
Start: 2022-05-20 | End: 2022-05-20

## 2022-05-20 RX ADMIN — ALBUTEROL SULFATE 4 PUFF: 90 AEROSOL, METERED RESPIRATORY (INHALATION) at 10:51

## 2022-05-20 ASSESSMENT — PULMONARY FUNCTION TESTS
FEV1_PERCENT_PREDICTED_PRE: 32
FEV1_PERCENT_PREDICTED_POST: 40
FEV1/FVC_POST: 33
FEV1/FVC_PRE: 32

## 2022-05-20 NOTE — PROCEDURES
spirometry was acceptable and reproducible by ATS standards      Spirometry/Flow volume loop: There is severe degree of airflow obstruction with statistically significant postbronchodilator response    Lung volumes:  Evidence of severe air trapping and hyperinflation    Diffusing capacity:  Moderately reduced DLCO at 52% predicted. Impression:  Severe airflow obstruction with statistically significant postbronchodilator response and evidence of severe air trapping and hyperinflation. FEV1 %Pred-Post   Date Value Ref Range Status   05/20/2022 40 % Final     FEV1/FVC-Post   Date Value Ref Range Status   05/20/2022 33 % Final     TLC %Pred   Date Value Ref Range Status   05/20/2022 120 % Final     DLCO/VA %Pred   Date Value Ref Range Status   05/20/2022 59 % Final           OBSTRUCTION % Predicted FEV1   MILD >70%   MODERATE 60-69%   MODERATELY-SEVERE 50-59%   SEVERE 35-49%   VERY SEVERE <35%         RESTRICTION % Predicted TLC   MILD 66-80%   MODERATE 54-65%   MODERATELY-SEVERE <54%                 DIFFUSION CAPACITY DLCO % Pred   MILD >60% AND < LLN   MODERATE 40-60%   SEVERE <40%       PFT data will be scanned into the media tab under this encounter. Please see the scanned data for numerical values.      Ana Zamora MD  [facility], Sleep and Critical Care Medicine

## 2022-05-26 ENCOUNTER — OFFICE VISIT (OUTPATIENT)
Dept: PULMONOLOGY | Age: 57
End: 2022-05-26
Payer: COMMERCIAL

## 2022-05-26 VITALS
BODY MASS INDEX: 27.92 KG/M2 | RESPIRATION RATE: 18 BRPM | SYSTOLIC BLOOD PRESSURE: 120 MMHG | OXYGEN SATURATION: 90 % | HEIGHT: 70 IN | HEART RATE: 73 BPM | DIASTOLIC BLOOD PRESSURE: 70 MMHG | TEMPERATURE: 96.9 F | WEIGHT: 195 LBS

## 2022-05-26 DIAGNOSIS — J42 FOLLICULAR BRONCHIOLITIS (HCC): Primary | ICD-10-CM

## 2022-05-26 DIAGNOSIS — J96.11 CHRONIC RESPIRATORY FAILURE WITH HYPOXIA (HCC): ICD-10-CM

## 2022-05-26 DIAGNOSIS — J44.9 COPD WITH ASTHMA (HCC): ICD-10-CM

## 2022-05-26 DIAGNOSIS — G47.33 OSA TREATED WITH BIPAP: ICD-10-CM

## 2022-05-26 PROCEDURE — G8417 CALC BMI ABV UP PARAM F/U: HCPCS | Performed by: INTERNAL MEDICINE

## 2022-05-26 PROCEDURE — G8427 DOCREV CUR MEDS BY ELIG CLIN: HCPCS | Performed by: INTERNAL MEDICINE

## 2022-05-26 PROCEDURE — 3023F SPIROM DOC REV: CPT | Performed by: INTERNAL MEDICINE

## 2022-05-26 PROCEDURE — 3017F COLORECTAL CA SCREEN DOC REV: CPT | Performed by: INTERNAL MEDICINE

## 2022-05-26 PROCEDURE — 99214 OFFICE O/P EST MOD 30 MIN: CPT | Performed by: INTERNAL MEDICINE

## 2022-05-26 PROCEDURE — 1036F TOBACCO NON-USER: CPT | Performed by: INTERNAL MEDICINE

## 2022-05-26 NOTE — PROGRESS NOTES
Chief complaint  This is a 49y.o. year old male  who comes to see me with a chief complaint of   Chief Complaint   Patient presents with    Follow-up     4 months      HPI  Here with cc of ILD, ESTRELLITA    He is doing well. He just did his CT and PFT (stable)  He remains on inhalers mainly due to lung preservation rather than symptoms control. He is on oxygen at 3 liters with exertion and bleeds in 3 liters to his bilevel     Remains on bilevel machine with sleep      Historical:  He had been following with Dr. Caity Lopez for ILD related to bronchiolitis. Ramiro Verito had been concerned about following up all the way out in [address] and asked if he could follow up with me. I asked Dr. Caity Lopez and he was ok with it. Dr. Caity Lopez has been following Ramiro Sellers for years. 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 He was tried on various immune suppressant with no improvement in symptoms. Last medication he was on was cellcept >5 years ago. He was seen at [facility] for lung transplant in the past, but was not felt to be sick enough to require transplant, nor was his weight low enough. He has not been seen at [facility] clinic since then. Current Outpatient Medications:     metroNIDAZOLE (METROGEL) 0.75 % gel, Apply topically 2 times daily. , Disp: 60 g, Rfl: 0    REPATHA SURECLICK 313 MG/ML SOAJ, INJECT 140 MG UNDER THE SKIN EVERY 14 DAYS, Disp: 6 pen, Rfl: 3    hydrocortisone (WESTCORT) 0.2 % cream, Apply topically 2 times daily Apply topically 2 times daily. , Disp: 60 g, Rfl: 0    amLODIPine (NORVASC) 2.5 MG tablet, TAKE 1 TABLET BY MOUTH ONE TIME A DAY, Disp: 90 tablet, Rfl: 0    rosuvastatin (CRESTOR) 40 MG tablet, TAKE 1 TABLET BY MOUTH ONE TIME A DAY IN THE EVENING, Disp: 90 tablet, Rfl: 0    lisinopril (PRINIVIL;ZESTRIL) 10 MG tablet, TAKE 1 TABLET BY MOUTH ONE TIME A DAY, Disp: 90 tablet, Rfl: 0    escitalopram (LEXAPRO) 10 MG tablet, Take 1 tablet by mouth daily, Disp: 90 tablet, Rfl: 0    carvedilol (COREG) 6.25 MG tablet, Take 1 tablet by mouth 2 times daily (with meals), Disp: 180 tablet, Rfl: 0    clopidogrel (PLAVIX) 75 MG tablet, Take 1 tablet by mouth daily, Disp: 90 tablet, Rfl: 3    aspirin (ASPIRIN LOW DOSE) 81 MG chewable tablet, RESTART IN 5 DAYS CHEW AND SWALLOW ONE TABLET BY MOUTH ONE TIME A DAY, Disp: 90 tablet, Rfl: 3    omeprazole (PRILOSEC) 20 MG delayed release capsule, TAKE ONE CAPSULE BY MOUTH TWICE A DAY, Disp: 180 capsule, Rfl: 1    nitroGLYCERIN (NITROSTAT) 0.4 MG SL tablet, up to max of 3 total doses.  If no relief after 1 dose, call 911., Disp: 25 tablet, Rfl: 3    tiotropium (SPIRIVA RESPIMAT) 2.5 MCG/ACT AERS inhaler, Inhale 2 puffs into the lungs daily, Disp: 3 Inhaler, Rfl: 3    SYMBICORT 160-4.5 MCG/ACT AERO, Inhale 2 puffs into the lungs 2 times daily, Disp: 3 Inhaler, Rfl: 1    ibuprofen (ADVIL;MOTRIN) 200 MG tablet, Take 200 mg by mouth every 6 hours as needed for Pain , Disp: , Rfl:     ondansetron (ZOFRAN) 4 MG tablet, Take 1 tablet by mouth every 6 hours as needed for Nausea or Vomiting, Disp: 30 tablet, Rfl: 0    sodium chloride, Inhalant, 3 % nebulizer solution, Take 15 mLs by nebulization 2 times daily, Disp: 900 mL, Rfl: 0    albuterol sulfate HFA (PROAIR HFA) 108 (90 BASE) MCG/ACT inhaler, Inhale 2 puffs into the lungs every 4 hours as needed for Wheezing or Shortness of Breath, Disp: 1 Inhaler, Rfl: 6    azelastine (ASTELIN) 0.1 % nasal spray, 2 sprays by Nasal route 2 times daily Use in each nostril as directed (Patient taking differently: 2 sprays by Nasal route 2 times daily as needed Use in each nostril as directed), Disp: 1 Bottle, Rfl: 3        PHYSICAL EXAM:  GENERAL:  Still looks thin  HEENT:  No scleral icterus, no conjunctival irritation, Mallampati IV with bifid pharnyx  NECK:  No thyromegaly, no bruits  LYMPH:  No cervical or supraclavicular adenopathy  HEART:  Regular rate and rhythm, no murmurs  LUNGS:  Clear ABDOMEN:  No distention, no organomegaly  EXTREMITIES:  Trace edema, no digital clubbing  NEURO:  No localizing deficits, CN II-XII intact    Pulmonary Function Testing 10/2015  A very severe obstructive lung defect is present with minimal  broncho-reactivity. There is also air trapping and a moderate reduction in  diffusion capacity. These lung function tests are most consistent with  severe chronic obstructive pulmonary disease. In comparison with pulmonary  function testing performed in October of 2012, there has been significant  reduction in the forced vital capacity and FEV1. Pulmonary Function Testing 10/2017  FEV1 1.09 L to 1.26 L  FVC  2.86 L to 3.48 L  FEV1/FVC 38  TLC 7.06 L  DLCO 42%    Pulmonary Function Testing 11/2018  FEV1 1.34 L to 1.42 L  FVC  3.31 L to 3.68 L  FEV1/FVC 40  TLC 7.83 L   DLCO 44%    Pulmonary Function Testing 9/2020  FEV1 1.17 L to 1.42 L  FVC  2.97 L to 3.56 L  FEV1/FVC 39  TLC 8.05 L   DLCO 47%    Pulmonary Function Testing 5/22  FEV1 1.15 L to 1.44 L  FVC  3.53 L to 4.33 L  FEV1/FVC 32  TLC 7.91 L   DLCO 52%     Chest imaging from 5/22 is reviewed. My impression is continued cystic lung disease with likely emphysema. Right sided scarring. Alpha-1 Anti-trypsin levels:  120, Phenotype:  M1M1       ABG 10/2017:  7.43/39/61.  92%    RHC 4/2018  1. Normal right heart pressures with a right atrial pressure of 1 and a  pulmonary capillary wedge pressure of 8.  2.  No evidence of pulmonary hypertension with an instantaneous pressure of  26/14 and a mean pulmonary arterial pressure of 20.  3.  Normal cardiac outputs by thermodilution at 6.73 liters per minute with  a cardiac index of 2.95 liters per kilogram per minute. By second equation  the cardiac output was 4.76 with a cardiac index of 2.09. Of note, the  patient does have erythrocytosis.   4.  Pulmonary vascular resistance 147.  5.  Normal mixed venous oxygen saturations with a superior vena cava  saturation of 70 and a pulmonary arterial saturation of 73%. I reviewed the above labs images, etc.     Assessment/Plan:  1. Follicular bronchiolitis ([facility]  Per open lung biopsy in the past.  On no active therapy. 2. COPD with asthma ([facility]  Continue with triple inhalers. PFT slightly improved from 2020. Not significant     3. Chronic respiratory failure with hypoxia (HCC)  Uses up to 3 liters with exertion and bleeds into his bilevel. He does benefit    4. ESTRELLITA treated with BiPAP  Benefiting and compliant. Benefiting from therapy by a low Stout score, good energy levels, low fatigue, and control of chronic medical problems      Follow up in 4-6 months    Pulmonary Rehab:  Finished Pulmonary rehab and cardiac rehab.   Does exercise on his own    Lung Cancer Screening CT:  Does not qualify due to >15 years since he quit and age <54

## 2022-08-29 LAB
CHOLESTEROL, TOTAL: 93 MG/DL (ref 0–199)
GLUCOSE BLD-MCNC: 108 MG/DL (ref 70–99)
HDLC SERPL-MCNC: 31 MG/DL (ref 40–60)
LDL CHOLESTEROL CALCULATED: 42 MG/DL
TRIGL SERPL-MCNC: 100 MG/DL (ref 0–150)

## 2022-09-29 ENCOUNTER — OFFICE VISIT (OUTPATIENT)
Dept: PRIMARY CARE CLINIC | Age: 57
End: 2022-09-29
Payer: COMMERCIAL

## 2022-09-29 ENCOUNTER — HOSPITAL ENCOUNTER (OUTPATIENT)
Age: 57
Discharge: HOME | End: 2022-09-29
Payer: COMMERCIAL

## 2022-09-29 VITALS
DIASTOLIC BLOOD PRESSURE: 74 MMHG | WEIGHT: 198.4 LBS | TEMPERATURE: 98.1 F | OXYGEN SATURATION: 93 % | BODY MASS INDEX: 28.4 KG/M2 | SYSTOLIC BLOOD PRESSURE: 110 MMHG | HEART RATE: 70 BPM | HEIGHT: 70 IN

## 2022-09-29 DIAGNOSIS — E78.2 MIXED HYPERLIPIDEMIA: ICD-10-CM

## 2022-09-29 DIAGNOSIS — F33.1 MODERATE EPISODE OF RECURRENT MAJOR DEPRESSIVE DISORDER (HCC): ICD-10-CM

## 2022-09-29 DIAGNOSIS — J43.2 CENTRILOBULAR EMPHYSEMA (HCC): ICD-10-CM

## 2022-09-29 DIAGNOSIS — I25.10 CAD S/P PERCUTANEOUS CORONARY ANGIOPLASTY: ICD-10-CM

## 2022-09-29 DIAGNOSIS — I10 ESSENTIAL HYPERTENSION: ICD-10-CM

## 2022-09-29 DIAGNOSIS — J84.9 ILD (INTERSTITIAL LUNG DISEASE) (HCC): ICD-10-CM

## 2022-09-29 DIAGNOSIS — E11.9 TYPE 2 DIABETES MELLITUS WITHOUT COMPLICATION, WITHOUT LONG-TERM CURRENT USE OF INSULIN (HCC): ICD-10-CM

## 2022-09-29 DIAGNOSIS — D75.1 POLYCYTHEMIA: ICD-10-CM

## 2022-09-29 DIAGNOSIS — J96.11 CHRONIC RESPIRATORY FAILURE WITH HYPOXIA (HCC): ICD-10-CM

## 2022-09-29 DIAGNOSIS — D75.1 POLYCYTHEMIA: Primary | ICD-10-CM

## 2022-09-29 DIAGNOSIS — F32.5 MAJOR DEPRESSIVE DISORDER WITH SINGLE EPISODE, IN FULL REMISSION (HCC): ICD-10-CM

## 2022-09-29 DIAGNOSIS — Z98.61 CAD S/P PERCUTANEOUS CORONARY ANGIOPLASTY: ICD-10-CM

## 2022-09-29 LAB
A/G RATIO: 1.8 (ref 1.1–2.2)
ALBUMIN SERPL-MCNC: 4.8 G/DL (ref 3.4–5)
ALP BLD-CCNC: 95 U/L (ref 40–129)
ALT SERPL-CCNC: 11 U/L (ref 10–40)
ANION GAP SERPL CALCULATED.3IONS-SCNC: 15 MMOL/L (ref 3–16)
AST SERPL-CCNC: 15 U/L (ref 15–37)
BASOPHILS ABSOLUTE: 0.1 K/UL (ref 0–0.2)
BASOPHILS RELATIVE PERCENT: 1.2 %
BILIRUB SERPL-MCNC: 1.2 MG/DL (ref 0–1)
BUN BLDV-MCNC: 11 MG/DL (ref 7–20)
CALCIUM SERPL-MCNC: 9.7 MG/DL (ref 8.3–10.6)
CHLORIDE BLD-SCNC: 103 MMOL/L (ref 99–110)
CO2: 24 MMOL/L (ref 21–32)
CREAT SERPL-MCNC: 1 MG/DL (ref 0.9–1.3)
CREATININE URINE: 298.9 MG/DL (ref 39–259)
EOSINOPHILS ABSOLUTE: 0.2 K/UL (ref 0–0.6)
EOSINOPHILS RELATIVE PERCENT: 3.6 %
GFR AFRICAN AMERICAN: >60
GFR NON-AFRICAN AMERICAN: >60
GLUCOSE BLD-MCNC: 76 MG/DL (ref 70–99)
HCT VFR BLD CALC: 54.7 % (ref 40.5–52.5)
HEMOGLOBIN: 18.5 G/DL (ref 13.5–17.5)
LYMPHOCYTES ABSOLUTE: 1.4 K/UL (ref 1–5.1)
LYMPHOCYTES RELATIVE PERCENT: 24.9 %
MCH RBC QN AUTO: 29 PG (ref 26–34)
MCHC RBC AUTO-ENTMCNC: 33.8 G/DL (ref 31–36)
MCV RBC AUTO: 85.9 FL (ref 80–100)
MICROALBUMIN UR-MCNC: <1.2 MG/DL
MICROALBUMIN/CREAT UR-RTO: ABNORMAL MG/G (ref 0–30)
MONOCYTES ABSOLUTE: 0.6 K/UL (ref 0–1.3)
MONOCYTES RELATIVE PERCENT: 9.8 %
NEUTROPHILS ABSOLUTE: 3.4 K/UL (ref 1.7–7.7)
NEUTROPHILS RELATIVE PERCENT: 60.5 %
PDW BLD-RTO: 14 % (ref 12.4–15.4)
PLATELET # BLD: 195 K/UL (ref 135–450)
PMV BLD AUTO: 8 FL (ref 5–10.5)
POTASSIUM SERPL-SCNC: 4.7 MMOL/L (ref 3.5–5.1)
RBC # BLD: 6.36 M/UL (ref 4.2–5.9)
SODIUM BLD-SCNC: 142 MMOL/L (ref 136–145)
TOTAL PROTEIN: 7.4 G/DL (ref 6.4–8.2)
WBC # BLD: 5.7 K/UL (ref 4–11)

## 2022-09-29 PROCEDURE — 2022F DILAT RTA XM EVC RTNOPTHY: CPT | Performed by: FAMILY MEDICINE

## 2022-09-29 PROCEDURE — 80053 COMPREHEN METABOLIC PANEL: CPT

## 2022-09-29 PROCEDURE — 1036F TOBACCO NON-USER: CPT | Performed by: FAMILY MEDICINE

## 2022-09-29 PROCEDURE — 82570 ASSAY OF URINE CREATININE: CPT

## 2022-09-29 PROCEDURE — 3017F COLORECTAL CA SCREEN DOC REV: CPT | Performed by: FAMILY MEDICINE

## 2022-09-29 PROCEDURE — G0008 ADMIN INFLUENZA VIRUS VAC: HCPCS | Performed by: FAMILY MEDICINE

## 2022-09-29 PROCEDURE — 82043 UR ALBUMIN QUANTITATIVE: CPT

## 2022-09-29 PROCEDURE — 36415 COLL VENOUS BLD VENIPUNCTURE: CPT

## 2022-09-29 PROCEDURE — 90674 CCIIV4 VAC NO PRSV 0.5 ML IM: CPT | Performed by: FAMILY MEDICINE

## 2022-09-29 PROCEDURE — 3023F SPIROM DOC REV: CPT | Performed by: FAMILY MEDICINE

## 2022-09-29 PROCEDURE — G8427 DOCREV CUR MEDS BY ELIG CLIN: HCPCS | Performed by: FAMILY MEDICINE

## 2022-09-29 PROCEDURE — G8417 CALC BMI ABV UP PARAM F/U: HCPCS | Performed by: FAMILY MEDICINE

## 2022-09-29 PROCEDURE — 99214 OFFICE O/P EST MOD 30 MIN: CPT | Performed by: FAMILY MEDICINE

## 2022-09-29 PROCEDURE — 83036 HEMOGLOBIN GLYCOSYLATED A1C: CPT

## 2022-09-29 PROCEDURE — 85025 COMPLETE CBC W/AUTO DIFF WBC: CPT

## 2022-09-29 PROCEDURE — 3044F HG A1C LEVEL LT 7.0%: CPT | Performed by: FAMILY MEDICINE

## 2022-09-29 RX ORDER — ROSUVASTATIN CALCIUM 40 MG/1
TABLET, COATED ORAL
Qty: 90 TABLET | Refills: 1 | Status: SHIPPED | OUTPATIENT
Start: 2022-09-29

## 2022-09-29 RX ORDER — AMLODIPINE BESYLATE 2.5 MG/1
TABLET ORAL
Qty: 90 TABLET | Refills: 1 | Status: SHIPPED | OUTPATIENT
Start: 2022-09-29

## 2022-09-29 RX ORDER — CARVEDILOL 6.25 MG/1
6.25 TABLET ORAL 2 TIMES DAILY WITH MEALS
Qty: 180 TABLET | Refills: 1 | Status: SHIPPED | OUTPATIENT
Start: 2022-09-29

## 2022-09-29 RX ORDER — OMEPRAZOLE 20 MG/1
CAPSULE, DELAYED RELEASE ORAL
Qty: 180 CAPSULE | Refills: 1 | Status: SHIPPED | OUTPATIENT
Start: 2022-09-29

## 2022-09-29 RX ORDER — ASPIRIN 81 MG/1
TABLET, CHEWABLE ORAL
Qty: 90 TABLET | Refills: 1 | Status: SHIPPED | OUTPATIENT
Start: 2022-09-29

## 2022-09-29 RX ORDER — CLOPIDOGREL BISULFATE 75 MG/1
75 TABLET ORAL DAILY
Qty: 90 TABLET | Refills: 1 | Status: SHIPPED | OUTPATIENT
Start: 2022-09-29

## 2022-09-29 RX ORDER — LISINOPRIL 10 MG/1
TABLET ORAL
Qty: 90 TABLET | Refills: 1 | Status: SHIPPED | OUTPATIENT
Start: 2022-09-29

## 2022-09-29 RX ORDER — ESCITALOPRAM OXALATE 10 MG/1
10 TABLET ORAL DAILY
Qty: 90 TABLET | Refills: 1 | Status: SHIPPED | OUTPATIENT
Start: 2022-09-29

## 2022-09-29 ASSESSMENT — PATIENT HEALTH QUESTIONNAIRE - PHQ9
SUM OF ALL RESPONSES TO PHQ QUESTIONS 1-9: 0
SUM OF ALL RESPONSES TO PHQ QUESTIONS 1-9: 0
6. FEELING BAD ABOUT YOURSELF - OR THAT YOU ARE A FAILURE OR HAVE LET YOURSELF OR YOUR FAMILY DOWN: 0
2. FEELING DOWN, DEPRESSED OR HOPELESS: 0
7. TROUBLE CONCENTRATING ON THINGS, SUCH AS READING THE NEWSPAPER OR WATCHING TELEVISION: 0
1. LITTLE INTEREST OR PLEASURE IN DOING THINGS: 0
10. IF YOU CHECKED OFF ANY PROBLEMS, HOW DIFFICULT HAVE THESE PROBLEMS MADE IT FOR YOU TO DO YOUR WORK, TAKE CARE OF THINGS AT HOME, OR GET ALONG WITH OTHER PEOPLE: 0
SUM OF ALL RESPONSES TO PHQ9 QUESTIONS 1 & 2: 0
4. FEELING TIRED OR HAVING LITTLE ENERGY: 0
SUM OF ALL RESPONSES TO PHQ QUESTIONS 1-9: 0
5. POOR APPETITE OR OVEREATING: 0
8. MOVING OR SPEAKING SO SLOWLY THAT OTHER PEOPLE COULD HAVE NOTICED. OR THE OPPOSITE, BEING SO FIGETY OR RESTLESS THAT YOU HAVE BEEN MOVING AROUND A LOT MORE THAN USUAL: 0
9. THOUGHTS THAT YOU WOULD BE BETTER OFF DEAD, OR OF HURTING YOURSELF: 0
SUM OF ALL RESPONSES TO PHQ QUESTIONS 1-9: 0
3. TROUBLE FALLING OR STAYING ASLEEP: 0

## 2022-09-29 ASSESSMENT — ANXIETY QUESTIONNAIRES
1. FEELING NERVOUS, ANXIOUS, OR ON EDGE: 0
5. BEING SO RESTLESS THAT IT IS HARD TO SIT STILL: 0
6. BECOMING EASILY ANNOYED OR IRRITABLE: 0
4. TROUBLE RELAXING: 0
7. FEELING AFRAID AS IF SOMETHING AWFUL MIGHT HAPPEN: 0
3. WORRYING TOO MUCH ABOUT DIFFERENT THINGS: 0
GAD7 TOTAL SCORE: 0
2. NOT BEING ABLE TO STOP OR CONTROL WORRYING: 0
IF YOU CHECKED OFF ANY PROBLEMS ON THIS QUESTIONNAIRE, HOW DIFFICULT HAVE THESE PROBLEMS MADE IT FOR YOU TO DO YOUR WORK, TAKE CARE OF THINGS AT HOME, OR GET ALONG WITH OTHER PEOPLE: NOT DIFFICULT AT ALL

## 2022-09-29 NOTE — PROGRESS NOTES
PROGRESS NOTE     Ginna Brand MD  [address], [address] [place] [address],[address], Jordin Estes [address]         Phone: [phone]    Date of Service:  9/29/2022     Patient ID: Leoncio Gutierres is a 49 y.o. 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 Continue triple therapy with symbicort and spiriva. Continue 3 L oxygen with exertion and Bipap. As summarized below, reviewed Dr. 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 Secondary polycythemia  Due for appt with Dr Peri Moffett as hasn't seen him in a year. Repeating CBC today to ensure phlebotomy not needed. This is thought to be secondary to his hypoxia from his lung disease. Dr. Peri Moffett only recommending phlebotomy if hemoglobin becomes greater than 20. Type 2 diabetes mellitus without complication, without long-term current use of insulin ([facility])  Checking A1c. Last A1c was within normal limits after losing so much weight after gastric sleeve surgery. Foot exam was normal today. UTD with DM eye exam.    The large amount of weight loss after gastric sleeve surgery certainly      Essential hypertension  At goal.    Patient to continue  amlodipine 2.5 mg, carvedilol 6.25 mg b.i.d., lisinopril 10 mg daily     Checking CMP. Pure hypercholesterolemia  At goal.  Pt to continue ASA, Crestor 40mg, and Repatha. CAD  Asymptomatic. Continue medical management. Patient is on aspirin, Plavix, beta-blocker, and statin. Pt to discuss with Dr Sunny Cunningham if still needs DAPT. Heart cath was in 2017 and no stent placed.               Moderate episode of recurrent major depressive disorder ([address]  Well-controlled on Lexapro 10 mg. Patient to continue.       Colonoscopy done 9/4/19: past due since September 2021 (had tubular adenoma and only fair prep). Encouraged to schedule with Dr Leti SCHUSTER once again. Told pt last few visits. COVID vaccine :   Pt declining. Highly encouraged him to also discuss with Dr. Radha Wood. Discussed morbidity mortality that could occur if he contracted Covid. Flu vaccine given today with VIS forms         Subjective:     CC: polycythemia, DM2, HTN, HLD, CAD, ILD, MDD, chronic respiratory failure    HPI      61-year-old white male here for reevaluation of the above chronic medical conditions. Pt used to be diabetic, but A1c have been in normal range since weight loss. He is s/p lap sleeve gastrectomy January 2020. Pt's weight is essentially stable in last 6 months. He lost around 90 lbs and kept it off. .  Last saw Dr Yahaira Wilde, bariatric surgeron on 1/20/22. He recommends f/u in 6 months    As far as his CAD he denies any angina or angina equivalents. See neg ROS below  Pt is s/p angioplasty with ballon catheter dilation of 2nd diagnonal branch of LAD and distal LAD in 2017. He is complaint with ASA, plavix, bblocker, statin and repatha. Last saw Dr Javi Thompson on 12/17/21:   Pt denied any angina or angina equlivalents at that time. Lipid profile was found to be favorable with current meds. Pt was encouraged to continue healthy eating and congratulated on weight loss. As far as ILD and COPD with asthma, states his BLACKBURN is at baseline. No SOB at rest, but needs 3 L oxygen with exertion. He is compliant with triple therapy (spiriva and symbicort). He is compliant with Bipap with 3 L oxygen nightly. Also uses 3 L oxygen with exertion. Historical: Dr. Nikki Cota, pulmonology, in the past.  Also felt ILD was secondary to bronchiolitis. 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   Patient has been tried on various immune suppressants for this condition with no improvement. Was last on CellCept 5 years ago. Had considered a lung transplant at [facility] in the past, but has not felt sick enough for transplantation. Last saw Dr Claudio Davies on 5/26/22: According to progress note, \"no active treatment for biopsy positive follicular bronchitis. Patient was continued on triple therapy for COPD with asthma, with mention of slight PFT improvement from 2020, though not significant. For chronic respiratory failure patient was continued on 3 L oxygen with exertion and with BiPAP. It was mentioned he does not qualify for lung cancer screening since he quit smoking greater than 15 years ago. Pt has polycythemia thought to be secondary to his lung disease. Last note we have from Dr. Linda Lenz for polycythemia 8/21/21 by NP, thought to be secondary to his hypoxia. Recommends phlebotomy only if hemoglobin goes above 20. Mentions Jak2 mutation was neg and erythropoeitin level was high normal.        Pt complaint with lexapro for depression. Patient states well controlled. PHQ-9 = 0 today. PFT with bronchodilator 9/23/20  Overall Interpretation   PFT does  demonstrates obstruction with FEV1 of 32 % FVC of 64%     with bronchodilator response with associated decrease in   diffusion capacity. Air trapping is  present. Hyperinflation is    present. This consistent with very Severe COPD. Clinical   correlation needed     Pulmonary Function Testing 5/22  FEV1 1.15 L to 1.44 L  FVC  3.53 L to 4.33 L  FEV1/FVC 32  TLC 7.91 L   DLCO 52%    Cath 7/26/17   revealed significant 2nd diagonal and distal LAD disease. Only balloon angioplasty was performed to the 80% diagonal lesion and more distal 90% LAD lesion resulting in 20% residual stenosis. INTERVENTIONS PERFORMED:  1.   We intervened on the second diagonal branch of the left anterior  descending and performed a balloon angioplasty only using 2.5 x 12-mm  balloon catheter. This was an 80% stenotic lesion which was reduced  to 20%. 2.  Distal left anterior descending artery also had stenosis, this was  90% and after balloon angioplasty reduced to 20%. [address] 4/2018  1. Normal right heart pressures with a right atrial pressure of 1 and a  pulmonary capillary wedge pressure of 8.  2.  No evidence of pulmonary hypertension with an instantaneous pressure of  26/14 and a mean pulmonary arterial pressure of 20.  3.  Normal cardiac outputs by thermodilution at 6.73 liters per minute with  a cardiac index of 2.95 liters per kilogram per minute. By second equation  the cardiac output was 4.76 with a cardiac index of 2.09. Of note, the  patient does have erythrocytosis. 4.  Pulmonary vascular resistance 147.  5.  Normal mixed venous oxygen saturations with a superior vena cava  saturation of 70 and a pulmonary arterial saturation of 73%.     Lab Results   Component Value Date    LABA1C 5.6 03/28/2022    LABA1C 5.6 09/09/2021    LABA1C 5.2 11/09/2020     Lab Results   Component Value Date    LABMICR <1.20 09/09/2021    CREATININE 1.1 03/28/2022     Lab Results   Component Value Date    ALT 15 09/09/2021    AST 23 09/09/2021     Lab Results   Component Value Date    CHOL 93 08/29/2022    TRIG 100 08/29/2022    HDL 31 (L) 08/29/2022    LDLCALC 42 08/29/2022        Lab Results   Component Value Date    WBC 6.0 08/18/2020    HGB 17.9 (H) 08/18/2020    HCT 53.0 (H) 08/18/2020    MCV 86.7 08/18/2020     08/18/2020       ROS:    Constitutional:  Negative for activity or appetite change, fever or fatigue  HENT:  Negative for congestion, sinus pressure, or rhinorrhea  Eyes:  Negative for eye pain or visual changes  Resp:  Negative for current  SOB, chest tightness, cough  Cardiovascular: Negative for CP, palpitations,  orthopnea, PND, LE edema, +chronic stable BLACKBURN  Gastrointestinal: Negative for abd pain, melena, BRBPR, N/V/D  Endocrine:  Negative for polydipsia and polyuria  : Negative for dysuria, flank pain or urinary frequency  Musculoskeletal:  Negative for back pain or myalgias  Neuro:  Negative for dizziness or lightheadedness  Psych: negative for depression or anxiety      Vitals:    09/29/22 1205   BP: 110/74   Pulse: 70   Temp: 98.1 °F (36.7 °C)   SpO2: 93%   Weight: 198 lb 6.4 oz (90 kg)   Height: 5' 10\" (1.778 m)         Outpatient Medications Marked as Taking for the 9/29/22 encounter (Office Visit) with Lashaun Wright MD   Medication Sig Dispense Refill    metroNIDAZOLE (METROGEL) 0.75 % gel Apply topically 2 times daily. 60 g 0    REPATHA SURECLICK 751 MG/ML SOAJ INJECT 140 MG UNDER THE SKIN EVERY 14 DAYS 6 pen 3    hydrocortisone (WESTCORT) 0.2 % cream Apply topically 2 times daily Apply topically 2 times daily. 60 g 0    amLODIPine (NORVASC) 2.5 MG tablet TAKE 1 TABLET BY MOUTH ONE TIME A DAY 90 tablet 0    rosuvastatin (CRESTOR) 40 MG tablet TAKE 1 TABLET BY MOUTH ONE TIME A DAY IN THE EVENING 90 tablet 0    lisinopril (PRINIVIL;ZESTRIL) 10 MG tablet TAKE 1 TABLET BY MOUTH ONE TIME A DAY 90 tablet 0    escitalopram (LEXAPRO) 10 MG tablet Take 1 tablet by mouth daily 90 tablet 0    carvedilol (COREG) 6.25 MG tablet Take 1 tablet by mouth 2 times daily (with meals) 180 tablet 0    clopidogrel (PLAVIX) 75 MG tablet Take 1 tablet by mouth daily 90 tablet 3    aspirin (ASPIRIN LOW DOSE) 81 MG chewable tablet RESTART IN 5 DAYS CHEW AND SWALLOW ONE TABLET BY MOUTH ONE TIME A DAY 90 tablet 3    omeprazole (PRILOSEC) 20 MG delayed release capsule TAKE ONE CAPSULE BY MOUTH TWICE A  capsule 1    nitroGLYCERIN (NITROSTAT) 0.4 MG SL tablet up to max of 3 total doses.  If no relief after 1 dose, call 911. 25 tablet 3    tiotropium (SPIRIVA RESPIMAT) 2.5 MCG/ACT AERS inhaler Inhale 2 puffs into the lungs daily 3 Inhaler 3    SYMBICORT 160-4.5 MCG/ACT AERO Inhale 2 puffs into the lungs 2 times daily 3 Inhaler 1    ibuprofen (ADVIL;MOTRIN) 200 MG tablet Take 200 mg by mouth every 6 hours as needed for Pain       ondansetron (ZOFRAN) 4 MG tablet Take 1 tablet by mouth every 6 hours as needed for Nausea or Vomiting 30 tablet 0    sodium chloride, Inhalant, 3 % nebulizer solution Take 15 mLs by nebulization 2 times daily 900 mL 0    albuterol sulfate HFA (PROAIR HFA) 108 (90 BASE) MCG/ACT inhaler Inhale 2 puffs into the lungs every 4 hours as needed for Wheezing or Shortness of Breath 1 Inhaler 6    azelastine (ASTELIN) 0.1 % nasal spray 2 sprays by Nasal route 2 times daily Use in each nostril as directed (Patient taking differently: 2 sprays by Nasal route 2 times daily as needed Use in each nostril as directed) 1 Bottle 3             Objective:   Constitutional:   Reviewed vitals above  Well Nourished, well developed, no distress       Neck:  Symmetric and without masses  No thyromegaly  Resp:  Normal effort  Clear to auscultation bilaterally without rhonchi, wheezing or crackles  Cardiovascular: On auscultation, normal S1 and S2 without murmurs, rubs or gallops  No bruits of bilateral carotids and no JVD  Gastrointestinal:  Nontender, nondistended, and no masses  No hepatosplenomegaly  Musculoskeletal:  Normal Gait  All extremities without clubbing, cyanosis or edema  Skin:  No rashes on inspection  No areas of increased heat or induration on palpation  Psych:  Normal mood and affect  Normal insight and judgement    Diabetic foot exam:   Left Foot:   Visual Exam: normal   Pulse DP: 2+ (normal)   Filament test: normal sensation     Right Foot:   Visual Exam: normal   Pulse DP: 2+ (normal)   Filament test: normal sensation         EMR Dragon/transcription disclaimer:  Much of this encounter note is electronic transcription/translation of spoken language to printed texts. The electronic translation of spoken language may be erroneous, or at times, nonsensical words or phrases may be inadvertently transcribed.   Although I have reviewed the note for such errors, some may still exist.

## 2022-09-30 LAB
ESTIMATED AVERAGE GLUCOSE: 111.2 MG/DL
HBA1C MFR BLD: 5.5 %

## 2022-12-01 ENCOUNTER — OFFICE VISIT (OUTPATIENT)
Dept: PULMONOLOGY | Age: 57
End: 2022-12-01
Payer: MEDICARE

## 2022-12-01 VITALS
RESPIRATION RATE: 21 BRPM | HEIGHT: 70 IN | DIASTOLIC BLOOD PRESSURE: 65 MMHG | OXYGEN SATURATION: 93 % | WEIGHT: 201.6 LBS | HEART RATE: 77 BPM | SYSTOLIC BLOOD PRESSURE: 115 MMHG | BODY MASS INDEX: 28.86 KG/M2 | TEMPERATURE: 97.5 F

## 2022-12-01 DIAGNOSIS — J42 FOLLICULAR BRONCHIOLITIS (HCC): Primary | ICD-10-CM

## 2022-12-01 DIAGNOSIS — J96.11 CHRONIC RESPIRATORY FAILURE WITH HYPOXIA (HCC): ICD-10-CM

## 2022-12-01 DIAGNOSIS — J44.9 COPD WITH ASTHMA (HCC): ICD-10-CM

## 2022-12-01 DIAGNOSIS — G47.33 OSA TREATED WITH BIPAP: ICD-10-CM

## 2022-12-01 PROCEDURE — G8417 CALC BMI ABV UP PARAM F/U: HCPCS | Performed by: INTERNAL MEDICINE

## 2022-12-01 PROCEDURE — G8427 DOCREV CUR MEDS BY ELIG CLIN: HCPCS | Performed by: INTERNAL MEDICINE

## 2022-12-01 PROCEDURE — 3023F SPIROM DOC REV: CPT | Performed by: INTERNAL MEDICINE

## 2022-12-01 PROCEDURE — G8482 FLU IMMUNIZE ORDER/ADMIN: HCPCS | Performed by: INTERNAL MEDICINE

## 2022-12-01 PROCEDURE — 3074F SYST BP LT 130 MM HG: CPT | Performed by: INTERNAL MEDICINE

## 2022-12-01 PROCEDURE — 99214 OFFICE O/P EST MOD 30 MIN: CPT | Performed by: INTERNAL MEDICINE

## 2022-12-01 PROCEDURE — 1036F TOBACCO NON-USER: CPT | Performed by: INTERNAL MEDICINE

## 2022-12-01 PROCEDURE — 3017F COLORECTAL CA SCREEN DOC REV: CPT | Performed by: INTERNAL MEDICINE

## 2022-12-01 PROCEDURE — 3078F DIAST BP <80 MM HG: CPT | Performed by: INTERNAL MEDICINE

## 2022-12-01 ASSESSMENT — COPD QUESTIONNAIRES
QUESTION4_WALKINCLINE: 3
QUESTION7_SLEEPQUALITY: 1
QUESTION1_COUGHFREQUENCY: 1
QUESTION3_CHESTTIGHTNESS: 1
CAT_TOTALSCORE: 11
QUESTION6_LEAVINGHOUSE: 1
QUESTION8_ENERGYLEVEL: 2
QUESTION5_HOMEACTIVITIES: 1
QUESTION2_CHESTPHLEGM: 1

## 2022-12-01 NOTE — PROGRESS NOTES
Chief complaint  This is a 49y.o. year old male  who comes to see me with a chief complaint of   Chief Complaint   Patient presents with    Follow-up    COPD     HPI  Here with cc of ILD, ESTRELLITA    He feels pretty darn good. Breathing seems to be stable. Has started to do more resistance and weight training lately because he is not able to walk during the colder months. Remains on triple inhalers and oxygen with exertion. He also bleeds in 3 liters of oxygen with bilevel at hs. Immunizations are up to date. Has done very well since weight loss surgery in the past      Historical:  He had been following with Dr. Courtney Kate for ILD related to bronchiolitis. Aaron Dumont had been concerned about following up all the way out in [address] and asked if he could follow up with me. I asked Dr. Courtney Kate and he was ok with it. Dr. Courtney Kate has been following Aaron Dumont for years. 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 He was tried on various immune suppressant with no improvement in symptoms. Last medication he was on was cellcept >5 years ago. He was seen at [facility] for lung transplant in the past, but was not felt to be sick enough to require transplant, nor was his weight low enough. He has not been seen at [facility] since then.         Current Outpatient Medications:     amLODIPine (NORVASC) 2.5 MG tablet, TAKE 1 TABLET BY MOUTH ONE TIME A DAY, Disp: 90 tablet, Rfl: 1    rosuvastatin (CRESTOR) 40 MG tablet, TAKE 1 TABLET BY MOUTH ONE TIME A DAY IN THE EVENING, Disp: 90 tablet, Rfl: 1    lisinopril (PRINIVIL;ZESTRIL) 10 MG tablet, TAKE 1 TABLET BY MOUTH ONE TIME A DAY, Disp: 90 tablet, Rfl: 1    escitalopram (LEXAPRO) 10 MG tablet, Take 1 tablet by mouth daily, Disp: 90 tablet, Rfl: 1    carvedilol (COREG) 6.25 MG tablet, Take 1 tablet by mouth 2 times daily (with meals), Disp: 180 tablet, Rfl: 1    clopidogrel (PLAVIX) 75 MG tablet, Take 1 tablet by mouth daily, Disp: 90 tablet, Rfl: 1    aspirin (ASPIRIN LOW DOSE) 81 MG chewable tablet, RESTART IN 5 DAYS CHEW AND SWALLOW ONE TABLET BY MOUTH ONE TIME A DAY, Disp: 90 tablet, Rfl: 1    omeprazole (PRILOSEC) 20 MG delayed release capsule, TAKE ONE CAPSULE BY MOUTH TWICE A DAY, Disp: 180 capsule, Rfl: 1    metroNIDAZOLE (METROGEL) 0.75 % gel, Apply topically 2 times daily. , Disp: 60 g, Rfl: 0    REPATHA SURECLICK 325 MG/ML SOAJ, INJECT 140 MG UNDER THE SKIN EVERY 14 DAYS, Disp: 6 pen, Rfl: 3    hydrocortisone (WESTCORT) 0.2 % cream, Apply topically 2 times daily Apply topically 2 times daily. , Disp: 60 g, Rfl: 0    nitroGLYCERIN (NITROSTAT) 0.4 MG SL tablet, up to max of 3 total doses.  If no relief after 1 dose, call 911., Disp: 25 tablet, Rfl: 3    tiotropium (SPIRIVA RESPIMAT) 2.5 MCG/ACT AERS inhaler, Inhale 2 puffs into the lungs daily, Disp: 3 Inhaler, Rfl: 3    SYMBICORT 160-4.5 MCG/ACT AERO, Inhale 2 puffs into the lungs 2 times daily, Disp: 3 Inhaler, Rfl: 1    ibuprofen (ADVIL;MOTRIN) 200 MG tablet, Take 200 mg by mouth every 6 hours as needed for Pain , Disp: , Rfl:     ondansetron (ZOFRAN) 4 MG tablet, Take 1 tablet by mouth every 6 hours as needed for Nausea or Vomiting, Disp: 30 tablet, Rfl: 0    sodium chloride, Inhalant, 3 % nebulizer solution, Take 15 mLs by nebulization 2 times daily, Disp: 900 mL, Rfl: 0    albuterol sulfate HFA (PROAIR HFA) 108 (90 BASE) MCG/ACT inhaler, Inhale 2 puffs into the lungs every 4 hours as needed for Wheezing or Shortness of Breath, Disp: 1 Inhaler, Rfl: 6    azelastine (ASTELIN) 0.1 % nasal spray, 2 sprays by Nasal route 2 times daily Use in each nostril as directed (Patient taking differently: 2 sprays by Nasal route 2 times daily as needed Use in each nostril as directed), Disp: 1 Bottle, Rfl: 3        PHYSICAL EXAM:  GENERAL:  Good weight, not distressed   HEENT:  No scleral icterus, no conjunctival irritation, Mallampati IV with bifid pharnyx  NECK:  No thyromegaly, no bruits  LYMPH:  No cervical or supraclavicular adenopathy  HEART:  Regular rate and rhythm, no murmurs  LUNGS:  Clear bilaterally. Good excursion   ABDOMEN:  No distention, no organomegaly  EXTREMITIES:  Trace edema, no digital clubbing  NEURO:  No localizing deficits, CN II-XII intact    Pulmonary Function Testing 10/2015  A very severe obstructive lung defect is present with minimal  broncho-reactivity. There is also air trapping and a moderate reduction in  diffusion capacity. These lung function tests are most consistent with  severe chronic obstructive pulmonary disease. In comparison with pulmonary  function testing performed in October of 2012, there has been significant  reduction in the forced vital capacity and FEV1. Pulmonary Function Testing 10/2017  FEV1 1.09 L to 1.26 L  FVC  2.86 L to 3.48 L  FEV1/FVC 38  TLC 7.06 L  DLCO 42%    Pulmonary Function Testing 11/2018  FEV1 1.34 L to 1.42 L  FVC  3.31 L to 3.68 L  FEV1/FVC 40  TLC 7.83 L   DLCO 44%    Pulmonary Function Testing 9/2020  FEV1 1.17 L to 1.42 L  FVC  2.97 L to 3.56 L  FEV1/FVC 39  TLC 8.05 L   DLCO 47%    Pulmonary Function Testing 5/22  FEV1 1.15 L to 1.44 L  FVC  3.53 L to 4.33 L  FEV1/FVC 32  TLC 7.91 L   DLCO 52%     Chest imaging from 5/22 is reviewed. My impression is continued cystic lung disease with likely emphysema. Right sided scarring. Alpha-1 Anti-trypsin levels:  120, Phenotype:  M1M1       ABG 10/2017:  7.43/39/61.  92%    RHC 4/2018  1. Normal right heart pressures with a right atrial pressure of 1 and a  pulmonary capillary wedge pressure of 8.  2.  No evidence of pulmonary hypertension with an instantaneous pressure of  26/14 and a mean pulmonary arterial pressure of 20.  3.  Normal cardiac outputs by thermodilution at 6.73 liters per minute with  a cardiac index of 2.95 liters per kilogram per minute. By second equation  the cardiac output was 4.76 with a cardiac index of 2.09. Of note, the  patient does have erythrocytosis.   4. Pulmonary vascular resistance 147.  5.  Normal mixed venous oxygen saturations with a superior vena cava  saturation of 70 and a pulmonary arterial saturation of 73%. I reviewed the above labs images, etc.     Assessment/Plan:  1. Follicular bronchiolitis (HCC)  ILD noted on open lung biopsy. On no directed therapy due to lack of effect with prednisone, cellcept. Stable on imaging    2. COPD with asthma ([facility]  Continue with triple inhalers as is    3. Chronic respiratory failure with hypoxia (HCC)  Uses up to 3 liters of oxygen with exertion and bleed into his bilevel. Does benefit     4. ESTRELLITA treated with BiPAP  Benefiting and compliant. Benefiting from therapy by a low [place] score, good energy levels, low fatigue, and control of chronic medical problems    Follow up in 4 months. HRCT and PFT to be ordered at next visit     Pulmonary Rehab:  Finished Pulmonary rehab and cardiac rehab.   Does exercise on his own    Lung Cancer Screening CT:  Does not qualify due to >15 years since he quit and age <54

## 2022-12-09 DIAGNOSIS — E78.2 MIXED HYPERLIPIDEMIA: ICD-10-CM

## 2022-12-09 RX ORDER — EVOLOCUMAB 140 MG/ML
INJECTION, SOLUTION SUBCUTANEOUS
Qty: 140 ML | Refills: 3 | Status: SHIPPED | OUTPATIENT
Start: 2022-12-09

## 2022-12-12 DIAGNOSIS — E78.2 MIXED HYPERLIPIDEMIA: ICD-10-CM

## 2022-12-12 RX ORDER — EVOLOCUMAB 140 MG/ML
140 INJECTION, SOLUTION SUBCUTANEOUS
Qty: 1 ADJUSTABLE DOSE PRE-FILLED PEN SYRINGE | Refills: 24
Start: 2022-12-12

## 2023-01-03 ENCOUNTER — TELEPHONE (OUTPATIENT)
Dept: PULMONOLOGY | Age: 58
End: 2023-01-03

## 2023-01-03 DIAGNOSIS — J44.9 COPD WITH ASTHMA (HCC): Primary | ICD-10-CM

## 2023-01-03 RX ORDER — GUAIFENESIN AND DEXTROMETHORPHAN HYDROBROMIDE 1200; 60 MG/1; MG/1
1 TABLET, EXTENDED RELEASE ORAL 2 TIMES DAILY
Qty: 14 TABLET | Refills: 0 | Status: SHIPPED | OUTPATIENT
Start: 2023-01-03

## 2023-01-03 RX ORDER — PREDNISONE 10 MG/1
TABLET ORAL
Qty: 30 TABLET | Refills: 0 | Status: SHIPPED | OUTPATIENT
Start: 2023-01-03 | End: 2023-01-13

## 2023-01-03 RX ORDER — DOXYCYCLINE HYCLATE 100 MG
100 TABLET ORAL 2 TIMES DAILY
Qty: 14 TABLET | Refills: 0 | Status: SHIPPED | OUTPATIENT
Start: 2023-01-03 | End: 2023-01-10

## 2023-01-03 NOTE — TELEPHONE ENCOUNTER
1. COPD with asthma ([facility]  Sent below meds to   [facility]. If not right, please call the pharmacy he want. Phone message did not state his pharmacy     - doxycycline hyclate (VIBRA-TABS) 100 MG tablet; Take 1 tablet by mouth 2 times daily for 7 days  Dispense: 14 tablet; Refill: 0  - Dextromethorphan-guaiFENesin (MUCINEX DM MAXIMUM STRENGTH)  MG TB12; Take 1 tablet by mouth in the morning and at bedtime  Dispense: 14 tablet; Refill: 0  - predniSONE (DELTASONE) 10 MG tablet; Take 40 mg by mouth for 3 days 30 mg for 3 days 20 mg for 3 days 10 mg for 3 days. Dispense: 30 tablet;  Refill: 0

## 2023-01-03 NOTE — TELEPHONE ENCOUNTER
Patient calls due to having an exacerbation. Coughing up a lot of mucous is green and tan, changing colors. No fever. Coughing so hard this morning passed out, landed on his face and busted his nose (causing a lot of blood). Patient states he normally gets medication when this occurs such as steroids or ABX. Covid test-negative   Flu test- none taken  Last OV: 12-1-2022  Message routed to advise.

## 2023-03-27 ENCOUNTER — HOSPITAL ENCOUNTER (OUTPATIENT)
Age: 58
Discharge: HOME | End: 2023-03-27
Payer: MEDICARE

## 2023-03-27 ENCOUNTER — OFFICE VISIT (OUTPATIENT)
Dept: PRIMARY CARE CLINIC | Age: 58
End: 2023-03-27
Payer: MEDICARE

## 2023-03-27 VITALS
BODY MASS INDEX: 29.46 KG/M2 | DIASTOLIC BLOOD PRESSURE: 84 MMHG | RESPIRATION RATE: 18 BRPM | HEIGHT: 70 IN | WEIGHT: 205.8 LBS | TEMPERATURE: 98.1 F | SYSTOLIC BLOOD PRESSURE: 132 MMHG | HEART RATE: 86 BPM

## 2023-03-27 DIAGNOSIS — J44.9 COPD WITH ASTHMA (HCC): ICD-10-CM

## 2023-03-27 DIAGNOSIS — E78.2 MIXED HYPERLIPIDEMIA: ICD-10-CM

## 2023-03-27 DIAGNOSIS — E11.9 TYPE 2 DIABETES MELLITUS WITHOUT COMPLICATION, WITHOUT LONG-TERM CURRENT USE OF INSULIN (HCC): ICD-10-CM

## 2023-03-27 DIAGNOSIS — I10 ESSENTIAL HYPERTENSION: ICD-10-CM

## 2023-03-27 DIAGNOSIS — J42 FOLLICULAR BRONCHIOLITIS (HCC): ICD-10-CM

## 2023-03-27 DIAGNOSIS — F32.5 MAJOR DEPRESSIVE DISORDER WITH SINGLE EPISODE, IN FULL REMISSION (HCC): ICD-10-CM

## 2023-03-27 DIAGNOSIS — I25.10 CAD S/P PERCUTANEOUS CORONARY ANGIOPLASTY: ICD-10-CM

## 2023-03-27 DIAGNOSIS — Z98.61 CAD S/P PERCUTANEOUS CORONARY ANGIOPLASTY: ICD-10-CM

## 2023-03-27 DIAGNOSIS — D75.1 SECONDARY POLYCYTHEMIA: ICD-10-CM

## 2023-03-27 DIAGNOSIS — E11.9 TYPE 2 DIABETES MELLITUS WITHOUT COMPLICATION, WITHOUT LONG-TERM CURRENT USE OF INSULIN (HCC): Primary | ICD-10-CM

## 2023-03-27 DIAGNOSIS — J84.9 ILD (INTERSTITIAL LUNG DISEASE) (HCC): ICD-10-CM

## 2023-03-27 DIAGNOSIS — J96.11 CHRONIC RESPIRATORY FAILURE WITH HYPOXIA (HCC): ICD-10-CM

## 2023-03-27 PROBLEM — J43.2 CENTRILOBULAR EMPHYSEMA (HCC): Status: RESOLVED | Noted: 2020-11-08 | Resolved: 2023-03-27

## 2023-03-27 PROBLEM — J44.89: Status: ACTIVE | Noted: 2023-03-27

## 2023-03-27 PROBLEM — J44.89 COPD WITH ASTHMA: Status: ACTIVE | Noted: 2023-03-27

## 2023-03-27 LAB
ANION GAP SERPL CALCULATED.3IONS-SCNC: 17 MMOL/L (ref 3–16)
BASOPHILS # BLD: 0.1 K/UL (ref 0–0.2)
BASOPHILS NFR BLD: 0.7 %
BUN SERPL-MCNC: 12 MG/DL (ref 7–20)
CALCIUM SERPL-MCNC: 9.7 MG/DL (ref 8.3–10.6)
CHLORIDE SERPL-SCNC: 105 MMOL/L (ref 99–110)
CO2 SERPL-SCNC: 22 MMOL/L (ref 21–32)
CREAT SERPL-MCNC: 1.2 MG/DL (ref 0.9–1.3)
DEPRECATED RDW RBC AUTO: 13.7 % (ref 12.4–15.4)
EOSINOPHIL # BLD: 0.2 K/UL (ref 0–0.6)
EOSINOPHIL NFR BLD: 3.4 %
GFR SERPLBLD CREATININE-BSD FMLA CKD-EPI: >60 ML/MIN/{1.73_M2}
GLUCOSE SERPL-MCNC: 73 MG/DL (ref 70–99)
HCT VFR BLD AUTO: 57.1 % (ref 40.5–52.5)
HGB BLD-MCNC: 19 G/DL (ref 13.5–17.5)
LYMPHOCYTES # BLD: 1.8 K/UL (ref 1–5.1)
LYMPHOCYTES NFR BLD: 25.5 %
MCH RBC QN AUTO: 28.7 PG (ref 26–34)
MCHC RBC AUTO-ENTMCNC: 33.4 G/DL (ref 31–36)
MCV RBC AUTO: 86 FL (ref 80–100)
MONOCYTES # BLD: 0.9 K/UL (ref 0–1.3)
MONOCYTES NFR BLD: 12.2 %
NEUTROPHILS # BLD: 4.1 K/UL (ref 1.7–7.7)
NEUTROPHILS NFR BLD: 58.2 %
PLATELET # BLD AUTO: 185 K/UL (ref 135–450)
PMV BLD AUTO: 8.6 FL (ref 5–10.5)
POTASSIUM SERPL-SCNC: 4.6 MMOL/L (ref 3.5–5.1)
RBC # BLD AUTO: 6.64 M/UL (ref 4.2–5.9)
SODIUM SERPL-SCNC: 144 MMOL/L (ref 136–145)
WBC # BLD AUTO: 7.1 K/UL (ref 4–11)

## 2023-03-27 PROCEDURE — 1036F TOBACCO NON-USER: CPT | Performed by: FAMILY MEDICINE

## 2023-03-27 PROCEDURE — 3075F SYST BP GE 130 - 139MM HG: CPT | Performed by: FAMILY MEDICINE

## 2023-03-27 PROCEDURE — 85025 COMPLETE CBC W/AUTO DIFF WBC: CPT

## 2023-03-27 PROCEDURE — G8427 DOCREV CUR MEDS BY ELIG CLIN: HCPCS | Performed by: FAMILY MEDICINE

## 2023-03-27 PROCEDURE — G8482 FLU IMMUNIZE ORDER/ADMIN: HCPCS | Performed by: FAMILY MEDICINE

## 2023-03-27 PROCEDURE — G8417 CALC BMI ABV UP PARAM F/U: HCPCS | Performed by: FAMILY MEDICINE

## 2023-03-27 PROCEDURE — 3017F COLORECTAL CA SCREEN DOC REV: CPT | Performed by: FAMILY MEDICINE

## 2023-03-27 PROCEDURE — 80048 BASIC METABOLIC PNL TOTAL CA: CPT

## 2023-03-27 PROCEDURE — 3023F SPIROM DOC REV: CPT | Performed by: FAMILY MEDICINE

## 2023-03-27 PROCEDURE — 83036 HEMOGLOBIN GLYCOSYLATED A1C: CPT

## 2023-03-27 PROCEDURE — 3046F HEMOGLOBIN A1C LEVEL >9.0%: CPT | Performed by: FAMILY MEDICINE

## 2023-03-27 PROCEDURE — 2022F DILAT RTA XM EVC RTNOPTHY: CPT | Performed by: FAMILY MEDICINE

## 2023-03-27 PROCEDURE — 3079F DIAST BP 80-89 MM HG: CPT | Performed by: FAMILY MEDICINE

## 2023-03-27 PROCEDURE — 99214 OFFICE O/P EST MOD 30 MIN: CPT | Performed by: FAMILY MEDICINE

## 2023-03-27 PROCEDURE — 36415 COLL VENOUS BLD VENIPUNCTURE: CPT

## 2023-03-27 ASSESSMENT — PATIENT HEALTH QUESTIONNAIRE - PHQ9
SUM OF ALL RESPONSES TO PHQ QUESTIONS 1-9: 0
8. MOVING OR SPEAKING SO SLOWLY THAT OTHER PEOPLE COULD HAVE NOTICED. OR THE OPPOSITE, BEING SO FIGETY OR RESTLESS THAT YOU HAVE BEEN MOVING AROUND A LOT MORE THAN USUAL: 0
4. FEELING TIRED OR HAVING LITTLE ENERGY: 0
7. TROUBLE CONCENTRATING ON THINGS, SUCH AS READING THE NEWSPAPER OR WATCHING TELEVISION: 0
5. POOR APPETITE OR OVEREATING: 0
3. TROUBLE FALLING OR STAYING ASLEEP: 0
SUM OF ALL RESPONSES TO PHQ QUESTIONS 1-9: 0
1. LITTLE INTEREST OR PLEASURE IN DOING THINGS: 0
2. FEELING DOWN, DEPRESSED OR HOPELESS: 0
SUM OF ALL RESPONSES TO PHQ QUESTIONS 1-9: 0
6. FEELING BAD ABOUT YOURSELF - OR THAT YOU ARE A FAILURE OR HAVE LET YOURSELF OR YOUR FAMILY DOWN: 0
SUM OF ALL RESPONSES TO PHQ9 QUESTIONS 1 & 2: 0
SUM OF ALL RESPONSES TO PHQ QUESTIONS 1-9: 0
9. THOUGHTS THAT YOU WOULD BE BETTER OFF DEAD, OR OF HURTING YOURSELF: 0

## 2023-03-28 LAB
EST. AVERAGE GLUCOSE BLD GHB EST-MCNC: 114 MG/DL
HBA1C MFR BLD: 5.6 %

## 2023-04-18 ENCOUNTER — OFFICE VISIT (OUTPATIENT)
Dept: PULMONOLOGY | Age: 58
End: 2023-04-18
Payer: MEDICARE

## 2023-04-18 VITALS
HEART RATE: 65 BPM | OXYGEN SATURATION: 90 % | RESPIRATION RATE: 18 BRPM | HEIGHT: 70 IN | TEMPERATURE: 97.6 F | BODY MASS INDEX: 29.49 KG/M2 | SYSTOLIC BLOOD PRESSURE: 120 MMHG | DIASTOLIC BLOOD PRESSURE: 80 MMHG | WEIGHT: 206 LBS

## 2023-04-18 DIAGNOSIS — J42 FOLLICULAR BRONCHIOLITIS (HCC): Primary | ICD-10-CM

## 2023-04-18 DIAGNOSIS — J44.9 COPD WITH ASTHMA (HCC): ICD-10-CM

## 2023-04-18 DIAGNOSIS — J96.11 CHRONIC RESPIRATORY FAILURE WITH HYPOXIA (HCC): ICD-10-CM

## 2023-04-18 PROCEDURE — G8427 DOCREV CUR MEDS BY ELIG CLIN: HCPCS | Performed by: INTERNAL MEDICINE

## 2023-04-18 PROCEDURE — 1036F TOBACCO NON-USER: CPT | Performed by: INTERNAL MEDICINE

## 2023-04-18 PROCEDURE — 99214 OFFICE O/P EST MOD 30 MIN: CPT | Performed by: INTERNAL MEDICINE

## 2023-04-18 PROCEDURE — 3023F SPIROM DOC REV: CPT | Performed by: INTERNAL MEDICINE

## 2023-04-18 PROCEDURE — 3079F DIAST BP 80-89 MM HG: CPT | Performed by: INTERNAL MEDICINE

## 2023-04-18 PROCEDURE — 3074F SYST BP LT 130 MM HG: CPT | Performed by: INTERNAL MEDICINE

## 2023-04-18 PROCEDURE — G8417 CALC BMI ABV UP PARAM F/U: HCPCS | Performed by: INTERNAL MEDICINE

## 2023-04-18 PROCEDURE — 3017F COLORECTAL CA SCREEN DOC REV: CPT | Performed by: INTERNAL MEDICINE

## 2023-04-18 RX ORDER — PREDNISONE 10 MG/1
TABLET ORAL
Qty: 30 TABLET | Refills: 0 | Status: SHIPPED | OUTPATIENT
Start: 2023-04-18

## 2023-04-18 RX ORDER — BUDESONIDE AND FORMOTEROL FUMARATE DIHYDRATE 160; 4.5 UG/1; UG/1
2 AEROSOL RESPIRATORY (INHALATION) 2 TIMES DAILY
Qty: 1 EACH | Refills: 3 | Status: SHIPPED | OUTPATIENT
Start: 2023-04-18

## 2023-04-18 ASSESSMENT — ASTHMA QUESTIONNAIRES
QUESTION_4 LAST FOUR WEEKS HOW OFTEN HAVE YOU USED YOUR RESCUE INHALER OR NEBULIZER MEDICATION (SUCH AS ALBUTEROL): 3
QUESTION_5 LAST FOUR WEEKS HOW WOULD YOU RATE YOUR ASTHMA CONTROL: 3
QUESTION_3 LAST FOUR WEEKS HOW OFTEN DID YOUR ASTHMA SYMPTOMS (WHEEZING, COUGHING, SHORTNESS OF BREATH, CHEST TIGHTNESS OR PAIN) WAKE YOU UP AT NIGHT OR EARLIER THAN USUAL IN THE MORNING: 3
QUESTION_1 LAST FOUR WEEKS HOW MUCH OF THE TIME DID YOUR ASTHMA KEEP YOU FROM GETTING AS MUCH DONE AT WORK, SCHOOL OR AT HOME: 1
QUESTION_2 LAST FOUR WEEKS HOW OFTEN HAVE YOU HAD SHORTNESS OF BREATH: 2
ACT_TOTALSCORE: 12

## 2023-04-18 ASSESSMENT — COPD QUESTIONNAIRES
CAT_TOTALSCORE: 15
QUESTION5_HOMEACTIVITIES: 3
QUESTION7_SLEEPQUALITY: 3
QUESTION4_WALKINCLINE: 4
QUESTION8_ENERGYLEVEL: 2
QUESTION1_COUGHFREQUENCY: 1
QUESTION2_CHESTPHLEGM: 0
QUESTION6_LEAVINGHOUSE: 1
QUESTION3_CHESTTIGHTNESS: 1

## 2023-04-18 NOTE — PATIENT INSTRUCTIONS
Prednisone for 12 days    Continue with inhalers    CT and PFT at the end of May    Follow up in 4 months
2021 - reports normal

## 2023-04-18 NOTE — PROGRESS NOTES
Chief complaint  This is a 56y.o. year old male  who comes to see me with a chief complaint of   Chief Complaint   Patient presents with    Follow-up    COPD    Asthma     HPI  Here with cc of ILD    Has noticed some increase in SOB for the past 2 weeks. Has been trying to keep active and exercise. He is doing that but breathing slightly off. Not sick. Needs symbicort and spriiva refilled. Wife thinks he needs prednisone. He has been well otherwise     Historical:  He had been following with Dr. Demario Rosario for ILD related to bronchiolitis. Ronak Caal had been concerned about following up all the way out in [address] and asked if he could follow up with me. I asked Dr. Demario Rosario and he was ok with it. Dr. Demario Rosario has been following Ronak Caal for years. 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 He was tried on various immune suppressant with no improvement in symptoms. Last medication he was on was cellcept >9 years ago. He was seen at [facility] for lung transplant in the past, but was not felt to be sick enough to require transplant, nor was his weight low enough. He has not been seen at [facility] clinic since then.         Current Outpatient Medications:     predniSONE (DELTASONE) 10 MG tablet, 40mg for 3days 30mg for 3days 20mg for 3days 10mg for 3days, Disp: 30 tablet, Rfl: 0    SYMBICORT 160-4.5 MCG/ACT AERO, Inhale 2 puffs into the lungs 2 times daily, Disp: 1 each, Rfl: 3    tiotropium (SPIRIVA RESPIMAT) 2.5 MCG/ACT AERS inhaler, Inhale 2 puffs into the lungs daily 2 puffs once daily, Disp: 3 each, Rfl: 3    Dextromethorphan-guaiFENesin (MUCINEX DM MAXIMUM STRENGTH)  MG TB12, Take 1 tablet by mouth in the morning and at bedtime, Disp: 14 tablet, Rfl: 0    Evolocumab (REPATHA SURECLICK) 425 MG/ML SOAJ, Inject 140 mg into the skin every 14 days, Disp: 1 Adjustable Dose Pre-filled Pen Syringe, Rfl: 24    amLODIPine (NORVASC) 2.5 MG tablet, TAKE 1 TABLET BY MOUTH ONE TIME A

## 2023-04-19 ENCOUNTER — TELEPHONE (OUTPATIENT)
Dept: PULMONOLOGY | Age: 58
End: 2023-04-19

## 2023-04-19 DIAGNOSIS — J44.9 COPD, SEVERE (HCC): Primary | ICD-10-CM

## 2023-04-19 RX ORDER — BUDESONIDE AND FORMOTEROL FUMARATE DIHYDRATE 160; 4.5 UG/1; UG/1
2 AEROSOL RESPIRATORY (INHALATION) 2 TIMES DAILY
Qty: 3 EACH | Refills: 3 | Status: SHIPPED | OUTPATIENT
Start: 2023-04-19

## 2023-04-19 NOTE — TELEPHONE ENCOUNTER
Symbicort  Pt wants generic, however on the script it says substitution not allowed. The copay is over $250 for 1 box of Symbicort. Pt wants to switch to 90 day supply for the generic because it's $10. Please review to allow generic. Mavis Allen from [address] [place] [address]  [phone]    Thank you!

## 2023-07-19 ENCOUNTER — PATIENT MESSAGE (OUTPATIENT)
Dept: BARIATRICS/WEIGHT MGMT | Age: 58
End: 2023-07-19

## 2023-07-19 NOTE — TELEPHONE ENCOUNTER
Attempt to contact for Bariatric Follow Up.  [employer] message sent and letter mailed to address on file in [facility]

## 2023-08-25 LAB
CHOLEST SERPL-MCNC: 120 MG/DL (ref 0–199)
GLUCOSE SERPL-MCNC: 120 MG/DL (ref 70–99)
HDLC SERPL-MCNC: 30 MG/DL (ref 40–60)
LDLC SERPL CALC-MCNC: 68 MG/DL
TRIGL SERPL-MCNC: 111 MG/DL (ref 0–150)

## 2023-09-14 ENCOUNTER — HOSPITAL ENCOUNTER (OUTPATIENT)
Dept: PULMONOLOGY | Age: 58
Discharge: HOME | End: 2023-09-14
Attending: INTERNAL MEDICINE
Payer: MEDICARE

## 2023-09-14 ENCOUNTER — HOSPITAL ENCOUNTER (OUTPATIENT)
Dept: CT IMAGING | Age: 58
Discharge: HOME | End: 2023-09-14
Attending: INTERNAL MEDICINE
Payer: MEDICARE

## 2023-09-14 ENCOUNTER — TELEPHONE (OUTPATIENT)
Dept: PULMONOLOGY | Age: 58
End: 2023-09-14

## 2023-09-14 DIAGNOSIS — J40 BRONCHITIS: Primary | ICD-10-CM

## 2023-09-14 DIAGNOSIS — J42 FOLLICULAR BRONCHIOLITIS (HCC): ICD-10-CM

## 2023-09-14 LAB
DLCO %PRED: 45 %
DLCO PRED: NORMAL
DLCO/VA %PRED: NORMAL
DLCO/VA PRED: NORMAL
DLCO/VA: NORMAL
DLCO: NORMAL
EXPIRATORY TIME-POST: NORMAL
EXPIRATORY TIME: NORMAL
FEF 25-75% %CHNG: NORMAL
FEF 25-75% %PRED-POST: NORMAL
FEF 25-75% %PRED-PRE: NORMAL
FEF 25-75% PRED: NORMAL
FEF 25-75%-POST: NORMAL
FEF 25-75%-PRE: NORMAL
FEV1 %PRED-POST: 32 %
FEV1 %PRED-PRE: 24 %
FEV1 PRED: NORMAL
FEV1-POST: NORMAL
FEV1-PRE: NORMAL
FEV1/FVC %PRED-POST: NORMAL
FEV1/FVC %PRED-PRE: NORMAL
FEV1/FVC PRED: NORMAL
FEV1/FVC-POST: NORMAL
FEV1/FVC-PRE: NORMAL
FVC %PRED-POST: 66 L
FVC %PRED-PRE: 53 %
FVC PRED: NORMAL
FVC-POST: NORMAL
FVC-PRE: NORMAL
GAW %PRED: NORMAL
GAW PRED: NORMAL
GAW: NORMAL
IC %PRED: NORMAL
IC PRED: NORMAL
IC: NORMAL
MEP: NORMAL
MIP: NORMAL
MVV %PRED-PRE: NORMAL
MVV PRED: NORMAL
MVV-PRE: NORMAL
PEF %PRED-POST: NORMAL
PEF %PRED-PRE: NORMAL
PEF PRED: NORMAL
PEF%CHNG: NORMAL
PEF-POST: NORMAL
PEF-PRE: NORMAL
RAW %PRED: NORMAL
RAW PRED: NORMAL
RAW: NORMAL
RV %PRED: NORMAL
RV PRED: NORMAL
RV: NORMAL
SVC %PRED: NORMAL
SVC PRED: NORMAL
SVC: NORMAL
TLC %PRED: 113 %
TLC PRED: NORMAL
TLC: NORMAL
VA %PRED: NORMAL
VA PRED: NORMAL
VA: NORMAL
VTG %PRED: NORMAL
VTG PRED: NORMAL
VTG: NORMAL

## 2023-09-14 PROCEDURE — 94729 DIFFUSING CAPACITY: CPT

## 2023-09-14 PROCEDURE — 94726 PLETHYSMOGRAPHY LUNG VOLUMES: CPT

## 2023-09-14 PROCEDURE — 71250 CT THORAX DX C-: CPT

## 2023-09-14 PROCEDURE — 94640 AIRWAY INHALATION TREATMENT: CPT

## 2023-09-14 PROCEDURE — 94060 EVALUATION OF WHEEZING: CPT

## 2023-09-14 PROCEDURE — 6370000000 HC RX 637 (ALT 250 FOR IP): Performed by: INTERNAL MEDICINE

## 2023-09-14 RX ORDER — LEVOFLOXACIN 500 MG/1
500 TABLET, FILM COATED ORAL DAILY
Qty: 7 TABLET | Refills: 0 | Status: SHIPPED | OUTPATIENT
Start: 2023-09-14 | End: 2023-09-21

## 2023-09-14 RX ORDER — ALBUTEROL SULFATE 90 UG/1
4 AEROSOL, METERED RESPIRATORY (INHALATION) ONCE
Status: COMPLETED | OUTPATIENT
Start: 2023-09-14 | End: 2023-09-14

## 2023-09-14 RX ADMIN — ALBUTEROL SULFATE 4 PUFF: 90 AEROSOL, METERED RESPIRATORY (INHALATION) at 12:42

## 2023-09-14 ASSESSMENT — PULMONARY FUNCTION TESTS
FVC_PERCENT_PREDICTED_PRE: 53
FVC_PERCENT_PREDICTED_POST: 66
FEV1_PERCENT_PREDICTED_POST: 32
FEV1_PERCENT_PREDICTED_PRE: 24

## 2023-09-14 NOTE — TELEPHONE ENCOUNTER
Complains of cough and SOB  Duration 2 days  Cough with sputum production? yes  Color yellow  Fever? no  Any other Symptoms? no  Any current treatment tried? Dayquil  Using inhalers? yes do they help? no  Pharmacy? [facility] [address] in [place] stopped into the office to ask for an antibiotic.

## 2023-09-15 NOTE — PROCEDURES
Spirometry was acceptable and reproducible by ATS standards      Spirometry/Flow volume loop:  Spirometry and flow volume loops consistent with very severe airflow obstruction. Postbronchodilator FEV1 of 32%, and FVC 66%. There was a significant bronchodilator response with FVC improvement of 590 cc and 13%. Lung volumes:  Significant air trapping. Residual volume 231%. Diffusing capacity:  Diffusing capacity is reduced, not corrected for hemoglobin. Summary:  Severe airflow obstruction. When compared to previous 16 months ago there is been significant and considerable decline in forced lung capacity. From 4.33 L down to 3.00 L      FEV1 %Pred-Post   Date Value Ref Range Status   09/14/2023 32 % Final     FEV1/FVC-Post   Date Value Ref Range Status   05/20/2022 33 % Final     TLC %Pred   Date Value Ref Range Status   09/14/2023 113 % Final     DLCO %Pred   Date Value Ref Range Status   09/14/2023 45 % Final     DLCO/VA %Pred   Date Value Ref Range Status   05/20/2022 59 % Final       PFT data will be scanned into the media tab under this encounter. Please see the scanned data for numerical values.      Therese Marques MD  [facility] Pulmonary, Sleep and Critical Care Medicine

## 2023-09-26 ENCOUNTER — HOSPITAL ENCOUNTER (OUTPATIENT)
Age: 58
Discharge: HOME | End: 2023-09-26
Payer: MEDICARE

## 2023-09-26 ENCOUNTER — OFFICE VISIT (OUTPATIENT)
Dept: PRIMARY CARE CLINIC | Age: 58
End: 2023-09-26

## 2023-09-26 VITALS
HEART RATE: 81 BPM | OXYGEN SATURATION: 90 % | WEIGHT: 201 LBS | BODY MASS INDEX: 28.77 KG/M2 | SYSTOLIC BLOOD PRESSURE: 112 MMHG | DIASTOLIC BLOOD PRESSURE: 70 MMHG | HEIGHT: 70 IN | TEMPERATURE: 98.1 F

## 2023-09-26 DIAGNOSIS — D75.1 POLYCYTHEMIA: ICD-10-CM

## 2023-09-26 DIAGNOSIS — Z12.11 ENCOUNTER FOR SCREENING COLONOSCOPY: ICD-10-CM

## 2023-09-26 DIAGNOSIS — E78.2 MIXED HYPERLIPIDEMIA: ICD-10-CM

## 2023-09-26 DIAGNOSIS — E11.9 TYPE 2 DIABETES MELLITUS WITHOUT COMPLICATION, WITHOUT LONG-TERM CURRENT USE OF INSULIN (HCC): Primary | ICD-10-CM

## 2023-09-26 DIAGNOSIS — F32.5 MAJOR DEPRESSIVE DISORDER WITH SINGLE EPISODE, IN FULL REMISSION (HCC): ICD-10-CM

## 2023-09-26 DIAGNOSIS — J84.9 ILD (INTERSTITIAL LUNG DISEASE) (HCC): ICD-10-CM

## 2023-09-26 DIAGNOSIS — Z23 NEED FOR INFLUENZA VACCINATION: ICD-10-CM

## 2023-09-26 DIAGNOSIS — I10 ESSENTIAL HYPERTENSION: ICD-10-CM

## 2023-09-26 DIAGNOSIS — Z98.61 CAD S/P PERCUTANEOUS CORONARY ANGIOPLASTY: ICD-10-CM

## 2023-09-26 DIAGNOSIS — I25.10 CAD S/P PERCUTANEOUS CORONARY ANGIOPLASTY: ICD-10-CM

## 2023-09-26 DIAGNOSIS — J44.89 COPD WITH ASTHMA: ICD-10-CM

## 2023-09-26 DIAGNOSIS — E11.9 TYPE 2 DIABETES MELLITUS WITHOUT COMPLICATION, WITHOUT LONG-TERM CURRENT USE OF INSULIN (HCC): ICD-10-CM

## 2023-09-26 LAB
ALBUMIN SERPL-MCNC: 4.6 G/DL (ref 3.4–5)
ALBUMIN/GLOB SERPL: 1.6 {RATIO} (ref 1.1–2.2)
ALP SERPL-CCNC: 73 U/L (ref 40–129)
ALT SERPL-CCNC: 21 U/L (ref 10–40)
ANION GAP SERPL CALCULATED.3IONS-SCNC: 14 MMOL/L (ref 3–16)
AST SERPL-CCNC: 28 U/L (ref 15–37)
BASOPHILS # BLD: 0 K/UL (ref 0–0.2)
BASOPHILS NFR BLD: 0.7 %
BILIRUB SERPL-MCNC: 1.1 MG/DL (ref 0–1)
BUN SERPL-MCNC: 13 MG/DL (ref 7–20)
CALCIUM SERPL-MCNC: 8.8 MG/DL (ref 8.3–10.6)
CHLORIDE SERPL-SCNC: 104 MMOL/L (ref 99–110)
CO2 SERPL-SCNC: 24 MMOL/L (ref 21–32)
CREAT SERPL-MCNC: 1 MG/DL (ref 0.9–1.3)
DEPRECATED RDW RBC AUTO: 14.6 % (ref 12.4–15.4)
EOSINOPHIL # BLD: 0.3 K/UL (ref 0–0.6)
EOSINOPHIL NFR BLD: 4.8 %
GFR SERPLBLD CREATININE-BSD FMLA CKD-EPI: >60 ML/MIN/{1.73_M2}
GLUCOSE SERPL-MCNC: 91 MG/DL (ref 70–99)
HCT VFR BLD AUTO: 50 % (ref 40.5–52.5)
HGB BLD-MCNC: 17.3 G/DL (ref 13.5–17.5)
LYMPHOCYTES # BLD: 1.4 K/UL (ref 1–5.1)
LYMPHOCYTES NFR BLD: 22.5 %
MCH RBC QN AUTO: 28.8 PG (ref 26–34)
MCHC RBC AUTO-ENTMCNC: 34.6 G/DL (ref 31–36)
MCV RBC AUTO: 83.1 FL (ref 80–100)
MONOCYTES # BLD: 0.7 K/UL (ref 0–1.3)
MONOCYTES NFR BLD: 10.5 %
NEUTROPHILS # BLD: 3.9 K/UL (ref 1.7–7.7)
NEUTROPHILS NFR BLD: 61.5 %
PLATELET # BLD AUTO: 194 K/UL (ref 135–450)
PMV BLD AUTO: 8.4 FL (ref 5–10.5)
POTASSIUM SERPL-SCNC: 3.9 MMOL/L (ref 3.5–5.1)
PROT SERPL-MCNC: 7.4 G/DL (ref 6.4–8.2)
RBC # BLD AUTO: 6.01 M/UL (ref 4.2–5.9)
SODIUM SERPL-SCNC: 142 MMOL/L (ref 136–145)
WBC # BLD AUTO: 6.3 K/UL (ref 4–11)

## 2023-09-26 PROCEDURE — 85025 COMPLETE CBC W/AUTO DIFF WBC: CPT

## 2023-09-26 PROCEDURE — 80053 COMPREHEN METABOLIC PANEL: CPT

## 2023-09-26 PROCEDURE — 36415 COLL VENOUS BLD VENIPUNCTURE: CPT

## 2023-09-26 PROCEDURE — 83036 HEMOGLOBIN GLYCOSYLATED A1C: CPT

## 2023-09-26 NOTE — PATIENT INSTRUCTIONS
Schedule follow up with Dr Carolina Franco. MD Sandra  [address] [facility].    [address] [facility], [address]  Phone: [phone]    Schedule diabetic eye exam

## 2023-09-27 ENCOUNTER — OFFICE VISIT (OUTPATIENT)
Dept: CARDIOLOGY CLINIC | Age: 58
End: 2023-09-27
Payer: MEDICARE

## 2023-09-27 VITALS
HEIGHT: 70 IN | BODY MASS INDEX: 29.06 KG/M2 | DIASTOLIC BLOOD PRESSURE: 66 MMHG | WEIGHT: 203 LBS | SYSTOLIC BLOOD PRESSURE: 116 MMHG | OXYGEN SATURATION: 89 % | HEART RATE: 63 BPM

## 2023-09-27 DIAGNOSIS — E78.5 HYPERLIPIDEMIA LDL GOAL <70: ICD-10-CM

## 2023-09-27 DIAGNOSIS — R55 SITUATIONAL SYNCOPE: ICD-10-CM

## 2023-09-27 DIAGNOSIS — I10 ESSENTIAL HYPERTENSION: ICD-10-CM

## 2023-09-27 DIAGNOSIS — E66.9 OBESITY WITHOUT SERIOUS COMORBIDITY, UNSPECIFIED CLASSIFICATION, UNSPECIFIED OBESITY TYPE: ICD-10-CM

## 2023-09-27 DIAGNOSIS — J84.9 ILD (INTERSTITIAL LUNG DISEASE) (HCC): ICD-10-CM

## 2023-09-27 DIAGNOSIS — I25.10 CORONARY ARTERY DISEASE INVOLVING NATIVE CORONARY ARTERY OF NATIVE HEART WITHOUT ANGINA PECTORIS: Primary | ICD-10-CM

## 2023-09-27 LAB
EST. AVERAGE GLUCOSE BLD GHB EST-MCNC: 114 MG/DL
HBA1C MFR BLD: 5.6 %

## 2023-09-27 PROCEDURE — 1036F TOBACCO NON-USER: CPT | Performed by: INTERNAL MEDICINE

## 2023-09-27 PROCEDURE — 99214 OFFICE O/P EST MOD 30 MIN: CPT | Performed by: INTERNAL MEDICINE

## 2023-09-27 PROCEDURE — 3078F DIAST BP <80 MM HG: CPT | Performed by: INTERNAL MEDICINE

## 2023-09-27 PROCEDURE — G8417 CALC BMI ABV UP PARAM F/U: HCPCS | Performed by: INTERNAL MEDICINE

## 2023-09-27 PROCEDURE — 3017F COLORECTAL CA SCREEN DOC REV: CPT | Performed by: INTERNAL MEDICINE

## 2023-09-27 PROCEDURE — 93000 ELECTROCARDIOGRAM COMPLETE: CPT | Performed by: INTERNAL MEDICINE

## 2023-09-27 PROCEDURE — G8427 DOCREV CUR MEDS BY ELIG CLIN: HCPCS | Performed by: INTERNAL MEDICINE

## 2023-09-27 PROCEDURE — 3074F SYST BP LT 130 MM HG: CPT | Performed by: INTERNAL MEDICINE

## 2023-09-27 NOTE — PROGRESS NOTES
dysuria. Skin: No cyanosis or skin lesions. Musculoskeletal: No new muscle or joint pain. Neurological: No syncope or TIA-like symptoms. Psychiatric: No anxiety, insomnia or depression    Allergies   Allergen Reactions    Erythromycin Hives     Current Outpatient Medications   Medication Sig Dispense Refill    budesonide-formoterol (SYMBICORT) 160-4.5 MCG/ACT AERO Inhale 2 puffs into the lungs 2 times daily 3 each 3    Dextromethorphan-guaiFENesin (MUCINEX DM MAXIMUM STRENGTH)  MG TB12 Take 1 tablet by mouth in the morning and at bedtime 14 tablet 0    Evolocumab (REPATHA SURECLICK) 038 MG/ML SOAJ Inject 140 mg into the skin every 14 days 1 Adjustable Dose Pre-filled Pen Syringe 24    amLODIPine (NORVASC) 2.5 MG tablet TAKE 1 TABLET BY MOUTH ONE TIME A DAY 90 tablet 1    rosuvastatin (CRESTOR) 40 MG tablet TAKE 1 TABLET BY MOUTH ONE TIME A DAY IN THE EVENING 90 tablet 1    lisinopril (PRINIVIL;ZESTRIL) 10 MG tablet TAKE 1 TABLET BY MOUTH ONE TIME A DAY 90 tablet 1    escitalopram (LEXAPRO) 10 MG tablet Take 1 tablet by mouth daily 90 tablet 1    carvedilol (COREG) 6.25 MG tablet Take 1 tablet by mouth 2 times daily (with meals) 180 tablet 1    clopidogrel (PLAVIX) 75 MG tablet Take 1 tablet by mouth daily 90 tablet 1    aspirin (ASPIRIN LOW DOSE) 81 MG chewable tablet RESTART IN 5 DAYS CHEW AND SWALLOW ONE TABLET BY MOUTH ONE TIME A DAY 90 tablet 1    omeprazole (PRILOSEC) 20 MG delayed release capsule TAKE ONE CAPSULE BY MOUTH TWICE A  capsule 1    metroNIDAZOLE (METROGEL) 0.75 % gel Apply topically 2 times daily. 60 g 0    hydrocortisone (WESTCORT) 0.2 % cream Apply topically 2 times daily Apply topically 2 times daily. 60 g 0    nitroGLYCERIN (NITROSTAT) 0.4 MG SL tablet up to max of 3 total doses.  If no relief after 1 dose, call 911. 25 tablet 3    tiotropium (SPIRIVA RESPIMAT) 2.5 MCG/ACT AERS inhaler Inhale 2 puffs into the lungs daily 3 Inhaler 3    ibuprofen (ADVIL;MOTRIN) 200 MG tablet

## 2023-10-24 NOTE — TELEPHONE ENCOUNTER
It is fine to wait until after his other appointments but please call patient and get him scheduled for his office visit here. Once he is scheduled please route refill and I am happy to send. Please document call and then close encounter.   thanks No

## 2023-11-03 ENCOUNTER — ENROLLMENT (OUTPATIENT)
Dept: PHARMACY | Facility: HOSPITAL | Age: 58
End: 2023-11-03

## 2023-11-06 ENCOUNTER — OFFICE VISIT (OUTPATIENT)
Dept: PULMONOLOGY | Age: 58
End: 2023-11-06
Payer: MEDICARE

## 2023-11-06 VITALS
HEIGHT: 70 IN | HEART RATE: 66 BPM | BODY MASS INDEX: 29.81 KG/M2 | TEMPERATURE: 97.7 F | WEIGHT: 208.2 LBS | SYSTOLIC BLOOD PRESSURE: 122 MMHG | DIASTOLIC BLOOD PRESSURE: 60 MMHG | OXYGEN SATURATION: 94 % | RESPIRATION RATE: 18 BRPM

## 2023-11-06 DIAGNOSIS — G47.33 OSA TREATED WITH BIPAP: ICD-10-CM

## 2023-11-06 DIAGNOSIS — J44.9 COPD, SEVERE (HCC): ICD-10-CM

## 2023-11-06 DIAGNOSIS — J42 FOLLICULAR BRONCHIOLITIS (HCC): Primary | ICD-10-CM

## 2023-11-06 DIAGNOSIS — J96.11 CHRONIC RESPIRATORY FAILURE WITH HYPOXIA (HCC): ICD-10-CM

## 2023-11-06 PROCEDURE — 3023F SPIROM DOC REV: CPT | Performed by: INTERNAL MEDICINE

## 2023-11-06 PROCEDURE — 3078F DIAST BP <80 MM HG: CPT | Performed by: INTERNAL MEDICINE

## 2023-11-06 PROCEDURE — G8482 FLU IMMUNIZE ORDER/ADMIN: HCPCS | Performed by: INTERNAL MEDICINE

## 2023-11-06 PROCEDURE — G8427 DOCREV CUR MEDS BY ELIG CLIN: HCPCS | Performed by: INTERNAL MEDICINE

## 2023-11-06 PROCEDURE — 99214 OFFICE O/P EST MOD 30 MIN: CPT | Performed by: INTERNAL MEDICINE

## 2023-11-06 PROCEDURE — G8417 CALC BMI ABV UP PARAM F/U: HCPCS | Performed by: INTERNAL MEDICINE

## 2023-11-06 PROCEDURE — 1036F TOBACCO NON-USER: CPT | Performed by: INTERNAL MEDICINE

## 2023-11-06 PROCEDURE — 3074F SYST BP LT 130 MM HG: CPT | Performed by: INTERNAL MEDICINE

## 2023-11-06 PROCEDURE — 3017F COLORECTAL CA SCREEN DOC REV: CPT | Performed by: INTERNAL MEDICINE

## 2023-11-06 RX ORDER — PREDNISONE 10 MG/1
TABLET ORAL
Qty: 30 TABLET | Refills: 0 | Status: SHIPPED | OUTPATIENT
Start: 2023-11-06

## 2023-11-06 RX ORDER — BUDESONIDE, GLYCOPYRROLATE, AND FORMOTEROL FUMARATE 160; 9; 4.8 UG/1; UG/1; UG/1
2 AEROSOL, METERED RESPIRATORY (INHALATION) 2 TIMES DAILY
Qty: 3 EACH | Refills: 3 | Status: SHIPPED | OUTPATIENT
Start: 2023-11-06

## 2023-11-06 RX ORDER — BUDESONIDE, GLYCOPYRROLATE, AND FORMOTEROL FUMARATE 160; 9; 4.8 UG/1; UG/1; UG/1
2 AEROSOL, METERED RESPIRATORY (INHALATION) 2 TIMES DAILY
Qty: 1 EACH | Refills: 1 | Status: SHIPPED | COMMUNITY
Start: 2023-11-06

## 2023-11-06 ASSESSMENT — COPD QUESTIONNAIRES
QUESTION6_LEAVINGHOUSE: 1
QUESTION2_CHESTPHLEGM: 1
CAT_TOTALSCORE: 13
QUESTION8_ENERGYLEVEL: 2
QUESTION5_HOMEACTIVITIES: 1
QUESTION3_CHESTTIGHTNESS: 0
QUESTION7_SLEEPQUALITY: 3
QUESTION1_COUGHFREQUENCY: 1
QUESTION4_WALKINCLINE: 4

## 2023-11-06 NOTE — PATIENT INSTRUCTIONS
Take prednisone burst    Change symbicort and spiriva to breztri. Two puffs twice a day.  Samples x 2    Continue with oxygen    Follow up in 3 months

## 2023-11-06 NOTE — PROGRESS NOTES
Chief complaint  This is a 56y.o. year old male  who comes to see me with a chief complaint of   Chief Complaint   Patient presents with    Follow-up    COPD     HPI  Here with cc of ILD    Recently did yearly CT and PFT. PFT with significant decline in flows. He has noticed a difference in breathing for about 6 months. He did not feel the best when he was doing the PFT. Remains on Symbicort and Spiriva. Using albuterol more often with some relief but does not feel it lasts long enough. He is not able to exercise like he was when he lost all of the weight. He is not sick but was last month and was given levaquin. Uses oxygen at 3 liters with exertion etc.  Does benefit. Uses bilevel with sleeping     Historical:  He had been following with Dr. Carlo Jeff for ILD related to bronchiolitis. Roseline Rogers had been concerned about following up all the way out in [address] [place] [address] and asked if he could follow up with me. I asked Dr. Carlo Jeff and he was ok with it. Dr. Carlo Jeff has been following Roseline Rogers for years. 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 He was tried on various immune suppressant with no improvement in symptoms. Last medication he was on was cellcept >9 years ago. He was seen at [facility] for lung transplant in the past, but was not felt to be sick enough to require transplant, nor was his weight low enough. He has not been seen at [facility] clinic since then.         Current Outpatient Medications:     predniSONE (DELTASONE) 10 MG tablet, 40mg for 3days 30mg for 3days 20mg for 3days 10mg for 3days, Disp: 30 tablet, Rfl: 0    Budeson-Glycopyrrol-Formoterol (BREZTRI AEROSPHERE) 160-9-4.8 MCG/ACT AERO, Inhale 2 puffs into the lungs 2 times daily, Disp: 3 each, Rfl: 3    Budeson-Glycopyrrol-Formoterol (BREZTRI AEROSPHERE) 160-9-4.8 MCG/ACT AERO, Inhale 2 puffs into the lungs 2 times daily, Disp: 1 each, Rfl: 1    budesonide-formoterol (SYMBICORT) 160-4.5 MCG/ACT AERO, Inhale 2 puffs

## 2023-11-10 ENCOUNTER — TELEPHONE (OUTPATIENT)
Dept: PHARMACY | Facility: HOSPITAL | Age: 58
End: 2023-11-10

## 2023-11-10 NOTE — TELEPHONE ENCOUNTER
Specialty Medication Service    Date: 11/10/2023  Patient's Name: Omari Rodriguez YOB: 1965            _____________________________________________________________________________________________    Patient returned call to schedule PharmD initial appointment for Specialty Medication Services. Patient scheduled 11/13/2023. Most recent office notes from cardiology in patient chart.     Mahad Beard PharmD  Ambulatory Clinical Pharmacist   Specialty Medication Services   Phone: [phone] option 4  11/10/2023 4:33 PM    For Pharmacy Admin Tracking Only    Program: Anki  CPA in place:  No  Recommendation Provided To: Patient/Caregiver: 1 via Telephone  Intervention Detail: Scheduled Appointment  Intervention Accepted By: Patient/Caregiver: 1   Time Spent (min): 5

## 2023-11-13 ENCOUNTER — PHARMACY VISIT (OUTPATIENT)
Dept: PHARMACY | Facility: HOSPITAL | Age: 58
End: 2023-11-13

## 2023-11-13 ENCOUNTER — TELEPHONE (OUTPATIENT)
Dept: CARDIOLOGY CLINIC | Age: 58
End: 2023-11-13

## 2023-11-13 DIAGNOSIS — E78.2 MIXED HYPERLIPIDEMIA: ICD-10-CM

## 2023-11-13 DIAGNOSIS — E78.2 MIXED HYPERLIPIDEMIA: Primary | ICD-10-CM

## 2023-11-13 RX ORDER — EVOLOCUMAB 140 MG/ML
INJECTION, SOLUTION SUBCUTANEOUS
Qty: 6 ML | Refills: 3 | Status: SHIPPED | OUTPATIENT
Start: 2023-11-13

## 2023-11-13 ASSESSMENT — PATIENT HEALTH QUESTIONNAIRE - PHQ9
6. FEELING BAD ABOUT YOURSELF - OR THAT YOU ARE A FAILURE OR HAVE LET YOURSELF OR YOUR FAMILY DOWN: 0
SUM OF ALL RESPONSES TO PHQ QUESTIONS 1-9: 0
SUM OF ALL RESPONSES TO PHQ9 QUESTIONS 1 & 2: 0
3. TROUBLE FALLING OR STAYING ASLEEP: 0
4. FEELING TIRED OR HAVING LITTLE ENERGY: 0
1. LITTLE INTEREST OR PLEASURE IN DOING THINGS: 0
2. FEELING DOWN, DEPRESSED OR HOPELESS: 0
5. POOR APPETITE OR OVEREATING: 0
SUM OF ALL RESPONSES TO PHQ QUESTIONS 1-9: 0
8. MOVING OR SPEAKING SO SLOWLY THAT OTHER PEOPLE COULD HAVE NOTICED. OR THE OPPOSITE, BEING SO FIGETY OR RESTLESS THAT YOU HAVE BEEN MOVING AROUND A LOT MORE THAN USUAL: 0
7. TROUBLE CONCENTRATING ON THINGS, SUCH AS READING THE NEWSPAPER OR WATCHING TELEVISION: 0
SUM OF ALL RESPONSES TO PHQ QUESTIONS 1-9: 0
SUM OF ALL RESPONSES TO PHQ QUESTIONS 1-9: 0
9. THOUGHTS THAT YOU WOULD BE BETTER OFF DEAD, OR OF HURTING YOURSELF: 0

## 2023-11-13 ASSESSMENT — PROMIS GLOBAL HEALTH SCALE
IN GENERAL, HOW WOULD YOU RATE YOUR PHYSICAL HEALTH [ON A SCALE OF 1 (POOR) TO 5 (EXCELLENT)]?: 3
IN GENERAL, HOW WOULD YOU RATE YOUR SATISFACTION WITH YOUR SOCIAL ACTIVITIES AND RELATIONSHIPS [ON A SCALE OF 1 (POOR) TO 5 (EXCELLENT)]?: 4
IN GENERAL, HOW WOULD YOU RATE YOUR MENTAL HEALTH, INCLUDING YOUR MOOD AND YOUR ABILITY TO THINK [ON A SCALE OF 1 (POOR) TO 5 (EXCELLENT)]?: 4
IN GENERAL, WOULD YOU SAY YOUR HEALTH IS...[ON A SCALE OF 1 (POOR) TO 5 (EXCELLENT)]: 3
IN THE PAST 7 DAYS, HOW WOULD YOU RATE YOUR PAIN ON AVERAGE [ON A SCALE FROM 0 (NO PAIN) TO 10 (WORST IMAGINABLE PAIN)]?: 0
IN GENERAL, WOULD YOU SAY YOUR QUALITY OF LIFE IS...[ON A SCALE OF 1 (POOR) TO 5 (EXCELLENT)]: 3
SUM OF RESPONSES TO QUESTIONS 3, 6, 7, & 8: 9
TO WHAT EXTENT ARE YOU ABLE TO CARRY OUT YOUR EVERYDAY PHYSICAL ACTIVITIES SUCH AS WALKING, CLIMBING STAIRS, CARRYING GROCERIES, OR MOVING A CHAIR [ON A SCALE OF 1 (NOT AT ALL) TO 5 (COMPLETELY)]?: 3
IN THE PAST 7 DAYS, HOW WOULD YOU RATE YOUR FATIGUE ON AVERAGE [ON A SCALE FROM 1 (NONE) TO 5 (VERY SEVERE)]?: 3
IN GENERAL, PLEASE RATE HOW WELL YOU CARRY OUT YOUR USUAL SOCIAL ACTIVITIES (INCLUDES ACTIVITIES AT HOME, AT WORK, AND IN YOUR COMMUNITY, AND RESPONSIBILITIES AS A PARENT, CHILD, SPOUSE, EMPLOYEE, FRIEND, ETC) [ON A SCALE OF 1 (POOR) TO 5 (EXCELLENT)]?: 4
IN THE PAST 7 DAYS, HOW OFTEN HAVE YOU BEEN BOTHERED BY EMOTIONAL PROBLEMS, SUCH AS FEELING ANXIOUS, DEPRESSED, OR IRRITABLE [ON A SCALE FROM 1 (NEVER) TO 5 (ALWAYS)]?: 5
SUM OF RESPONSES TO QUESTIONS 2, 4, 5, & 10: 16

## 2023-11-13 NOTE — PROGRESS NOTES
Specialty Medication Service    Patient's Name: Aurelia Cook YOB: 1965      Aurelia Cook is a 56 y.o. male presenting today for Specialty Medication Service visit. Patient is prescribed SMS formulary medication, Repatha. Medication list updated.     Specialty Medication: Repatha 140 mg  Frequency: every 14 days  Indication: Hyperlipidemia  Initially Diagnosed: Pt can't remember when he was diagnosed with hyperlipidemia  Additional Therapy:   Crestor 40mg po daily  Previous Therapy:   Lipitor-stopped due to cramps in legs    Specialist: Belkis Ward [address], [address] [facility]  Specialist Progress Note Available: Yes  Last Specialist Visit: 23     Allergies   Allergen Reactions    Erythromycin Hives       Past Medical History:   Diagnosis Date    CAD S/P percutaneous coronary angioplasty     Centrilobular emphysema ([address]) 2020    Chronic respiratory failure (HCC)     COPD exacerbation ([address]) 2016    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    Coronary artery disease involving native coronary artery of native heart with unstable angina pectoris ([address])     Diabetes ([address])     Diabetes ([address])     Dyslipidemia     Dyspnea     Follicular bronchiolitis ([address]) 3/27/2023    GERD (gastroesophageal reflux disease)     High risk medications (not anticoagulants) long-term use     HTN (hypertension)     Hyperlipidemia     Hypoxemia     ILD (interstitial lung disease) ([address])     Left ureteral stone 2018    MDD (major depressive disorder)     ESTRELLITA on CPAP     Polycythemia     Positive FIT (fecal immunochemical test)     Prediabetes     Tubular adenoma     colonoscopy 2019    Ureteral calculus of right kidney transplant       Social History     Tobacco Use    Smoking status: Former     Packs/day: 3.00     Years: 20.00     Additional pack years: 0.00     Total pack years: 60.00     Types: Cigarettes     Quit date: 2000     Years since quittin.8     Passive

## 2023-11-13 NOTE — TELEPHONE ENCOUNTER
Last OV: 9/27/23  Next OV:  X  1 year   Last refill: 12/22/23  Most recent Labs: 9/26/23  Last EKG (if needed):  9/27/23

## 2023-11-16 ENCOUNTER — TELEPHONE (OUTPATIENT)
Dept: PHARMACY | Facility: HOSPITAL | Age: 58
End: 2023-11-16

## 2023-11-16 NOTE — TELEPHONE ENCOUNTER
Specialty Medication Service    Date: 11/16/2023  Patient's Name: Margot Maya YOB: 1965            _____________________________________________________________________________________________    Email received from [address] in regard to current SMS formulary medication, Helder Lema. Initial SMS override placed. Waiting on md appt 01/03/24. Override placed through then.     Mason Olivera CPhT  Pharmacy   Specialty Medication Services   Phone: [phone] option 4    For Pharmacy Admin Tracking Only    Program: SMS  CPA in place:  Yes  Recommendation Provided To: Pharmacy: 1  Intervention Detail: Benefit Assistance  Intervention Accepted By: Pharmacy: 1  Gap Closed?:    Time Spent (min): 15

## 2023-12-07 ENCOUNTER — TELEPHONE (OUTPATIENT)
Dept: PULMONOLOGY | Age: 58
End: 2023-12-07

## 2023-12-07 RX ORDER — DOXYCYCLINE HYCLATE 100 MG
100 TABLET ORAL 2 TIMES DAILY
Qty: 14 TABLET | Refills: 0 | Status: SHIPPED | OUTPATIENT
Start: 2023-12-07 | End: 2023-12-14

## 2023-12-07 RX ORDER — PREDNISONE 20 MG/1
20 TABLET ORAL DAILY
Qty: 7 TABLET | Refills: 0 | Status: SHIPPED | OUTPATIENT
Start: 2023-12-07 | End: 2023-12-14

## 2023-12-07 NOTE — TELEPHONE ENCOUNTER
Patient called back to say his wife has covid and  he tested himself and came up postive just wanted to let Dr. Serjio Pastor know didn't now if he needed to do anything or take anything extra.

## 2023-12-07 NOTE — TELEPHONE ENCOUNTER
Pt is not feeling well. He says it has been going on for 2 days and is coughing up yellow mucus.  Pt states that Dr. Michelle Osorio told him to call in if he does not feel well and he would send something to the pharmacy to help    Pt is using the [facility] on [address]

## 2024-01-03 ENCOUNTER — PHARMACY VISIT (OUTPATIENT)
Dept: PHARMACY | Facility: HOSPITAL | Age: 59
End: 2024-01-03

## 2024-01-03 DIAGNOSIS — E78.2 MIXED HYPERLIPIDEMIA: Primary | ICD-10-CM

## 2024-01-03 NOTE — PROGRESS NOTES
Initial Specialty Medication Virtual Visit  [facility]  [facility]  [address] [facility] [place] [address]  Dept: [phone]  Dept Fax: [phone]  Loc: [phone]  Date of patient's visit: 1/3/2024  Patient's Name:  Jensen Davidson YOB: 1965            Patient Care Team:  Margie Maddox MD as PCP - General (Family Medicine)  Margie Maddox MD as PCP - Empaneled Provider  Te Burt MD as Consulting Physician (Pulmonology)  ================================================================    REASON FOR VISIT/CHIEF COMPLAINT:  Hyperlipidemia    HISTORY OF PRESENTING ILLNESS:  Jensen Davidson is 58 y.o. is here for initial virtual visit for specialty medication.      Repatha    Specialty Medication: Repatha 140 mg  Frequency: every 14 days  Indication: Hyperlipidemia  Initially Diagnosed: He is unsure of the date of diagnosis.   Additional Therapy:   Crestor 40mg po daily  Previous Therapy:   Lipitor-stopped due to cramps in legs     Specialist: Wesley Salguero  [address] [facility] [address]  [place] [address]  Specialist Progress Note Available: Yes  Last Specialist Visit: 9/27/23     Hyperlipidemia: The patient has a long standing hx of HLD. He has known CAD s/p stent placement. He was unable to tolerate Lipitor due to leg cramps. He was unable to get to goal LDL with Crestor only. He is now taking Crestor and Rapatha. He is tolerating this well. He has no side effects from these. His LDL has improved on this.     DIAGNOSTIC FINDINGS:  CBC:  Lab Results   Component Value Date/Time    WBC 6.3 09/26/2023 11:12 AM    HGB 17.3 09/26/2023 11:12 AM     09/26/2023 11:12 AM       BMP:    Lab Results   Component Value Date/Time     09/26/2023 11:12 AM    K 3.9 09/26/2023 11:12 AM    K 4.0 10/03/2018 05:51 AM     09/26/2023 11:12 AM    CO2 24 09/26/2023 11:12 AM    BUN 13 09/26/2023 11:12 AM    CREATININE 1.0

## 2024-01-05 DIAGNOSIS — F33.1 MODERATE EPISODE OF RECURRENT MAJOR DEPRESSIVE DISORDER (HCC): ICD-10-CM

## 2024-01-05 DIAGNOSIS — I10 ESSENTIAL HYPERTENSION: ICD-10-CM

## 2024-01-05 RX ORDER — ESCITALOPRAM OXALATE 10 MG/1
10 TABLET ORAL DAILY
Qty: 90 TABLET | Refills: 1 | Status: SHIPPED | OUTPATIENT
Start: 2024-01-05

## 2024-01-05 RX ORDER — ROSUVASTATIN CALCIUM 40 MG/1
TABLET, COATED ORAL
Qty: 90 TABLET | Refills: 1 | Status: SHIPPED | OUTPATIENT
Start: 2024-01-05

## 2024-01-05 RX ORDER — ASPIRIN 81 MG/1
TABLET, CHEWABLE ORAL
Qty: 90 TABLET | Refills: 1 | Status: SHIPPED | OUTPATIENT
Start: 2024-01-05

## 2024-01-05 RX ORDER — LISINOPRIL 10 MG/1
TABLET ORAL
Qty: 90 TABLET | Refills: 1 | Status: SHIPPED | OUTPATIENT
Start: 2024-01-05

## 2024-01-05 RX ORDER — CLOPIDOGREL BISULFATE 75 MG/1
75 TABLET ORAL DAILY
Qty: 90 TABLET | Refills: 1 | OUTPATIENT
Start: 2024-01-05

## 2024-01-05 RX ORDER — CARVEDILOL 6.25 MG/1
6.25 TABLET ORAL 2 TIMES DAILY WITH MEALS
Qty: 180 TABLET | Refills: 1 | Status: SHIPPED | OUTPATIENT
Start: 2024-01-05

## 2024-01-05 RX ORDER — AMLODIPINE BESYLATE 2.5 MG/1
TABLET ORAL
Qty: 90 TABLET | Refills: 1 | Status: SHIPPED | OUTPATIENT
Start: 2024-01-05

## 2024-01-05 RX ORDER — OMEPRAZOLE 20 MG/1
CAPSULE, DELAYED RELEASE ORAL
Qty: 180 CAPSULE | Refills: 1 | Status: SHIPPED | OUTPATIENT
Start: 2024-01-05

## 2024-01-05 NOTE — TELEPHONE ENCOUNTER
Refill Request:   rosuvastatin (CRESTOR) 40 MG tablet   aspirin (ASPIRIN LOW DOSE) 81 MG chewable tablet   lisinopril (PRINIVIL;ZESTRIL) 10 MG tablet   clopidogrel (PLAVIX) 75 MG tablet   carvedilol (COREG) 6.25 MG tablet   escitalopram (LEXAPRO) 10 MG tablet   amLODIPine (NORVASC) 2.5 MG tablet   omeprazole (PRILOSEC) 20 MG delayed release capsule     Patient uses [facility]

## 2024-01-05 NOTE — TELEPHONE ENCOUNTER
Refill Request       Last Seen: Last Seen Department: 9/26/2023  Last Seen by PCP: 9/26/2023    Last Written: 09/29/22 #90 with 1    Next Appointment:   Future Appointments   Date Time Provider Department Center   2/6/2024  9:20 AM Víctor Quiles DO WH PULM [facility]   3/28/2024  1:30 PM Margie Maddox MD [facility]   5/13/2024  9:30 AM Tanner Powell, [facility] [place]           Requested Prescriptions     Pending Prescriptions Disp Refills    amLODIPine (NORVASC) 2.5 MG tablet 90 tablet 1     Sig: TAKE 1 TABLET BY MOUTH ONE TIME A DAY    aspirin (ASPIRIN LOW DOSE) 81 MG chewable tablet 90 tablet 1     Sig: RESTART IN 5 DAYS CHEW AND SWALLOW ONE TABLET BY MOUTH ONE TIME A DAY    rosuvastatin (CRESTOR) 40 MG tablet 90 tablet 1     Sig: TAKE 1 TABLET BY MOUTH ONE TIME A DAY IN THE EVENING    lisinopril (PRINIVIL;ZESTRIL) 10 MG tablet 90 tablet 1     Sig: TAKE 1 TABLET BY MOUTH ONE TIME A DAY    omeprazole (PRILOSEC) 20 MG delayed release capsule 180 capsule 1     Sig: TAKE ONE CAPSULE BY MOUTH TWICE A DAY    carvedilol (COREG) 6.25 MG tablet 180 tablet 1     Sig: Take 1 tablet by mouth 2 times daily (with meals)    clopidogrel (PLAVIX) 75 MG tablet 90 tablet 1     Sig: Take 1 tablet by mouth daily    escitalopram (LEXAPRO) 10 MG tablet 90 tablet 1     Sig: Take 1 tablet by mouth daily

## 2024-02-06 ENCOUNTER — OFFICE VISIT (OUTPATIENT)
Dept: PULMONOLOGY | Age: 59
End: 2024-02-06
Payer: MEDICARE

## 2024-02-06 VITALS
BODY MASS INDEX: 29.86 KG/M2 | RESPIRATION RATE: 18 BRPM | WEIGHT: 208.6 LBS | HEART RATE: 67 BPM | SYSTOLIC BLOOD PRESSURE: 126 MMHG | DIASTOLIC BLOOD PRESSURE: 82 MMHG | OXYGEN SATURATION: 90 % | HEIGHT: 70 IN

## 2024-02-06 DIAGNOSIS — J96.11 CHRONIC RESPIRATORY FAILURE WITH HYPOXIA (HCC): ICD-10-CM

## 2024-02-06 DIAGNOSIS — J44.9 COPD, SEVERE (HCC): ICD-10-CM

## 2024-02-06 DIAGNOSIS — J42 FOLLICULAR BRONCHIOLITIS (HCC): Primary | ICD-10-CM

## 2024-02-06 DIAGNOSIS — G47.33 OSA TREATED WITH BIPAP: ICD-10-CM

## 2024-02-06 PROCEDURE — G8417 CALC BMI ABV UP PARAM F/U: HCPCS | Performed by: INTERNAL MEDICINE

## 2024-02-06 PROCEDURE — 3017F COLORECTAL CA SCREEN DOC REV: CPT | Performed by: INTERNAL MEDICINE

## 2024-02-06 PROCEDURE — G8482 FLU IMMUNIZE ORDER/ADMIN: HCPCS | Performed by: INTERNAL MEDICINE

## 2024-02-06 PROCEDURE — 99214 OFFICE O/P EST MOD 30 MIN: CPT | Performed by: INTERNAL MEDICINE

## 2024-02-06 PROCEDURE — 3079F DIAST BP 80-89 MM HG: CPT | Performed by: INTERNAL MEDICINE

## 2024-02-06 PROCEDURE — G8427 DOCREV CUR MEDS BY ELIG CLIN: HCPCS | Performed by: INTERNAL MEDICINE

## 2024-02-06 PROCEDURE — 1036F TOBACCO NON-USER: CPT | Performed by: INTERNAL MEDICINE

## 2024-02-06 PROCEDURE — 3074F SYST BP LT 130 MM HG: CPT | Performed by: INTERNAL MEDICINE

## 2024-02-06 PROCEDURE — 3023F SPIROM DOC REV: CPT | Performed by: INTERNAL MEDICINE

## 2024-02-06 ASSESSMENT — COPD QUESTIONNAIRES
QUESTION2_CHESTPHLEGM: 0
QUESTION4_WALKINCLINE: 4
QUESTION1_COUGHFREQUENCY: 1
QUESTION5_HOMEACTIVITIES: 3
QUESTION8_ENERGYLEVEL: 2
QUESTION7_SLEEPQUALITY: 2
QUESTION3_CHESTTIGHTNESS: 1
CAT_TOTALSCORE: 15
QUESTION6_LEAVINGHOUSE: 2

## 2024-02-06 NOTE — PROGRESS NOTES
Chief complaint  This is a 58 y.o. year old male  who comes to see me with a chief complaint of   Chief Complaint   Patient presents with    Follow-up     Bronchiolitis     HPI  Here with cc of ILD    He is doing ok.  Still stays active nearly everyday to help keep weight down.  I changed him to breztri last time and he feels that it does help better than symbicort and spiriva.  Oxygen uses is at 3 liters and bleeds in 3 liters to his bipap machine.  He is going on cruise this summer.  No other changes for the most part     Historical:  He had been following with Dr. Burt for ILD related to bronchiolitis.  Jensen had been concerned about following up all the way out in [facility] and asked if he could follow up with me.  I asked Dr. Burt and he was ok with it.  Dr. Burt has been following Jensen for years.  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  He was tried on various immune suppressant with no improvement in symptoms.  Last medication he was on was cellcept >9 years ago.  He was seen at [facility] for lung transplant in the past, but was not felt to be sick enough to require transplant, nor was his weight low enough.  He has not been seen at [facility] since then.        Current Outpatient Medications:     amLODIPine (NORVASC) 2.5 MG tablet, TAKE 1 TABLET BY MOUTH ONE TIME A DAY, Disp: 90 tablet, Rfl: 1    aspirin (ASPIRIN LOW DOSE) 81 MG chewable tablet, RESTART IN 5 DAYS CHEW AND SWALLOW ONE TABLET BY MOUTH ONE TIME A DAY, Disp: 90 tablet, Rfl: 1    rosuvastatin (CRESTOR) 40 MG tablet, TAKE 1 TABLET BY MOUTH ONE TIME A DAY IN THE EVENING, Disp: 90 tablet, Rfl: 1    lisinopril (PRINIVIL;ZESTRIL) 10 MG tablet, TAKE 1 TABLET BY MOUTH ONE TIME A DAY, Disp: 90 tablet, Rfl: 1    omeprazole (PRILOSEC) 20 MG delayed release capsule, TAKE ONE CAPSULE BY MOUTH TWICE A DAY, Disp: 180 capsule, Rfl: 1    carvedilol (COREG) 6.25 MG tablet, Take 1 tablet by mouth 2 times daily

## 2024-05-13 ENCOUNTER — PHARMACY VISIT (OUTPATIENT)
Dept: PHARMACY | Facility: HOSPITAL | Age: 59
End: 2024-05-13

## 2024-05-13 DIAGNOSIS — E78.2 MIXED HYPERLIPIDEMIA: Primary | ICD-10-CM

## 2024-05-13 ASSESSMENT — PROMIS GLOBAL HEALTH SCALE
IN GENERAL, HOW WOULD YOU RATE YOUR SATISFACTION WITH YOUR SOCIAL ACTIVITIES AND RELATIONSHIPS [ON A SCALE OF 1 (POOR) TO 5 (EXCELLENT)]?: VERY GOOD
SUM OF RESPONSES TO QUESTIONS 3, 6, 7, & 8: 15
IN THE PAST 7 DAYS, HOW OFTEN HAVE YOU BEEN BOTHERED BY EMOTIONAL PROBLEMS, SUCH AS FEELING ANXIOUS, DEPRESSED, OR IRRITABLE [ON A SCALE FROM 1 (NEVER) TO 5 (ALWAYS)]?: NEVER
IN GENERAL, WOULD YOU SAY YOUR HEALTH IS...[ON A SCALE OF 1 (POOR) TO 5 (EXCELLENT)]: VERY GOOD
IN THE PAST 7 DAYS, HOW WOULD YOU RATE YOUR PAIN ON AVERAGE [ON A SCALE FROM 0 (NO PAIN) TO 10 (WORST IMAGINABLE PAIN)]?: 1
IN GENERAL, WOULD YOU SAY YOUR QUALITY OF LIFE IS...[ON A SCALE OF 1 (POOR) TO 5 (EXCELLENT)]: VERY GOOD
IN GENERAL, PLEASE RATE HOW WELL YOU CARRY OUT YOUR USUAL SOCIAL ACTIVITIES (INCLUDES ACTIVITIES AT HOME, AT WORK, AND IN YOUR COMMUNITY, AND RESPONSIBILITIES AS A PARENT, CHILD, SPOUSE, EMPLOYEE, FRIEND, ETC) [ON A SCALE OF 1 (POOR) TO 5 (EXCELLENT)]?: VERY GOOD
IN GENERAL, HOW WOULD YOU RATE YOUR MENTAL HEALTH, INCLUDING YOUR MOOD AND YOUR ABILITY TO THINK [ON A SCALE OF 1 (POOR) TO 5 (EXCELLENT)]?: VERY GOOD
IN GENERAL, HOW WOULD YOU RATE YOUR PHYSICAL HEALTH [ON A SCALE OF 1 (POOR) TO 5 (EXCELLENT)]?: VERY GOOD
SUM OF RESPONSES TO QUESTIONS 2, 4, 5, & 10: 17
TO WHAT EXTENT ARE YOU ABLE TO CARRY OUT YOUR EVERYDAY PHYSICAL ACTIVITIES SUCH AS WALKING, CLIMBING STAIRS, CARRYING GROCERIES, OR MOVING A CHAIR [ON A SCALE OF 1 (NOT AT ALL) TO 5 (COMPLETELY)]?: COMPLETELY

## 2024-05-13 ASSESSMENT — PATIENT HEALTH QUESTIONNAIRE - PHQ9
8. MOVING OR SPEAKING SO SLOWLY THAT OTHER PEOPLE COULD HAVE NOTICED. OR THE OPPOSITE, BEING SO FIGETY OR RESTLESS THAT YOU HAVE BEEN MOVING AROUND A LOT MORE THAN USUAL: NOT AT ALL
SUM OF ALL RESPONSES TO PHQ QUESTIONS 1-9: 0
SUM OF ALL RESPONSES TO PHQ9 QUESTIONS 1 & 2: 0
7. TROUBLE CONCENTRATING ON THINGS, SUCH AS READING THE NEWSPAPER OR WATCHING TELEVISION: NOT AT ALL
5. POOR APPETITE OR OVEREATING: NOT AT ALL
10. IF YOU CHECKED OFF ANY PROBLEMS, HOW DIFFICULT HAVE THESE PROBLEMS MADE IT FOR YOU TO DO YOUR WORK, TAKE CARE OF THINGS AT HOME, OR GET ALONG WITH OTHER PEOPLE: NOT DIFFICULT AT ALL
4. FEELING TIRED OR HAVING LITTLE ENERGY: NOT AT ALL
1. LITTLE INTEREST OR PLEASURE IN DOING THINGS: NOT AT ALL
9. THOUGHTS THAT YOU WOULD BE BETTER OFF DEAD, OR OF HURTING YOURSELF: NOT AT ALL
SUM OF ALL RESPONSES TO PHQ QUESTIONS 1-9: 0
SUM OF ALL RESPONSES TO PHQ QUESTIONS 1-9: 0
3. TROUBLE FALLING OR STAYING ASLEEP: NOT AT ALL
SUM OF ALL RESPONSES TO PHQ QUESTIONS 1-9: 0
2. FEELING DOWN, DEPRESSED OR HOPELESS: NOT AT ALL
6. FEELING BAD ABOUT YOURSELF - OR THAT YOU ARE A FAILURE OR HAVE LET YOURSELF OR YOUR FAMILY DOWN: NOT AT ALL
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
## 2024-05-15 ENCOUNTER — OFFICE VISIT (OUTPATIENT)
Dept: PULMONOLOGY | Age: 59
End: 2024-05-15
Payer: MEDICARE

## 2024-05-15 VITALS
SYSTOLIC BLOOD PRESSURE: 137 MMHG | HEART RATE: 89 BPM | DIASTOLIC BLOOD PRESSURE: 81 MMHG | RESPIRATION RATE: 18 BRPM | WEIGHT: 211.4 LBS | OXYGEN SATURATION: 91 % | HEIGHT: 71 IN | BODY MASS INDEX: 29.6 KG/M2

## 2024-05-15 DIAGNOSIS — J42 FOLLICULAR BRONCHIOLITIS (HCC): Primary | ICD-10-CM

## 2024-05-15 DIAGNOSIS — I25.10 CORONARY ARTERY DISEASE INVOLVING NATIVE CORONARY ARTERY OF NATIVE HEART WITHOUT ANGINA PECTORIS: ICD-10-CM

## 2024-05-15 DIAGNOSIS — G47.33 OSA TREATED WITH BIPAP: ICD-10-CM

## 2024-05-15 DIAGNOSIS — J96.11 CHRONIC RESPIRATORY FAILURE WITH HYPOXIA (HCC): ICD-10-CM

## 2024-05-15 DIAGNOSIS — R06.02 SOB (SHORTNESS OF BREATH): ICD-10-CM

## 2024-05-15 DIAGNOSIS — J44.9 COPD, SEVERE (HCC): ICD-10-CM

## 2024-05-15 PROCEDURE — 1036F TOBACCO NON-USER: CPT | Performed by: INTERNAL MEDICINE

## 2024-05-15 PROCEDURE — 3079F DIAST BP 80-89 MM HG: CPT | Performed by: INTERNAL MEDICINE

## 2024-05-15 PROCEDURE — G8417 CALC BMI ABV UP PARAM F/U: HCPCS | Performed by: INTERNAL MEDICINE

## 2024-05-15 PROCEDURE — 3023F SPIROM DOC REV: CPT | Performed by: INTERNAL MEDICINE

## 2024-05-15 PROCEDURE — 3017F COLORECTAL CA SCREEN DOC REV: CPT | Performed by: INTERNAL MEDICINE

## 2024-05-15 PROCEDURE — G8427 DOCREV CUR MEDS BY ELIG CLIN: HCPCS | Performed by: INTERNAL MEDICINE

## 2024-05-15 PROCEDURE — 99214 OFFICE O/P EST MOD 30 MIN: CPT | Performed by: INTERNAL MEDICINE

## 2024-05-15 PROCEDURE — 3075F SYST BP GE 130 - 139MM HG: CPT | Performed by: INTERNAL MEDICINE

## 2024-05-15 RX ORDER — PREDNISONE 10 MG/1
TABLET ORAL
Qty: 30 TABLET | Refills: 0 | Status: SHIPPED | OUTPATIENT
Start: 2024-05-15

## 2024-05-15 NOTE — PROGRESS NOTES
Chief complaint  This is a 58 y.o. year old male  who comes to see me with a chief complaint of   Chief Complaint   Patient presents with    Follow-up     ILD/Follicular bronchiolitis     HPI  Here with cc of ILD    He says he has been more SOB lately with exertion and exercise tolerance is not as good as it used to be.  He remains on breztri and albuterol.  Rarely has to use albuterol.  Not sure what is going on.  He says some of his symptoms feel like they did back when his heart was the issue.  He does not have an appt with Jose M for awhile but might move it up.  He is not sick.  Has oxygen at 3 liters that he uses with exertion and bleeds into his bilevel machine.  He is very compliant with bilevel every night     Historical:  He had been following with Dr. Burt for ILD related to bronchiolitis.  Jensen had been concerned about following up all the way out in [facility] and asked if he could follow up with me.  I asked Dr. Burt and he was ok with it.  Dr. Burt has been following Jensen for years.  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  He was tried on various immune suppressant with no improvement in symptoms.  Last medication he was on was cellcept >9 years ago.  He was seen at [facility] for lung transplant in the past, but was not felt to be sick enough to require transplant, nor was his weight low enough.  He has not been seen at [facility] since then.        Current Outpatient Medications:     predniSONE (DELTASONE) 10 MG tablet, 40mg for 3days 30mg for 3days 20mg for 3days 10mg for 3days, Disp: 30 tablet, Rfl: 0    amLODIPine (NORVASC) 2.5 MG tablet, TAKE 1 TABLET BY MOUTH ONE TIME A DAY, Disp: 90 tablet, Rfl: 1    aspirin (ASPIRIN LOW DOSE) 81 MG chewable tablet, RESTART IN 5 DAYS CHEW AND SWALLOW ONE TABLET BY MOUTH ONE TIME A DAY, Disp: 90 tablet, Rfl: 1    rosuvastatin (CRESTOR) 40 MG tablet, TAKE 1 TABLET BY MOUTH ONE TIME A DAY IN THE EVENING, Disp: 90

## 2024-05-22 ENCOUNTER — PATIENT MESSAGE (OUTPATIENT)
Dept: BARIATRICS/WEIGHT MGMT | Age: 59
End: 2024-05-22

## 2024-05-22 NOTE — TELEPHONE ENCOUNTER
Attempt to contact for Bariatric Follow Up. Chart[employer]right message sent and letter mailed to address on file in [facility]

## 2024-07-30 ENCOUNTER — TELEPHONE (OUTPATIENT)
Dept: PULMONOLOGY | Age: 59
End: 2024-07-30

## 2024-07-30 DIAGNOSIS — J44.1 COPD EXACERBATION (HCC): Primary | ICD-10-CM

## 2024-07-30 NOTE — TELEPHONE ENCOUNTER
Pt is leaving out of the state next week and is requesting antibiotics and prednisone sent to his pharmacy. Please advise.

## 2024-07-31 RX ORDER — PREDNISONE 10 MG/1
TABLET ORAL
Qty: 30 TABLET | Refills: 0 | Status: SHIPPED | OUTPATIENT
Start: 2024-07-31

## 2024-07-31 RX ORDER — LEVOFLOXACIN 500 MG/1
500 TABLET, FILM COATED ORAL DAILY
Qty: 7 TABLET | Refills: 0 | Status: SHIPPED | OUTPATIENT
Start: 2024-07-31 | End: 2024-08-07

## 2024-08-27 LAB
CHOLEST SERPL-MCNC: 81 MG/DL (ref 0–199)
GLUCOSE SERPL-MCNC: 94 MG/DL (ref 70–99)
HDLC SERPL-MCNC: 31 MG/DL (ref 40–60)
LDLC SERPL CALC-MCNC: 33 MG/DL
TRIGL SERPL-MCNC: 86 MG/DL (ref 0–150)

## 2024-09-04 ENCOUNTER — TELEPHONE (OUTPATIENT)
Dept: PHARMACY | Facility: HOSPITAL | Age: 59
End: 2024-09-04

## 2024-09-04 ENCOUNTER — OFFICE VISIT (OUTPATIENT)
Dept: PULMONOLOGY | Age: 59
End: 2024-09-04
Payer: MEDICARE

## 2024-09-04 VITALS
TEMPERATURE: 97.8 F | HEART RATE: 67 BPM | OXYGEN SATURATION: 91 % | DIASTOLIC BLOOD PRESSURE: 74 MMHG | WEIGHT: 214.6 LBS | HEIGHT: 70 IN | RESPIRATION RATE: 18 BRPM | SYSTOLIC BLOOD PRESSURE: 126 MMHG | BODY MASS INDEX: 30.72 KG/M2

## 2024-09-04 DIAGNOSIS — J42 FOLLICULAR BRONCHIOLITIS (HCC): Primary | ICD-10-CM

## 2024-09-04 DIAGNOSIS — J44.9 COPD, SEVERE (HCC): ICD-10-CM

## 2024-09-04 DIAGNOSIS — J96.11 CHRONIC RESPIRATORY FAILURE WITH HYPOXIA (HCC): ICD-10-CM

## 2024-09-04 DIAGNOSIS — G47.33 OSA TREATED WITH BIPAP: ICD-10-CM

## 2024-09-04 PROCEDURE — G8427 DOCREV CUR MEDS BY ELIG CLIN: HCPCS | Performed by: INTERNAL MEDICINE

## 2024-09-04 PROCEDURE — G8417 CALC BMI ABV UP PARAM F/U: HCPCS | Performed by: INTERNAL MEDICINE

## 2024-09-04 PROCEDURE — 1036F TOBACCO NON-USER: CPT | Performed by: INTERNAL MEDICINE

## 2024-09-04 PROCEDURE — 3078F DIAST BP <80 MM HG: CPT | Performed by: INTERNAL MEDICINE

## 2024-09-04 PROCEDURE — 99214 OFFICE O/P EST MOD 30 MIN: CPT | Performed by: INTERNAL MEDICINE

## 2024-09-04 PROCEDURE — 3074F SYST BP LT 130 MM HG: CPT | Performed by: INTERNAL MEDICINE

## 2024-09-04 PROCEDURE — 3023F SPIROM DOC REV: CPT | Performed by: INTERNAL MEDICINE

## 2024-09-04 PROCEDURE — 3017F COLORECTAL CA SCREEN DOC REV: CPT | Performed by: INTERNAL MEDICINE

## 2024-09-04 NOTE — PROGRESS NOTES
Jensen Davidson         : 1965  [] [facility]     [] [facility]      [] [facility]     [][facility]    [] [facility]  [] [facility]  [] [facility]   [] [facility] [] Other:  Diagnosis: [x] ESTRELLITA (G47.33) [] CSA (G47.31) [] Apnea (G47.30)   Length of Need: [] 12 Months [] 99 Months [] Other:    Machine (JACK!):  [] ResMed AirSense     Auto [] Other:     []  CPAP () [] Bilevel ()   Mode: [] Auto [] Spontaneous    Mode: [] Auto [] Spontaneous                       Comfort Settings:     If the order for CPAP  - Ramp Pressure: 5 cmH2O                                        - Ramp time: 15 min                                     -  Flex/EPR - 3 full time  If the order is for BiLevel:                                    - For ResMed Bilevel (TiMax-4 sec   TiMin- 0.2 sec)     Humidifier: [x] Heated ()        [x] Water chamber replacement ()/ 1 per 6 months        Mask:  Please always start with the mask the patient used during the titraion   [x] Nasal () /1 per 3 months [] Full Face () /1 per 3 months   [x] Patient choice -Size and fit mask [] Patient Choice - Size and fit mask   [] Dispense:  nasal pillows Medium  [] Dispense:    [x] Headgear () / 1 per 6 months [] Headgear () / 1 per 3 months   [x] Replacement Nasal Cushion ()/2 per month [] Interface Replacement ()/1 per month   [x] Replacement Nasal Pillows ()/2 per month medium         Tubing: [x] Heated ()/1 per 3 months    [] Standard ()/1 per 3 months [] Other:           Filters: [x] Non-disposable ()/1 per 6 months     [x] Ultra-Fine, Disposable ()/2 per month        Miscellaneous: [x] Chin Strap ()/ 1 per 6 months [] O2 bleed-in:       LPM   [] Oximetry on CPAP/Bilevel []  Other:          Start Order Date: 24    MEDICAL JUSTIFICATION:  I, the undersigned, certify that the above prescribed supplies are medically necessary for this patient’s wellbeing.  In my

## 2024-09-04 NOTE — TELEPHONE ENCOUNTER
Specialty Medication Service    Date: 2024  Patient's Name: Jensen Davidson YOB: 1965            _____________________________________________________________________________________________  Called pt today to check and see if he is still taking Repatha as he is over due for refill.  Per pt he was on vacation when his Repatha refill was due and needs to fill it as it is still a current medication for him.  Instructed pt to call [facility] today to schedule his next Repatha fill.    Of note, pt's lipid panel from 24 resulted.  Went over lab results with the pt.  Total cholesterol, triglycerides, and LDL showed improvement.  Pt's HDL was still low at 31 mg/dL.  Educated pt on the following non-pharmacologic ways to help increase HDL: increase physical activity, eat low carb and low sugar diet, incorporate fish into diet, increase amount of fiber rich foods in diet (whole or cut fruit, vegetables, salads, whole grains).    Seun Powell, Pharmacist  Specialty Medication Services    For Pharmacy Admin Tracking Only    Program: SMS  CPA in place:  Yes  Recommendation Provided To: Patient/Caregiver: 1 via Telephone  Intervention Detail: Adherence Monitorin  Intervention Accepted By: Patient/Caregiver: 1  Time Spent (min): 20

## 2024-09-04 NOTE — PROGRESS NOTES
Chief complaint  This is a 58 y.o. year old male  who comes to see me with a chief complaint of   Chief Complaint   Patient presents with    Follow-up    COPD     ILD     HPI  Here with cc of ILD, COPD    He is doing ok.  He knows his limitations.  He went on vacation to [place] and did ok.  Upon return he got sick.  He did take antibiotics and prednisone with improvement and resolution of his symptoms.  He is essentially at baseline which might be a little more SOB.  He tires to stay active. On breztri, albuterol and oxygen.  Uses bilevel machine with sleep.  Needs supplies.  Has not scheduled his ECHO yet as I wanted to screen for PH        Historical:  He had been following with Dr. Burt for ILD related to bronchiolitis.  Jensen had been concerned about following up all the way out in [facility] and asked if he could follow up with me.  I asked Dr. Burt and he was ok with it.  Dr. Burt has been following Jensen for years.  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  He was tried on various immune suppressant with no improvement in symptoms.  Last medication he was on was cellcept >9 years ago.  He was seen at [facility] for lung transplant in the past, but was not felt to be sick enough to require transplant, nor was his weight low enough.  He has not been seen at [facility] since then.        Current Outpatient Medications:     predniSONE (DELTASONE) 10 MG tablet, 40mg for 3days 30mg for 3days 20mg for 3days 10mg for 3days, Disp: 30 tablet, Rfl: 0    predniSONE (DELTASONE) 10 MG tablet, 40mg for 3days 30mg for 3days 20mg for 3days 10mg for 3days, Disp: 30 tablet, Rfl: 0    amLODIPine (NORVASC) 2.5 MG tablet, TAKE 1 TABLET BY MOUTH ONE TIME A DAY, Disp: 90 tablet, Rfl: 1    aspirin (ASPIRIN LOW DOSE) 81 MG chewable tablet, RESTART IN 5 DAYS CHEW AND SWALLOW ONE TABLET BY MOUTH ONE TIME A DAY, Disp: 90 tablet, Rfl: 1    rosuvastatin (CRESTOR) 40 MG tablet, TAKE 1 TABLET BY
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
## 2024-10-22 ENCOUNTER — OFFICE VISIT (OUTPATIENT)
Dept: CARDIOLOGY CLINIC | Age: 59
End: 2024-10-22
Payer: MEDICARE

## 2024-10-22 VITALS
OXYGEN SATURATION: 90 % | BODY MASS INDEX: 26.31 KG/M2 | HEIGHT: 70 IN | WEIGHT: 183.8 LBS | DIASTOLIC BLOOD PRESSURE: 72 MMHG | RESPIRATION RATE: 18 BRPM | SYSTOLIC BLOOD PRESSURE: 124 MMHG | HEART RATE: 70 BPM

## 2024-10-22 DIAGNOSIS — I10 ESSENTIAL HYPERTENSION: ICD-10-CM

## 2024-10-22 DIAGNOSIS — I25.10 CORONARY ARTERY DISEASE INVOLVING NATIVE CORONARY ARTERY OF NATIVE HEART WITHOUT ANGINA PECTORIS: Primary | ICD-10-CM

## 2024-10-22 DIAGNOSIS — J84.9 ILD (INTERSTITIAL LUNG DISEASE) (HCC): ICD-10-CM

## 2024-10-22 DIAGNOSIS — E78.5 HYPERLIPIDEMIA LDL GOAL <70: ICD-10-CM

## 2024-10-22 PROCEDURE — 1036F TOBACCO NON-USER: CPT | Performed by: INTERNAL MEDICINE

## 2024-10-22 PROCEDURE — 3017F COLORECTAL CA SCREEN DOC REV: CPT | Performed by: INTERNAL MEDICINE

## 2024-10-22 PROCEDURE — G8484 FLU IMMUNIZE NO ADMIN: HCPCS | Performed by: INTERNAL MEDICINE

## 2024-10-22 PROCEDURE — 3074F SYST BP LT 130 MM HG: CPT | Performed by: INTERNAL MEDICINE

## 2024-10-22 PROCEDURE — 3078F DIAST BP <80 MM HG: CPT | Performed by: INTERNAL MEDICINE

## 2024-10-22 PROCEDURE — 99214 OFFICE O/P EST MOD 30 MIN: CPT | Performed by: INTERNAL MEDICINE

## 2024-10-22 PROCEDURE — G8417 CALC BMI ABV UP PARAM F/U: HCPCS | Performed by: INTERNAL MEDICINE

## 2024-10-22 PROCEDURE — G8427 DOCREV CUR MEDS BY ELIG CLIN: HCPCS | Performed by: INTERNAL MEDICINE

## 2024-10-22 PROCEDURE — 93000 ELECTROCARDIOGRAM COMPLETE: CPT | Performed by: INTERNAL MEDICINE

## 2024-11-12 ENCOUNTER — HOSPITAL ENCOUNTER (OUTPATIENT)
Dept: PULMONOLOGY | Age: 59
Discharge: HOME | End: 2024-11-12
Attending: INTERNAL MEDICINE
Payer: MEDICARE

## 2024-11-12 ENCOUNTER — TELEPHONE (OUTPATIENT)
Dept: PHARMACY | Facility: HOSPITAL | Age: 59
End: 2024-11-12

## 2024-11-12 ENCOUNTER — HOSPITAL ENCOUNTER (OUTPATIENT)
Dept: CT IMAGING | Age: 59
Discharge: HOME | End: 2024-11-12
Attending: INTERNAL MEDICINE
Payer: MEDICARE

## 2024-11-12 DIAGNOSIS — J44.89 FOLLICULAR BRONCHIOLITIS (HCC): ICD-10-CM

## 2024-11-12 DIAGNOSIS — J44.9 COPD, SEVERE (HCC): ICD-10-CM

## 2024-11-12 LAB
DLCO %PRED: 47 %
DLCO PRED: NORMAL
DLCO/VA %PRED: NORMAL
DLCO/VA PRED: NORMAL
DLCO/VA: NORMAL
DLCO: 12.58 ML/MIN/MMHG
EXPIRATORY TIME-POST: NORMAL
EXPIRATORY TIME: NORMAL
FEF 25-75 %CHNG: NORMAL
FEF 25-75 POST %PRED: NORMAL
FEF 25-75% %PRED-PRE: NORMAL
FEF 25-75% PRED: NORMAL
FEF 25-75-POST: NORMAL
FEF 25-75-PRE: NORMAL
FEV1 %PRED-POST: 28 %
FEV1 %PRED-PRE: 22 %
FEV1 PRED: NORMAL
FEV1-POST: NORMAL
FEV1-PRE: NORMAL
FEV1/FVC %PRED-POST: NORMAL
FEV1/FVC %PRED-PRE: NORMAL
FEV1/FVC PRED: NORMAL
FEV1/FVC-POST: 35 %
FEV1/FVC-PRE: 30 %
FVC %PRED-POST: 64 L
FVC %PRED-PRE: 59 %
FVC PRED: NORMAL
FVC-POST: 2.86 L
FVC-PRE: 2.62 L
GAW %PRED: NORMAL
GAW PRED: NORMAL
GAW: NORMAL
IC PRE %PRED: NORMAL
IC PRED: NORMAL
IC: NORMAL
MEP: NORMAL
MIP: NORMAL
MVV %PRED-PRE: NORMAL
MVV PRED: NORMAL
MVV-PRE: NORMAL
PEF %PRED-POST: NORMAL
PEF %PRED-PRE: NORMAL
PEF PRED: NORMAL
PEF%CHNG: NORMAL
PEF-POST: NORMAL
PEF-PRE: NORMAL
RAW %PRED: 113 %
RAW PRED: NORMAL
RAW: NORMAL
RV PRE %PRED: NORMAL
RV PRED: NORMAL
RV: NORMAL
SVC %PRED: NORMAL
SVC PRED: NORMAL
SVC: NORMAL
TLC PRE %PRED: 119 %
TLC PRED: NORMAL
TLC: NORMAL
VA %PRED: 76 %
VA PRED: NORMAL
VA: 4.71 L
VTG %PRED: NORMAL
VTG PRED: NORMAL
VTG: NORMAL

## 2024-11-12 PROCEDURE — 94060 EVALUATION OF WHEEZING: CPT

## 2024-11-12 PROCEDURE — 6370000000 HC RX 637 (ALT 250 FOR IP): Performed by: INTERNAL MEDICINE

## 2024-11-12 PROCEDURE — 71250 CT THORAX DX C-: CPT

## 2024-11-12 PROCEDURE — 94726 PLETHYSMOGRAPHY LUNG VOLUMES: CPT

## 2024-11-12 PROCEDURE — 94729 DIFFUSING CAPACITY: CPT

## 2024-11-12 PROCEDURE — 94640 AIRWAY INHALATION TREATMENT: CPT

## 2024-11-12 PROCEDURE — 94664 DEMO&/EVAL PT USE INHALER: CPT

## 2024-11-12 RX ORDER — ALBUTEROL SULFATE 90 UG/1
4 INHALANT RESPIRATORY (INHALATION) ONCE
Status: COMPLETED | OUTPATIENT
Start: 2024-11-12 | End: 2024-11-12

## 2024-11-12 RX ADMIN — ALBUTEROL SULFATE 4 PUFF: 90 AEROSOL, METERED RESPIRATORY (INHALATION) at 09:46

## 2024-11-12 ASSESSMENT — PULMONARY FUNCTION TESTS
FVC_POST: 2.86
FEV1_PERCENT_PREDICTED_POST: 28
FEV1/FVC_POST: 35
FVC_PRE: 2.62
FEV1/FVC_PRE: 30
FVC_PERCENT_PREDICTED_POST: 64
FVC_PERCENT_PREDICTED_PRE: 59
FEV1_PERCENT_PREDICTED_PRE: 22

## 2024-11-12 NOTE — TELEPHONE ENCOUNTER
Specialty Medication Service    Date: 11/12/2024  Patient's Name: Jensen Davidson YOB: 1965            _____________________________________________________________________________________________    Spoke with patient to reschedule PharmD follow up appointment for Specialty Medication Services. Patient scheduled 11/25/24 at 930 am.      Stacey Soni CPhT  Pharmacy   Specialty Medication Services   Phone: [phone] option 4    For Pharmacy Admin Tracking Only    Program: [facility]  CPA in place:  Yes  Recommendation Provided To: Patient/Caregiver: 1 via Telephone  Intervention Detail: Scheduled Appointment  Intervention Accepted By: Patient/Caregiver: 1  Gap Closed?:    Time Spent (min): 15

## 2024-11-14 NOTE — PROCEDURES
PROCEDURE NOTE  Date: 11/14/2024   Name: Jensen Davidson  YOB: 1965    Reason for PFT:  Follicular bronchitis     Spirometry  Spirometry shows very severe obstructive defect.  The FVC is diminished suggesting a possible restrictive defect; suggest lung volumes if clinically indicated.    Lung Volumes  Hyperinflation is present.  Air trapping is present.    Bronchodilator  There is no significant response to bronchodilator demonstrated.   [Increase in FEV1 and/or FVC => 12% of control and => 200 ml]    Flow-Volume Loop  The flow-volume loop is compatible with an obstructive process.    Diffusing Capacity  Diffusing capacity is moderately decreased.       [40-60%]    Overall Interpretation  Very severe obstruction is present with air trapping and hyperinflation    No restriction is present    There is a non-significant bronchodilator response present    Diffusion capacity is moderately reduced     FEV1 is 0.79 L at 22% predicted.    FVC is 2.62 L at 59% predicted    FEV1/FVC ratio is 30    Very severe obstruction with non-significant bronchodilator response.  Hyperinflation and air trapping are present.  Moderately reduced diffusing capacity.  When compared to PFT on 9/2023, there is no significant change with continued presence of obstruction, air trapping and hyperinflation.         Obstruction severity and Restriction Severity using FEV1 %  Gold Stage Severity per ERS/ATS FEV1 % per ERS/ATS   GOLD I:  FEV1>80% Mild COPD >70   GOLD II:  FEV1 50-79% Moderate 50-70   GOLD III:  FEV1 30-49% Severe 35-50   GOLD IV:  FEV1 <30 Very Severe  <35       DCLO:  Normal:  %  Mild:  65-70%  Moderate:  41-60%  Severe:  <40%    PFT data will be scanned into the media tab in epic.    Neeraj Quiles DO  [facility]

## 2024-11-25 ENCOUNTER — PHARMACY VISIT (OUTPATIENT)
Dept: PHARMACY | Facility: HOSPITAL | Age: 59
End: 2024-11-25

## 2024-11-25 DIAGNOSIS — E78.2 MIXED HYPERLIPIDEMIA: Primary | ICD-10-CM

## 2024-11-25 DIAGNOSIS — E78.2 MIXED HYPERLIPIDEMIA: ICD-10-CM

## 2024-11-25 RX ORDER — EVOLOCUMAB 140 MG/ML
INJECTION, SOLUTION SUBCUTANEOUS
Qty: 6 ML | Refills: 3 | Status: SHIPPED | OUTPATIENT
Start: 2024-11-25

## 2024-11-25 ASSESSMENT — PATIENT HEALTH QUESTIONNAIRE - PHQ9
8. MOVING OR SPEAKING SO SLOWLY THAT OTHER PEOPLE COULD HAVE NOTICED. OR THE OPPOSITE, BEING SO FIGETY OR RESTLESS THAT YOU HAVE BEEN MOVING AROUND A LOT MORE THAN USUAL: NOT AT ALL
4. FEELING TIRED OR HAVING LITTLE ENERGY: NOT AT ALL
5. POOR APPETITE OR OVEREATING: NOT AT ALL
SUM OF ALL RESPONSES TO PHQ9 QUESTIONS 1 & 2: 0
SUM OF ALL RESPONSES TO PHQ QUESTIONS 1-9: 0
9. THOUGHTS THAT YOU WOULD BE BETTER OFF DEAD, OR OF HURTING YOURSELF: NOT AT ALL
7. TROUBLE CONCENTRATING ON THINGS, SUCH AS READING THE NEWSPAPER OR WATCHING TELEVISION: NOT AT ALL
6. FEELING BAD ABOUT YOURSELF - OR THAT YOU ARE A FAILURE OR HAVE LET YOURSELF OR YOUR FAMILY DOWN: NOT AT ALL
SUM OF ALL RESPONSES TO PHQ QUESTIONS 1-9: 0
SUM OF ALL RESPONSES TO PHQ QUESTIONS 1-9: 0
2. FEELING DOWN, DEPRESSED OR HOPELESS: NOT AT ALL
SUM OF ALL RESPONSES TO PHQ QUESTIONS 1-9: 0
3. TROUBLE FALLING OR STAYING ASLEEP: NOT AT ALL
10. IF YOU CHECKED OFF ANY PROBLEMS, HOW DIFFICULT HAVE THESE PROBLEMS MADE IT FOR YOU TO DO YOUR WORK, TAKE CARE OF THINGS AT HOME, OR GET ALONG WITH OTHER PEOPLE: NOT DIFFICULT AT ALL
1. LITTLE INTEREST OR PLEASURE IN DOING THINGS: NOT AT ALL

## 2024-11-25 NOTE — TELEPHONE ENCOUNTER
Last OV:10/22/2024  Last Labs:08/29/2022  Last Refills:01/03/2024  Next Appt:10/28/2025  Last EKG: 10/22/2024
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
## 2024-12-03 ENCOUNTER — TELEPHONE (OUTPATIENT)
Dept: PHARMACY | Facility: HOSPITAL | Age: 59
End: 2024-12-03

## 2024-12-03 DIAGNOSIS — E78.2 MIXED HYPERLIPIDEMIA: Primary | ICD-10-CM

## 2024-12-03 NOTE — TELEPHONE ENCOUNTER
Specialty Medication Service    Date: 12/3/2024  Patient's Name: Jensen Davidson YOB: 1965            _____________________________________________________________________________________________     Jensen Davidson is a 58 y.o.  male enrolled in the Specialty Medication Service program. Refill request received for Repatha.    Patient does have active CPA on file.   Patient last SMS pharmacist visit was within the last 6 months.  Patient last medical director visit was within the last year.  Patient has seen their specialist within the last year.   Labs were reviewed.   [facility] medical director referral is on file: yes  Next SMS visit is anticipated for 5/2025.   Correct SMS department for prescribing yes    Chart reviewed. No significant concerns noted, patient is compliant with the program.     Will approve the refill for two 3-month refills, per the original provider order.      Seun Powell, Pharmacist  Specialty Medication Services    For Pharmacy Admin Tracking Only    Program: [facility]  CPA in place:  Yes  Recommendation Provided To: Pharmacy: 1  Intervention Detail: Refill(s) Provided  Intervention Accepted By: Pharmacy: 1  Time Spent (min): 20

## 2024-12-04 ENCOUNTER — OFFICE VISIT (OUTPATIENT)
Dept: PULMONOLOGY | Age: 59
End: 2024-12-04
Payer: MEDICARE

## 2024-12-04 VITALS
RESPIRATION RATE: 18 BRPM | HEART RATE: 68 BPM | HEIGHT: 70 IN | OXYGEN SATURATION: 90 % | SYSTOLIC BLOOD PRESSURE: 122 MMHG | WEIGHT: 213.4 LBS | TEMPERATURE: 97.8 F | DIASTOLIC BLOOD PRESSURE: 67 MMHG | BODY MASS INDEX: 30.55 KG/M2

## 2024-12-04 DIAGNOSIS — J96.11 CHRONIC RESPIRATORY FAILURE WITH HYPOXIA: ICD-10-CM

## 2024-12-04 DIAGNOSIS — J44.89 FOLLICULAR BRONCHIOLITIS (HCC): Primary | ICD-10-CM

## 2024-12-04 DIAGNOSIS — G47.33 OSA TREATED WITH BIPAP: ICD-10-CM

## 2024-12-04 DIAGNOSIS — J44.9 COPD, SEVERE (HCC): ICD-10-CM

## 2024-12-04 PROCEDURE — 1036F TOBACCO NON-USER: CPT | Performed by: INTERNAL MEDICINE

## 2024-12-04 PROCEDURE — 3078F DIAST BP <80 MM HG: CPT | Performed by: INTERNAL MEDICINE

## 2024-12-04 PROCEDURE — G8417 CALC BMI ABV UP PARAM F/U: HCPCS | Performed by: INTERNAL MEDICINE

## 2024-12-04 PROCEDURE — G8427 DOCREV CUR MEDS BY ELIG CLIN: HCPCS | Performed by: INTERNAL MEDICINE

## 2024-12-04 PROCEDURE — 3017F COLORECTAL CA SCREEN DOC REV: CPT | Performed by: INTERNAL MEDICINE

## 2024-12-04 PROCEDURE — 3074F SYST BP LT 130 MM HG: CPT | Performed by: INTERNAL MEDICINE

## 2024-12-04 PROCEDURE — G8484 FLU IMMUNIZE NO ADMIN: HCPCS | Performed by: INTERNAL MEDICINE

## 2024-12-04 PROCEDURE — 99214 OFFICE O/P EST MOD 30 MIN: CPT | Performed by: INTERNAL MEDICINE

## 2024-12-04 PROCEDURE — G2211 COMPLEX E/M VISIT ADD ON: HCPCS | Performed by: INTERNAL MEDICINE

## 2024-12-04 PROCEDURE — 3023F SPIROM DOC REV: CPT | Performed by: INTERNAL MEDICINE

## 2024-12-04 RX ORDER — PREDNISONE 10 MG/1
TABLET ORAL
Qty: 30 TABLET | Refills: 0 | Status: SHIPPED | OUTPATIENT
Start: 2024-12-04

## 2024-12-04 RX ORDER — PREDNISONE 5 MG/1
5 TABLET ORAL DAILY
Qty: 90 TABLET | Refills: 3 | Status: SHIPPED | OUTPATIENT
Start: 2024-12-04

## 2024-12-04 RX ORDER — CEFDINIR 300 MG/1
300 CAPSULE ORAL 2 TIMES DAILY
Qty: 14 CAPSULE | Refills: 0 | Status: SHIPPED | OUTPATIENT
Start: 2024-12-04 | End: 2024-12-11

## 2024-12-04 RX ORDER — AZITHROMYCIN 500 MG/1
500 TABLET, FILM COATED ORAL
Qty: 36 TABLET | Refills: 3 | Status: SHIPPED | OUTPATIENT
Start: 2024-12-04

## 2024-12-04 NOTE — PROGRESS NOTES
Chief complaint  This is a 58 y.o. year old male  who comes to see me with a chief complaint of   Chief Complaint   Patient presents with    Follow-up    Bronchiolitis     HPI  Here with cc of ILD, COPD      He is doing fair.  He really thinks his breathing has gotten worse over the past 8 months.  He is compliant with inhalers but still is SOB.  He tries to stay active as best as he can but has noticed more trouble with breathing.  He also feels he is coming down with a cold.  Weight has been relatively stable as he has had had weight loss surgery in the past.  Just did yearly PFT and CT with continued very severe obstruction on PFT      Historical:  He had been following with Dr. Burt for ILD related to bronchiolitis.  Jensen had been concerned about following up all the way out in [facility] and asked if he could follow up with me.  I asked Dr. Burt and he was ok with it.  Dr. Burt has been following Jensen for years.  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  He was tried on various immune suppressant with no improvement in symptoms.  Last medication he was on was cellcept >9 years ago.  He was seen at [facility] for lung transplant in the past, but was not felt to be sick enough to require transplant, nor was his weight low enough.  He has not been seen at [facility] since then.        Current Outpatient Medications:     predniSONE (DELTASONE) 5 MG tablet, Take 1 tablet by mouth daily, Disp: 90 tablet, Rfl: 3    azithromycin (ZITHROMAX) 500 MG tablet, Take 1 tablet by mouth three times a week, Disp: 36 tablet, Rfl: 3    predniSONE (DELTASONE) 10 MG tablet, 40mg for 3days 30mg for 3days 20mg for 3days 10mg for 3days, Disp: 30 tablet, Rfl: 0    cefdinir (OMNICEF) 300 MG capsule, Take 1 capsule by mouth 2 times daily for 7 days, Disp: 14 capsule, Rfl: 0    Evolocumab 140 MG/ML SOAJ, Inject 140 mg into the skin every 14 days, Disp: 6 mL, Rfl: 1    REPATHA SURECLICK 140

## 2024-12-04 NOTE — PATIENT INSTRUCTIONS
Start prednisone 5 mg per day after you finish your burst     Start azithromycin three times per week    Prednisone burst     Omnicef for 7 days     Continue with inhalers and oxygen    Consider pulm rehab    Follow up in 3 months

## 2024-12-08 SDOH — ECONOMIC STABILITY: INCOME INSECURITY: HOW HARD IS IT FOR YOU TO PAY FOR THE VERY BASICS LIKE FOOD, HOUSING, MEDICAL CARE, AND HEATING?: NOT HARD AT ALL

## 2024-12-08 SDOH — ECONOMIC STABILITY: FOOD INSECURITY: WITHIN THE PAST 12 MONTHS, YOU WORRIED THAT YOUR FOOD WOULD RUN OUT BEFORE YOU GOT MONEY TO BUY MORE.: NEVER TRUE

## 2024-12-08 SDOH — ECONOMIC STABILITY: FOOD INSECURITY: WITHIN THE PAST 12 MONTHS, THE FOOD YOU BOUGHT JUST DIDN'T LAST AND YOU DIDN'T HAVE MONEY TO GET MORE.: NEVER TRUE

## 2024-12-09 ENCOUNTER — OFFICE VISIT (OUTPATIENT)
Dept: PRIMARY CARE CLINIC | Age: 59
End: 2024-12-09

## 2024-12-09 VITALS
WEIGHT: 214 LBS | DIASTOLIC BLOOD PRESSURE: 68 MMHG | BODY MASS INDEX: 30.64 KG/M2 | HEIGHT: 70 IN | OXYGEN SATURATION: 90 % | HEART RATE: 74 BPM | SYSTOLIC BLOOD PRESSURE: 110 MMHG

## 2024-12-09 DIAGNOSIS — J44.9 COPD, SEVERE (HCC): ICD-10-CM

## 2024-12-09 DIAGNOSIS — E78.2 MIXED HYPERLIPIDEMIA: ICD-10-CM

## 2024-12-09 DIAGNOSIS — F33.1 MODERATE EPISODE OF RECURRENT MAJOR DEPRESSIVE DISORDER (HCC): ICD-10-CM

## 2024-12-09 DIAGNOSIS — D75.1 POLYCYTHEMIA: ICD-10-CM

## 2024-12-09 DIAGNOSIS — I10 ESSENTIAL HYPERTENSION: ICD-10-CM

## 2024-12-09 DIAGNOSIS — I25.10 CORONARY ARTERY DISEASE INVOLVING NATIVE CORONARY ARTERY OF NATIVE HEART WITHOUT ANGINA PECTORIS: ICD-10-CM

## 2024-12-09 DIAGNOSIS — J84.9 ILD (INTERSTITIAL LUNG DISEASE) (HCC): ICD-10-CM

## 2024-12-09 DIAGNOSIS — J96.11 CHRONIC RESPIRATORY FAILURE WITH HYPOXIA: ICD-10-CM

## 2024-12-09 DIAGNOSIS — F32.5 MAJOR DEPRESSIVE DISORDER WITH SINGLE EPISODE, IN FULL REMISSION (HCC): ICD-10-CM

## 2024-12-09 DIAGNOSIS — Z00.00 INITIAL MEDICARE ANNUAL WELLNESS VISIT: Primary | ICD-10-CM

## 2024-12-09 DIAGNOSIS — E11.9 TYPE 2 DIABETES MELLITUS WITHOUT COMPLICATION, WITHOUT LONG-TERM CURRENT USE OF INSULIN (HCC): ICD-10-CM

## 2024-12-09 RX ORDER — AMLODIPINE BESYLATE 2.5 MG/1
TABLET ORAL
Qty: 90 TABLET | Refills: 1 | Status: SHIPPED | OUTPATIENT
Start: 2024-12-09

## 2024-12-09 RX ORDER — SELENIUM SULFIDE 2.5 MG/100ML
LOTION TOPICAL
Qty: 120 ML | Refills: 1 | Status: SHIPPED | OUTPATIENT
Start: 2024-12-09 | End: 2025-01-08

## 2024-12-09 RX ORDER — ROSUVASTATIN CALCIUM 40 MG/1
TABLET, COATED ORAL
Qty: 90 TABLET | Refills: 1 | Status: SHIPPED | OUTPATIENT
Start: 2024-12-09

## 2024-12-09 RX ORDER — ESCITALOPRAM OXALATE 10 MG/1
10 TABLET ORAL DAILY
Qty: 90 TABLET | Refills: 1 | Status: SHIPPED | OUTPATIENT
Start: 2024-12-09

## 2024-12-09 RX ORDER — CARVEDILOL 6.25 MG/1
6.25 TABLET ORAL 2 TIMES DAILY WITH MEALS
Qty: 180 TABLET | Refills: 1 | Status: SHIPPED | OUTPATIENT
Start: 2024-12-09

## 2024-12-09 RX ORDER — ASPIRIN 81 MG/1
TABLET, CHEWABLE ORAL
Qty: 90 TABLET | Refills: 1 | Status: SHIPPED | OUTPATIENT
Start: 2024-12-09

## 2024-12-09 RX ORDER — LISINOPRIL 10 MG/1
TABLET ORAL
Qty: 90 TABLET | Refills: 1 | Status: SHIPPED | OUTPATIENT
Start: 2024-12-09

## 2024-12-09 ASSESSMENT — PATIENT HEALTH QUESTIONNAIRE - PHQ9
SUM OF ALL RESPONSES TO PHQ QUESTIONS 1-9: 0
9. THOUGHTS THAT YOU WOULD BE BETTER OFF DEAD, OR OF HURTING YOURSELF: NOT AT ALL
6. FEELING BAD ABOUT YOURSELF - OR THAT YOU ARE A FAILURE OR HAVE LET YOURSELF OR YOUR FAMILY DOWN: NOT AT ALL
4. FEELING TIRED OR HAVING LITTLE ENERGY: NOT AT ALL
SUM OF ALL RESPONSES TO PHQ QUESTIONS 1-9: 0
1. LITTLE INTEREST OR PLEASURE IN DOING THINGS: NOT AT ALL
5. POOR APPETITE OR OVEREATING: NOT AT ALL
SUM OF ALL RESPONSES TO PHQ9 QUESTIONS 1 & 2: 0
8. MOVING OR SPEAKING SO SLOWLY THAT OTHER PEOPLE COULD HAVE NOTICED. OR THE OPPOSITE, BEING SO FIGETY OR RESTLESS THAT YOU HAVE BEEN MOVING AROUND A LOT MORE THAN USUAL: NOT AT ALL
7. TROUBLE CONCENTRATING ON THINGS, SUCH AS READING THE NEWSPAPER OR WATCHING TELEVISION: NOT AT ALL
3. TROUBLE FALLING OR STAYING ASLEEP: NOT AT ALL
2. FEELING DOWN, DEPRESSED OR HOPELESS: NOT AT ALL
SUM OF ALL RESPONSES TO PHQ QUESTIONS 1-9: 0
10. IF YOU CHECKED OFF ANY PROBLEMS, HOW DIFFICULT HAVE THESE PROBLEMS MADE IT FOR YOU TO DO YOUR WORK, TAKE CARE OF THINGS AT HOME, OR GET ALONG WITH OTHER PEOPLE: NOT DIFFICULT AT ALL
SUM OF ALL RESPONSES TO PHQ QUESTIONS 1-9: 0

## 2024-12-09 ASSESSMENT — LIFESTYLE VARIABLES
HOW OFTEN DO YOU HAVE A DRINK CONTAINING ALCOHOL: NEVER
HOW MANY STANDARD DRINKS CONTAINING ALCOHOL DO YOU HAVE ON A TYPICAL DAY: PATIENT DOES NOT DRINK

## 2024-12-09 NOTE — PATIENT INSTRUCTIONS
Schedule colonoscopy if ok with Dr. Quiles       Starting a Weight Loss Plan: Care Instructions  Overview     It can be a challenge to lose weight. But your doctor can help you make a weight-loss plan that meets your needs.  You don't have to make a lot of big changes at once. A better idea might be to focus on small changes and stick with them. When those changes become habit, you can add a few more changes.  Some people find it helpful to take an exercise or nutrition class. If you have questions, ask your doctor about seeing a registered dietitian or an exercise specialist. You might also think about joining a weight-loss support group.  If you're not ready to make changes right now, try to pick a date in the future. Then make an appointment with your doctor to talk about when and how you'll get started with a plan.  Follow-up care is a key part of your treatment and safety. Be sure to make and go to all appointments, and call your doctor if you are having problems. It's also a good idea to know your test results and keep a list of the medicines you take.  How can you care for yourself at home?  Set realistic goals. Many people expect to lose much more weight than is likely. A weight loss of 5% to 10% of your body weight may be enough to improve your health.  Get family and friends involved to provide support. Talk to them about why you are trying to lose weight, and ask them to help. They can help by participating in exercise and having meals with you, even if they may be eating something different.  Find what works best for you. If you do not have time or do not like to cook, a program that offers meal replacement bars or shakes may be better for you. Or if you like to prepare meals, finding a plan that includes daily menus and recipes may be best.  Ask your doctor about other health professionals who can help you achieve your weight loss goals.  A dietitian can help you make healthy changes in your diet.  An

## 2024-12-09 NOTE — PROGRESS NOTES
insight and judgement       CareTeam (Including outside providers/suppliers regularly involved in providing care):   Patient Care Team:  Margie Maddox MD as PCP - General (Family Medicine)  Margie Maddox MD as PCP - Empaneled Provider  Te Burt MD as Consulting Physician (Pulmonology)      Reviewed and updated this visit:  Tobacco  Allergies  Meds  Med Hx  Surg Hx  Soc Hx  Fam Hx          EMR Dragon/transcription disclaimer:  Much of this encounter note is electronic transcription/translation of spoken language to printed texts.  The electronic translation of spoken language may be erroneous, or at times, nonsensical words or phrases may be inadvertently transcribed.  Although I have reviewed the note for such errors, some may still exist.

## 2024-12-10 DIAGNOSIS — E78.2 MIXED HYPERLIPIDEMIA: ICD-10-CM

## 2024-12-10 DIAGNOSIS — E11.9 TYPE 2 DIABETES MELLITUS WITHOUT COMPLICATION, WITHOUT LONG-TERM CURRENT USE OF INSULIN (HCC): ICD-10-CM

## 2024-12-10 DIAGNOSIS — I10 ESSENTIAL HYPERTENSION: ICD-10-CM

## 2024-12-10 LAB
ALBUMIN SERPL-MCNC: 4.6 G/DL (ref 3.4–5)
ALBUMIN/GLOB SERPL: 1.6 {RATIO} (ref 1.1–2.2)
ALP SERPL-CCNC: 92 U/L (ref 40–129)
ALT SERPL-CCNC: 18 U/L (ref 10–40)
ANION GAP SERPL CALCULATED.3IONS-SCNC: 11 MMOL/L (ref 3–16)
AST SERPL-CCNC: 23 U/L (ref 15–37)
BILIRUB SERPL-MCNC: 0.8 MG/DL (ref 0–1)
BUN SERPL-MCNC: 13 MG/DL (ref 7–20)
CALCIUM SERPL-MCNC: 9.2 MG/DL (ref 8.3–10.6)
CHLORIDE SERPL-SCNC: 106 MMOL/L (ref 99–110)
CO2 SERPL-SCNC: 26 MMOL/L (ref 21–32)
CREAT SERPL-MCNC: 1.1 MG/DL (ref 0.9–1.3)
EST. AVERAGE GLUCOSE BLD GHB EST-MCNC: 131.2 MG/DL
GFR SERPLBLD CREATININE-BSD FMLA CKD-EPI: 78 ML/MIN/{1.73_M2}
GLUCOSE SERPL-MCNC: 76 MG/DL (ref 70–99)
HBA1C MFR BLD: 6.2 %
POTASSIUM SERPL-SCNC: 4.4 MMOL/L (ref 3.5–5.1)
PROT SERPL-MCNC: 7.5 G/DL (ref 6.4–8.2)
SODIUM SERPL-SCNC: 143 MMOL/L (ref 136–145)

## 2025-01-08 ENCOUNTER — PHARMACY VISIT (OUTPATIENT)
Dept: PHARMACY | Facility: HOSPITAL | Age: 60
End: 2025-01-08

## 2025-01-08 DIAGNOSIS — E78.2 MIXED HYPERLIPIDEMIA: Primary | ICD-10-CM

## 2025-01-08 NOTE — PROGRESS NOTES
Specialty Medication Follow up Virtual Visit  [facility]  [facility]ICATION [facility]  [address]  [place] [address]  Dept: [phone]  Dept Fax: [phone]  Loc: [phone]  Date of patient's visit: 1/8/2025  Patient's Name:  Jensen Davidson YOB: 1965            Patient Care Team:  Margie Maddox MD as PCP - General (Family Medicine)  Margie Maddox MD as PCP - Empaneled Provider  Te Burt MD as Consulting Physician (Pulmonology)  ================================================================    REASON FOR VISIT/CHIEF COMPLAINT:  Hyperlipidemia    HISTORY OF PRESENTING ILLNESS:    Repatha     Specialty Medication: Repatha 140 mg  Frequency: every 14 days  Indication: Hyperlipidemia  Initially Diagnosed: He is unsure of the date of diagnosis.   Additional Therapy:   Crestor 40mg po daily  Previous Therapy:   Lipitor-stopped due to cramps in legs     Specialist: Wesley Salguero  [address]  [place] [address]  Specialist Progress Note Available: Yes    Hyperlipidemia:       This is a chronic, controlled problem. He has known CAD s/p stent placement. He was unable to tolerate Lipitor due to leg cramps. He was unable to get to goal LDL with Crestor only. He is now taking Crestor and Rapatha. He is tolerating this well with no side effects from these. His LDL has improved to 33 on 8/27/24.    DIAGNOSTIC FINDINGS:  CBC:  Lab Results   Component Value Date/Time    WBC 6.3 09/26/2023 11:12 AM    HGB 17.3 09/26/2023 11:12 AM     09/26/2023 11:12 AM       BMP:    Lab Results   Component Value Date/Time     12/10/2024 09:41 AM    K 4.4 12/10/2024 09:41 AM    K 4.0 10/03/2018 05:51 AM     12/10/2024 09:41 AM    CO2 26 12/10/2024 09:41 AM    BUN 13 12/10/2024 09:41 AM    CREATININE 1.1 12/10/2024 09:41 AM    GLUCOSE 76 12/10/2024 09:41 AM       HEMOGLOBIN A1C:   Lab Results

## 2025-03-06 ENCOUNTER — OFFICE VISIT (OUTPATIENT)
Dept: PULMONOLOGY | Age: 60
End: 2025-03-06
Payer: MEDICARE

## 2025-03-06 VITALS
TEMPERATURE: 98.2 F | RESPIRATION RATE: 17 BRPM | HEIGHT: 70 IN | SYSTOLIC BLOOD PRESSURE: 141 MMHG | WEIGHT: 196 LBS | HEART RATE: 82 BPM | BODY MASS INDEX: 28.06 KG/M2 | DIASTOLIC BLOOD PRESSURE: 79 MMHG | OXYGEN SATURATION: 90 %

## 2025-03-06 DIAGNOSIS — J96.11 CHRONIC RESPIRATORY FAILURE WITH HYPOXIA (HCC): ICD-10-CM

## 2025-03-06 DIAGNOSIS — J84.9 INTERSTITIAL PULMONARY DISEASE, UNSPECIFIED (HCC): ICD-10-CM

## 2025-03-06 DIAGNOSIS — G47.33 OSA TREATED WITH BIPAP: ICD-10-CM

## 2025-03-06 DIAGNOSIS — J44.89 FOLLICULAR BRONCHIOLITIS (HCC): ICD-10-CM

## 2025-03-06 DIAGNOSIS — J44.9 COPD, VERY SEVERE (HCC): Primary | ICD-10-CM

## 2025-03-06 PROCEDURE — 3023F SPIROM DOC REV: CPT | Performed by: INTERNAL MEDICINE

## 2025-03-06 PROCEDURE — G8427 DOCREV CUR MEDS BY ELIG CLIN: HCPCS | Performed by: INTERNAL MEDICINE

## 2025-03-06 PROCEDURE — 3077F SYST BP >= 140 MM HG: CPT | Performed by: INTERNAL MEDICINE

## 2025-03-06 PROCEDURE — 3078F DIAST BP <80 MM HG: CPT | Performed by: INTERNAL MEDICINE

## 2025-03-06 PROCEDURE — 1036F TOBACCO NON-USER: CPT | Performed by: INTERNAL MEDICINE

## 2025-03-06 PROCEDURE — G8417 CALC BMI ABV UP PARAM F/U: HCPCS | Performed by: INTERNAL MEDICINE

## 2025-03-06 PROCEDURE — 3017F COLORECTAL CA SCREEN DOC REV: CPT | Performed by: INTERNAL MEDICINE

## 2025-03-06 PROCEDURE — 99214 OFFICE O/P EST MOD 30 MIN: CPT | Performed by: INTERNAL MEDICINE

## 2025-03-06 RX ORDER — PREDNISONE 20 MG/1
20 TABLET ORAL DAILY
Qty: 90 TABLET | Refills: 3 | Status: SHIPPED | OUTPATIENT
Start: 2025-03-06

## 2025-03-06 NOTE — PROGRESS NOTES
the reason for decline in breathing.  May be related to his bronchiolitis.  Needs to be evaluated at lung transplant center   - predniSONE (DELTASONE) 20 MG tablet; Take 1 tablet by mouth daily For 4 weeks, then 1/2 tablet a day  Dispense: 90 tablet; Refill: 3  - [facility] [place]    3. Chronic respiratory failure with hypoxia (HCC)  Uses and benefits from oxygen.  Needs to use it more often in order to see if his exercise capacity can improve.  Also needs to do rehab again     4. ESTRELLITA treated with BiPAP  Historically benefiting and compliant    Likely need to start evaluating lung transplant centers as he has progressed rapidly over the past 8 months or so.  Doubt he could have zephyr valve due to his ILD      Follow up in 3 months.      Pulmonary Rehab:  Resume pulmonary rehab     Lung Cancer Screening CT:  Does not qualify due to >15 years since he quit and age <55

## 2025-03-06 NOTE — PATIENT INSTRUCTIONS
Start prednisone 20 mg per day for 4 weeks then drop to 10 mg per day    Check on referral centers for lung transplant     Use oxygen more    Follow up in 2-3 months

## 2025-03-31 NOTE — PROGRESS NOTES
[facility]  PHYSICIAN PULMONARY REHABILITATION ORDER  Medical Director:  Dr. Víctor Quiles  (X)Phase II Pulmonary Rehabilitation - Hospital Facility-based, supervised exercise with SpO2 / HR monitoring and Oxygen Titration (if necessary) each session, 2-3 times weekly, with individualized education sessions.    Patient Name: Jensen Davidson  : 1965  Referring Physician: Dr. Quiles     Date: 3/31/2025    Medically Necessary Pulmonary Rehab for:  severe COPD (from my dictation or the PFT report), which is GOLD Stage 3.      Physician Prescribed Exercise:  Length of program:  Up to 36 sessions, subject to insurance limitations. Program to include aerobic endurance conditioning, resistance training (RT), step training (ST), flexibility training, and education (all relevant topics including psychosocial assessment).    FITT Principles + Progression for Exercise Prescription (also found on the ITP):     Frequency: 2-3x / wk for up to 36 sessions    Intensity:   Set from Initial 6MWT (Feet achieved converted to METs)   Type:          Aerobic and Strength (Treadmill, AD, NuStep, SciFit, UBE, RT, ST)   Time:        15-60 min. of Treatment; Aerobic, RT, ST, Rests and Education  Progression:  1-2 minute increase in Time, per Type, per session, 5-20% increase in Intensity per week if SpO2 >88 AND Abhay RPE/RPD is <14      Note:  These are guidelines. The Pulmonary Rehab staff may adjust the treatment to suit the patient's individual needs, goals, oxygen saturation and functional level.    Plan of Care:  Pulmonary Rehab aerobic endurance and strength training sessions for a total of 30-91 min/day, including Education time, 2-3 days/week with suggested supplemented matching minutes of walking at home on most days not participating in Pulmonary Rehab.  Patient is willing to cooperate and participate in the plan of care. Patient is physically able, motivated, and willing to participate in RI, and I

## 2025-04-01 ENCOUNTER — HOSPITAL ENCOUNTER (OUTPATIENT)
Dept: CARDIAC REHAB | Age: 60
Setting detail: THERAPIES SERIES
Discharge: HOME | End: 2025-04-01
Payer: MEDICARE

## 2025-04-01 PROCEDURE — 94625 PHY/QHP OP PULM RHB W/O MNTR: CPT

## 2025-04-03 ENCOUNTER — APPOINTMENT (OUTPATIENT)
Dept: CARDIAC REHAB | Age: 60
End: 2025-04-03
Payer: COMMERCIAL

## 2025-04-08 ENCOUNTER — APPOINTMENT (OUTPATIENT)
Dept: CARDIAC REHAB | Age: 60
End: 2025-04-08
Payer: COMMERCIAL

## 2025-04-10 ENCOUNTER — APPOINTMENT (OUTPATIENT)
Dept: CARDIAC REHAB | Age: 60
End: 2025-04-10
Payer: COMMERCIAL

## 2025-04-15 ENCOUNTER — HOSPITAL ENCOUNTER (OUTPATIENT)
Dept: CARDIAC REHAB | Age: 60
Setting detail: THERAPIES SERIES
Discharge: HOME | End: 2025-04-15
Payer: MEDICARE

## 2025-04-15 PROCEDURE — 94625 PHY/QHP OP PULM RHB W/O MNTR: CPT

## 2025-04-17 ENCOUNTER — TELEPHONE (OUTPATIENT)
Dept: PULMONOLOGY | Age: 60
End: 2025-04-17

## 2025-04-17 ENCOUNTER — HOSPITAL ENCOUNTER (OUTPATIENT)
Dept: CARDIAC REHAB | Age: 60
Setting detail: THERAPIES SERIES
Discharge: HOME | End: 2025-04-17
Payer: MEDICARE

## 2025-04-17 PROCEDURE — 94625 PHY/QHP OP PULM RHB W/O MNTR: CPT

## 2025-04-17 NOTE — TELEPHONE ENCOUNTER
I've tried to fax the lung transplant physician referral form in whole, it's a little over 60 pages, and separately in 3 sections. Each time fax# [phone] comes back as busy. I called the number on it [phone] to speak with a lung transplant coordinator. Noone answered during business hours. I left a message asking if there's another way to send this paperwork and to see if they received any of the faxes.

## 2025-04-22 ENCOUNTER — HOSPITAL ENCOUNTER (OUTPATIENT)
Dept: CARDIAC REHAB | Age: 60
Setting detail: THERAPIES SERIES
Discharge: HOME | End: 2025-04-22
Payer: MEDICARE

## 2025-04-22 PROCEDURE — 94625 PHY/QHP OP PULM RHB W/O MNTR: CPT

## 2025-04-24 ENCOUNTER — HOSPITAL ENCOUNTER (OUTPATIENT)
Dept: CARDIAC REHAB | Age: 60
Setting detail: THERAPIES SERIES
Discharge: HOME | End: 2025-04-24
Payer: MEDICARE

## 2025-04-24 PROCEDURE — 94625 PHY/QHP OP PULM RHB W/O MNTR: CPT

## 2025-04-29 ENCOUNTER — APPOINTMENT (OUTPATIENT)
Dept: CARDIAC REHAB | Age: 60
End: 2025-04-29
Payer: COMMERCIAL

## 2025-05-01 ENCOUNTER — HOSPITAL ENCOUNTER (OUTPATIENT)
Dept: CARDIAC REHAB | Age: 60
Setting detail: THERAPIES SERIES
Discharge: HOME | End: 2025-05-01
Payer: MEDICARE

## 2025-05-01 PROCEDURE — 94625 PHY/QHP OP PULM RHB W/O MNTR: CPT

## 2025-05-06 ENCOUNTER — HOSPITAL ENCOUNTER (OUTPATIENT)
Dept: CARDIAC REHAB | Age: 60
Setting detail: THERAPIES SERIES
Discharge: HOME | End: 2025-05-06
Payer: MEDICARE

## 2025-05-06 ENCOUNTER — OFFICE VISIT (OUTPATIENT)
Dept: PULMONOLOGY | Age: 60
End: 2025-05-06
Payer: MEDICARE

## 2025-05-06 VITALS
HEART RATE: 65 BPM | TEMPERATURE: 97.3 F | BODY MASS INDEX: 29.92 KG/M2 | SYSTOLIC BLOOD PRESSURE: 135 MMHG | HEIGHT: 70 IN | OXYGEN SATURATION: 90 % | RESPIRATION RATE: 18 BRPM | DIASTOLIC BLOOD PRESSURE: 77 MMHG | WEIGHT: 209 LBS

## 2025-05-06 DIAGNOSIS — J44.9 COPD, VERY SEVERE (HCC): Primary | ICD-10-CM

## 2025-05-06 DIAGNOSIS — J96.11 CHRONIC RESPIRATORY FAILURE WITH HYPOXIA (HCC): ICD-10-CM

## 2025-05-06 DIAGNOSIS — J44.89 FOLLICULAR BRONCHIOLITIS (HCC): ICD-10-CM

## 2025-05-06 DIAGNOSIS — G47.33 OSA TREATED WITH BIPAP: ICD-10-CM

## 2025-05-06 PROCEDURE — G8427 DOCREV CUR MEDS BY ELIG CLIN: HCPCS | Performed by: INTERNAL MEDICINE

## 2025-05-06 PROCEDURE — 1036F TOBACCO NON-USER: CPT | Performed by: INTERNAL MEDICINE

## 2025-05-06 PROCEDURE — 3023F SPIROM DOC REV: CPT | Performed by: INTERNAL MEDICINE

## 2025-05-06 PROCEDURE — 94625 PHY/QHP OP PULM RHB W/O MNTR: CPT

## 2025-05-06 PROCEDURE — 3078F DIAST BP <80 MM HG: CPT | Performed by: INTERNAL MEDICINE

## 2025-05-06 PROCEDURE — G8417 CALC BMI ABV UP PARAM F/U: HCPCS | Performed by: INTERNAL MEDICINE

## 2025-05-06 PROCEDURE — 3075F SYST BP GE 130 - 139MM HG: CPT | Performed by: INTERNAL MEDICINE

## 2025-05-06 PROCEDURE — 99214 OFFICE O/P EST MOD 30 MIN: CPT | Performed by: INTERNAL MEDICINE

## 2025-05-06 PROCEDURE — 3017F COLORECTAL CA SCREEN DOC REV: CPT | Performed by: INTERNAL MEDICINE

## 2025-05-06 NOTE — PROGRESS NOTES
Chief complaint  This is a 59 y.o. year old male  who comes to see me with a chief complaint of   Chief Complaint   Patient presents with    Follow-up    COPD    ILD     HPI  Here with cc of ILD, COPD    He has his first visit with [facility] coming up.  He was referred for lung transplant due to decline in breathing. He is active in rehab again with some help.  He is using 4 liters to exercise.  The prednisone has not helped and now he is having issues with his bilevel machine.  Feels like he cannot breathe.  Still on inhalers.  Struggling with this and feeling depressed and angry      Historical:  He had been following with Dr. Burt for ILD related to bronchiolitis.  Jensen had been concerned about following up all the way out in [facility] and asked if he could follow up with me.  I asked Dr. Burt and he was ok with it.  Dr. Burt has been following Jensen for years.  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  He was tried on various immune suppressant with no improvement in symptoms.  Last medication he was on was cellcept >11 years ago.  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       Current Outpatient Medications:     predniSONE (DELTASONE) 20 MG tablet, Take 1 tablet by mouth daily For 4 weeks, then 1/2 tablet a day, Disp: 90 tablet, Rfl: 3    carvedilol (COREG) 6.25 MG tablet, Take 1 tablet by mouth 2 times daily (with meals), Disp: 180 tablet, Rfl: 1    aspirin (ASPIRIN LOW DOSE) 81 MG chewable tablet, RESTART IN 5 DAYS CHEW AND SWALLOW ONE TABLET BY MOUTH ONE TIME A DAY, Disp: 90 tablet, Rfl: 1    rosuvastatin (CRESTOR) 40 MG tablet, TAKE 1 TABLET BY MOUTH ONE TIME A DAY IN THE EVENING, Disp: 90 tablet, Rfl: 1    escitalopram (LEXAPRO) 10 MG tablet, Take 1 tablet by mouth daily, Disp: 90 tablet, Rfl: 1    lisinopril (PRINIVIL;ZESTRIL) 10 MG tablet, TAKE 1

## 2025-05-08 ENCOUNTER — HOSPITAL ENCOUNTER (OUTPATIENT)
Dept: CARDIAC REHAB | Age: 60
Setting detail: THERAPIES SERIES
Discharge: HOME | End: 2025-05-08
Payer: MEDICARE

## 2025-05-08 PROCEDURE — 94625 PHY/QHP OP PULM RHB W/O MNTR: CPT

## 2025-05-13 ENCOUNTER — HOSPITAL ENCOUNTER (OUTPATIENT)
Dept: CARDIAC REHAB | Age: 60
Setting detail: THERAPIES SERIES
Discharge: HOME | End: 2025-05-13
Payer: MEDICARE

## 2025-05-13 PROCEDURE — 94625 PHY/QHP OP PULM RHB W/O MNTR: CPT

## 2025-05-15 ENCOUNTER — HOSPITAL ENCOUNTER (OUTPATIENT)
Dept: CARDIAC REHAB | Age: 60
Setting detail: THERAPIES SERIES
Discharge: HOME | End: 2025-05-15
Payer: MEDICARE

## 2025-05-15 PROCEDURE — 94625 PHY/QHP OP PULM RHB W/O MNTR: CPT

## 2025-05-20 ENCOUNTER — HOSPITAL ENCOUNTER (OUTPATIENT)
Dept: CARDIAC REHAB | Age: 60
Setting detail: THERAPIES SERIES
Discharge: HOME | End: 2025-05-20
Payer: MEDICARE

## 2025-05-20 PROCEDURE — 94625 PHY/QHP OP PULM RHB W/O MNTR: CPT

## 2025-05-22 ENCOUNTER — HOSPITAL ENCOUNTER (OUTPATIENT)
Dept: CARDIAC REHAB | Age: 60
Setting detail: THERAPIES SERIES
Discharge: HOME | End: 2025-05-22
Payer: MEDICARE

## 2025-05-22 PROCEDURE — 94625 PHY/QHP OP PULM RHB W/O MNTR: CPT

## 2025-05-27 ENCOUNTER — HOSPITAL ENCOUNTER (OUTPATIENT)
Dept: CARDIAC REHAB | Age: 60
Setting detail: THERAPIES SERIES
Discharge: HOME | End: 2025-05-27
Payer: MEDICARE

## 2025-05-27 PROCEDURE — 94625 PHY/QHP OP PULM RHB W/O MNTR: CPT

## 2025-05-29 ENCOUNTER — HOSPITAL ENCOUNTER (OUTPATIENT)
Dept: CARDIAC REHAB | Age: 60
Setting detail: THERAPIES SERIES
Discharge: HOME | End: 2025-05-29
Payer: MEDICARE

## 2025-05-29 PROCEDURE — 94625 PHY/QHP OP PULM RHB W/O MNTR: CPT

## 2025-06-02 ENCOUNTER — TELEPHONE (OUTPATIENT)
Facility: HOSPITAL | Age: 60
End: 2025-06-02

## 2025-06-02 NOTE — TELEPHONE ENCOUNTER
Specialty Medication Service    Date: 6/2/2025  Patient's Name: Jensen Davidson YOB: 1965            _____________________________________________________________________________________________    Sent Mychart message to schedule PharmD follow up appointment for Specialty Medication Services.      Eugenia Teixeira CPhT  Clinical   Specialty Medication Services  Phone: [phone] option #4  Fax: [phone]

## 2025-06-03 ENCOUNTER — HOSPITAL ENCOUNTER (OUTPATIENT)
Dept: CARDIAC REHAB | Age: 60
Setting detail: THERAPIES SERIES
Discharge: HOME | End: 2025-06-03
Payer: MEDICARE

## 2025-06-03 LAB
CHOLEST SERPL-MCNC: 143 MG/DL (ref 0–199)
GLUCOSE SERPL-MCNC: 107 MG/DL (ref 70–99)
HDLC SERPL-MCNC: 32 MG/DL (ref 40–60)
LDLC SERPL CALC-MCNC: 96 MG/DL
TRIGL SERPL-MCNC: 77 MG/DL (ref 0–150)

## 2025-06-03 PROCEDURE — 94625 PHY/QHP OP PULM RHB W/O MNTR: CPT

## 2025-06-04 ENCOUNTER — HOSPITAL ENCOUNTER (OUTPATIENT)
Dept: CARDIAC REHAB | Age: 60
Setting detail: THERAPIES SERIES
Discharge: HOME | End: 2025-06-04
Payer: MEDICARE

## 2025-06-04 PROCEDURE — 94625 PHY/QHP OP PULM RHB W/O MNTR: CPT

## 2025-06-06 NOTE — TELEPHONE ENCOUNTER
Specialty Medication Service    Date: 6/6/2025  Patient's Name: Jensen Davidson YOB: 1965            _____________________________________________________________________________________________    Spoke with patient to schedule PharmD follow up appointment for Specialty Medication Services. Patient scheduled 06/17 12p      Eugenia Teixeira CPhT  Clinical   Specialty Medication Services  Phone: [phone] option #4  Fax: [phone]    For Pharmacy Admin Tracking Only    Program: [facility]  CPA in place:  Yes  Recommendation Provided To: Patient/Caregiver: 1 via Telephone  Intervention Detail: Scheduled Appointment  Intervention Accepted By: Patient/Caregiver: 1  Gap Closed?:    Time Spent (min): 10

## 2025-06-10 ENCOUNTER — APPOINTMENT (OUTPATIENT)
Dept: GENERAL RADIOLOGY | Age: 60
DRG: 287 | End: 2025-06-10
Payer: COMMERCIAL

## 2025-06-10 ENCOUNTER — HOSPITAL ENCOUNTER (OUTPATIENT)
Dept: CARDIAC REHAB | Age: 60
Setting detail: THERAPIES SERIES
Discharge: HOME | End: 2025-06-10
Payer: MEDICARE

## 2025-06-10 ENCOUNTER — HOSPITAL ENCOUNTER (INPATIENT)
Age: 60
LOS: 1 days | Discharge: ANOTHER HOSPITAL | DRG: 287 | End: 2025-06-13
Attending: EMERGENCY MEDICINE | Admitting: FAMILY MEDICINE
Payer: COMMERCIAL

## 2025-06-10 DIAGNOSIS — R06.02 SHORTNESS OF BREATH: ICD-10-CM

## 2025-06-10 DIAGNOSIS — R07.89 CHEST PRESSURE: Primary | ICD-10-CM

## 2025-06-10 DIAGNOSIS — R07.9 CHEST PAIN: ICD-10-CM

## 2025-06-10 DIAGNOSIS — Z95.5 HISTORY OF HEART ARTERY STENT: ICD-10-CM

## 2025-06-10 DIAGNOSIS — R07.9 CHEST PAIN, UNSPECIFIED TYPE: ICD-10-CM

## 2025-06-10 DIAGNOSIS — Z71.89 GOALS OF CARE, COUNSELING/DISCUSSION: ICD-10-CM

## 2025-06-10 DIAGNOSIS — J84.9 INTERSTITIAL LUNG DISEASE (HCC): ICD-10-CM

## 2025-06-10 PROBLEM — I20.0 UNSTABLE ANGINA (HCC): Status: ACTIVE | Noted: 2025-06-10

## 2025-06-10 LAB
ANION GAP SERPL CALCULATED.3IONS-SCNC: 11 MMOL/L (ref 3–16)
BASE EXCESS BLDV CALC-SCNC: 1.1 MMOL/L
BASOPHILS # BLD: 0 K/UL (ref 0–0.2)
BASOPHILS NFR BLD: 0.7 %
BUN SERPL-MCNC: 16 MG/DL (ref 7–20)
CALCIUM SERPL-MCNC: 9.2 MG/DL (ref 8.3–10.6)
CHLORIDE SERPL-SCNC: 106 MMOL/L (ref 99–110)
CO2 BLDV-SCNC: 29 MMOL/L
CO2 SERPL-SCNC: 23 MMOL/L (ref 21–32)
COHGB MFR BLDV: 1.5 %
CREAT SERPL-MCNC: 1.1 MG/DL (ref 0.9–1.3)
DEPRECATED RDW RBC AUTO: 14.9 % (ref 12.4–15.4)
EOSINOPHIL # BLD: 0.2 K/UL (ref 0–0.6)
EOSINOPHIL NFR BLD: 4 %
GFR SERPLBLD CREATININE-BSD FMLA CKD-EPI: 78 ML/MIN/{1.73_M2}
GLUCOSE SERPL-MCNC: 122 MG/DL (ref 70–99)
HCO3 BLDV-SCNC: 28 MMOL/L (ref 23–29)
HCT VFR BLD AUTO: 46.9 % (ref 40.5–52.5)
HGB BLD-MCNC: 15.6 G/DL (ref 13.5–17.5)
INR PPP: 0.97 (ref 0.86–1.14)
LYMPHOCYTES # BLD: 1.5 K/UL (ref 1–5.1)
LYMPHOCYTES NFR BLD: 24.2 %
MCH RBC QN AUTO: 25.8 PG (ref 26–34)
MCHC RBC AUTO-ENTMCNC: 33.2 G/DL (ref 31–36)
MCV RBC AUTO: 77.7 FL (ref 80–100)
METHGB MFR BLDV: 0.7 %
MONOCYTES # BLD: 0.7 K/UL (ref 0–1.3)
MONOCYTES NFR BLD: 11.8 %
NEUTROPHILS # BLD: 3.6 K/UL (ref 1.7–7.7)
NEUTROPHILS NFR BLD: 59.3 %
O2 THERAPY: NORMAL
PCO2 BLDV: 49.5 MMHG (ref 40–50)
PH BLDV: 7.36 [PH] (ref 7.35–7.45)
PLATELET # BLD AUTO: 184 K/UL (ref 135–450)
PMV BLD AUTO: 8.5 FL (ref 5–10.5)
PO2 BLDV: 33 MMHG
POTASSIUM SERPL-SCNC: 4.3 MMOL/L (ref 3.5–5.1)
PROTHROMBIN TIME: 13.2 SEC (ref 12.1–14.9)
RBC # BLD AUTO: 6.04 M/UL (ref 4.2–5.9)
SAO2 % BLDV: 59 %
SODIUM SERPL-SCNC: 140 MMOL/L (ref 136–145)
TROPONIN, HIGH SENSITIVITY: <6 NG/L (ref 0–22)
TROPONIN, HIGH SENSITIVITY: <6 NG/L (ref 0–22)
WBC # BLD AUTO: 6 K/UL (ref 4–11)

## 2025-06-10 PROCEDURE — G0378 HOSPITAL OBSERVATION PER HR: HCPCS

## 2025-06-10 PROCEDURE — 93005 ELECTROCARDIOGRAM TRACING: CPT | Performed by: EMERGENCY MEDICINE

## 2025-06-10 PROCEDURE — 71045 X-RAY EXAM CHEST 1 VIEW: CPT

## 2025-06-10 PROCEDURE — 85610 PROTHROMBIN TIME: CPT

## 2025-06-10 PROCEDURE — 99285 EMERGENCY DEPT VISIT HI MDM: CPT

## 2025-06-10 PROCEDURE — 82803 BLOOD GASES ANY COMBINATION: CPT

## 2025-06-10 PROCEDURE — 84484 ASSAY OF TROPONIN QUANT: CPT

## 2025-06-10 PROCEDURE — 36415 COLL VENOUS BLD VENIPUNCTURE: CPT

## 2025-06-10 PROCEDURE — 94625 PHY/QHP OP PULM RHB W/O MNTR: CPT

## 2025-06-10 PROCEDURE — 6370000000 HC RX 637 (ALT 250 FOR IP): Performed by: EMERGENCY MEDICINE

## 2025-06-10 PROCEDURE — 2700000000 HC OXYGEN THERAPY PER DAY

## 2025-06-10 PROCEDURE — 85025 COMPLETE CBC W/AUTO DIFF WBC: CPT

## 2025-06-10 PROCEDURE — 94761 N-INVAS EAR/PLS OXIMETRY MLT: CPT

## 2025-06-10 PROCEDURE — 80048 BASIC METABOLIC PNL TOTAL CA: CPT

## 2025-06-10 RX ORDER — SODIUM CHLORIDE 9 MG/ML
INJECTION, SOLUTION INTRAVENOUS PRN
Status: DISCONTINUED | OUTPATIENT
Start: 2025-06-10 | End: 2025-06-13 | Stop reason: HOSPADM

## 2025-06-10 RX ORDER — BUTALBITAL, ACETAMINOPHEN AND CAFFEINE 50; 325; 40 MG/1; MG/1; MG/1
2 TABLET ORAL ONCE
Status: COMPLETED | OUTPATIENT
Start: 2025-06-10 | End: 2025-06-10

## 2025-06-10 RX ORDER — BUDESONIDE AND FORMOTEROL FUMARATE DIHYDRATE 160; 4.5 UG/1; UG/1
2 AEROSOL RESPIRATORY (INHALATION)
Status: DISCONTINUED | OUTPATIENT
Start: 2025-06-10 | End: 2025-06-13 | Stop reason: HOSPADM

## 2025-06-10 RX ORDER — POTASSIUM CHLORIDE 7.45 MG/ML
10 INJECTION INTRAVENOUS PRN
Status: DISCONTINUED | OUTPATIENT
Start: 2025-06-10 | End: 2025-06-13 | Stop reason: HOSPADM

## 2025-06-10 RX ORDER — ASPIRIN 81 MG/1
81 TABLET, CHEWABLE ORAL DAILY
Status: DISCONTINUED | OUTPATIENT
Start: 2025-06-11 | End: 2025-06-13 | Stop reason: HOSPADM

## 2025-06-10 RX ORDER — MAGNESIUM SULFATE IN WATER 40 MG/ML
2000 INJECTION, SOLUTION INTRAVENOUS PRN
Status: DISCONTINUED | OUTPATIENT
Start: 2025-06-10 | End: 2025-06-13 | Stop reason: HOSPADM

## 2025-06-10 RX ORDER — POLYETHYLENE GLYCOL 3350 17 G/17G
17 POWDER, FOR SOLUTION ORAL DAILY PRN
Status: DISCONTINUED | OUTPATIENT
Start: 2025-06-10 | End: 2025-06-13 | Stop reason: HOSPADM

## 2025-06-10 RX ORDER — ESCITALOPRAM OXALATE 10 MG/1
10 TABLET ORAL DAILY
Status: DISCONTINUED | OUTPATIENT
Start: 2025-06-11 | End: 2025-06-13 | Stop reason: HOSPADM

## 2025-06-10 RX ORDER — NITROGLYCERIN 0.4 MG/1
0.4 TABLET SUBLINGUAL EVERY 5 MIN PRN
Status: DISCONTINUED | OUTPATIENT
Start: 2025-06-10 | End: 2025-06-13 | Stop reason: HOSPADM

## 2025-06-10 RX ORDER — INSULIN LISPRO 100 [IU]/ML
0-4 INJECTION, SOLUTION INTRAVENOUS; SUBCUTANEOUS
Status: DISCONTINUED | OUTPATIENT
Start: 2025-06-10 | End: 2025-06-13 | Stop reason: HOSPADM

## 2025-06-10 RX ORDER — CARVEDILOL 6.25 MG/1
6.25 TABLET ORAL 2 TIMES DAILY WITH MEALS
Status: DISCONTINUED | OUTPATIENT
Start: 2025-06-11 | End: 2025-06-13 | Stop reason: HOSPADM

## 2025-06-10 RX ORDER — DEXTROSE MONOHYDRATE 100 MG/ML
INJECTION, SOLUTION INTRAVENOUS CONTINUOUS PRN
Status: DISCONTINUED | OUTPATIENT
Start: 2025-06-10 | End: 2025-06-13 | Stop reason: HOSPADM

## 2025-06-10 RX ORDER — ONDANSETRON 4 MG/1
4 TABLET, ORALLY DISINTEGRATING ORAL EVERY 8 HOURS PRN
Status: DISCONTINUED | OUTPATIENT
Start: 2025-06-10 | End: 2025-06-13 | Stop reason: HOSPADM

## 2025-06-10 RX ORDER — AZELASTINE 1 MG/ML
2 SPRAY, METERED NASAL 2 TIMES DAILY PRN
Status: DISCONTINUED | OUTPATIENT
Start: 2025-06-10 | End: 2025-06-10

## 2025-06-10 RX ORDER — SODIUM CHLORIDE 0.9 % (FLUSH) 0.9 %
5-40 SYRINGE (ML) INJECTION PRN
Status: DISCONTINUED | OUTPATIENT
Start: 2025-06-10 | End: 2025-06-13 | Stop reason: HOSPADM

## 2025-06-10 RX ORDER — LISINOPRIL 10 MG/1
10 TABLET ORAL DAILY
Status: DISCONTINUED | OUTPATIENT
Start: 2025-06-11 | End: 2025-06-13 | Stop reason: HOSPADM

## 2025-06-10 RX ORDER — SODIUM CHLORIDE 0.9 % (FLUSH) 0.9 %
5-40 SYRINGE (ML) INJECTION EVERY 12 HOURS SCHEDULED
Status: DISCONTINUED | OUTPATIENT
Start: 2025-06-10 | End: 2025-06-13 | Stop reason: HOSPADM

## 2025-06-10 RX ORDER — ACETAMINOPHEN 325 MG/1
650 TABLET ORAL EVERY 6 HOURS PRN
Status: DISCONTINUED | OUTPATIENT
Start: 2025-06-10 | End: 2025-06-13 | Stop reason: HOSPADM

## 2025-06-10 RX ORDER — MAGNESIUM HYDROXIDE/ALUMINUM HYDROXICE/SIMETHICONE 120; 1200; 1200 MG/30ML; MG/30ML; MG/30ML
30 SUSPENSION ORAL EVERY 6 HOURS PRN
Status: DISCONTINUED | OUTPATIENT
Start: 2025-06-10 | End: 2025-06-13 | Stop reason: HOSPADM

## 2025-06-10 RX ORDER — POTASSIUM CHLORIDE 1500 MG/1
40 TABLET, EXTENDED RELEASE ORAL PRN
Status: DISCONTINUED | OUTPATIENT
Start: 2025-06-10 | End: 2025-06-13 | Stop reason: HOSPADM

## 2025-06-10 RX ORDER — ONDANSETRON 2 MG/ML
4 INJECTION INTRAMUSCULAR; INTRAVENOUS EVERY 6 HOURS PRN
Status: DISCONTINUED | OUTPATIENT
Start: 2025-06-10 | End: 2025-06-13 | Stop reason: HOSPADM

## 2025-06-10 RX ORDER — ENOXAPARIN SODIUM 100 MG/ML
30 INJECTION SUBCUTANEOUS 2 TIMES DAILY
Status: DISCONTINUED | OUTPATIENT
Start: 2025-06-11 | End: 2025-06-13 | Stop reason: HOSPADM

## 2025-06-10 RX ORDER — ASPIRIN 81 MG/1
324 TABLET, CHEWABLE ORAL ONCE
Status: COMPLETED | OUTPATIENT
Start: 2025-06-10 | End: 2025-06-10

## 2025-06-10 RX ORDER — ROSUVASTATIN CALCIUM 40 MG/1
40 TABLET, COATED ORAL NIGHTLY
Status: DISCONTINUED | OUTPATIENT
Start: 2025-06-10 | End: 2025-06-13 | Stop reason: HOSPADM

## 2025-06-10 RX ORDER — SODIUM CHLORIDE FOR INHALATION 3 %
15 VIAL, NEBULIZER (ML) INHALATION 2 TIMES DAILY PRN
Status: DISCONTINUED | OUTPATIENT
Start: 2025-06-10 | End: 2025-06-13 | Stop reason: HOSPADM

## 2025-06-10 RX ORDER — ALBUTEROL SULFATE 90 UG/1
2 INHALANT RESPIRATORY (INHALATION) EVERY 4 HOURS PRN
Status: DISCONTINUED | OUTPATIENT
Start: 2025-06-10 | End: 2025-06-13 | Stop reason: HOSPADM

## 2025-06-10 RX ORDER — PANTOPRAZOLE SODIUM 40 MG/1
40 TABLET, DELAYED RELEASE ORAL
Status: DISCONTINUED | OUTPATIENT
Start: 2025-06-11 | End: 2025-06-13 | Stop reason: HOSPADM

## 2025-06-10 RX ORDER — ENOXAPARIN SODIUM 100 MG/ML
40 INJECTION SUBCUTANEOUS DAILY
Status: DISCONTINUED | OUTPATIENT
Start: 2025-06-11 | End: 2025-06-10

## 2025-06-10 RX ORDER — ACETAMINOPHEN 650 MG/1
650 SUPPOSITORY RECTAL EVERY 6 HOURS PRN
Status: DISCONTINUED | OUTPATIENT
Start: 2025-06-10 | End: 2025-06-13 | Stop reason: HOSPADM

## 2025-06-10 RX ORDER — GLUCAGON 1 MG/ML
1 KIT INJECTION PRN
Status: DISCONTINUED | OUTPATIENT
Start: 2025-06-10 | End: 2025-06-13 | Stop reason: HOSPADM

## 2025-06-10 RX ORDER — ATORVASTATIN CALCIUM 40 MG/1
80 TABLET, FILM COATED ORAL NIGHTLY
Status: DISCONTINUED | OUTPATIENT
Start: 2025-06-10 | End: 2025-06-10

## 2025-06-10 RX ADMIN — ASPIRIN 243 MG: 81 TABLET, CHEWABLE ORAL at 19:16

## 2025-06-10 RX ADMIN — BUTALBITAL, ACETAMINOPHEN, AND CAFFEINE 2 TABLET: 325; 50; 40 TABLET ORAL at 19:15

## 2025-06-10 ASSESSMENT — PAIN - FUNCTIONAL ASSESSMENT
PAIN_FUNCTIONAL_ASSESSMENT: 0-10
PAIN_FUNCTIONAL_ASSESSMENT: ACTIVITIES ARE NOT PREVENTED

## 2025-06-10 ASSESSMENT — PAIN DESCRIPTION - ORIENTATION
ORIENTATION: LEFT
ORIENTATION: LEFT

## 2025-06-10 ASSESSMENT — PAIN DESCRIPTION - PAIN TYPE: TYPE: ACUTE PAIN

## 2025-06-10 ASSESSMENT — PAIN DESCRIPTION - FREQUENCY: FREQUENCY: CONTINUOUS

## 2025-06-10 ASSESSMENT — PAIN SCALES - GENERAL
PAINLEVEL_OUTOF10: 6
PAINLEVEL_OUTOF10: 2
PAINLEVEL_OUTOF10: 3
PAINLEVEL_OUTOF10: 5

## 2025-06-10 ASSESSMENT — PAIN DESCRIPTION - LOCATION
LOCATION: ARM
LOCATION: ARM
LOCATION: HEAD;CHEST;ARM

## 2025-06-10 ASSESSMENT — PAIN DESCRIPTION - ONSET: ONSET: ON-GOING

## 2025-06-10 ASSESSMENT — PAIN DESCRIPTION - DESCRIPTORS: DESCRIPTORS: SHOOTING

## 2025-06-10 NOTE — ED PROVIDER NOTES
[facility] EMERGENCY DEPARTMENT  EMERGENCY DEPARTMENT ENCOUNTER        Pt Name: Jensen Davidson  MRN: [id number]  Birthdate 1965  Date of evaluation: 6/10/2025  Provider: Emeli Vinson MD  PCP: Margie Maddox MD  Note Started: 6:52 PM EDT 6/10/25    CHIEF COMPLAINT     I felt short of breath and my left arm was tingling  HISTORY OF PRESENT ILLNESS: 1 or more Elements     Chief Complaint   Patient presents with    Chest Pain     Left arm tingling and heaviness, chest pressure/ heaviness, frontal headache onset during pulmonary rehab today. States his oxygen also started dropping to low 80s during while on 4L NC. Pt normally only wears 2L at night. Pt 87% on arrival to ED on room air. Pt is being seen to potentially get a lung transplant soon. Jose M pt     History from : Patient and Family wife  Limitations to history : None    Jensen Davidson is a 59 y.o. male who presents to the emergency department secondary to concern for chest discomfort associated with left arm tingling.  He states that he started feeling more short of breath yesterday though he has been having increased shortness of breath for the last 8 to 9 months.  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  He states normally it is around 90%.  He states that he was feeling more short of breath and that he also this evening when he was at home and started feeling like his left arm was tingling and heavy.  The chest discomfort he is having he describes as a feeling of heaviness.  He has had no nausea, vomiting, leg swelling.  No episodes of diaphoresis.  He initially states he has only felt lightheaded and has not had any syncope but then remembers that a couple of days ago he actually did have an episode of lightheadedness that led to syncope.  His wife did not know about this.  He is still having  Risk  (12/8/2024)    Overall Financial Resource Strain (CARDIA)     Difficulty of Paying Living Expenses: Not hard at all   Food Insecurity: No Food Insecurity (12/8/2024)    Hunger Vital Sign     Worried About Running Out of Food in the Last Year: Never true     Ran Out of Food in the Last Year: Never true   Transportation Needs: Unknown (12/8/2024)    PRAPARE - Transportation     Lack of Transportation (Non-Medical): No   Physical Activity: Insufficiently Active (12/9/2024)    Exercise Vital Sign     Days of Exercise per Week: 3 days     Minutes of Exercise per Session: 20 min   Housing Stability: Unknown (12/8/2024)    Housing Stability Vital Sign     Homeless in the Last Year: No     SCREENINGS            Quiana Coma Scale  Eye Opening: Spontaneous  Best Verbal Response: Oriented  Best Motor Response: Obeys commands  Elizabeth Coma Scale Score: 15                                CIWA Assessment  BP: 120/86  Pulse: 57              PHYSICAL EXAM  1 or more Elements   INITIAL VITALS: BP: (!) 143/95, Temp: 98.4 °F (36.9 °C), Pulse: 70, Respirations: 17, SpO2: (!) 87 % Height: 177.8 cm (5' 10\")   Wt Readings from Last 3 Encounters:   06/10/25 101.3 kg (223 lb 5.2 oz)   05/06/25 94.8 kg (209 lb)   03/06/25 88.9 kg (196 lb)     Physical Exam  Vitals and nursing note reviewed.   Constitutional:       General: He is not in acute distress.     Appearance: He is not ill-appearing, toxic-appearing or diaphoretic.   HENT:      Head: Normocephalic and atraumatic.      Right Ear: External ear normal.      Left Ear: External ear normal.      Nose: Nose normal. No congestion or rhinorrhea.   Eyes:      General: No scleral icterus.        Right eye: No discharge.         Left eye: No discharge.      Conjunctiva/sclera: Conjunctivae normal.   Neck:      Trachea: No tracheal deviation.   Cardiovascular:      Rate and Rhythm: Normal rate.      Pulses: Normal pulses.      Heart sounds: No murmur heard.  Pulmonary:      Effort: No

## 2025-06-11 PROBLEM — R07.89 CHEST PRESSURE: Status: ACTIVE | Noted: 2025-06-11

## 2025-06-11 LAB
ANION GAP SERPL CALCULATED.3IONS-SCNC: 8 MMOL/L (ref 3–16)
BUN SERPL-MCNC: 16 MG/DL (ref 7–20)
CALCIUM SERPL-MCNC: 8.8 MG/DL (ref 8.3–10.6)
CHLORIDE SERPL-SCNC: 105 MMOL/L (ref 99–110)
CHOLEST SERPL-MCNC: 95 MG/DL (ref 0–199)
CO2 SERPL-SCNC: 25 MMOL/L (ref 21–32)
CREAT SERPL-MCNC: 1 MG/DL (ref 0.9–1.3)
DEPRECATED RDW RBC AUTO: 14.9 % (ref 12.4–15.4)
EKG ATRIAL RATE: 58 BPM
EKG ATRIAL RATE: 65 BPM
EKG DIAGNOSIS: NORMAL
EKG DIAGNOSIS: NORMAL
EKG P AXIS: 76 DEGREES
EKG P AXIS: 77 DEGREES
EKG P-R INTERVAL: 152 MS
EKG P-R INTERVAL: 180 MS
EKG Q-T INTERVAL: 406 MS
EKG Q-T INTERVAL: 424 MS
EKG QRS DURATION: 82 MS
EKG QRS DURATION: 84 MS
EKG QTC CALCULATION (BAZETT): 416 MS
EKG QTC CALCULATION (BAZETT): 422 MS
EKG R AXIS: 38 DEGREES
EKG R AXIS: 8 DEGREES
EKG T AXIS: 74 DEGREES
EKG T AXIS: 82 DEGREES
EKG VENTRICULAR RATE: 58 BPM
EKG VENTRICULAR RATE: 65 BPM
EST. AVERAGE GLUCOSE BLD GHB EST-MCNC: 134.1 MG/DL
GFR SERPLBLD CREATININE-BSD FMLA CKD-EPI: 88 ML/MIN/{1.73_M2}
GLUCOSE BLD-MCNC: 102 MG/DL (ref 70–99)
GLUCOSE BLD-MCNC: 117 MG/DL (ref 70–99)
GLUCOSE BLD-MCNC: 127 MG/DL (ref 70–99)
GLUCOSE BLD-MCNC: 209 MG/DL (ref 70–99)
GLUCOSE BLD-MCNC: 96 MG/DL (ref 70–99)
GLUCOSE SERPL-MCNC: 117 MG/DL (ref 70–99)
HBA1C MFR BLD: 6.3 %
HCT VFR BLD AUTO: 43.9 % (ref 40.5–52.5)
HDLC SERPL-MCNC: 27 MG/DL (ref 40–60)
HGB BLD-MCNC: 14.7 G/DL (ref 13.5–17.5)
LDLC SERPL CALC-MCNC: 54 MG/DL
MCH RBC QN AUTO: 25.6 PG (ref 26–34)
MCHC RBC AUTO-ENTMCNC: 33.4 G/DL (ref 31–36)
MCV RBC AUTO: 76.5 FL (ref 80–100)
PERFORMED ON: ABNORMAL
PERFORMED ON: NORMAL
PLATELET # BLD AUTO: 156 K/UL (ref 135–450)
PMV BLD AUTO: 8.5 FL (ref 5–10.5)
POTASSIUM SERPL-SCNC: 3.9 MMOL/L (ref 3.5–5.1)
PROCALCITONIN SERPL IA-MCNC: 0.06 NG/ML (ref 0–0.15)
RBC # BLD AUTO: 5.74 M/UL (ref 4.2–5.9)
SODIUM SERPL-SCNC: 138 MMOL/L (ref 136–145)
TRIGL SERPL-MCNC: 72 MG/DL (ref 0–150)
TROPONIN, HIGH SENSITIVITY: <6 NG/L (ref 0–22)
VLDLC SERPL CALC-MCNC: 14 MG/DL
WBC # BLD AUTO: 4.9 K/UL (ref 4–11)

## 2025-06-11 PROCEDURE — 83036 HEMOGLOBIN GLYCOSYLATED A1C: CPT

## 2025-06-11 PROCEDURE — 94761 N-INVAS EAR/PLS OXIMETRY MLT: CPT

## 2025-06-11 PROCEDURE — 99223 1ST HOSP IP/OBS HIGH 75: CPT | Performed by: INTERNAL MEDICINE

## 2025-06-11 PROCEDURE — 80048 BASIC METABOLIC PNL TOTAL CA: CPT

## 2025-06-11 PROCEDURE — 36415 COLL VENOUS BLD VENIPUNCTURE: CPT

## 2025-06-11 PROCEDURE — G0378 HOSPITAL OBSERVATION PER HR: HCPCS

## 2025-06-11 PROCEDURE — 6360000002 HC RX W HCPCS: Performed by: INTERNAL MEDICINE

## 2025-06-11 PROCEDURE — 80061 LIPID PANEL: CPT

## 2025-06-11 PROCEDURE — 2700000000 HC OXYGEN THERAPY PER DAY

## 2025-06-11 PROCEDURE — 93005 ELECTROCARDIOGRAM TRACING: CPT | Performed by: FAMILY MEDICINE

## 2025-06-11 PROCEDURE — 6370000000 HC RX 637 (ALT 250 FOR IP): Performed by: FAMILY MEDICINE

## 2025-06-11 PROCEDURE — 6370000000 HC RX 637 (ALT 250 FOR IP): Performed by: INTERNAL MEDICINE

## 2025-06-11 PROCEDURE — 96374 THER/PROPH/DIAG INJ IV PUSH: CPT

## 2025-06-11 PROCEDURE — 2500000003 HC RX 250 WO HCPCS: Performed by: INTERNAL MEDICINE

## 2025-06-11 PROCEDURE — 6370000000 HC RX 637 (ALT 250 FOR IP): Performed by: REGISTERED NURSE

## 2025-06-11 PROCEDURE — 93010 ELECTROCARDIOGRAM REPORT: CPT | Performed by: STUDENT IN AN ORGANIZED HEALTH CARE EDUCATION/TRAINING PROGRAM

## 2025-06-11 PROCEDURE — 85027 COMPLETE CBC AUTOMATED: CPT

## 2025-06-11 PROCEDURE — 84484 ASSAY OF TROPONIN QUANT: CPT

## 2025-06-11 PROCEDURE — 94640 AIRWAY INHALATION TREATMENT: CPT

## 2025-06-11 PROCEDURE — 84145 PROCALCITONIN (PCT): CPT

## 2025-06-11 PROCEDURE — 96372 THER/PROPH/DIAG INJ SC/IM: CPT

## 2025-06-11 RX ORDER — MORPHINE SULFATE 2 MG/ML
2 INJECTION, SOLUTION INTRAMUSCULAR; INTRAVENOUS ONCE
Status: DISCONTINUED | OUTPATIENT
Start: 2025-06-11 | End: 2025-06-13 | Stop reason: HOSPADM

## 2025-06-11 RX ORDER — MORPHINE SULFATE 4 MG/ML
4 INJECTION, SOLUTION INTRAMUSCULAR; INTRAVENOUS EVERY 4 HOURS PRN
Status: DISCONTINUED | OUTPATIENT
Start: 2025-06-11 | End: 2025-06-13 | Stop reason: HOSPADM

## 2025-06-11 RX ORDER — TRAMADOL HYDROCHLORIDE 50 MG/1
50 TABLET ORAL EVERY 6 HOURS PRN
Status: DISCONTINUED | OUTPATIENT
Start: 2025-06-11 | End: 2025-06-13 | Stop reason: HOSPADM

## 2025-06-11 RX ORDER — SUMATRIPTAN 6 MG/.5ML
6 INJECTION, SOLUTION SUBCUTANEOUS ONCE
Status: COMPLETED | OUTPATIENT
Start: 2025-06-11 | End: 2025-06-11

## 2025-06-11 RX ORDER — SUMATRIPTAN SUCCINATE 25 MG/1
25 TABLET ORAL
Status: COMPLETED | OUTPATIENT
Start: 2025-06-11 | End: 2025-06-11

## 2025-06-11 RX ADMIN — ROSUVASTATIN CALCIUM 40 MG: 40 TABLET, FILM COATED ORAL at 20:26

## 2025-06-11 RX ADMIN — CARVEDILOL 6.25 MG: 6.25 TABLET, FILM COATED ORAL at 08:13

## 2025-06-11 RX ADMIN — ROSUVASTATIN CALCIUM 40 MG: 40 TABLET, FILM COATED ORAL at 00:19

## 2025-06-11 RX ADMIN — BUDESONIDE AND FORMOTEROL FUMARATE DIHYDRATE 2 PUFF: 160; 4.5 AEROSOL RESPIRATORY (INHALATION) at 08:09

## 2025-06-11 RX ADMIN — SODIUM CHLORIDE, PRESERVATIVE FREE 10 ML: 5 INJECTION INTRAVENOUS at 00:24

## 2025-06-11 RX ADMIN — ONDANSETRON 4 MG: 2 INJECTION, SOLUTION INTRAMUSCULAR; INTRAVENOUS at 13:49

## 2025-06-11 RX ADMIN — BUDESONIDE AND FORMOTEROL FUMARATE DIHYDRATE 2 PUFF: 160; 4.5 AEROSOL RESPIRATORY (INHALATION) at 19:51

## 2025-06-11 RX ADMIN — LISINOPRIL 10 MG: 10 TABLET ORAL at 08:13

## 2025-06-11 RX ADMIN — ASPIRIN 81 MG: 81 TABLET, CHEWABLE ORAL at 08:13

## 2025-06-11 RX ADMIN — INSULIN LISPRO 1 UNITS: 100 INJECTION, SOLUTION INTRAVENOUS; SUBCUTANEOUS at 20:26

## 2025-06-11 RX ADMIN — SUMATRIPTAN SUCCINATE 6 MG: 6 INJECTION SUBCUTANEOUS at 12:34

## 2025-06-11 RX ADMIN — SUMATRIPTAN SUCCINATE 25 MG: 25 TABLET ORAL at 00:56

## 2025-06-11 RX ADMIN — ESCITALOPRAM OXALATE 10 MG: 10 TABLET ORAL at 08:13

## 2025-06-11 RX ADMIN — SODIUM CHLORIDE, PRESERVATIVE FREE 10 ML: 5 INJECTION INTRAVENOUS at 20:26

## 2025-06-11 RX ADMIN — CARVEDILOL 6.25 MG: 6.25 TABLET, FILM COATED ORAL at 17:12

## 2025-06-11 RX ADMIN — TRAMADOL HYDROCHLORIDE 50 MG: 50 TABLET, COATED ORAL at 14:06

## 2025-06-11 RX ADMIN — SODIUM CHLORIDE, PRESERVATIVE FREE 10 ML: 5 INJECTION INTRAVENOUS at 08:14

## 2025-06-11 RX ADMIN — ENOXAPARIN SODIUM 30 MG: 100 INJECTION SUBCUTANEOUS at 20:35

## 2025-06-11 RX ADMIN — PANTOPRAZOLE SODIUM 40 MG: 40 TABLET, DELAYED RELEASE ORAL at 05:33

## 2025-06-11 RX ADMIN — TIOTROPIUM BROMIDE INHALATION SPRAY 2 PUFF: 3.12 SPRAY, METERED RESPIRATORY (INHALATION) at 08:09

## 2025-06-11 ASSESSMENT — PAIN DESCRIPTION - ONSET: ONSET: ON-GOING

## 2025-06-11 ASSESSMENT — PAIN - FUNCTIONAL ASSESSMENT: PAIN_FUNCTIONAL_ASSESSMENT: ACTIVITIES ARE NOT PREVENTED

## 2025-06-11 ASSESSMENT — PAIN DESCRIPTION - FREQUENCY: FREQUENCY: CONTINUOUS

## 2025-06-11 ASSESSMENT — PAIN DESCRIPTION - LOCATION
LOCATION: HEAD

## 2025-06-11 ASSESSMENT — PAIN SCALES - GENERAL
PAINLEVEL_OUTOF10: 5
PAINLEVEL_OUTOF10: 6
PAINLEVEL_OUTOF10: 8
PAINLEVEL_OUTOF10: 0

## 2025-06-11 ASSESSMENT — PAIN DESCRIPTION - DESCRIPTORS
DESCRIPTORS: ACHING
DESCRIPTORS: ACHING
DESCRIPTORS: THROBBING

## 2025-06-11 ASSESSMENT — PAIN DESCRIPTION - ORIENTATION: ORIENTATION: MID

## 2025-06-11 ASSESSMENT — PAIN DESCRIPTION - PAIN TYPE
TYPE: ACUTE PAIN
TYPE: ACUTE PAIN

## 2025-06-11 NOTE — PROGRESS NOTES
Patient states he wears 2 lpm at home with [employer]. (Previously [employer]) His issue is with the battery on his portable concentrator not holding a charge. Contacted CM about equipment. Home oxygen order d/c at this time. MD aware.

## 2025-06-11 NOTE — FLOWSHEET NOTE
Patient was admitted from Ed and assisted from stretcher to bed he removed O2 and he desat to 84% RA and O2 applied at 2L and he came up to 91%. Patient has dyspnea with exertion and slight labored breathing. Patient stated he is supposed to wear Oxygen at home but does not and needs more supplies. He complained of chest pain and a headache, provider notified with new orders for PRN Morphine and a one time dose of Imitrex. Provider notified of patient need of Oxygen supplies and new order for a Respiratory eval for home care oxygen. Patient is on bedrest, bed alarm placed for safety and call light in reach, urinal at bedside and a hat was placed in toilet for BM sample for Occult stool.

## 2025-06-11 NOTE — H&P
V2.0  History and Physical      Name:  Jensen Davidson /Age/Sex: 1965  (59 y.o. male)   MRN & CSN:  [id number] & [id number] Encounter Date/Time: 6/10/2025 9:36 PM EDT   Location:  16 PCP: Margie Maddox MD       Hospital Day: 1    Assessment and Plan:   Jensen Davidson is a 59 y.o. obese male smoker in remission with a pmh of CAD, GERD hypertension, dyslipidemia, diabetes type 2, interstitial lung disease and COPD, obstructive sleep apnea on BiPAP, polycythemia who presents with Unstable angina (HCC).    Hospital Problems           Last Modified POA    * (Principal) Unstable angina (HCC) 6/10/2025 Yes    Pulmonary infiltrate on radiologic exam 6/10/2025 Yes    Essential hypertension 6/10/2025 Yes    ESTRELLITA  6/10/2025 Yes       Plan:  Trend troponins, check D-dimer, monitor on telemetry continue aspirin, statin, beta-blocker and as needed nitroglycerin, cardiology consult for further risk stratification recommendations  Check procalcitonin, no antibiotics indicated since no other signs and symptoms of pneumonia  Resume home regimen for chronic stable conditions    Disposition:   Current Living situation: Home  Expected Disposition: Home  Estimated D/C: 1 to 2 days    Diet ADULT DIET; Clear Liquid; No Caffeine   DVT Prophylaxis [x] Lovenox, []  Heparin, [] SCDs, [] Ambulation,  [] Eliquis, [] Xarelto, [] Coumadin   Code Status Full Code   Surrogate Decision Maker/ POA Wife     Personally reviewed Lab Studies and Imaging     Discussed management of the case with no one who recommended n/a.    EKG interpreted personally and results normal sinus rhythm with ventricular rate of 65, QTc 422, no acute ischemic changes.    Imaging that was interpreted personally includes chest x-ray and results left basilar infiltrate.    Drugs that require monitoring for toxicity include none and the method of monitoring was n/a.        History from:     patient    History of Present Illness:     Chief Complaint:  ONE TIME A DAY 12/9/24   Margie Maddox MD   rosuvastatin (CRESTOR) 40 MG tablet TAKE 1 TABLET BY MOUTH ONE TIME A DAY IN THE EVENING 12/9/24   Margie Maddox MD   escitalopram (LEXAPRO) 10 MG tablet Take 1 tablet by mouth daily 12/9/24   Margie Maddox MD   lisinopril (PRINIVIL;ZESTRIL) 10 MG tablet TAKE 1 TABLET BY MOUTH ONE TIME A DAY 12/9/24   Margie Maddox MD   omeprazole (PRILOSEC) 20 MG delayed release capsule TAKE ONE CAPSULE BY MOUTH TWICE A DAY 12/9/24   Margie Maddox MD   amLODIPine (NORVASC) 2.5 MG tablet TAKE 1 TABLET BY MOUTH ONE TIME A DAY 12/9/24   Margie Maddox MD   azithromycin (ZITHROMAX) 500 MG tablet Take 1 tablet by mouth three times a week 12/4/24   Víctor Quiles DO   Evolocumab 140 MG/ML SOAJ Inject 140 mg into the skin every 14 days 12/3/24   Karson Todd MD   REPATHA SURECLICK 140 MG/ML SOAJ INJECT 140MG (1 PEN) UNDER THE SKIN EVERY 14 DAYS 11/25/24   Wesley Salguero MD   predniSONE (DELTASONE) 10 MG tablet 40mg for 3days 30mg for 3days 20mg for 3days 10mg for 3days 7/31/24   Víctor Quiles DO   Budeson-Glycopyrrol-Formoterol (BREZTRI AEROSPHERE) 160-9-4.8 MCG/ACT AERO Inhale 2 puffs into the lungs 2 times daily 11/6/23   Víctor Quiles DO   metroNIDAZOLE (METROGEL) 0.75 % gel Apply topically 2 times daily. 3/28/22   Margie Maddox MD   hydrocortisone (WESTCORT) 0.2 % cream Apply topically 2 times daily Apply topically 2 times daily.  Patient taking differently: Apply topically 2 times daily as needed Apply topically 2 times daily prn for psoriasis. 12/9/21   Margie Maddox MD   nitroGLYCERIN (NITROSTAT) 0.4 MG SL tablet up to max of 3 total doses. If no relief after 1 dose, call 911. 3/18/21   Wesley Salguero MD   ibuprofen (ADVIL;MOTRIN) 200 MG tablet Take 1 tablet by mouth every 6 hours as needed for Pain    Provider, MD Katerine   sodium chloride,

## 2025-06-11 NOTE — FLOWSHEET NOTE
4 Eyes Skin Assessment     NAME:  Jensen Davidson  YOB: 1965  MEDICAL RECORD NUMBER:  [id number]    The patient is being assessed for  Admission    I agree that at least one RN has performed a thorough Head to Toe Skin Assessment on the patient. ALL assessment sites listed below have been assessed.      Areas assessed by both nurses:    Head, Face, Ears, Shoulders, Back, Chest, Arms, Elbows, Hands, Sacrum. Buttock, Coccyx, Ischium, Legs. Feet and Heels, and Under Medical Devices         Does the Patient have a Wound? No noted wound(s)       Lion Prevention initiated by RN: No  Wound Care Orders initiated by RN: No    Pressure Injury (Stage 3,4, Unstageable, DTI, NWPT, and Complex wounds) if present, place Wound referral order by RN under : No    New Ostomies, if present place, Ostomy referral order under : No     Nurse 1 eSignature: Electronically signed by Frances Cano RN on 6/11/25 at 2:33 AM EDT    **SHARE this note so that the co-signing nurse can place an eSignature**    Nurse 2 eSignature: Electronically signed by Zully Street RN on 6/11/25 at 2:37 AM EDT

## 2025-06-11 NOTE — PROGRESS NOTES
Clinical Pharmacy Note  Subcutaneous Anticoagulant Adjustment     Enoxaparin has been adjusted to 30 mg BID based on [facility] policy.    Recent Labs     06/10/25  1856   CREATININE 1.1     Recent Labs     06/10/25  1856   HGB 15.6   HCT 46.9      INR 0.97     Estimated Creatinine Clearance: 88 mL/min (based on SCr of 1.1 mg/dL).    Pharmacist Review of Appropriate Use and Automatic Dose Adjustment of Subcutaneous Anticoagulants (Adult)    The guidance below is to provide initial recommendations for dosing. If recommended dose does not align well with patient's current clinical picture, communications with the care team will occur to determine most appropriate medication and dose.    TABLE 1.  ENOXAPARIN ROUTINE PROPHYLAXIS DOSING (Medically ill, routine surgery)   Patient Weight (kg)     50.9 and below 51 - 100.9 101 - 150.9 151 - 174.9 175 or greater         Estimated CrCl  (ml/min) 30 or greater   30 mg SUBQ daily   40 mg SUBQ daily 30 mg SUBQ BID  40 mg SUBQ BID 60mg SUBQ BID      15-29 UFH 5000 units SUBQ BID   30 mg SUBQ daily 30 mg SUBQ daily 40 mg SUBQ daily   60 mg SUBQ daily      Less than 15 or Dialysis UFH 5000 units SUBQ BID   UFH 5000 units SUBQ TID UFH 7500 units SUBQ TID         Rosa Reilly [facility] 6/10/2025 9:37 PM

## 2025-06-11 NOTE — PROGRESS NOTES
Pt does not use a home unit. Pt states he cannot tolerate it and uses O2 instead. Pt currently on 3 L, SpO2 93%.

## 2025-06-11 NOTE — H&P
[facility]  Cardiology Consult    Jensen Davidson  1965 June 11, 2025      Reason for Referral: CATHRYN    CC: CATHRYN      Subjective:     History of Present Illness:    Jensen Davidson is a 59 y.o. patient with a PMH significant for COPD, dyspnea and diabetes presented with complaints of chest pain.  Chest pain started the day of admission lasted few minutes accompanied with shortness of breath.  Chest pain-free now chest pain precordial nonradiating.  No nausea vomiting dizziness loss of consciousness.  Chest pain not associated with exertion.       Past Medical History:   has a past medical history of Centrilobular emphysema (HCC), Chronic respiratory failure (HCC), Coronary artery disease involving native coronary artery of native heart with unstable angina pectoris (HCC), Diabetes (HCC), Dyslipidemia, Dyspnea, Follicular bronchiolitis (HCC), GERD (gastroesophageal reflux disease), High risk medications (not anticoagulants) long-term use, HTN (hypertension), Hyperlipidemia, Hypoxemia, ILD (interstitial lung disease) (HCC), Left ureteral stone, MDD (major depressive disorder), ESTRELLITA on CPAP, Polycythemia, Positive FIT (fecal immunochemical test), Tubular adenoma, and Ureteral calculus of right kidney transplant.    Surgical History:   has a past surgical history that includes Lung removal, partial (Right, 2005); Diagnostic Cardiac Cath Lab Procedure (2006??); bronchoscopy; Colonoscopy; Endoscopy, colon, diagnostic; Colonoscopy (N/A, 9/4/2019); Upper gastrointestinal endoscopy (N/A, 9/4/2019); and Sleeve Gastrectomy (N/A, 1/21/2020).     Social History:   reports that he quit smoking about 25 years ago. His smoking use included cigarettes. He started smoking about 45 years ago. He has a 60 pack-year smoking history. He has been exposed to tobacco smoke. He has never used smokeless tobacco. He reports that he does not drink alcohol and does not use drugs.     Family History:  family history includes

## 2025-06-11 NOTE — ED NOTES
Introduced self to patient.     This RN into room to obtain repeat Troponin. Patient reports that his pain has decreased to 3/10.    Patient resting, respirations easy and unlabored. Denies needs at this time. Call light within reach, bed in lowest position, side rails up x 2.

## 2025-06-11 NOTE — PROGRESS NOTES
V2.0    [facility] Progress Note      Name:  Jensen Davidson /Age/Sex: 1965  (59 y.o. male)   MRN & CSN:  [id number] & [id number] Encounter Date/Time: 2025 12:09 PM EDT   Location:  [id number]/4254-01 PCP: Margie Maddox MD     Attending:Chuyita Morales MD       Hospital Day: 2    Assessment and Recommendations   Jensen Davidson is a 59 y.o. male with pmh of HLD, HTN, GERD, CAD, DM 2, interstitial lung disease, COPD, ESTRELLITA on BiPAP who presents with Unstable angina (HCC)    Patient was examined this morning.  Presented with chest pain that seems to be better this morning.  Patient does have extensive history of CAD   Cardiology was consulted, awaiting their recommendations  Troponin obtained to be less than 6, less than 6  EKG shows no ischemic finding      Plan:   Unstable angina   Troponin negative  EKG shows sinus rhythm with heart rate of 65  Continuous cardiac monitor  Cardiology consulted    2.  Interstitial lung disease, COPD  Follows with [facility] for possible lung transplant  Tiotropium twice daily  Symbicort twice daily  Albuterol twice daily      3.  Essential hypertension  Lisinopril 10 mg daily  Coreg 6.25 mg twice daily    Will continue to follow, monitor and manage chronic medical conditions with medication listed below  Diet ADULT DIET; Clear Liquid; No Caffeine   DVT Prophylaxis [x] Lovenox, []  Heparin, [] SCDs, [x] Ambulation,  [] Eliquis, [] Xarelto  [] Coumadin   Code Status Full Code   Disposition From: home  Expected Disposition: home  Estimated Date of Discharge: 1-2 days  Patient requires continued admission due to clinical improvement   Surrogate Decision Maker/ POA  self     Personally reviewed Lab Studies and Imaging         Medical Decision Making:  The following items were considered in medical decision making:  Discussion of patient care with other providers  Reviewed clinical lab tests  Reviewed radiology tests  Reviewed other diagnostic

## 2025-06-11 NOTE — ED NOTES
ED TO INPATIENT SBAR HANDOFF    Patient Name: Jensen Davidson   Preferred Name: Jensen  : 1965  59 y.o.   Family/Caregiver Present: no   Code Status Order: Full Code  PO Status: NPO:No Clear liquids only without caffeine  Telemetry Order: Yes  C-SSRS: Risk of Suicide: No Risk  Sitter no   Restraints:     Sepsis Risk Score      Situation  Chief Complaint   Patient presents with    Chest Pain     Left arm tingling and heaviness, chest pressure/ heaviness, frontal headache onset during pulmonary rehab today. States his oxygen also started dropping to low 80s during while on 4L NC. Pt normally only wears 2L at night. Pt 87% on arrival to ED on room air. Pt is being seen to potentially get a lung transplant soon. Jose M pt     Brief Description of Patient's Condition: Jensen Davidson is a 59 y.o. male who presents to the emergency department secondary to concern for chest discomfort associated with left arm tingling.  He states that he started feeling more short of breath yesterday though he has been having increased shortness of breath for the last 8 to 9 months.  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  He states normally it is around 90%.  He states that he was feeling more short of breath and that he also this evening when he was at home and started feeling like his left arm was tingling and heavy.  The chest discomfort he is having he describes as a feeling of heaviness.  He has had no nausea, vomiting, leg swelling.  No episodes of diaphoresis.  He initially states he has only felt lightheaded and has not had any syncope but then remembers that a couple of days ago he actually did have an episode of lightheadedness that led to syncope.  His wife did not know about this.  He is still having some pressure in his chest about a 5 out of 10.  He is still having some tingling in his arm  injection 30 mg (has no administration in time range)   aspirin chewable tablet 324 mg (243 mg Oral Given 6/10/25 1916)   butalbital-acetaminophen-caffeine (FIORICET, ESGIC) per tablet 2 tablet (2 tablets Oral Given 6/10/25 1915)     Last documented pain medication administration: 1915Fioricet  Pertinent or High Risk Medications/Drips: no   If Yes, please provide details:   Blood Product Administration: no  If Yes, please provide details:   Process Protocols/Bundles:     Recommendation  Incomplete STAT orders:   Overdue Medications:   Patient Belongings:    Additional Comments:   If any further questions, please call Sending RN at [phone]      Admitting Unit Notification  Name of person notified and time: ED to in patient SBAR      Electronically signed by: Electronically signed by Regino Osullivan RN on 6/10/2025 at 10:24 PM

## 2025-06-11 NOTE — PLAN OF CARE
Problem: Chronic Conditions and Co-morbidities  Goal: Patient's chronic conditions and co-morbidity symptoms are monitored and maintained or improved  6/11/2025 0955 by Gaby Pineda RN  Outcome: Progressing  6/11/2025 0224 by Frances Cano RN  Outcome: Progressing     Problem: Discharge Planning  Goal: Discharge to home or other facility with appropriate resources  6/11/2025 0955 by Gaby Pineda RN  Outcome: Progressing  6/11/2025 0224 by Frances Cano RN  Outcome: Progressing     Problem: Pain  Goal: Verbalizes/displays adequate comfort level or baseline comfort level  6/11/2025 0955 by Gaby Pineda RN  Outcome: Progressing  6/11/2025 0224 by Frances Cano RN  Outcome: Progressing     Problem: Respiratory - Adult  Goal: Achieves optimal ventilation and oxygenation  Outcome: Progressing     Problem: Skin/Tissue Integrity - Adult  Goal: Skin integrity remains intact  Outcome: Progressing  Goal: Incisions, wounds, or drain sites healing without S/S of infection  Outcome: Progressing  Goal: Oral mucous membranes remain intact  Outcome: Progressing     Problem: Gastrointestinal - Adult  Goal: Minimal or absence of nausea and vomiting  Outcome: Progressing  Goal: Maintains or returns to baseline bowel function  Outcome: Progressing  Goal: Maintains adequate nutritional intake  Outcome: Progressing

## 2025-06-12 ENCOUNTER — APPOINTMENT (OUTPATIENT)
Dept: CT IMAGING | Age: 60
DRG: 287 | End: 2025-06-12
Payer: COMMERCIAL

## 2025-06-12 ENCOUNTER — HOSPITAL ENCOUNTER (OUTPATIENT)
Dept: CARDIAC REHAB | Age: 60
Setting detail: THERAPIES SERIES
Discharge: HOME | End: 2025-06-12
Payer: MEDICARE

## 2025-06-12 ENCOUNTER — APPOINTMENT (OUTPATIENT)
Age: 60
DRG: 287 | End: 2025-06-12
Attending: INTERNAL MEDICINE
Payer: COMMERCIAL

## 2025-06-12 PROBLEM — R07.9 CHEST PAIN: Status: ACTIVE | Noted: 2025-06-12

## 2025-06-12 PROBLEM — J84.9 INTERSTITIAL LUNG DISEASE (HCC): Status: ACTIVE | Noted: 2025-06-12

## 2025-06-12 LAB
ECHO BSA: 2.22 M2
ECHO EST RA PRESSURE: 3 MMHG
ECHO IVC PROX: 1.5 CM
ECHO LA AREA 2C: 19.4 CM2
ECHO LA AREA 4C: 13.7 CM2
ECHO LA MAJOR AXIS: 5 CM
ECHO LA MINOR AXIS: 5.5 CM
ECHO LA VOL BP: 43 ML (ref 18–58)
ECHO LA VOL MOD A2C: 56 ML (ref 18–58)
ECHO LA VOL MOD A4C: 31 ML (ref 18–58)
ECHO LA VOL/BSA BIPLANE: 20 ML/M2 (ref 16–34)
ECHO LA VOLUME INDEX MOD A2C: 26 ML/M2 (ref 16–34)
ECHO LA VOLUME INDEX MOD A4C: 14 ML/M2 (ref 16–34)
ECHO LV EDV A2C: 102 ML
ECHO LV EDV A4C: 78 ML
ECHO LV EDV INDEX A4C: 36 ML/M2
ECHO LV EDV NDEX A2C: 47 ML/M2
ECHO LV EF PHYSICIAN: 63 %
ECHO LV EJECTION FRACTION A2C: 64 %
ECHO LV EJECTION FRACTION A4C: 57 %
ECHO LV EJECTION FRACTION BIPLANE: 62 % (ref 55–100)
ECHO LV ESV A2C: 37 ML
ECHO LV ESV A4C: 34 ML
ECHO LV ESV INDEX A2C: 17 ML/M2
ECHO LV ESV INDEX A4C: 16 ML/M2
ECHO LV FRACTIONAL SHORTENING: 34 % (ref 28–44)
ECHO LV INTERNAL DIMENSION DIASTOLE INDEX: 2.16 CM/M2
ECHO LV INTERNAL DIMENSION DIASTOLIC: 4.7 CM (ref 4.2–5.9)
ECHO LV INTERNAL DIMENSION SYSTOLIC INDEX: 1.42 CM/M2
ECHO LV INTERNAL DIMENSION SYSTOLIC: 3.1 CM
ECHO LV IVSD: 1.1 CM (ref 0.6–1)
ECHO LV MASS 2D: 199.6 G (ref 88–224)
ECHO LV MASS INDEX 2D: 91.6 G/M2 (ref 49–115)
ECHO LV POSTERIOR WALL DIASTOLIC: 1.2 CM (ref 0.6–1)
ECHO LV RELATIVE WALL THICKNESS RATIO: 0.51
ECHO RA AREA 4C: 16.6 CM2
ECHO RA END SYSTOLIC VOLUME APICAL 4 CHAMBER INDEX BSA: 20 ML/M2
ECHO RA VOLUME: 44 ML
ECHO RIGHT VENTRICULAR SYSTOLIC PRESSURE (RVSP): 27 MMHG
ECHO RV BASAL DIMENSION: 3.8 CM
ECHO RV FREE WALL PEAK S': 18.1 CM/S
ECHO RV LONGITUDINAL DIMENSION: 9.5 CM
ECHO RV MID DIMENSION: 2.5 CM
ECHO RV TAPSE: 2.6 CM (ref 1.7–?)
ECHO TV REGURGITANT MAX VELOCITY: 2.45 M/S
ECHO TV REGURGITANT PEAK GRADIENT: 24 MMHG
GLUCOSE BLD-MCNC: 107 MG/DL (ref 70–99)
GLUCOSE BLD-MCNC: 130 MG/DL (ref 70–99)
GLUCOSE BLD-MCNC: 132 MG/DL (ref 70–99)
GLUCOSE BLD-MCNC: 180 MG/DL (ref 70–99)
GLUCOSE BLD-MCNC: 80 MG/DL (ref 70–99)
GLUCOSE BLD-MCNC: 85 MG/DL (ref 70–99)
PERFORMED ON: ABNORMAL
PERFORMED ON: NORMAL
PERFORMED ON: NORMAL

## 2025-06-12 PROCEDURE — 94640 AIRWAY INHALATION TREATMENT: CPT

## 2025-06-12 PROCEDURE — 94761 N-INVAS EAR/PLS OXIMETRY MLT: CPT

## 2025-06-12 PROCEDURE — 6360000002 HC RX W HCPCS

## 2025-06-12 PROCEDURE — 99232 SBSQ HOSP IP/OBS MODERATE 35: CPT | Performed by: INTERNAL MEDICINE

## 2025-06-12 PROCEDURE — 6360000002 HC RX W HCPCS: Performed by: INTERNAL MEDICINE

## 2025-06-12 PROCEDURE — 2500000003 HC RX 250 WO HCPCS: Performed by: INTERNAL MEDICINE

## 2025-06-12 PROCEDURE — 6370000000 HC RX 637 (ALT 250 FOR IP): Performed by: FAMILY MEDICINE

## 2025-06-12 PROCEDURE — 6370000000 HC RX 637 (ALT 250 FOR IP): Performed by: INTERNAL MEDICINE

## 2025-06-12 PROCEDURE — 2700000000 HC OXYGEN THERAPY PER DAY

## 2025-06-12 PROCEDURE — 93308 TTE F-UP OR LMTD: CPT | Performed by: INTERNAL MEDICINE

## 2025-06-12 PROCEDURE — 71250 CT THORAX DX C-: CPT

## 2025-06-12 PROCEDURE — 93325 DOPPLER ECHO COLOR FLOW MAPG: CPT | Performed by: INTERNAL MEDICINE

## 2025-06-12 PROCEDURE — 1200000000 HC SEMI PRIVATE

## 2025-06-12 PROCEDURE — 93325 DOPPLER ECHO COLOR FLOW MAPG: CPT

## 2025-06-12 PROCEDURE — 93321 DOPPLER ECHO F-UP/LMTD STD: CPT | Performed by: INTERNAL MEDICINE

## 2025-06-12 RX ORDER — KETOROLAC TROMETHAMINE 15 MG/ML
15 INJECTION, SOLUTION INTRAMUSCULAR; INTRAVENOUS ONCE
Status: COMPLETED | OUTPATIENT
Start: 2025-06-12 | End: 2025-06-12

## 2025-06-12 RX ADMIN — TRAMADOL HYDROCHLORIDE 50 MG: 50 TABLET, COATED ORAL at 08:19

## 2025-06-12 RX ADMIN — KETOROLAC TROMETHAMINE 15 MG: 15 INJECTION, SOLUTION INTRAMUSCULAR; INTRAVENOUS at 23:34

## 2025-06-12 RX ADMIN — TIOTROPIUM BROMIDE INHALATION SPRAY 2 PUFF: 3.12 SPRAY, METERED RESPIRATORY (INHALATION) at 07:53

## 2025-06-12 RX ADMIN — ESCITALOPRAM OXALATE 10 MG: 10 TABLET ORAL at 08:13

## 2025-06-12 RX ADMIN — BUDESONIDE AND FORMOTEROL FUMARATE DIHYDRATE 2 PUFF: 160; 4.5 AEROSOL RESPIRATORY (INHALATION) at 07:53

## 2025-06-12 RX ADMIN — ASPIRIN 81 MG: 81 TABLET, CHEWABLE ORAL at 08:13

## 2025-06-12 RX ADMIN — ENOXAPARIN SODIUM 30 MG: 100 INJECTION SUBCUTANEOUS at 21:07

## 2025-06-12 RX ADMIN — CARVEDILOL 6.25 MG: 6.25 TABLET, FILM COATED ORAL at 16:18

## 2025-06-12 RX ADMIN — TRAMADOL HYDROCHLORIDE 50 MG: 50 TABLET, COATED ORAL at 16:18

## 2025-06-12 RX ADMIN — SODIUM CHLORIDE, PRESERVATIVE FREE 10 ML: 5 INJECTION INTRAVENOUS at 21:08

## 2025-06-12 RX ADMIN — LISINOPRIL 10 MG: 10 TABLET ORAL at 08:13

## 2025-06-12 RX ADMIN — ROSUVASTATIN CALCIUM 40 MG: 40 TABLET, FILM COATED ORAL at 21:07

## 2025-06-12 RX ADMIN — BUDESONIDE AND FORMOTEROL FUMARATE DIHYDRATE 2 PUFF: 160; 4.5 AEROSOL RESPIRATORY (INHALATION) at 20:42

## 2025-06-12 RX ADMIN — CARVEDILOL 6.25 MG: 6.25 TABLET, FILM COATED ORAL at 08:13

## 2025-06-12 RX ADMIN — SODIUM CHLORIDE, PRESERVATIVE FREE 10 ML: 5 INJECTION INTRAVENOUS at 08:13

## 2025-06-12 ASSESSMENT — PAIN SCALES - GENERAL
PAINLEVEL_OUTOF10: 7
PAINLEVEL_OUTOF10: 4
PAINLEVEL_OUTOF10: 0

## 2025-06-12 ASSESSMENT — PAIN DESCRIPTION - LOCATION
LOCATION: HEAD
LOCATION: HEAD

## 2025-06-12 ASSESSMENT — PAIN DESCRIPTION - PAIN TYPE: TYPE: ACUTE PAIN

## 2025-06-12 ASSESSMENT — PAIN DESCRIPTION - DESCRIPTORS
DESCRIPTORS: ACHING
DESCRIPTORS: ACHING

## 2025-06-12 ASSESSMENT — PAIN - FUNCTIONAL ASSESSMENT
PAIN_FUNCTIONAL_ASSESSMENT: ACTIVITIES ARE NOT PREVENTED
PAIN_FUNCTIONAL_ASSESSMENT: ACTIVITIES ARE NOT PREVENTED

## 2025-06-12 NOTE — CARE COORDINATION
Discharge Planning:      (CM) reviewed the patient's chart to assess needs. Patient's Readmission Risk Score is 10%. Patient's medical insurance is  Payor: R / Plan: [employer] EMPLOYEES / Product Type: *No Product type* / .  Patient's PCP is Margie Maddox MD .  No needs anticipated, at this time. CM team to follow. Staff to inform CM if additional discharge needs arise.    Pts preferred pharmacy is   [facility] - [place] - [address] [facility], [address] - P [phone] - F [phone]  [address] [facility], [address]  [place] [address]  Phone: [phone] Fax: [phone]    [id number] [place] [address] - P [phone] - F [phone]  [address] [place] [address]  Phone: [phone] Fax: [phone]    [facility] - [place] - [address] - [place] [phone] - F [phone]  [address] [place] [address]  Phone: [phone] Fax: [phone]    Electronically signed by Kasia Arnold RN on 6/12/2025 at 3:18 PM

## 2025-06-12 NOTE — PROGRESS NOTES
[facility]  Cardiology Consult    Jensen Davidson  1965 June 12, 2025      Reason for Referral: CATHRYN    CC: CATHRYN      Subjective:     History of Present Illness:    Jensen Davidson is a 59 y.o. patient with a PMH significant for COPD, dyspnea and diabetes presented with complaints of chest pain.  Chest pain started the day of admission lasted few minutes accompanied with shortness of breath.  Chest pain-free now chest pain precordial nonradiating.  No nausea vomiting dizziness loss of consciousness.  Chest pain not associated with exertion.       Past Medical History:   has a past medical history of Centrilobular emphysema (HCC), Chronic respiratory failure (HCC), Coronary artery disease involving native coronary artery of native heart with unstable angina pectoris (HCC), Diabetes (HCC), Dyslipidemia, Dyspnea, Follicular bronchiolitis (HCC), GERD (gastroesophageal reflux disease), High risk medications (not anticoagulants) long-term use, HTN (hypertension), Hyperlipidemia, Hypoxemia, ILD (interstitial lung disease) (HCC), Left ureteral stone, MDD (major depressive disorder), ESTRELLITA on CPAP, Polycythemia, Positive FIT (fecal immunochemical test), Tubular adenoma, and Ureteral calculus of right kidney transplant.    Surgical History:   has a past surgical history that includes Lung removal, partial (Right, 2005); Diagnostic Cardiac Cath Lab Procedure (2006??); bronchoscopy; Colonoscopy; Endoscopy, colon, diagnostic; Colonoscopy (N/A, 9/4/2019); Upper gastrointestinal endoscopy (N/A, 9/4/2019); and Sleeve Gastrectomy (N/A, 1/21/2020).     Social History:   reports that he quit smoking about 25 years ago. His smoking use included cigarettes. He started smoking about 45 years ago. He has a 60 pack-year smoking history. He has been exposed to tobacco smoke. He has never used smokeless tobacco. He reports that he does not drink alcohol and does not use drugs.     Family History:  family history includes

## 2025-06-12 NOTE — PROGRESS NOTES
V2.0    [facility] Progress Note      Name:  Jensen Davidson /Age/Sex: 1965  (59 y.o. male)   MRN & CSN:  [id number] & [id number] Encounter Date/Time: 2025 12:09 PM EDT   Location:  [id number] PCP: Margie Maddox MD     Attending:Chuyita Morales MD       Hospital Day: 3    Assessment and Recommendations   Jensen Davidson is a 59 y.o. male with pmh of HLD, HTN, GERD, CAD, DM 2, interstitial lung disease, COPD, ESTRELLITA on BiPAP who presents with Unstable angina (HCC)      Patient was examined today, has not had any more chest pain.  Underwent a cardiac echo this morning.  Echo shows ejection fraction of 55 to 60% with right ventricle and atrium mildly dilated.  Cardiology on board and following, notes reviewed.  Possibility of left heart cath    Chest x-ray shows possible pneumonia versus atelectasis.    Procalcitonin obtained to be 0.06.  cardiology recommending CT chest  Will obtain CT chest to rule out any pathology    Plan:   Unstable angina   Troponin negative  EKG shows sinus rhythm with heart rate of 65  Continuous cardiac monitor  Cardiology consulted      2.  Interstitial lung disease, COPD  Follows with [facility] for possible lung transplant  Tiotropium twice daily  Symbicort twice daily  Albuterol twice daily      3.  Essential hypertension  Lisinopril 10 mg daily  Coreg 6.25 mg twice daily    Will continue to follow, monitor and manage chronic medical conditions with medication listed below  Diet ADULT DIET; Regular; No Caffeine   DVT Prophylaxis [x] Lovenox, []  Heparin, [] SCDs, [x] Ambulation,  [] Eliquis, [] Xarelto  [] Coumadin   Code Status Full Code   Disposition From: home  Expected Disposition: home  Estimated Date of Discharge: 1-2 days  Patient requires continued admission due to clinical improvement   Surrogate Decision Maker/ POA  self     Personally reviewed Lab Studies and Imaging         Medical Decision Making:  The following items were considered in medical  produced using speech recognition software and may contain errors related to that system including errors in grammar, punctuation, and spelling, as well as words and phrases that may be inappropriate. If there are any questions or concerns, please feel free to contact the dictating provider for clarification.

## 2025-06-12 NOTE — PLAN OF CARE
Problem: Chronic Conditions and Co-morbidities  Goal: Patient's chronic conditions and co-morbidity symptoms are monitored and maintained or improved  Outcome: Progressing     Problem: Discharge Planning  Goal: Discharge to home or other facility with appropriate resources  Outcome: Progressing     Problem: Pain  Goal: Verbalizes/displays adequate comfort level or baseline comfort level  Outcome: Progressing     Problem: Respiratory - Adult  Goal: Achieves optimal ventilation and oxygenation  Outcome: Progressing     Problem: Gastrointestinal - Adult  Goal: Minimal or absence of nausea and vomiting  Outcome: Progressing

## 2025-06-12 NOTE — PLAN OF CARE
Problem: Chronic Conditions and Co-morbidities  Goal: Patient's chronic conditions and co-morbidity symptoms are monitored and maintained or improved  6/12/2025 1124 by Gaby Pineda RN  Outcome: Progressing  6/12/2025 0610 by Frances Velasco RN  Outcome: Progressing     Problem: Discharge Planning  Goal: Discharge to home or other facility with appropriate resources  6/12/2025 1124 by Gaby Pineda RN  Outcome: Progressing  6/12/2025 0610 by Frances Velasco RN  Outcome: Progressing     Problem: Pain  Goal: Verbalizes/displays adequate comfort level or baseline comfort level  6/12/2025 1124 by Gaby Pineda RN  Outcome: Progressing  6/12/2025 0610 by Frances Velasco RN  Outcome: Progressing     Problem: Respiratory - Adult  Goal: Achieves optimal ventilation and oxygenation  6/12/2025 1124 by Gaby Pineda RN  Outcome: Progressing  6/12/2025 0610 by Frances Velasco RN  Outcome: Progressing     Problem: Skin/Tissue Integrity - Adult  Goal: Skin integrity remains intact  Outcome: Progressing  Goal: Incisions, wounds, or drain sites healing without S/S of infection  Outcome: Progressing  Goal: Oral mucous membranes remain intact  Outcome: Progressing     Problem: Gastrointestinal - Adult  Goal: Minimal or absence of nausea and vomiting  6/12/2025 1124 by Gaby Pineda RN  Outcome: Progressing  6/12/2025 0610 by Frances Velasco RN  Outcome: Progressing  Goal: Maintains or returns to baseline bowel function  Outcome: Progressing  Goal: Maintains adequate nutritional intake  Outcome: Progressing

## 2025-06-13 ENCOUNTER — HOSPITAL ENCOUNTER (INPATIENT)
Age: 60
LOS: 4 days | Discharge: HOME | DRG: 322 | End: 2025-06-17
Attending: INTERNAL MEDICINE | Admitting: INTERNAL MEDICINE
Payer: COMMERCIAL

## 2025-06-13 VITALS
DIASTOLIC BLOOD PRESSURE: 72 MMHG | TEMPERATURE: 98.4 F | HEIGHT: 70 IN | RESPIRATION RATE: 18 BRPM | SYSTOLIC BLOOD PRESSURE: 118 MMHG | OXYGEN SATURATION: 91 % | WEIGHT: 223.11 LBS | HEART RATE: 80 BPM | BODY MASS INDEX: 31.94 KG/M2

## 2025-06-13 DIAGNOSIS — I25.10 CAD (CORONARY ARTERY DISEASE): ICD-10-CM

## 2025-06-13 PROBLEM — Z95.5 HISTORY OF HEART ARTERY STENT: Status: ACTIVE | Noted: 2025-06-13

## 2025-06-13 LAB
ALBUMIN SERPL-MCNC: 3.7 G/DL (ref 3.4–5)
ALBUMIN/GLOB SERPL: 1.4 {RATIO} (ref 1.1–2.2)
ALP SERPL-CCNC: 73 U/L (ref 40–129)
ALT SERPL-CCNC: 13 U/L (ref 10–40)
ANION GAP SERPL CALCULATED.3IONS-SCNC: 8 MMOL/L (ref 3–16)
AST SERPL-CCNC: 25 U/L (ref 15–37)
BILIRUB SERPL-MCNC: 0.5 MG/DL (ref 0–1)
BUN SERPL-MCNC: 14 MG/DL (ref 7–20)
CALCIUM SERPL-MCNC: 8.6 MG/DL (ref 8.3–10.6)
CHLORIDE SERPL-SCNC: 102 MMOL/L (ref 99–110)
CO2 SERPL-SCNC: 27 MMOL/L (ref 21–32)
CREAT SERPL-MCNC: 1 MG/DL (ref 0.9–1.3)
DEPRECATED RDW RBC AUTO: 14.8 % (ref 12.4–15.4)
ECHO BSA: 2.22 M2
GFR SERPLBLD CREATININE-BSD FMLA CKD-EPI: 88 ML/MIN/{1.73_M2}
GLUCOSE BLD-MCNC: 121 MG/DL (ref 70–99)
GLUCOSE BLD-MCNC: 139 MG/DL (ref 70–99)
GLUCOSE BLD-MCNC: 82 MG/DL (ref 70–99)
GLUCOSE BLD-MCNC: 84 MG/DL (ref 70–99)
GLUCOSE SERPL-MCNC: 95 MG/DL (ref 70–99)
HCT VFR BLD AUTO: 44.4 % (ref 40.5–52.5)
HGB BLD-MCNC: 14.8 G/DL (ref 13.5–17.5)
MCH RBC QN AUTO: 25.5 PG (ref 26–34)
MCHC RBC AUTO-ENTMCNC: 33.3 G/DL (ref 31–36)
MCV RBC AUTO: 76.6 FL (ref 80–100)
PERFORMED ON: ABNORMAL
PERFORMED ON: ABNORMAL
PERFORMED ON: NORMAL
PERFORMED ON: NORMAL
PLATELET # BLD AUTO: 124 K/UL (ref 135–450)
PMV BLD AUTO: 8.2 FL (ref 5–10.5)
POC ACT LR: 263 SEC
POTASSIUM SERPL-SCNC: 4.2 MMOL/L (ref 3.5–5.1)
PROT SERPL-MCNC: 6.3 G/DL (ref 6.4–8.2)
RBC # BLD AUTO: 5.79 M/UL (ref 4.2–5.9)
SODIUM SERPL-SCNC: 137 MMOL/L (ref 136–145)
WBC # BLD AUTO: 5 K/UL (ref 4–11)

## 2025-06-13 PROCEDURE — 6370000000 HC RX 637 (ALT 250 FOR IP): Performed by: STUDENT IN AN ORGANIZED HEALTH CARE EDUCATION/TRAINING PROGRAM

## 2025-06-13 PROCEDURE — C1887 CATHETER, GUIDING: HCPCS | Performed by: INTERNAL MEDICINE

## 2025-06-13 PROCEDURE — 92979 ENDOLUMINL IVUS OCT C EA: CPT | Performed by: INTERNAL MEDICINE

## 2025-06-13 PROCEDURE — 80053 COMPREHEN METABOLIC PANEL: CPT

## 2025-06-13 PROCEDURE — B2111ZZ FLUOROSCOPY OF MULTIPLE CORONARY ARTERIES USING LOW OSMOLAR CONTRAST: ICD-10-PCS | Performed by: INTERNAL MEDICINE

## 2025-06-13 PROCEDURE — 99152 MOD SED SAME PHYS/QHP 5/>YRS: CPT | Performed by: INTERNAL MEDICINE

## 2025-06-13 PROCEDURE — 6360000004 HC RX CONTRAST MEDICATION: Performed by: INTERNAL MEDICINE

## 2025-06-13 PROCEDURE — 93458 L HRT ARTERY/VENTRICLE ANGIO: CPT | Performed by: INTERNAL MEDICINE

## 2025-06-13 PROCEDURE — B2151ZZ FLUOROSCOPY OF LEFT HEART USING LOW OSMOLAR CONTRAST: ICD-10-PCS | Performed by: INTERNAL MEDICINE

## 2025-06-13 PROCEDURE — 6360000002 HC RX W HCPCS: Performed by: INTERNAL MEDICINE

## 2025-06-13 PROCEDURE — B241ZZ3 ULTRASONOGRAPHY OF MULTIPLE CORONARY ARTERIES, INTRAVASCULAR: ICD-10-PCS | Performed by: INTERNAL MEDICINE

## 2025-06-13 PROCEDURE — 6370000000 HC RX 637 (ALT 250 FOR IP): Performed by: FAMILY MEDICINE

## 2025-06-13 PROCEDURE — C1894 INTRO/SHEATH, NON-LASER: HCPCS | Performed by: INTERNAL MEDICINE

## 2025-06-13 PROCEDURE — 2500000003 HC RX 250 WO HCPCS: Performed by: INTERNAL MEDICINE

## 2025-06-13 PROCEDURE — C1769 GUIDE WIRE: HCPCS | Performed by: INTERNAL MEDICINE

## 2025-06-13 PROCEDURE — 7100000010 HC PHASE II RECOVERY - FIRST 15 MIN: Performed by: INTERNAL MEDICINE

## 2025-06-13 PROCEDURE — 7100000011 HC PHASE II RECOVERY - ADDTL 15 MIN: Performed by: INTERNAL MEDICINE

## 2025-06-13 PROCEDURE — 4A033BC MEASUREMENT OF ARTERIAL PRESSURE, CORONARY, PERCUTANEOUS APPROACH: ICD-10-PCS | Performed by: INTERNAL MEDICINE

## 2025-06-13 PROCEDURE — 36415 COLL VENOUS BLD VENIPUNCTURE: CPT

## 2025-06-13 PROCEDURE — 4A023N7 MEASUREMENT OF CARDIAC SAMPLING AND PRESSURE, LEFT HEART, PERCUTANEOUS APPROACH: ICD-10-PCS | Performed by: INTERNAL MEDICINE

## 2025-06-13 PROCEDURE — 92978 ENDOLUMINL IVUS OCT C 1ST: CPT | Performed by: INTERNAL MEDICINE

## 2025-06-13 PROCEDURE — 6370000000 HC RX 637 (ALT 250 FOR IP): Performed by: INTERNAL MEDICINE

## 2025-06-13 PROCEDURE — 2060000000 HC ICU INTERMEDIATE R&B

## 2025-06-13 PROCEDURE — 85347 COAGULATION TIME ACTIVATED: CPT

## 2025-06-13 PROCEDURE — 99153 MOD SED SAME PHYS/QHP EA: CPT | Performed by: INTERNAL MEDICINE

## 2025-06-13 PROCEDURE — C1753 CATH, INTRAVAS ULTRASOUND: HCPCS | Performed by: INTERNAL MEDICINE

## 2025-06-13 PROCEDURE — 2709999900 HC NON-CHARGEABLE SUPPLY: Performed by: INTERNAL MEDICINE

## 2025-06-13 PROCEDURE — 85027 COMPLETE CBC AUTOMATED: CPT

## 2025-06-13 RX ORDER — ENOXAPARIN SODIUM 100 MG/ML
40 INJECTION SUBCUTANEOUS DAILY
Status: DISCONTINUED | OUTPATIENT
Start: 2025-06-14 | End: 2025-06-17 | Stop reason: HOSPADM

## 2025-06-13 RX ORDER — SODIUM CHLORIDE 0.9 % (FLUSH) 0.9 %
5-40 SYRINGE (ML) INJECTION PRN
Status: DISCONTINUED | OUTPATIENT
Start: 2025-06-13 | End: 2025-06-13 | Stop reason: HOSPADM

## 2025-06-13 RX ORDER — ESCITALOPRAM OXALATE 10 MG/1
10 TABLET ORAL DAILY
Status: DISCONTINUED | OUTPATIENT
Start: 2025-06-14 | End: 2025-06-17 | Stop reason: HOSPADM

## 2025-06-13 RX ORDER — ASPIRIN 325 MG
325 TABLET ORAL ONCE
Status: COMPLETED | OUTPATIENT
Start: 2025-06-13 | End: 2025-06-13

## 2025-06-13 RX ORDER — ISOSORBIDE MONONITRATE 30 MG/1
30 TABLET, EXTENDED RELEASE ORAL DAILY
Status: DISCONTINUED | OUTPATIENT
Start: 2025-06-14 | End: 2025-06-17 | Stop reason: HOSPADM

## 2025-06-13 RX ORDER — BUDESONIDE AND FORMOTEROL FUMARATE DIHYDRATE 160; 4.5 UG/1; UG/1
2 AEROSOL RESPIRATORY (INHALATION)
Status: DISCONTINUED | OUTPATIENT
Start: 2025-06-13 | End: 2025-06-15

## 2025-06-13 RX ORDER — CARVEDILOL 6.25 MG/1
6.25 TABLET ORAL 2 TIMES DAILY WITH MEALS
Status: DISCONTINUED | OUTPATIENT
Start: 2025-06-14 | End: 2025-06-17 | Stop reason: HOSPADM

## 2025-06-13 RX ORDER — IOPAMIDOL 755 MG/ML
INJECTION, SOLUTION INTRAVASCULAR PRN
Status: DISCONTINUED | OUTPATIENT
Start: 2025-06-13 | End: 2025-06-13 | Stop reason: HOSPADM

## 2025-06-13 RX ORDER — MIDAZOLAM HYDROCHLORIDE 1 MG/ML
INJECTION, SOLUTION INTRAMUSCULAR; INTRAVENOUS PRN
Status: DISCONTINUED | OUTPATIENT
Start: 2025-06-13 | End: 2025-06-13 | Stop reason: HOSPADM

## 2025-06-13 RX ORDER — HEPARIN SODIUM 1000 [USP'U]/ML
INJECTION, SOLUTION INTRAVENOUS; SUBCUTANEOUS PRN
Status: DISCONTINUED | OUTPATIENT
Start: 2025-06-13 | End: 2025-06-13 | Stop reason: HOSPADM

## 2025-06-13 RX ORDER — ACETAMINOPHEN 650 MG/1
650 SUPPOSITORY RECTAL EVERY 6 HOURS PRN
Status: DISCONTINUED | OUTPATIENT
Start: 2025-06-13 | End: 2025-06-17 | Stop reason: HOSPADM

## 2025-06-13 RX ORDER — LIDOCAINE HYDROCHLORIDE 10 MG/ML
INJECTION, SOLUTION INFILTRATION; PERINEURAL PRN
Status: DISCONTINUED | OUTPATIENT
Start: 2025-06-13 | End: 2025-06-13 | Stop reason: HOSPADM

## 2025-06-13 RX ORDER — SODIUM CHLORIDE 0.9 % (FLUSH) 0.9 %
5-40 SYRINGE (ML) INJECTION EVERY 12 HOURS SCHEDULED
Status: DISCONTINUED | OUTPATIENT
Start: 2025-06-13 | End: 2025-06-13 | Stop reason: HOSPADM

## 2025-06-13 RX ORDER — SODIUM CHLORIDE FOR INHALATION 3 %
15 VIAL, NEBULIZER (ML) INHALATION 2 TIMES DAILY PRN
Status: DISCONTINUED | OUTPATIENT
Start: 2025-06-13 | End: 2025-06-17 | Stop reason: HOSPADM

## 2025-06-13 RX ORDER — ASPIRIN 81 MG/1
81 TABLET, CHEWABLE ORAL DAILY
Status: DISCONTINUED | OUTPATIENT
Start: 2025-06-14 | End: 2025-06-17 | Stop reason: HOSPADM

## 2025-06-13 RX ORDER — ACETAMINOPHEN 325 MG/1
650 TABLET ORAL EVERY 6 HOURS PRN
Status: DISCONTINUED | OUTPATIENT
Start: 2025-06-13 | End: 2025-06-17 | Stop reason: HOSPADM

## 2025-06-13 RX ORDER — RANOLAZINE 500 MG/1
500 TABLET, EXTENDED RELEASE ORAL 2 TIMES DAILY
Status: DISCONTINUED | OUTPATIENT
Start: 2025-06-13 | End: 2025-06-17 | Stop reason: HOSPADM

## 2025-06-13 RX ORDER — INSULIN LISPRO 100 [IU]/ML
0-4 INJECTION, SOLUTION INTRAVENOUS; SUBCUTANEOUS
Status: DISCONTINUED | OUTPATIENT
Start: 2025-06-13 | End: 2025-06-17 | Stop reason: HOSPADM

## 2025-06-13 RX ORDER — PANTOPRAZOLE SODIUM 40 MG/1
40 TABLET, DELAYED RELEASE ORAL
Status: DISCONTINUED | OUTPATIENT
Start: 2025-06-14 | End: 2025-06-17 | Stop reason: HOSPADM

## 2025-06-13 RX ORDER — ALBUTEROL SULFATE 90 UG/1
2 INHALANT RESPIRATORY (INHALATION) EVERY 4 HOURS PRN
Status: DISCONTINUED | OUTPATIENT
Start: 2025-06-13 | End: 2025-06-17 | Stop reason: HOSPADM

## 2025-06-13 RX ORDER — ACETAMINOPHEN 325 MG/1
650 TABLET ORAL EVERY 4 HOURS PRN
Status: DISCONTINUED | OUTPATIENT
Start: 2025-06-13 | End: 2025-06-13 | Stop reason: HOSPADM

## 2025-06-13 RX ORDER — LISINOPRIL 10 MG/1
10 TABLET ORAL DAILY
Status: DISCONTINUED | OUTPATIENT
Start: 2025-06-14 | End: 2025-06-17 | Stop reason: HOSPADM

## 2025-06-13 RX ORDER — SODIUM CHLORIDE 9 MG/ML
INJECTION, SOLUTION INTRAVENOUS PRN
Status: DISCONTINUED | OUTPATIENT
Start: 2025-06-13 | End: 2025-06-13 | Stop reason: HOSPADM

## 2025-06-13 RX ORDER — DEXTROSE MONOHYDRATE 100 MG/ML
INJECTION, SOLUTION INTRAVENOUS CONTINUOUS PRN
Status: DISCONTINUED | OUTPATIENT
Start: 2025-06-13 | End: 2025-06-17 | Stop reason: HOSPADM

## 2025-06-13 RX ORDER — ONDANSETRON 2 MG/ML
4 INJECTION INTRAMUSCULAR; INTRAVENOUS EVERY 6 HOURS PRN
Status: DISCONTINUED | OUTPATIENT
Start: 2025-06-13 | End: 2025-06-17 | Stop reason: HOSPADM

## 2025-06-13 RX ORDER — GLUCAGON 1 MG/ML
1 KIT INJECTION PRN
Status: DISCONTINUED | OUTPATIENT
Start: 2025-06-13 | End: 2025-06-17 | Stop reason: HOSPADM

## 2025-06-13 RX ORDER — ROSUVASTATIN CALCIUM 20 MG/1
40 TABLET, COATED ORAL NIGHTLY
Status: DISCONTINUED | OUTPATIENT
Start: 2025-06-13 | End: 2025-06-17 | Stop reason: HOSPADM

## 2025-06-13 RX ORDER — ATORVASTATIN CALCIUM 40 MG/1
40 TABLET, FILM COATED ORAL NIGHTLY
Status: DISCONTINUED | OUTPATIENT
Start: 2025-06-13 | End: 2025-06-13

## 2025-06-13 RX ADMIN — CARVEDILOL 6.25 MG: 6.25 TABLET, FILM COATED ORAL at 18:04

## 2025-06-13 RX ADMIN — ESCITALOPRAM OXALATE 10 MG: 10 TABLET ORAL at 13:30

## 2025-06-13 RX ADMIN — RANOLAZINE 500 MG: 500 TABLET, EXTENDED RELEASE ORAL at 22:23

## 2025-06-13 RX ADMIN — ASPIRIN 81 MG: 81 TABLET, CHEWABLE ORAL at 13:29

## 2025-06-13 RX ADMIN — ASPIRIN 325 MG: 325 TABLET ORAL at 07:22

## 2025-06-13 RX ADMIN — TRAMADOL HYDROCHLORIDE 50 MG: 50 TABLET, COATED ORAL at 13:29

## 2025-06-13 RX ADMIN — LISINOPRIL 10 MG: 10 TABLET ORAL at 13:30

## 2025-06-13 RX ADMIN — ROSUVASTATIN 40 MG: 20 TABLET, FILM COATED ORAL at 22:23

## 2025-06-13 RX ADMIN — ENOXAPARIN SODIUM 30 MG: 100 INJECTION SUBCUTANEOUS at 13:30

## 2025-06-13 RX ADMIN — CARVEDILOL 6.25 MG: 6.25 TABLET, FILM COATED ORAL at 13:29

## 2025-06-13 ASSESSMENT — PAIN DESCRIPTION - LOCATION: LOCATION: HEAD

## 2025-06-13 ASSESSMENT — PAIN SCALES - GENERAL
PAINLEVEL_OUTOF10: 0
PAINLEVEL_OUTOF10: 0
PAINLEVEL_OUTOF10: 3
PAINLEVEL_OUTOF10: 3
PAINLEVEL_OUTOF10: 4
PAINLEVEL_OUTOF10: 5

## 2025-06-13 NOTE — PLAN OF CARE
Problem: Chronic Conditions and Co-morbidities  Goal: Patient's chronic conditions and co-morbidity symptoms are monitored and maintained or improved  6/13/2025 0112 by Cheryl Fish RN  Outcome: Progressing  6/12/2025 1124 by Gaby Pineda RN  Outcome: Progressing     Problem: Discharge Planning  Goal: Discharge to home or other facility with appropriate resources  6/13/2025 0112 by Cheryl Fish RN  Outcome: Progressing  6/12/2025 1124 by Gaby Pineda RN  Outcome: Progressing     Problem: Pain  Goal: Verbalizes/displays adequate comfort level or baseline comfort level  6/13/2025 0112 by Cheryl Fish RN  Outcome: Progressing  6/12/2025 1124 by Gaby Pineda RN  Outcome: Progressing     Problem: Respiratory - Adult  Goal: Achieves optimal ventilation and oxygenation  6/13/2025 0112 by Cheryl Fish RN  Outcome: Progressing  6/12/2025 1124 by Gaby Pineda RN  Outcome: Progressing     Problem: Skin/Tissue Integrity - Adult  Goal: Skin integrity remains intact  6/13/2025 0112 by Cheryl Fish RN  Outcome: Progressing  6/12/2025 1124 by Gaby Pineda RN  Outcome: Progressing  Goal: Incisions, wounds, or drain sites healing without S/S of infection  6/13/2025 0112 by Cheryl Fish RN  Outcome: Progressing  6/12/2025 1124 by Gaby Pineda RN  Outcome: Progressing  Goal: Oral mucous membranes remain intact  6/13/2025 0112 by Cheryl Fish RN  Outcome: Progressing  6/12/2025 1124 by Gaby Pineda RN  Outcome: Progressing     Problem: Gastrointestinal - Adult  Goal: Minimal or absence of nausea and vomiting  6/13/2025 0112 by Cheryl Fish RN  Outcome: Progressing  6/12/2025 1124 by Gaby Pineda RN  Outcome: Progressing  Goal: Maintains or returns to baseline bowel function  6/13/2025 0112 by Cheryl Fish RN  Outcome: Progressing  6/12/2025 1124 by Gaby Pineda RN  Outcome: Progressing  Goal: Maintains adequate nutritional intake  6/13/2025 0112 by Cheryl Fish

## 2025-06-13 NOTE — CARE COORDINATION
6/13 PLAN: Patient has order to transfer to [facility] for a cardiac procedure. SW will need to follow up on Oxygen when he is discharged from there.Electronically signed by Kasia Arnold RN on 6/13/2025 at 3:33 PM

## 2025-06-13 NOTE — PLAN OF CARE
And this 59-year-old male with PMHx of CAD, hypertension, hyperlipidemia, DM, ESTRELLITA, ILD, COPD; 2 L at baseline who initially presented to [facility] on 6/10/2025 presented with chest pain    Admitted for unstable angina;  HST negative x 3.  EKG negative for acute ischemic changes  CT scan of the chest without contrast yesterday which showed moderate cystic lung disease similar to prior exams with new moderate opacities in the bilateral lower lobes favored to atelectasis and scarring. Severe multivessel coronary artery calcification. Cholelithiasis.   Cardiology consulted; underwent echo on 6/11 which showed EF of 55 to 60%.  No RWMA noted.  Underwent LHC today which showed severe, calcified LAD and RCA stenoses by IVUS.   Being transferred to [facility] for PCI

## 2025-06-13 NOTE — PROGRESS NOTES
V2.0    [facility] Progress Note      Name:  Jensen Davidson /Age/Sex: 1965  (59 y.o. male)   MRN & CSN:  [id number] & [id number] Encounter Date/Time: 2025 12:09 PM EDT   Location:  [id number] PCP: Margie Maddox MD     Attending:Chuyita Morales MD       Hospital Day: 4    Assessment and Recommendations   Jensen Davidson is a 59 y.o. male with pmh of HLD, HTN, GERD, CAD, DM 2, interstitial lung disease, COPD, ESTRELLITA on BiPAP who presents with Unstable angina (HCC)    Patient was examined this afternoon.  Family at the bedside  Nursing staff are at the bedside giving overnight events    Patient is status post left heart catheter this morning.    Awaiting cardiology notes about left heart cath finding.  Noted that patient may need to be transferred to [facility] for possible cardiac stenting       Concerning for possible pneumonia.  Obtain the CT scan of the chest without contrast yesterday which showed moderate cystic lung disease similar to prior exams with new moderate opacities in the bilateral lower lobes favored to atelectasis and scarring.  Severe multivessel coronary artery calcification.  Cholelithiasis.    Labs obtained this morning showing sodium 137, potassium 4.2, creatinine 1.0, GFR 88, WBC of 5.0, H&H 14.8, 44.4    Patient will need home oxygen evaluation prior to being discharged.  Patient has oxygen equipment at home but does not have active DME company.  He uses 4 LPM which is most likely draining his battery on his portable oxygen.    Plan:   Unstable angina   Troponin negative  EKG shows sinus rhythm with heart rate of 65  Continuous cardiac monitor  Cardiology consulted      2.  Interstitial lung disease, COPD  Follows with [facility] for possible lung transplant  Obtain the CT chest showing moderate cystic lung disease similar to prior moderate opacities in bilateral lower lobes favored representing linear atelectasis and scarring.  Severe multivessel  bolus, 125 mL, PRN   Or  dextrose bolus, 250 mL, PRN  glucagon (rDNA), 1 mg, PRN  dextrose, , Continuous PRN  albuterol sulfate HFA, 2 puff, Q4H PRN  sodium chloride (Inhalant), 15 mL, BID PRN        Labs and Imaging   XR CHEST PORTABLE  Result Date: 6/10/2025  EXAMINATION: ONE XRAY VIEW OF THE CHEST 6/10/2025 3:20 pm COMPARISON: October 2, 2018 HISTORY: ORDERING SYSTEM PROVIDED HISTORY: sob TECHNOLOGIST PROVIDED HISTORY: Reason for exam:->sob Reason for Exam: sob FINDINGS: Adequate inspiration is noted.  Opacity is present within the left lung base, and may represent a combination of pneumonia and atelectasis.  No pneumothorax or pleural effusion is present.  Heart size mediastinal contours are stable.  No acute osseous abnormality is present.  Fullness of the superior mediastinum is again demonstrated, and may be related to vascular ectasia, thyroid enlargement or mediastinal lipomatosis.     Left basilar opacity, which may represent a combination of pneumonia and atelectasis.       CBC:   Recent Labs     06/10/25  1856 06/11/25  0412 06/13/25  0557   WBC 6.0 4.9 5.0   HGB 15.6 14.7 14.8    156 124*     BMP:    Recent Labs     06/10/25  1856 06/11/25  0412 06/13/25  0557    138 137   K 4.3 3.9 4.2    105 102   CO2 23 25 27   BUN 16 16 14   CREATININE 1.1 1.0 1.0   GLUCOSE 122* 117* 95     Hepatic:   Recent Labs     06/13/25  0557   AST 25   ALT 13   BILITOT 0.5   ALKPHOS 73     Lipids:   Lab Results   Component Value Date/Time    CHOL 95 06/11/2025 04:12 AM    HDL 27 06/11/2025 04:12 AM    TRIG 72 06/11/2025 04:12 AM     Hemoglobin A1C:   Lab Results   Component Value Date/Time    LABA1C 6.3 06/11/2025 04:12 AM     TSH:   Lab Results   Component Value Date/Time    TSH 1.28 08/07/2019 10:50 AM     Troponin: No results found for: \"TROPONINT\"  Lactic Acid: No results for input(s): \"LACTA\" in the last 72 hours.  BNP: No results for input(s): \"PROBNP\" in the last 72 hours.  UA:  Lab Results

## 2025-06-13 NOTE — PROGRESS NOTES
1000 - Received from cath lab post [facility], Awake and alert., TR band intact right radial with 11cc air in band. Dr. Khalil at bedside reviewing procedure.      1100 - Initiating air removal from TR band. Plans started to transfer to [facility]    1230 - report called to Radha LÓPEZ    1245 - Radial compression band removed from right wrist without difficulty. No complications noted to cath site; gauze, tegaderm dressing and arm board applied. Education provided to patient in regards to post cath restrictions. All questions answered. Patient verbalized understanding. Will monitor.      1240 - Transferred to [facility] via stretcher

## 2025-06-13 NOTE — ANESTHESIA PRE-OP
Brief Pre-Op Note/Sedation Assessment      Jensen Davidson  1965  Cath Pool Room/PL      [id number]  8:12 AM    Planned Procedure: Cardiac Catheterization Procedure    Post Procedure Plan: Return to same level of care    Consent: I have discussed with the patient and/or the patient representative the indication, alternatives, and the possible risks and/or complications of the planned procedure and the anesthesia methods. The patient and/or patient representative appear to understand and agree to proceed.    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    GERD (gastroesophageal reflux disease)     High risk medications (not anticoagulants) long-term use     HTN (hypertension)     Hyperlipidemia     Hypoxemia     ILD (interstitial lung disease) (HCC)     Left ureteral stone 04/13/2018    MDD (major depressive disorder)     ESTRELLITA on CPAP     Polycythemia     Positive FIT (fecal immunochemical test)     Tubular adenoma     colonoscopy 9/2019    Ureteral calculus of right kidney transplant 2005         Surgical History:  Past Surgical History:   Procedure Laterality Date    BRONCHOSCOPY      COLONOSCOPY      COLONOSCOPY N/A 9/4/2019    COLONOSCOPY POLYPECTOMY ABLATION HOT SNARE OF TRANSVERSE COLON POLYP AND SIGMOID POLYP performed by Bert Salamanca MD at [facility] ENDOSCOPY    DIAGNOSTIC CARDIAC CATH LAB PROCEDURE  2006??     Oral Nightly Lorraine Vazquez MD   40 mg at 06/12/25 2107    sodium chloride (Inhalant) 3 % nebulizer solution 15 mL  15 mL Nebulization BID PRN Lorraine Vazquez MD        enoxaparin Sodium (LOVENOX) injection 30 mg  30 mg SubCUTAneous BID Lorraine Vazquez MD   30 mg at 06/12/25 2107           Pre-Sedation:    Pre-Sedation Documentation and Exam:  I have assessed the patient and agree with the H&P present on the chart.    Prior History of Anesthesia Complications:   none    Modified Mallampati:  II (soft palate, uvula, fauces visible)    ASA Classification:  Class 3 - A patient with severe systemic disease that limits activity but is not incapacitating    Medication Planned:  midazolam intravenously and fentanyl intravenously    Patient is an appropriate candidate for plan of sedation: yes    The risks, benefits, goals, and alternatives of the procedure were discussed in detail with the patient. Informed consent was obtained and further recommendations will be made following the procedure.     Antonieta Khalil DO, [facility]  Interventional Cardiology       NOTE:  This report was transcribed using voice recognition software.  Every effort was made to ensure accuracy; however, inadvertent computerized transcription errors may be present.

## 2025-06-13 NOTE — CARE COORDINATION
6/13 Patient will need a home oxygen eval prior to discharge. He has oxygen equipment at home but is not active with a [employer] company and he is complaining that his battery won't hold a charge. He said he uses up to 4 LPM which is too much for a POC and probably why it's not holding a charge. [employer] can supply new oxygen if needed.Electronically signed by Kasia Arnold RN on 6/13/2025 at 3:25 PM

## 2025-06-14 PROBLEM — E78.5 DYSLIPIDEMIA: Status: ACTIVE | Noted: 2025-06-14

## 2025-06-14 PROBLEM — G47.33 OSA (OBSTRUCTIVE SLEEP APNEA): Status: ACTIVE | Noted: 2025-06-14

## 2025-06-14 PROBLEM — I10 HYPERTENSION, ESSENTIAL: Status: ACTIVE | Noted: 2025-06-14

## 2025-06-14 LAB
ANION GAP SERPL CALCULATED.3IONS-SCNC: 10 MMOL/L (ref 3–16)
BUN SERPL-MCNC: 15 MG/DL (ref 7–20)
CALCIUM SERPL-MCNC: 8.8 MG/DL (ref 8.3–10.6)
CHLORIDE SERPL-SCNC: 102 MMOL/L (ref 99–110)
CO2 SERPL-SCNC: 25 MMOL/L (ref 21–32)
CREAT SERPL-MCNC: 1.1 MG/DL (ref 0.9–1.3)
DEPRECATED RDW RBC AUTO: 15.1 % (ref 12.4–15.4)
GFR SERPLBLD CREATININE-BSD FMLA CKD-EPI: 78 ML/MIN/{1.73_M2}
GLUCOSE BLD-MCNC: 193 MG/DL (ref 70–99)
GLUCOSE BLD-MCNC: 81 MG/DL (ref 70–99)
GLUCOSE BLD-MCNC: 99 MG/DL (ref 70–99)
GLUCOSE SERPL-MCNC: 93 MG/DL (ref 70–99)
HCT VFR BLD AUTO: 43.7 % (ref 40.5–52.5)
HGB BLD-MCNC: 14.7 G/DL (ref 13.5–17.5)
MCH RBC QN AUTO: 25.5 PG (ref 26–34)
MCHC RBC AUTO-ENTMCNC: 33.7 G/DL (ref 31–36)
MCV RBC AUTO: 75.7 FL (ref 80–100)
PERFORMED ON: ABNORMAL
PERFORMED ON: NORMAL
PERFORMED ON: NORMAL
PLATELET # BLD AUTO: 147 K/UL (ref 135–450)
PMV BLD AUTO: 8.2 FL (ref 5–10.5)
POTASSIUM SERPL-SCNC: 4.2 MMOL/L (ref 3.5–5.1)
RBC # BLD AUTO: 5.78 M/UL (ref 4.2–5.9)
SODIUM SERPL-SCNC: 137 MMOL/L (ref 136–145)
WBC # BLD AUTO: 5.2 K/UL (ref 4–11)

## 2025-06-14 PROCEDURE — 94640 AIRWAY INHALATION TREATMENT: CPT

## 2025-06-14 PROCEDURE — 85027 COMPLETE CBC AUTOMATED: CPT

## 2025-06-14 PROCEDURE — 6370000000 HC RX 637 (ALT 250 FOR IP): Performed by: STUDENT IN AN ORGANIZED HEALTH CARE EDUCATION/TRAINING PROGRAM

## 2025-06-14 PROCEDURE — 80048 BASIC METABOLIC PNL TOTAL CA: CPT

## 2025-06-14 PROCEDURE — 6360000002 HC RX W HCPCS: Performed by: STUDENT IN AN ORGANIZED HEALTH CARE EDUCATION/TRAINING PROGRAM

## 2025-06-14 PROCEDURE — 99223 1ST HOSP IP/OBS HIGH 75: CPT | Performed by: INTERNAL MEDICINE

## 2025-06-14 PROCEDURE — 2060000000 HC ICU INTERMEDIATE R&B

## 2025-06-14 PROCEDURE — 36415 COLL VENOUS BLD VENIPUNCTURE: CPT

## 2025-06-14 PROCEDURE — 6370000000 HC RX 637 (ALT 250 FOR IP): Performed by: PHYSICIAN ASSISTANT

## 2025-06-14 RX ORDER — PREDNISONE 10 MG/1
10 TABLET ORAL DAILY
Status: DISCONTINUED | OUTPATIENT
Start: 2025-06-14 | End: 2025-06-17 | Stop reason: HOSPADM

## 2025-06-14 RX ADMIN — PREDNISONE 10 MG: 10 TABLET ORAL at 17:41

## 2025-06-14 RX ADMIN — CARVEDILOL 6.25 MG: 6.25 TABLET, FILM COATED ORAL at 17:40

## 2025-06-14 RX ADMIN — TIOTROPIUM BROMIDE INHALATION SPRAY 5 MCG: 3.12 SPRAY, METERED RESPIRATORY (INHALATION) at 12:46

## 2025-06-14 RX ADMIN — ESCITALOPRAM OXALATE 10 MG: 10 TABLET ORAL at 10:06

## 2025-06-14 RX ADMIN — ROSUVASTATIN 40 MG: 20 TABLET, FILM COATED ORAL at 21:37

## 2025-06-14 RX ADMIN — PANTOPRAZOLE SODIUM 40 MG: 40 TABLET, DELAYED RELEASE ORAL at 06:24

## 2025-06-14 RX ADMIN — ASPIRIN 81 MG: 81 TABLET, CHEWABLE ORAL at 10:06

## 2025-06-14 RX ADMIN — RANOLAZINE 500 MG: 500 TABLET, EXTENDED RELEASE ORAL at 10:06

## 2025-06-14 RX ADMIN — ISOSORBIDE MONONITRATE 30 MG: 30 TABLET, EXTENDED RELEASE ORAL at 10:06

## 2025-06-14 RX ADMIN — Medication 2 PUFF: at 18:08

## 2025-06-14 RX ADMIN — ENOXAPARIN SODIUM 40 MG: 100 INJECTION SUBCUTANEOUS at 09:00

## 2025-06-14 RX ADMIN — RANOLAZINE 500 MG: 500 TABLET, EXTENDED RELEASE ORAL at 21:37

## 2025-06-14 RX ADMIN — Medication 2 PUFF: at 12:46

## 2025-06-14 RX ADMIN — CARVEDILOL 6.25 MG: 6.25 TABLET, FILM COATED ORAL at 08:00

## 2025-06-14 RX ADMIN — LISINOPRIL 10 MG: 10 TABLET ORAL at 10:06

## 2025-06-14 RX ADMIN — MAGNESIUM HYDROXIDE 30 ML: 400 SUSPENSION ORAL at 17:40

## 2025-06-14 ASSESSMENT — PAIN SCALES - GENERAL
PAINLEVEL_OUTOF10: 0
PAINLEVEL_OUTOF10: 0

## 2025-06-14 NOTE — H&P
V2.0  History and Physical      Name:  Jensen Davidson /Age/Sex: 1965  (59 y.o. male)   MRN & CSN:  [id number] & [id number] Encounter Date/Time: 2025 9:01 PM EDT   Location:  [id number] PCP: Margie Maddox MD       Assessment and Plan:   Jensen Davidson is a 59 y.o. male with hypertension, hyperlipidemia, T2DM without long-term use of insulin, COPD/ILD, chronic respiratory failure on 2 L NC O2, GERD, obesity, ESTRELLITA presented as a transfer from [facility] with concern for unstable angina    Unstable angina  Status post left heart catheterization 2025-severe calcified LAD and RCA stenosis. Echocardiogram 2025- EF 55-60% no RWMA, mildly dilated RV  Continue aspirin and Crestor  Start low-dose Imdur and Ranexa  Resume home carvedilol, consider uptitrating based on hemodynamics  Cardiology consultation, tentative plan for high risk PCI on Monday    Hypertension  Discontinue amlodipine  Start low-dose Imdur and resume home lisinopril and carvedilol    T2DM without long-term use of insulin  Start low-dose insulin sliding scale POC glucose ACHS hypoglycemia protocol    COPD/ILD  Chronic respiratory failure on 2 L NC O2  Symbicort and Spiriva as a substitute, as needed albuterol and hypertonic nebulization  Undergoing workup for lung transplant at [facility]    GERD  Continue home Protonix    Obesity/BMI 32.01  Obstructive sleep apnea  Resume home CPAP    Inpatient PCU telemetry  Full code    Disposition:     Current Living situation: Home  Expected Disposition: Home  Estimated D/C: 2 to 3 days    Diet ADULT DIET; Regular; Low Sodium (2 gm); No Caffeine   DVT Prophylaxis [x] Lovenox, []  Heparin, [] SCDs, [] Ambulation,  [] Eliquis, [] Xarelto, [] Coumadin   Code Status Full Code   Surrogate Decision Maker/ KACI Luna     Personally reviewed Lab Studies and Imaging   EKG () interpreted personally and results sinus bradycardia 58/min no acute ST-T wave  atelectasis.  No pneumothorax or pleural effusion is present.  Heart size mediastinal contours are stable.  No acute osseous abnormality is present.  Fullness of the superior mediastinum is again demonstrated, and may be related to vascular ectasia, thyroid enlargement or mediastinal lipomatosis.     Left basilar opacity, which may represent a combination of pneumonia and atelectasis.         Electronically signed by Elsy Tabor MD on 6/13/2025 at 11:22 PM

## 2025-06-14 NOTE — FLOWSHEET NOTE
Patient was transferred by stretcher to [facility] at 8:00 P.M. with all personal belongings. Wife was at bedside. PIV dressing was changed and flushed with ease. Blood sugar was 139. VSS. Denied pain and discomfort. Tele-monitor was removed and CMU notified and taken to CMU. Report was given to transport along with paperwork.

## 2025-06-14 NOTE — PROGRESS NOTES
[facility]      GENERAL CARDIOLOGY PROGRESS NOTE  [phone]        CC:  Transfer from [facility]       ASSESSMENT/PLAN:  1.  Unstable angina  2.  CAD, C June 13, 2025  3.  Hypertension  4.  DM2  5.  COPD/ILD  6.  ESTRELLITA    Patient presenting to [facility] with unstable angina, underwent [facility] June 13, 2025.  Transferred for consideration of high risk PCI to LAD and RCA.  Remains asymptomatic.  Transferred on monotherapy with aspirin, presuming may need CTS evaluation pending heart team evaluation Monday.  Keep NPO after midnight.  Advised patient to alert the nursing staff if he has any recurrent discomfort.  Continue high intensity statin.  Patient is on Repatha as outpatient for dyslipidemia and CAD.    Blood pressure during admission is controlled.  Some low values this morning.  Continue antihypertensive therapy on lisinopril, isosorbide, Coreg and monitor hemodynamics.    Diabetes mellitus type 2 appears controlled, most recent A1c 6.3%.    Known COPD/ILD on home oxygen, currently undergoing pulmonary transplant workup at [facility].    Compliant with obstructive sleep apnea treatment.    Thank you for allowing to us to participate in the care or Jensen Davidson. Further evaluation will be based upon the patient's clinical course and testing results.    All questions and concerns were addressed to the patient/family.     Sabine Aguirre DO, DO [facility] FAC 6/14/2025 6:44 PM  [facility]  .      History of Present Illness:  Jensen Davidson is a 59 y.o. male patient presenting from [facility] as a transfer for angina.  Patient underwent C June 13, 2025 and based on findings, cardiology service recommended transfer for high risk PCI.    Medical history significant for CAD, hypertension, dyslipidemia, DM 2, COPD/ILD, chronic respiratory failure on 2 L nasal cannula, GERD, obesity, ESTRELLITA.    INTERVAL UPDATE:  Patient seen and examined, patient sleeping this morning, easily arousable, alert and  (HCC), Coronary artery disease involving native coronary artery of native heart with unstable angina pectoris (HCC), Diabetes (HCC), Dyslipidemia, Dyspnea, Follicular bronchiolitis (HCC), GERD (gastroesophageal reflux disease), High risk medications (not anticoagulants) long-term use, HTN (hypertension), Hyperlipidemia, Hypoxemia, ILD (interstitial lung disease) (HCC), Left ureteral stone, MDD (major depressive disorder), ESTRELLITA on CPAP, Polycythemia, Positive FIT (fecal immunochemical test), Tubular adenoma, and Ureteral calculus of right kidney transplant.  Surgical History:   has a past surgical history that includes Lung removal, partial (Right, 2005); Diagnostic Cardiac Cath Lab Procedure (2006??); bronchoscopy; Colonoscopy; Endoscopy, colon, diagnostic; Colonoscopy (N/A, 9/4/2019); Upper gastrointestinal endoscopy (N/A, 9/4/2019); and Sleeve Gastrectomy (N/A, 1/21/2020).   Social History:   reports that he quit smoking about 25 years ago. His smoking use included cigarettes. He started smoking about 45 years ago. He has a 60 pack-year smoking history. He has been exposed to tobacco smoke. He has never used smokeless tobacco. He reports that he does not drink alcohol and does not use drugs.   Family History   Problem Relation Age of Onset    Diabetes Father     High Cholesterol Father     High Blood Pressure Father     Emphysema Neg Hx     Heart Failure Neg Hx     Hypertension Neg Hx      Home Medications:  Reviewed and are listed in nursing record and/or listed below.  Prior to Admission medications    Medication Sig Start Date End Date Taking? Authorizing Provider   carvedilol (COREG) 6.25 MG tablet Take 1 tablet by mouth 2 times daily (with meals) 12/9/24  Yes Margie Maddox MD   aspirin (ASPIRIN LOW DOSE) 81 MG chewable tablet RESTART IN 5 DAYS CHEW AND SWALLOW ONE TABLET BY MOUTH ONE TIME A DAY 12/9/24  Yes Margie Maddox MD   rosuvastatin (CRESTOR) 40 MG tablet TAKE 1 TABLET BY MOUTH ONE TIME

## 2025-06-14 NOTE — CONSULTS
[facility]      GENERAL CARDIOLOGY CONSULTATION  [phone]        Inpatient consult to Cardiology  Consult performed by: Sabine Aguirre DO  Consult ordered by: Elsy Tabor MD  Reason for consult: Chest pain          CC:  Transfer from [facility]       ASSESSMENT/PLAN:  1.  Unstable angina  2.  CAD, [facility] June 13, 2025  3.  Hypertension  4.  DM2  5.  COPD/ILD  6.  ESTRELLITA    Patient presenting to [facility] with unstable angina, having undergone [facility] June 13, 2025.  Transferred for consideration of high risk PCI to LAD and RCA.  Currently, patient is asymptomatic and hemodynamically stable.  Transferred on monotherapy with aspirin, presuming may need CTS evaluation pending heart team evaluation Monday.  Advised patient to alert the nursing staff if he has any recurrent discomfort.  Continue high intensity statin.  Patient is on Repatha as outpatient for dyslipidemia and CAD.    Blood pressure during admission is controlled.  Continue antihypertensive therapy on lisinopril, isosorbide, Coreg.    Diabetes mellitus type 2 appears controlled, most recent A1c 6.3%.    Known COPD/ILD on home oxygen, currently undergoing pulmonary transplant workup at [facility].    Compliant with obstructive sleep apnea treatment.    Thank you for allowing to us to participate in the care or Jensen Davidson. Further evaluation will be based upon the patient's clinical course and testing results.    All questions and concerns were addressed to the patient/family.     Sabine Aguirre DO, DO KAYLAN [facility] 6/14/2025 8:12 AM  [facility]  .      History of Present Illness:  Jensen Davidson is a 59 y.o. male patient presenting from [facility] as a transfer for angina.  Patient underwent [facility] June 13, 2025 and based on findings, cardiology service recommended transfer for high risk PCI.    Patient seen at the bedside, eating breakfast.  He is in no acute distress.  He denies chest pain or left arm pain.  States he is feeling  6/11/2025 9:15:06 PM     ECHO:  06/10/25    ECHO (TTE) LIMITED (PRN CONTRAST/BUBBLE/STRAIN/3D) 06/12/2025  9:42 AM (Final)    Interpretation Summary    Left Ventricle: The EF by visual approximation is 55 - 60%. Normal wall motion.    Right Ventricle: Right ventricle is mildly dilated.    Right Atrium: Right atrium is mildly dilated.    Image quality is adequate. Technically difficult study due to patient's body habitus.    Signed by: Richard Bernstein MD on 6/12/2025  9:42 AM

## 2025-06-14 NOTE — PROGRESS NOTES
[facility] HOSPITALISTS PROGRESS NOTE    6/14/2025 10:24 AM        Name: Jensen Davidson .              Admitted: 6/13/2025  Primary Care Provider: Margie Maddox MD (Tel: [phone])    Brief Course:   Transferred from [facility] for high risk PCI Monday. States he has been having chest, left arm pain for 9 months.    Subjective:    Feels frustrated but denies chest pain.Last chest pain was prior to cath. He states he wears 2L of oxygen at home at night and as needed. O2 sat runs 90%    Reviewed interval ancillary notes    Current Medications  acetaminophen (TYLENOL) tablet 650 mg, Q6H PRN   Or  acetaminophen (TYLENOL) suppository 650 mg, Q6H PRN  aspirin chewable tablet 81 mg, Daily  ondansetron (ZOFRAN) injection 4 mg, Q6H PRN  enoxaparin (LOVENOX) injection 40 mg, Daily  albuterol sulfate HFA (PROVENTIL;VENTOLIN;PROAIR) 108 (90 Base) MCG/ACT inhaler 2 puff, Q4H PRN  budesonide-formoterol (SYMBICORT) 160-4.5 MCG/ACT inhaler 2 puff, BID RT   And  tiotropium (SPIRIVA RESPIMAT) 2.5 MCG/ACT inhaler 5 mcg, Daily RT  carvedilol (COREG) tablet 6.25 mg, BID WC  escitalopram (LEXAPRO) tablet 10 mg, Daily  lisinopril (PRINIVIL;ZESTRIL) tablet 10 mg, Daily  pantoprazole (PROTONIX) tablet 40 mg, QAM AC  rosuvastatin (CRESTOR) tablet 40 mg, Nightly  sodium chloride (Inhalant) 3 % nebulizer solution 15 mL, BID PRN  isosorbide mononitrate (IMDUR) extended release tablet 30 mg, Daily  ranolazine (RANEXA) extended release tablet 500 mg, BID  insulin lispro (HUMALOG,ADMELOG) injection vial 0-4 Units, 4x Daily AC & HS  dextrose bolus 10% 125 mL, PRN   Or  dextrose bolus 10% 250 mL, PRN  glucagon injection 1 mg, PRN  dextrose 10 % infusion, Continuous PRN        Objective:  /69   Pulse 70   Temp 97.7 °F (36.5 °C) (Oral)   Resp 16   Ht 1.778 m (5' 10\")   Wt 100.6 kg (221 lb 12.8 oz)   SpO2 92%   BMI 31.82 kg/m²   No intake or output data

## 2025-06-15 ENCOUNTER — APPOINTMENT (OUTPATIENT)
Dept: GENERAL RADIOLOGY | Age: 60
DRG: 322 | End: 2025-06-15
Attending: INTERNAL MEDICINE
Payer: COMMERCIAL

## 2025-06-15 LAB
GLUCOSE BLD-MCNC: 143 MG/DL (ref 70–99)
GLUCOSE BLD-MCNC: 181 MG/DL (ref 70–99)
GLUCOSE BLD-MCNC: 91 MG/DL (ref 70–99)
PERFORMED ON: ABNORMAL
PERFORMED ON: ABNORMAL
PERFORMED ON: NORMAL

## 2025-06-15 PROCEDURE — 6360000002 HC RX W HCPCS: Performed by: STUDENT IN AN ORGANIZED HEALTH CARE EDUCATION/TRAINING PROGRAM

## 2025-06-15 PROCEDURE — 6370000000 HC RX 637 (ALT 250 FOR IP): Performed by: PHYSICIAN ASSISTANT

## 2025-06-15 PROCEDURE — 94761 N-INVAS EAR/PLS OXIMETRY MLT: CPT

## 2025-06-15 PROCEDURE — 94640 AIRWAY INHALATION TREATMENT: CPT

## 2025-06-15 PROCEDURE — 2060000000 HC ICU INTERMEDIATE R&B

## 2025-06-15 PROCEDURE — 6370000000 HC RX 637 (ALT 250 FOR IP): Performed by: STUDENT IN AN ORGANIZED HEALTH CARE EDUCATION/TRAINING PROGRAM

## 2025-06-15 PROCEDURE — 71045 X-RAY EXAM CHEST 1 VIEW: CPT

## 2025-06-15 PROCEDURE — 99232 SBSQ HOSP IP/OBS MODERATE 35: CPT | Performed by: INTERNAL MEDICINE

## 2025-06-15 PROCEDURE — 2700000000 HC OXYGEN THERAPY PER DAY

## 2025-06-15 RX ORDER — IPRATROPIUM BROMIDE AND ALBUTEROL SULFATE 2.5; .5 MG/3ML; MG/3ML
1 SOLUTION RESPIRATORY (INHALATION)
Status: DISCONTINUED | OUTPATIENT
Start: 2025-06-15 | End: 2025-06-17 | Stop reason: HOSPADM

## 2025-06-15 RX ORDER — IPRATROPIUM BROMIDE AND ALBUTEROL SULFATE 2.5; .5 MG/3ML; MG/3ML
1 SOLUTION RESPIRATORY (INHALATION)
Status: DISCONTINUED | OUTPATIENT
Start: 2025-06-15 | End: 2025-06-15

## 2025-06-15 RX ADMIN — LISINOPRIL 10 MG: 10 TABLET ORAL at 09:15

## 2025-06-15 RX ADMIN — RANOLAZINE 500 MG: 500 TABLET, EXTENDED RELEASE ORAL at 21:06

## 2025-06-15 RX ADMIN — ENOXAPARIN SODIUM 40 MG: 100 INJECTION SUBCUTANEOUS at 09:16

## 2025-06-15 RX ADMIN — PREDNISONE 10 MG: 10 TABLET ORAL at 09:15

## 2025-06-15 RX ADMIN — IPRATROPIUM BROMIDE AND ALBUTEROL SULFATE 1 DOSE: .5; 3 SOLUTION RESPIRATORY (INHALATION) at 22:50

## 2025-06-15 RX ADMIN — MAGNESIUM HYDROXIDE 30 ML: 400 SUSPENSION ORAL at 10:59

## 2025-06-15 RX ADMIN — PANTOPRAZOLE SODIUM 40 MG: 40 TABLET, DELAYED RELEASE ORAL at 07:46

## 2025-06-15 RX ADMIN — ESCITALOPRAM OXALATE 10 MG: 10 TABLET ORAL at 09:15

## 2025-06-15 RX ADMIN — ISOSORBIDE MONONITRATE 30 MG: 30 TABLET, EXTENDED RELEASE ORAL at 09:15

## 2025-06-15 RX ADMIN — IPRATROPIUM BROMIDE AND ALBUTEROL SULFATE 1 DOSE: .5; 3 SOLUTION RESPIRATORY (INHALATION) at 16:21

## 2025-06-15 RX ADMIN — CARVEDILOL 6.25 MG: 6.25 TABLET, FILM COATED ORAL at 09:15

## 2025-06-15 RX ADMIN — INSULIN LISPRO 1 UNITS: 100 INJECTION, SOLUTION INTRAVENOUS; SUBCUTANEOUS at 21:05

## 2025-06-15 RX ADMIN — ASPIRIN 81 MG: 81 TABLET, CHEWABLE ORAL at 09:15

## 2025-06-15 RX ADMIN — RANOLAZINE 500 MG: 500 TABLET, EXTENDED RELEASE ORAL at 09:15

## 2025-06-15 RX ADMIN — ROSUVASTATIN 40 MG: 20 TABLET, FILM COATED ORAL at 21:06

## 2025-06-15 RX ADMIN — Medication 2 PUFF: at 08:20

## 2025-06-15 RX ADMIN — TIOTROPIUM BROMIDE INHALATION SPRAY 5 MCG: 3.12 SPRAY, METERED RESPIRATORY (INHALATION) at 08:20

## 2025-06-15 RX ADMIN — CARVEDILOL 6.25 MG: 6.25 TABLET, FILM COATED ORAL at 18:06

## 2025-06-15 ASSESSMENT — PAIN SCALES - GENERAL
PAINLEVEL_OUTOF10: 0

## 2025-06-15 NOTE — RT PROTOCOL NOTE
RT Nebulizer Bronchodilator Protocol Note    There is a bronchodilator order in the chart from a provider indicating to follow the RT Bronchodilator Protocol and there is an “Initiate RT Bronchodilator Protocol” order as well (see protocol at bottom of note).    CXR Findings:  XR CHEST PORTABLE  Result Date: 6/15/2025  Increasing predominantly linear opacities in the bilateral lung bases most consistent with atelectasis and or scarring however some superimposed mild pneumonitis is not excluded.       The findings from the last RT Protocol Assessment were as follows:  Smoking: Chronic pulmonary disease  Respiratory Pattern: Regular pattern and RR 12-20 bpm  Breath Sounds: Slightly diminished and/or crackles  Cough: Strong, spontaneous, non-productive  Indication for Bronchodilator Therapy:    Bronchodilator Assessment Score: 4    Aerosolized bronchodilator medication orders have been revised according to the RT Nebulizer Bronchodilator Protocol below.    Respiratory Therapist to perform RT Therapy Protocol Assessment initially then follow the protocol.  Repeat RT Therapy Protocol Assessment PRN for score 0-3 or on second treatment, BID, and PRN for scores above 3.    No Indications - adjust the frequency to every 6 hours PRN wheezing or bronchospasm, if no treatments needed after 48 hours then discontinue using Per Protocol order mode.     If indication present, adjust the RT bronchodilator orders based on the Bronchodilator Assessment Score as indicated below.  If a patient is on this medication at home then do not decrease Frequency below that used at home.    0-3 - enter or revise RT bronchodilator order(s) to equivalent RT Bronchodilator order with Frequency of every 4 hours PRN for wheezing or increased work of breathing using Per Protocol order mode.       4-6 - enter or revise RT Bronchodilator order(s) to two equivalent RT bronchodilator orders with one order with BID Frequency and one order with Frequency of  every 4 hours PRN wheezing or increased work of breathing using Per Protocol order mode.         7-10 - enter or revise RT Bronchodilator order(s) to two equivalent RT bronchodilator orders with one order with TID Frequency and one order with Frequency of every 4 hours PRN wheezing or increased work of breathing using Per Protocol order mode.       11-13 - enter or revise RT Bronchodilator order(s) to one equivalent RT bronchodilator order with QID Frequency and an Albuterol order with Frequency of every 4 hours PRN wheezing or increased work of breathing using Per Protocol order mode.      Greater than 13 - enter or revise RT Bronchodilator order(s) to one equivalent RT bronchodilator order with every 4 hours Frequency and an Albuterol order with Frequency of every 2 hours PRN wheezing or increased work of breathing using Per Protocol order mode.     RT to enter RT Home Evaluation for COPD & MDI Assessment order using Per Protocol order mode.    Electronically signed by ABRAHAM SMART RCP on 6/15/2025 at 4:55 PM

## 2025-06-15 NOTE — PROGRESS NOTES
[facility] HOSPITALISTS PROGRESS NOTE    6/15/2025 3:27 PM        Name: Jensen Davidson .              Admitted: 6/13/2025  Primary Care Provider: Margie Maddox MD (Tel: [phone])    Brief Course:   Transferred from [facility] for high risk PCI Monday. States he has been having chest, left arm pain for 9 months.    Subjective:    Feels ok, up ot 6L of oxygen but denies feeling SOB. He states he wears 2L of oxygen at home at night and as needed. He wears 4L during pulmonary rehab. O2 sat runs 90% on RA at home. Denies CP    Reviewed interval ancillary notes    Current Medications  ipratropium 0.5 mg-albuterol 2.5 mg (DUONEB) nebulizer solution 1 Dose, Q4H RT  predniSONE (DELTASONE) tablet 10 mg, Daily  magnesium hydroxide (MILK OF MAGNESIA) 400 MG/5ML suspension 30 mL, Daily PRN  acetaminophen (TYLENOL) tablet 650 mg, Q6H PRN   Or  acetaminophen (TYLENOL) suppository 650 mg, Q6H PRN  aspirin chewable tablet 81 mg, Daily  ondansetron (ZOFRAN) injection 4 mg, Q6H PRN  enoxaparin (LOVENOX) injection 40 mg, Daily  albuterol sulfate HFA (PROVENTIL;VENTOLIN;PROAIR) 108 (90 Base) MCG/ACT inhaler 2 puff, Q4H PRN  carvedilol (COREG) tablet 6.25 mg, BID WC  escitalopram (LEXAPRO) tablet 10 mg, Daily  lisinopril (PRINIVIL;ZESTRIL) tablet 10 mg, Daily  pantoprazole (PROTONIX) tablet 40 mg, QAM AC  rosuvastatin (CRESTOR) tablet 40 mg, Nightly  sodium chloride (Inhalant) 3 % nebulizer solution 15 mL, BID PRN  isosorbide mononitrate (IMDUR) extended release tablet 30 mg, Daily  ranolazine (RANEXA) extended release tablet 500 mg, BID  insulin lispro (HUMALOG,ADMELOG) injection vial 0-4 Units, 4x Daily AC & HS  dextrose bolus 10% 125 mL, PRN   Or  dextrose bolus 10% 250 mL, PRN  glucagon injection 1 mg, PRN  dextrose 10 % infusion, Continuous PRN        Objective:  /70   Pulse 79   Temp 98.9 °F (37.2 °C) (Temporal)   Resp 16   Ht 1.778 m

## 2025-06-15 NOTE — PROGRESS NOTES
06/15/25 1654   RT Protocol   History Pulmonary Disease 2   Respiratory pattern 0   Breath sounds 2   Cough 0   Bronchodilator Assessment Score 4

## 2025-06-15 NOTE — PLAN OF CARE
Problem: Chronic Conditions and Co-morbidities  Goal: Patient's chronic conditions and co-morbidity symptoms are monitored and maintained or improved  Outcome: Progressing     Problem: Discharge Planning  Goal: Discharge to home or other facility with appropriate resources  Outcome: Progressing     Problem: Pain  Goal: Verbalizes/displays adequate comfort level or baseline comfort level  Outcome: Progressing     Problem: Safety - Adult  Goal: Free from fall injury  Outcome: Progressing     Problem: Cardiovascular - Adult  Goal: Maintains optimal cardiac output and hemodynamic stability  Outcome: Progressing

## 2025-06-15 NOTE — PLAN OF CARE
Problem: Chronic Conditions and Co-morbidities  Goal: Patient's chronic conditions and co-morbidity symptoms are monitored and maintained or improved  6/15/2025 0317 by Gwen Mckeon RN  Outcome: Progressing    Problem: Discharge Planning  Goal: Discharge to home or other facility with appropriate resources  6/15/2025 0317 by Gwen Mckeon RN  Outcome: Progressing    Problem: Pain  Goal: Verbalizes/displays adequate comfort level or baseline comfort level  6/15/2025 0317 by Gwen Mckeon RN  Outcome: Progressing    Problem: Safety - Adult  Goal: Free from fall injury  6/15/2025 0317 by Gwen Mckeon RN  Outcome: Progressing    Problem: Cardiovascular - Adult  Goal: Maintains optimal cardiac output and hemodynamic stability  Outcome: Progressing  Goal: Absence of cardiac dysrhythmias or at baseline  Outcome: Progressing     Problem: Metabolic/Fluid and Electrolytes - Adult  Goal: Glucose maintained within prescribed range  Outcome: Progressing

## 2025-06-15 NOTE — DISCHARGE SUMMARY
Hospital Medicine Discharge Summary    Patient ID: Jensen Davidson      Patient's PCP: Margie Maddox MD    Admit Date: 6/10/2025     Discharge Date: 6/13/2025      Admitting Provider: Chuyita Morales MD     Discharge Provider: Chuyita Morales MD     Discharge Diagnoses:       Active Hospital Problems    Diagnosis     History of heart artery stent [Z95.5]     Interstitial lung disease (HCC) [J84.9]     Chest pain [R07.9]     Chest pressure [R07.89]     Unstable angina (HCC) [I20.0]     ESTRELLITA treated with BiPAP [G47.33]     Essential hypertension [I10]     Shortness of breath [R06.02]        The patient was seen and examined on day of discharge and this discharge summary is in conjunction with any daily progress note from day of discharge.    Hospital Course:   Jensen Davidson is a 59 y.o. male with pmh of HLD, HTN, GERD, CAD, DM 2, interstitial lung disease, COPD, ESTRELLITA on BiPAP who presents with Unstable angina (HCC)     Patient was examined this afternoon.  Family at the bedside  Nursing staff are at the bedside giving overnight events     Patient is status post left heart catheter this morning.     Awaiting cardiology notes about left heart cath finding.  Noted that patient may need to be transferred to [facility] for possible cardiac stenting         Concerning for possible pneumonia.  Obtain the CT scan of the chest without contrast yesterday which showed moderate cystic lung disease similar to prior exams with new moderate opacities in the bilateral lower lobes favored to atelectasis and scarring.  Severe multivessel coronary artery calcification.  Cholelithiasis.     Labs obtained this morning showing sodium 137, potassium 4.2, creatinine 1.0, GFR 88, WBC of 5.0, H&H 14.8, 44.4     Patient will need home oxygen evaluation prior to being discharged.  Patient has oxygen equipment at home but does not have active DME company.  He uses 4 LPM which is most likely draining his

## 2025-06-16 ENCOUNTER — CARE COORDINATION (OUTPATIENT)
Dept: OTHER | Facility: CLINIC | Age: 60
End: 2025-06-16

## 2025-06-16 PROBLEM — J98.4 CYSTIC-BULLOUS DISEASE OF LUNG: Status: ACTIVE | Noted: 2025-06-16

## 2025-06-16 LAB
ANION GAP SERPL CALCULATED.3IONS-SCNC: 12 MMOL/L (ref 3–16)
BASOPHILS # BLD: 0 K/UL (ref 0–0.2)
BASOPHILS NFR BLD: 0.7 %
BUN SERPL-MCNC: 20 MG/DL (ref 7–20)
CALCIUM SERPL-MCNC: 8.8 MG/DL (ref 8.3–10.6)
CHLORIDE SERPL-SCNC: 103 MMOL/L (ref 99–110)
CO2 SERPL-SCNC: 23 MMOL/L (ref 21–32)
CREAT SERPL-MCNC: 1.2 MG/DL (ref 0.9–1.3)
DEPRECATED RDW RBC AUTO: 14.6 % (ref 12.4–15.4)
ECHO BSA: 2.23 M2
EOSINOPHIL # BLD: 0.1 K/UL (ref 0–0.6)
EOSINOPHIL NFR BLD: 2.5 %
GFR SERPLBLD CREATININE-BSD FMLA CKD-EPI: 70 ML/MIN/{1.73_M2}
GLUCOSE BLD-MCNC: 156 MG/DL (ref 70–99)
GLUCOSE BLD-MCNC: 156 MG/DL (ref 70–99)
GLUCOSE BLD-MCNC: 84 MG/DL (ref 70–99)
GLUCOSE SERPL-MCNC: 93 MG/DL (ref 70–99)
HCT VFR BLD AUTO: 42.8 % (ref 40.5–52.5)
HGB BLD-MCNC: 14.5 G/DL (ref 13.5–17.5)
LYMPHOCYTES # BLD: 1.4 K/UL (ref 1–5.1)
LYMPHOCYTES NFR BLD: 24.6 %
MCH RBC QN AUTO: 25.3 PG (ref 26–34)
MCHC RBC AUTO-ENTMCNC: 33.8 G/DL (ref 31–36)
MCV RBC AUTO: 74.9 FL (ref 80–100)
MONOCYTES # BLD: 0.7 K/UL (ref 0–1.3)
MONOCYTES NFR BLD: 11.4 %
NEUTROPHILS # BLD: 3.6 K/UL (ref 1.7–7.7)
NEUTROPHILS NFR BLD: 60.8 %
PERFORMED ON: ABNORMAL
PERFORMED ON: ABNORMAL
PERFORMED ON: NORMAL
PLATELET # BLD AUTO: 159 K/UL (ref 135–450)
PMV BLD AUTO: 8.1 FL (ref 5–10.5)
POC ACT LR: 199 SEC
POC ACT LR: 338 SEC
POTASSIUM SERPL-SCNC: 3.8 MMOL/L (ref 3.5–5.1)
RBC # BLD AUTO: 5.72 M/UL (ref 4.2–5.9)
SODIUM SERPL-SCNC: 138 MMOL/L (ref 136–145)
WBC # BLD AUTO: 5.9 K/UL (ref 4–11)

## 2025-06-16 PROCEDURE — 4A023N7 MEASUREMENT OF CARDIAC SAMPLING AND PRESSURE, LEFT HEART, PERCUTANEOUS APPROACH: ICD-10-PCS | Performed by: INTERNAL MEDICINE

## 2025-06-16 PROCEDURE — 92978 ENDOLUMINL IVUS OCT C 1ST: CPT | Performed by: INTERNAL MEDICINE

## 2025-06-16 PROCEDURE — 94761 N-INVAS EAR/PLS OXIMETRY MLT: CPT

## 2025-06-16 PROCEDURE — 93458 L HRT ARTERY/VENTRICLE ANGIO: CPT | Performed by: INTERNAL MEDICINE

## 2025-06-16 PROCEDURE — 80048 BASIC METABOLIC PNL TOTAL CA: CPT

## 2025-06-16 PROCEDURE — 99152 MOD SED SAME PHYS/QHP 5/>YRS: CPT | Performed by: INTERNAL MEDICINE

## 2025-06-16 PROCEDURE — 99222 1ST HOSP IP/OBS MODERATE 55: CPT | Performed by: INTERNAL MEDICINE

## 2025-06-16 PROCEDURE — 6370000000 HC RX 637 (ALT 250 FOR IP): Performed by: INTERNAL MEDICINE

## 2025-06-16 PROCEDURE — 36415 COLL VENOUS BLD VENIPUNCTURE: CPT

## 2025-06-16 PROCEDURE — 2060000000 HC ICU INTERMEDIATE R&B

## 2025-06-16 PROCEDURE — 94640 AIRWAY INHALATION TREATMENT: CPT

## 2025-06-16 PROCEDURE — B2101ZZ FLUOROSCOPY OF SINGLE CORONARY ARTERY USING LOW OSMOLAR CONTRAST: ICD-10-PCS | Performed by: INTERNAL MEDICINE

## 2025-06-16 PROCEDURE — C1725 CATH, TRANSLUMIN NON-LASER: HCPCS | Performed by: INTERNAL MEDICINE

## 2025-06-16 PROCEDURE — 99153 MOD SED SAME PHYS/QHP EA: CPT | Performed by: INTERNAL MEDICINE

## 2025-06-16 PROCEDURE — 2500000003 HC RX 250 WO HCPCS: Performed by: INTERNAL MEDICINE

## 2025-06-16 PROCEDURE — C1887 CATHETER, GUIDING: HCPCS | Performed by: INTERNAL MEDICINE

## 2025-06-16 PROCEDURE — 85025 COMPLETE CBC W/AUTO DIFF WBC: CPT

## 2025-06-16 PROCEDURE — 6360000004 HC RX CONTRAST MEDICATION: Performed by: INTERNAL MEDICINE

## 2025-06-16 PROCEDURE — 6360000002 HC RX W HCPCS: Performed by: INTERNAL MEDICINE

## 2025-06-16 PROCEDURE — 2709999900 HC NON-CHARGEABLE SUPPLY: Performed by: INTERNAL MEDICINE

## 2025-06-16 PROCEDURE — 2700000000 HC OXYGEN THERAPY PER DAY

## 2025-06-16 PROCEDURE — C1894 INTRO/SHEATH, NON-LASER: HCPCS | Performed by: INTERNAL MEDICINE

## 2025-06-16 PROCEDURE — 85347 COAGULATION TIME ACTIVATED: CPT

## 2025-06-16 PROCEDURE — 027135Z DILATION OF CORONARY ARTERY, TWO ARTERIES WITH TWO DRUG-ELUTING INTRALUMINAL DEVICES, PERCUTANEOUS APPROACH: ICD-10-PCS | Performed by: INTERNAL MEDICINE

## 2025-06-16 PROCEDURE — C9600 PERC DRUG-EL COR STENT SING: HCPCS | Performed by: INTERNAL MEDICINE

## 2025-06-16 PROCEDURE — 92928 PRQ TCAT PLMT NTRAC ST 1 LES: CPT | Performed by: INTERNAL MEDICINE

## 2025-06-16 PROCEDURE — C1769 GUIDE WIRE: HCPCS | Performed by: INTERNAL MEDICINE

## 2025-06-16 PROCEDURE — C1874 STENT, COATED/COV W/DEL SYS: HCPCS | Performed by: INTERNAL MEDICINE

## 2025-06-16 PROCEDURE — C1753 CATH, INTRAVAS ULTRASOUND: HCPCS | Performed by: INTERNAL MEDICINE

## 2025-06-16 PROCEDURE — 6370000000 HC RX 637 (ALT 250 FOR IP): Performed by: STUDENT IN AN ORGANIZED HEALTH CARE EDUCATION/TRAINING PROGRAM

## 2025-06-16 PROCEDURE — B240ZZ3 ULTRASONOGRAPHY OF SINGLE CORONARY ARTERY, INTRAVASCULAR: ICD-10-PCS | Performed by: INTERNAL MEDICINE

## 2025-06-16 DEVICE — EVEROLIMUS-ELUTING PLATINUM CHROMIUM CORONARY STENT SYSTEM
Type: IMPLANTABLE DEVICE | Status: FUNCTIONAL
Brand: SYNERGY™ XD

## 2025-06-16 RX ORDER — NITROGLYCERIN 20 MG/100ML
INJECTION INTRAVENOUS CONTINUOUS PRN
Status: COMPLETED | OUTPATIENT
Start: 2025-06-16 | End: 2025-06-16

## 2025-06-16 RX ORDER — 0.9 % SODIUM CHLORIDE 0.9 %
250 INTRAVENOUS SOLUTION INTRAVENOUS PRN
Status: DISCONTINUED | OUTPATIENT
Start: 2025-06-16 | End: 2025-06-17 | Stop reason: HOSPADM

## 2025-06-16 RX ORDER — FENTANYL CITRATE 50 UG/ML
INJECTION, SOLUTION INTRAMUSCULAR; INTRAVENOUS PRN
Status: DISCONTINUED | OUTPATIENT
Start: 2025-06-16 | End: 2025-06-16 | Stop reason: HOSPADM

## 2025-06-16 RX ORDER — MIDAZOLAM HYDROCHLORIDE 1 MG/ML
INJECTION, SOLUTION INTRAMUSCULAR; INTRAVENOUS PRN
Status: DISCONTINUED | OUTPATIENT
Start: 2025-06-16 | End: 2025-06-16 | Stop reason: HOSPADM

## 2025-06-16 RX ORDER — HYDRALAZINE HYDROCHLORIDE 20 MG/ML
10 INJECTION INTRAMUSCULAR; INTRAVENOUS
Status: DISCONTINUED | OUTPATIENT
Start: 2025-06-16 | End: 2025-06-17 | Stop reason: HOSPADM

## 2025-06-16 RX ORDER — TICAGRELOR 90 MG/1
90 TABLET, FILM COATED ORAL 2 TIMES DAILY
Status: DISCONTINUED | OUTPATIENT
Start: 2025-06-16 | End: 2025-06-17 | Stop reason: HOSPADM

## 2025-06-16 RX ORDER — IOPAMIDOL 755 MG/ML
INJECTION, SOLUTION INTRAVASCULAR PRN
Status: DISCONTINUED | OUTPATIENT
Start: 2025-06-16 | End: 2025-06-16 | Stop reason: HOSPADM

## 2025-06-16 RX ORDER — TICAGRELOR 90 MG/1
90 TABLET, FILM COATED ORAL 2 TIMES DAILY
Status: DISCONTINUED | OUTPATIENT
Start: 2025-06-16 | End: 2025-06-16

## 2025-06-16 RX ORDER — SODIUM CHLORIDE 0.9 % (FLUSH) 0.9 %
5-40 SYRINGE (ML) INJECTION EVERY 12 HOURS SCHEDULED
Status: DISCONTINUED | OUTPATIENT
Start: 2025-06-16 | End: 2025-06-17 | Stop reason: HOSPADM

## 2025-06-16 RX ORDER — HEPARIN SODIUM 1000 [USP'U]/ML
INJECTION, SOLUTION INTRAVENOUS; SUBCUTANEOUS PRN
Status: DISCONTINUED | OUTPATIENT
Start: 2025-06-16 | End: 2025-06-16 | Stop reason: HOSPADM

## 2025-06-16 RX ORDER — ATROPINE SULFATE 0.4 MG/ML
0.5 INJECTION, SOLUTION INTRAVENOUS
Status: DISCONTINUED | OUTPATIENT
Start: 2025-06-16 | End: 2025-06-17 | Stop reason: HOSPADM

## 2025-06-16 RX ORDER — EPTIFIBATIDE 2 MG/ML
INJECTION, SOLUTION INTRAVENOUS PRN
Status: DISCONTINUED | OUTPATIENT
Start: 2025-06-16 | End: 2025-06-16 | Stop reason: HOSPADM

## 2025-06-16 RX ORDER — ACETAMINOPHEN 325 MG/1
650 TABLET ORAL EVERY 4 HOURS PRN
Status: DISCONTINUED | OUTPATIENT
Start: 2025-06-16 | End: 2025-06-17 | Stop reason: HOSPADM

## 2025-06-16 RX ORDER — ASPIRIN 81 MG/1
81 TABLET, CHEWABLE ORAL DAILY
Status: DISCONTINUED | OUTPATIENT
Start: 2025-06-17 | End: 2025-06-16

## 2025-06-16 RX ORDER — TICAGRELOR 90 MG/1
TABLET, FILM COATED ORAL PRN
Status: DISCONTINUED | OUTPATIENT
Start: 2025-06-16 | End: 2025-06-16 | Stop reason: HOSPADM

## 2025-06-16 RX ORDER — SODIUM CHLORIDE 0.9 % (FLUSH) 0.9 %
5-40 SYRINGE (ML) INJECTION PRN
Status: DISCONTINUED | OUTPATIENT
Start: 2025-06-16 | End: 2025-06-17 | Stop reason: HOSPADM

## 2025-06-16 RX ORDER — SODIUM CHLORIDE 9 MG/ML
INJECTION, SOLUTION INTRAVENOUS PRN
Status: DISCONTINUED | OUTPATIENT
Start: 2025-06-16 | End: 2025-06-17 | Stop reason: HOSPADM

## 2025-06-16 RX ORDER — EPTIFIBATIDE 0.75 MG/ML
INJECTION, SOLUTION INTRAVENOUS CONTINUOUS PRN
Status: COMPLETED | OUTPATIENT
Start: 2025-06-16 | End: 2025-06-16

## 2025-06-16 RX ORDER — EPTIFIBATIDE 0.75 MG/ML
2 INJECTION, SOLUTION INTRAVENOUS CONTINUOUS
Status: DISPENSED | OUTPATIENT
Start: 2025-06-16 | End: 2025-06-16

## 2025-06-16 RX ADMIN — ROSUVASTATIN 40 MG: 20 TABLET, FILM COATED ORAL at 21:02

## 2025-06-16 RX ADMIN — ESCITALOPRAM OXALATE 10 MG: 10 TABLET ORAL at 11:46

## 2025-06-16 RX ADMIN — IPRATROPIUM BROMIDE AND ALBUTEROL SULFATE 1 DOSE: .5; 3 SOLUTION RESPIRATORY (INHALATION) at 11:58

## 2025-06-16 RX ADMIN — PANTOPRAZOLE SODIUM 40 MG: 40 TABLET, DELAYED RELEASE ORAL at 05:14

## 2025-06-16 RX ADMIN — CARVEDILOL 6.25 MG: 6.25 TABLET, FILM COATED ORAL at 17:15

## 2025-06-16 RX ADMIN — ASPIRIN 81 MG: 81 TABLET, CHEWABLE ORAL at 07:03

## 2025-06-16 RX ADMIN — LISINOPRIL 10 MG: 10 TABLET ORAL at 11:46

## 2025-06-16 RX ADMIN — IPRATROPIUM BROMIDE AND ALBUTEROL SULFATE 1 DOSE: .5; 3 SOLUTION RESPIRATORY (INHALATION) at 21:48

## 2025-06-16 RX ADMIN — RANOLAZINE 500 MG: 500 TABLET, EXTENDED RELEASE ORAL at 21:02

## 2025-06-16 RX ADMIN — PREDNISONE 10 MG: 10 TABLET ORAL at 11:46

## 2025-06-16 RX ADMIN — SODIUM CHLORIDE, PRESERVATIVE FREE 10 ML: 5 INJECTION INTRAVENOUS at 11:46

## 2025-06-16 RX ADMIN — TICAGRELOR 90 MG: 90 TABLET ORAL at 21:02

## 2025-06-16 RX ADMIN — ISOSORBIDE MONONITRATE 30 MG: 30 TABLET, EXTENDED RELEASE ORAL at 11:46

## 2025-06-16 RX ADMIN — EPTIFIBATIDE 2 MCG/KG/MIN: 0.75 INJECTION, SOLUTION INTRAVENOUS at 10:16

## 2025-06-16 RX ADMIN — RANOLAZINE 500 MG: 500 TABLET, EXTENDED RELEASE ORAL at 11:46

## 2025-06-16 RX ADMIN — SODIUM CHLORIDE, PRESERVATIVE FREE 10 ML: 5 INJECTION INTRAVENOUS at 21:02

## 2025-06-16 ASSESSMENT — PAIN SCALES - GENERAL
PAINLEVEL_OUTOF10: 0

## 2025-06-16 NOTE — CARE COORDINATION
Care Transitions Chart Review      Patient: Jensen Davidson    Patient : 1965   MRN: [id number]    Reason for Admission: CAD  Admission Date: 6/10/25  RURS: Readmission Risk Score: 14.1      Last Discharge Facility       Date Complaint Diagnosis Description Type Department Provider    25  CAD (coronary artery disease) Admission (Current) [facility] Junaid Robbins MD            Patient admitted to an External Facility? No Admitting Facility Name: [facility]    Chart reviewed for Care Transition discharge assignment.  ACM will contact patient at discharge for Care Transition outreach.

## 2025-06-16 NOTE — CONSULTS
[facility] Pulmonary and Critical Care   Consult Note      Reason for Consult: Acute hypoxic respiratory failure  Requesting Physician: Nicci Reyes    Subjective:   CHIEF COMPLAINT: Chest pain and shortness of breath     HPI: Patient has been hospitalized for evaluation of unstable angina, chest pain and worsening shortness of breath-required up to 6 L O2 at 1 point.  He has undergone LHC today, significant RCA stenosis status post stent x 2.  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  He typically uses oxygen as needed and at nighttime.  ESTRELLITA-not compliant with CPAP due to issues with pressures.    Patient states that he has been struggling to breathe for quite some time, with poor exertional capacity and even activity of daily living.  He has been undergoing pulmonary rehabilitation.  No significant cough or phlegm.    Follows with Dr. Quiles at [facility].  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  Failed CellCept therapy, currently on chronic prednisone 10 mg daily.  He has also been evaluated at [facility] for lung transplant.  Former smoker quit 25 years ago, 60-pack-year history.  PFT from November 2024, FEV1 0.79 L [22% predicted], %, % and DLCO 47%.       The patient is a 59 y.o. 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

## 2025-06-16 NOTE — ADT AUTH CERT
6/13    Last updated by Rosalia Rehman RN on 6/13/2025 1826     Review Status Created By   In Primary Rosalia Rehman RN       Review Type Associated Date   -- 6/13/2025      Criteria Review   DATE: 6/13   TYPE OF BED: Telemetry     PERTINENT UPDATES:  [facility] today which showed severe, calcified LAD and RCA stenoses by IVUS, pending transfer to [place] for complex PCI  Remains on supplemental oxygen above baseline      -----CARDIAC CATH-----  PRE PROCEDURE DIAGNOSIS: Chest pain [R07.9]  PROCEDURES:   Left heart cath / coronary angiography  Intravascular ultrasound  CONTRAST: Isovue. Contrast concentration: 370. Amount: 120 ml  EBL: 2ml  COMPLICATIONS: None  CONCLUSION:  Mr. Jensen Davidson is a 60 y/o male patient undergoing lung transplant workup at [facility], here for requested [facility]  Severe, calcified LAD and RCA stenoses by IVUS.   Discussed with Dr. Liu and will transfer to [facility] for complex PCI  -----------------------     RELEVANT BASELINES:  2L Home oxygen at night  ESTRELLITA with Bipap     VITALS: 97.9 (36.6)    16        69        117/65      90% 4L NC  WEIGHT: 101.2kg  BMI: 32.1     ABNL/PERTINENT LABS/RADIOLOGY/DIAGNOSTIC STUDIES:  06/13/25 05:57  Total Protein: 6.3 (L)  MCV: 76.6 (L)  MCH: 25.5 (L)  Platelet Count: 124 (L)     PHYSICAL EXAM:  General appearance: No apparent distress, cooperative  HEENT: Pupils equal, round, and reactive to light.   Neck: Supple, with full range of motion.   Respiratory:  Normal respiratory effort.   Cardiovascular: Regular rate and rhythm.  Abdomen: Soft, non-tender, non-distended with normal bowel sounds.  Musculoskeletal: No edema bilaterally.  Full range of motion without deformity.  Skin: Skin color, texture, turgor normal.  No rashes or lesions.  Neurologic:  Neurovascularly intact   Psychiatric: Alert and oriented  Capillary Refill: Brisk, 3 seconds, normal   Peripheral Pulses: +2 palpable, equal bilaterally      MD

## 2025-06-16 NOTE — PROGRESS NOTES
[facility]    6/16/2025 3:07 PM        Name: Jensen Davidson .              Admitted: 6/13/2025  Primary Care Provider: Margie Maddox MD (Tel: [phone])    Brief Course:   Transferred from [facility] for high risk PCI. States he has been having chest, left arm pain for 9 months.    Subjective:    Patient had successful PCI this am. He was requiring up to 6L of oxygen this am. He has been weaned down to 2-3 since coming back from [facility]. Denies cp or sob    Reviewed interval ancillary notes    Current Medications  sodium chloride flush 0.9 % injection 5-40 mL, 2 times per day  sodium chloride flush 0.9 % injection 5-40 mL, PRN  0.9 % sodium chloride infusion, PRN  acetaminophen (TYLENOL) tablet 650 mg, Q4H PRN  atropine injection 0.5 mg, Once PRN  sodium chloride 0.9 % bolus 250 mL, PRN  hydrALAZINE (APRESOLINE) injection 10 mg, Q1H PRN  ticagrelor (BRILINTA) tablet 90 mg, BID  ipratropium 0.5 mg-albuterol 2.5 mg (DUONEB) nebulizer solution 1 Dose, BID RT  predniSONE (DELTASONE) tablet 10 mg, Daily  magnesium hydroxide (MILK OF MAGNESIA) 400 MG/5ML suspension 30 mL, Daily PRN  acetaminophen (TYLENOL) tablet 650 mg, Q6H PRN   Or  acetaminophen (TYLENOL) suppository 650 mg, Q6H PRN  aspirin chewable tablet 81 mg, Daily  ondansetron (ZOFRAN) injection 4 mg, Q6H PRN  enoxaparin (LOVENOX) injection 40 mg, Daily  albuterol sulfate HFA (PROVENTIL;VENTOLIN;PROAIR) 108 (90 Base) MCG/ACT inhaler 2 puff, Q4H PRN  carvedilol (COREG) tablet 6.25 mg, BID WC  escitalopram (LEXAPRO) tablet 10 mg, Daily  lisinopril (PRINIVIL;ZESTRIL) tablet 10 mg, Daily  pantoprazole (PROTONIX) tablet 40 mg, QAM AC  rosuvastatin (CRESTOR) tablet 40 mg, Nightly  sodium chloride (Inhalant) 3 % nebulizer solution 15 mL, BID PRN  isosorbide mononitrate (IMDUR) extended release tablet 30 mg, Daily  ranolazine (RANEXA) extended release tablet 500

## 2025-06-16 NOTE — ANESTHESIA PRE-OP
Brief Pre-Op Note/Sedation Assessment      Jensen Davidson  1965  [id number]  7:55 AM    Planned Procedure: Cardiac Catheterization Procedure  Post Procedure Plan: Return to same level of care  Consent: I have discussed with the patient and/or the patient representative the indication, alternatives, and the possible risks and/or complications of the planned procedure and the anesthesia methods. The patient and/or patient representative appear to understand and agree to proceed.        Chief Complaint:   Chest Pain/Pressure      Indications for Cath Procedure:  Presentation:  Worsening Angina  2.  Anginal Classification within 2 weeks:  CCS IV - Inability to perform any activity without angina or angina at rest, i.e., severe limitation  3.  Angina Symptoms Assessment:  Typical Chest Pain  4.  Heart Failure Class within last 2 weeks:  No symptoms  5.  Cardiovascular Instability:  No    Prior Ischemic Workup/Eval:  Pre-Procedural Medications: Yes: Aspirin, Beta Blockers, and STATIN  2.   Stress Test Completed?  No    Does Patient need surgery?  Cath Valve Surgery:  No    Pre-Procedure Medical History:  Vital Signs:  /88   Pulse 67   Temp 97.7 °F (36.5 °C) (Temporal)   Resp 12   Ht 1.778 m (5' 10\")   Wt 100.1 kg (220 lb 10.9 oz)   SpO2 95%   BMI 31.66 kg/m²     Allergies:    Allergies   Allergen Reactions    Erythromycin Hives     Medications:    Current Facility-Administered Medications   Medication Dose Route Frequency Provider Last Rate Last Admin    fentaNYL (SUBLIMAZE) injection   IntraVENous PRN Wesley Salguero MD   50 mcg at 06/16/25 0751    midazolam (VERSED) injection   IntraVENous PRN Wesley Salguero MD   1 mg at 06/16/25 0751    ipratropium 0.5 mg-albuterol 2.5 mg (DUONEB) nebulizer solution 1 Dose  1 Dose Inhalation BID RT SalNicci josue PA-C   1 Dose at 06/15/25 2250    predniSONE (DELTASONE) tablet 10 mg  10 mg Oral Daily SalNicci josue PA-C   10 mg at 06/15/25

## 2025-06-17 ENCOUNTER — APPOINTMENT (OUTPATIENT)
Dept: CARDIAC REHAB | Age: 60
End: 2025-06-17
Payer: MEDICARE

## 2025-06-17 ENCOUNTER — TELEPHONE (OUTPATIENT)
Dept: PHARMACY | Facility: HOSPITAL | Age: 60
End: 2025-06-17

## 2025-06-17 VITALS
DIASTOLIC BLOOD PRESSURE: 79 MMHG | TEMPERATURE: 98.8 F | BODY MASS INDEX: 31.44 KG/M2 | WEIGHT: 219.58 LBS | SYSTOLIC BLOOD PRESSURE: 130 MMHG | HEART RATE: 73 BPM | HEIGHT: 70 IN | RESPIRATION RATE: 15 BRPM | OXYGEN SATURATION: 93 %

## 2025-06-17 LAB
ANION GAP SERPL CALCULATED.3IONS-SCNC: 13 MMOL/L (ref 3–16)
BUN SERPL-MCNC: 17 MG/DL (ref 7–20)
CALCIUM SERPL-MCNC: 8.9 MG/DL (ref 8.3–10.6)
CHLORIDE SERPL-SCNC: 102 MMOL/L (ref 99–110)
CO2 SERPL-SCNC: 22 MMOL/L (ref 21–32)
CREAT SERPL-MCNC: 1.1 MG/DL (ref 0.9–1.3)
DEPRECATED RDW RBC AUTO: 14.9 % (ref 12.4–15.4)
EKG ATRIAL RATE: 63 BPM
EKG ATRIAL RATE: 70 BPM
EKG DIAGNOSIS: NORMAL
EKG DIAGNOSIS: NORMAL
EKG P AXIS: 78 DEGREES
EKG P AXIS: 81 DEGREES
EKG P-R INTERVAL: 170 MS
EKG P-R INTERVAL: 184 MS
EKG Q-T INTERVAL: 392 MS
EKG Q-T INTERVAL: 412 MS
EKG QRS DURATION: 80 MS
EKG QRS DURATION: 82 MS
EKG QTC CALCULATION (BAZETT): 421 MS
EKG QTC CALCULATION (BAZETT): 423 MS
EKG R AXIS: 67 DEGREES
EKG R AXIS: 69 DEGREES
EKG T AXIS: 93 DEGREES
EKG T AXIS: 95 DEGREES
EKG VENTRICULAR RATE: 63 BPM
EKG VENTRICULAR RATE: 70 BPM
GFR SERPLBLD CREATININE-BSD FMLA CKD-EPI: 78 ML/MIN/{1.73_M2}
GLUCOSE BLD-MCNC: 144 MG/DL (ref 70–99)
GLUCOSE BLD-MCNC: 95 MG/DL (ref 70–99)
GLUCOSE SERPL-MCNC: 94 MG/DL (ref 70–99)
HCT VFR BLD AUTO: 42.9 % (ref 40.5–52.5)
HGB BLD-MCNC: 14.2 G/DL (ref 13.5–17.5)
MCH RBC QN AUTO: 25.4 PG (ref 26–34)
MCHC RBC AUTO-ENTMCNC: 33.1 G/DL (ref 31–36)
MCV RBC AUTO: 76.5 FL (ref 80–100)
PERFORMED ON: ABNORMAL
PERFORMED ON: NORMAL
PLATELET # BLD AUTO: 166 K/UL (ref 135–450)
PMV BLD AUTO: 8.1 FL (ref 5–10.5)
POTASSIUM SERPL-SCNC: 3.8 MMOL/L (ref 3.5–5.1)
RBC # BLD AUTO: 5.61 M/UL (ref 4.2–5.9)
SODIUM SERPL-SCNC: 137 MMOL/L (ref 136–145)
WBC # BLD AUTO: 7 K/UL (ref 4–11)

## 2025-06-17 PROCEDURE — 2500000003 HC RX 250 WO HCPCS: Performed by: INTERNAL MEDICINE

## 2025-06-17 PROCEDURE — 94680 O2 UPTK RST&XERS DIR SIMPLE: CPT

## 2025-06-17 PROCEDURE — 93005 ELECTROCARDIOGRAM TRACING: CPT | Performed by: INTERNAL MEDICINE

## 2025-06-17 PROCEDURE — 6370000000 HC RX 637 (ALT 250 FOR IP): Performed by: INTERNAL MEDICINE

## 2025-06-17 PROCEDURE — 80048 BASIC METABOLIC PNL TOTAL CA: CPT

## 2025-06-17 PROCEDURE — 94640 AIRWAY INHALATION TREATMENT: CPT

## 2025-06-17 PROCEDURE — 99233 SBSQ HOSP IP/OBS HIGH 50: CPT | Performed by: NURSE PRACTITIONER

## 2025-06-17 PROCEDURE — 93010 ELECTROCARDIOGRAM REPORT: CPT | Performed by: INTERNAL MEDICINE

## 2025-06-17 PROCEDURE — 2700000000 HC OXYGEN THERAPY PER DAY

## 2025-06-17 PROCEDURE — 85027 COMPLETE CBC AUTOMATED: CPT

## 2025-06-17 PROCEDURE — 94761 N-INVAS EAR/PLS OXIMETRY MLT: CPT

## 2025-06-17 PROCEDURE — 36415 COLL VENOUS BLD VENIPUNCTURE: CPT

## 2025-06-17 RX ORDER — TICAGRELOR 90 MG/1
90 TABLET, FILM COATED ORAL 2 TIMES DAILY
Qty: 60 TABLET | Refills: 1 | Status: SHIPPED | OUTPATIENT
Start: 2025-06-17 | End: 2025-06-24 | Stop reason: SDUPTHER

## 2025-06-17 RX ORDER — RANOLAZINE 500 MG/1
500 TABLET, EXTENDED RELEASE ORAL 2 TIMES DAILY
Qty: 60 TABLET | Refills: 3 | Status: SHIPPED | OUTPATIENT
Start: 2025-06-17 | End: 2025-06-24 | Stop reason: SDUPTHER

## 2025-06-17 RX ORDER — ISOSORBIDE MONONITRATE 30 MG/1
30 TABLET, EXTENDED RELEASE ORAL DAILY
Qty: 30 TABLET | Refills: 3 | Status: SHIPPED | OUTPATIENT
Start: 2025-06-18 | End: 2025-06-24 | Stop reason: SDUPTHER

## 2025-06-17 RX ORDER — PREDNISONE 10 MG/1
10 TABLET ORAL DAILY
Qty: 20 TABLET | Refills: 0 | Status: SHIPPED | OUTPATIENT
Start: 2025-06-17 | End: 2025-06-24 | Stop reason: ALTCHOICE

## 2025-06-17 RX ADMIN — TICAGRELOR 90 MG: 90 TABLET ORAL at 09:31

## 2025-06-17 RX ADMIN — PANTOPRAZOLE SODIUM 40 MG: 40 TABLET, DELAYED RELEASE ORAL at 05:06

## 2025-06-17 RX ADMIN — RANOLAZINE 500 MG: 500 TABLET, EXTENDED RELEASE ORAL at 09:31

## 2025-06-17 RX ADMIN — CARVEDILOL 6.25 MG: 6.25 TABLET, FILM COATED ORAL at 09:31

## 2025-06-17 RX ADMIN — ASPIRIN 81 MG: 81 TABLET, CHEWABLE ORAL at 09:31

## 2025-06-17 RX ADMIN — LISINOPRIL 10 MG: 10 TABLET ORAL at 09:31

## 2025-06-17 RX ADMIN — IPRATROPIUM BROMIDE AND ALBUTEROL SULFATE 1 DOSE: .5; 3 SOLUTION RESPIRATORY (INHALATION) at 11:41

## 2025-06-17 RX ADMIN — ESCITALOPRAM OXALATE 10 MG: 10 TABLET ORAL at 09:31

## 2025-06-17 RX ADMIN — PREDNISONE 10 MG: 10 TABLET ORAL at 09:31

## 2025-06-17 RX ADMIN — ISOSORBIDE MONONITRATE 30 MG: 30 TABLET, EXTENDED RELEASE ORAL at 09:31

## 2025-06-17 RX ADMIN — SODIUM CHLORIDE, PRESERVATIVE FREE 10 ML: 5 INJECTION INTRAVENOUS at 09:32

## 2025-06-17 ASSESSMENT — PAIN SCALES - GENERAL: PAINLEVEL_OUTOF10: 0

## 2025-06-17 NOTE — PLAN OF CARE
Problem: Chronic Conditions and Co-morbidities  Goal: Patient's chronic conditions and co-morbidity symptoms are monitored and maintained or improved  6/17/2025 1314 by Anabell Farah RN  Outcome: Completed  6/17/2025 0945 by Anabell Farah RN  Outcome: Progressing     Problem: Discharge Planning  Goal: Discharge to home or other facility with appropriate resources  6/17/2025 1314 by Anabell Farah RN  Outcome: Completed  6/17/2025 0945 by Anabell Farah RN  Outcome: Progressing     Problem: Pain  Goal: Verbalizes/displays adequate comfort level or baseline comfort level  6/17/2025 1314 by Anabell Farah RN  Outcome: Completed  6/17/2025 0945 by Anabell Farah RN  Outcome: Progressing     Problem: Safety - Adult  Goal: Free from fall injury  6/17/2025 1314 by Anabell Farah RN  Outcome: Completed  6/17/2025 0945 by Anabell Farah RN  Outcome: Progressing     Problem: Cardiovascular - Adult  Goal: Maintains optimal cardiac output and hemodynamic stability  6/17/2025 1314 by Anabell Farah RN  Outcome: Completed  6/17/2025 0945 by Anabell Farah RN  Outcome: Progressing  Goal: Absence of cardiac dysrhythmias or at baseline  6/17/2025 1314 by Anabell Farah RN  Outcome: Completed  6/17/2025 0945 by Anabell Farah RN  Outcome: Progressing     Problem: Metabolic/Fluid and Electrolytes - Adult  Goal: Glucose maintained within prescribed range  6/17/2025 1314 by Anabell Farah RN  Outcome: Completed  6/17/2025 0945 by Anabell Farah RN  Outcome: Progressing     Problem: Respiratory - Adult  Goal: Achieves optimal ventilation and oxygenation  6/17/2025 1314 by Anabell Farah RN  Outcome: Completed  6/17/2025 0945 by Anabell Farah RN  Outcome: Progressing     Problem: Gastrointestinal - Adult  Goal: Maintains adequate nutritional intake  6/17/2025 1314 by Anabell Farah RN  Outcome: Completed  6/17/2025 0945 by Anabell Farah RN  Outcome: Progressing

## 2025-06-17 NOTE — CARE COORDINATION
Discharge Planning Note:  Chart reviewed and it appears that patient has minimal needs for discharge at this time. Patient is a transfer from  for high risk PCI,     Risk Score 13 %     Primary Care Physician is Margie Maddox MD    Primary insurance is [employer]    Please notify case management if any discharge needs are identified.      Case management will continue to follow progress and update discharge plan as needed.

## 2025-06-17 NOTE — PROGRESS NOTES
CLINICAL PHARMACY NOTE: MEDS TO BEDS    Total # of Prescriptions Filled: 4   The following medications were delivered to the patient:  Isosorbide mononitrate ER 30mg  Prednisone 10mg  Ticagrelor 90mg  Ranolazine ER 500mg    Additional Documentation:     Medications were picked up in the Outpatient Pharmacy by patient's wife Cheryl Alvarez Jerad Arreaga

## 2025-06-17 NOTE — PROGRESS NOTES
CLINICAL PHARMACY NOTE: MEDS TO BEDS    Total # of Prescriptions Filled: 4   The following medications were delivered to the patient:  Isosorbide Mononitrate ER 30  Prednisone 10 mg   Ticagrelor 90 mg  Ranolazine  mg    Additional Documentation: Cheryl pt wife picked up in outpatient pharmacy.  Myah Simmons CPhT

## 2025-06-17 NOTE — CARE COORDINATION
06/17/25 1229   Readmission Assessment   Number of Days since last admission? 1-7 days   Previous Disposition Home with Family   Who is being Interviewed Patient   What was the patient's/caregiver's perception as to why they think they needed to return back to the hospital? Other (Comment)  (Patient is a  transfer from [facility]  for high risk PCI,)   Did you visit your Primary Care Physician after you left the hospital, before you returned this time? No   Why weren't you able to visit your PCP? Did not have an appointment   Did you see a specialist, such as Cardiac, Pulmonary, Orthopedic Physician, etc. after you left the hospital? No   Who advised the patient to return to the hospital? Physician  (transfer from  for high risk PCI,)   Does the patient report anything that got in the way of taking their medications? No   In our efforts to provide the best possible care to you and others like you, can you think of anything that we could have done to help you after you left the hospital the first time, so that you might not have needed to return so soon? Other (Comment)  (transfer from  for high risk PCI,)

## 2025-06-17 NOTE — PLAN OF CARE
Problem: Chronic Conditions and Co-morbidities  Goal: Patient's chronic conditions and co-morbidity symptoms are monitored and maintained or improved  Outcome: Progressing     Problem: Discharge Planning  Goal: Discharge to home or other facility with appropriate resources  Outcome: Progressing     Problem: Pain  Goal: Verbalizes/displays adequate comfort level or baseline comfort level  Outcome: Progressing     Problem: Safety - Adult  Goal: Free from fall injury  Outcome: Progressing     Problem: Cardiovascular - Adult  Goal: Maintains optimal cardiac output and hemodynamic stability  Outcome: Progressing  Goal: Absence of cardiac dysrhythmias or at baseline  Outcome: Progressing     Problem: Metabolic/Fluid and Electrolytes - Adult  Goal: Glucose maintained within prescribed range  Outcome: Progressing     Problem: Respiratory - Adult  Goal: Achieves optimal ventilation and oxygenation  Outcome: Progressing     Problem: Gastrointestinal - Adult  Goal: Maintains adequate nutritional intake  Outcome: Progressing

## 2025-06-17 NOTE — DISCHARGE SUMMARY
[facility] DISCHARGE SUMMARY    Patient Demographics    Patient. Jensen Davidson  Date of Birth. 1965  MRN. [id number]     Primary care provider. Margie Maddox MD  (Tel: [phone])    Admit date: 6/13/2025    Discharge date 6/17/2025  Note Date: 6/17/2025     Reason for Hospitalization.   Dyspnea / chest pain        Significant Findings.   Principal Problem:    Unstable angina (HCC)  Active Problems:    Coronary artery disease involving native coronary artery of native heart with unstable angina pectoris (HCC)    Acute respiratory failure with hypoxia (HCC)    Dyslipidemia    ESTRELLITA (obstructive sleep apnea)    Hypertension, essential    Cystic-bullous disease of lung  Resolved Problems:    * No resolved hospital problems. *       Problems and results from this hospitalization that need follow up.  CAD follow up with cardiology in 2 weeks  ILD/ on transplant list at [facility]:  follow up with Dr Quiles, discuss changing CPAP settings   Cholelithiasis noted on imaging     Significant test results and incidental findings.  XR CHEST PORTABLE   Final Result   Increasing predominantly linear opacities in the bilateral lung bases most   consistent with atelectasis and or scarring however some superimposed mild   pneumonitis is not excluded.           ECHO:      Left Ventricle: The EF by visual approximation is 55 - 60%. Normal wall motion.    Right Ventricle: Right ventricle is mildly dilated.    Right Atrium: Right atrium is mildly dilated.    Image quality is adequate. Technically difficult study due to patient's body habitus.      CT chest 6/12/2025:    IMPRESSION:  1. Moderate cystic lung disease similar to prior examination with new  moderate opacities in the bilateral lower lobes favored to represent linear  atelectasis/scarring.  2. Severe multivessel coronary artery calcification.  3.  of 3 total doses. If no relief after 1 dose, call 911.  Notes to patient: Used: to treat chest pain  Side effects: Headache, low blood pressure      May take one tablet every 5 minutes up to 3 times, call ambulance if chest pain continues         omeprazole 20 MG delayed release capsule  Commonly known as: PRILOSEC  TAKE ONE CAPSULE BY MOUTH TWICE A DAY     * Repatha SureClick Soaj pen  Generic drug: Evolocumab  INJECT 140MG (1 PEN) UNDER THE SKIN EVERY 14 DAYS     * Evolocumab Soaj pen  Commonly known as: REPATHA  Inject 140 mg into the skin every 14 days     rosuvastatin 40 MG tablet  Commonly known as: CRESTOR  TAKE 1 TABLET BY MOUTH ONE TIME A DAY IN THE EVENING     sodium chloride (Inhalant) 3 % nebulizer solution  Take 15 mLs by nebulization 2 times daily           * This list has 2 medication(s) that are the same as other medications prescribed for you. Read the directions carefully, and ask your doctor or other care provider to review them with you.                STOP taking these medications      amLODIPine 2.5 MG tablet  Commonly known as: NORVASC            ASK your doctor about these medications      azithromycin 500 MG tablet  Commonly known as: ZITHROMAX  Take 1 tablet by mouth three times a week               Where to Get Your Medications        These medications were sent to [facility] [id number] [address]  - P [phone] - F [phone]  [address] [place] [id number]      Phone: [phone]   isosorbide mononitrate 30 MG extended release tablet  ranolazine 500 MG extended release tablet  ticagrelor 90 MG Tabs tablet       You can get these medications from any pharmacy    Bring a paper prescription for each of these medications  predniSONE 10 MG tablet         Discharge recommendations given to patient.  Follow Up.  in 1 week   Disposition.  home  Activity. activity as tolerated  Diet: ADULT DIET; Regular      Spent  35 minutes in discharge

## 2025-06-17 NOTE — DISCHARGE INSTRUCTIONS
Radial Angiogram      Care of your puncture site:  Remove clear bandage 24 hours after the procedure.  May shower in 24 hours  Inspect the site daily and gently clean using soap and water.  Dry thoroughly and apply a Band-Aid.    Normal Observations:  Soreness or tenderness which may last one week.  Mild oozing from the incision site.  Possible bruising that could last 2 weeks.    Activity:  You may resume driving 24 hours following the procedure.  You may resume normal activity in 3 days or after the wound heals.  Avoid lifting more than 10 pounds for 3 days with affected arm.    Nutrition:  Regular diet  Drink at least 8 to 10 glasses of decaffeinated, non-alcoholic fluid for the next 24 hours to flush the x-ray dye used for your angiogram out of your body.    Call your doctor immediately if your condition worsens, for any other concerns, for a follow-up appointment or if you experience any of the following:  Significant bleeding that does not stop after 10 minutes of applying firm pressure on the puncture site.  Increased swelling of the wrist.  Unusual pain, numbness, or tingling of the wrist/arm.   Any signs of infection such as: redness, yellow drainage at the site, swelling or pain.    Other Instructions:  Hold Metformin or Metformin containing drugs for 48 hours after procedure.

## 2025-06-17 NOTE — PROGRESS NOTES
[facility]   Cardiology Progress Note     Date: 6/17/2025  Admit Date: 6/13/2025     Reason for consultation:     No chief complaint on file.    History of Present Illness: History obtained from patient and medical record.     Jensen Davidson is a 59 y.o. male with a PMH significant for COPD, dyspnea and diabetes presented with complaints of chest pain.  Chest pain started the day of admission lasted few minutes accompanied with shortness of breath.  Chest pain-free now chest pain precordial nonradiating.  No nausea vomiting dizziness loss of consciousness.  Chest pain not associated with exertion.(per consult note)    Interval Hx: Today, he is feeling well. O2 has been weaned to 2L. Denies chest pain. Breathing stable. No other complaints. Tele stable. Right radial procedure site c/d/I. No hematoma or bruising. Good pulses, color, warmth, and sensation to that extremity.  Pt feeling ready for dc.     Patient seen and examined. Clinical notes reviewed. Telemetry reviewed.  No new complaints today. No major events overnight.   Denies having chest pain, palpitations, shortness of breath, orthopnea/PND, cough, or dizziness at the time of this visit.      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    GERD (gastroesophageal reflux disease)     High risk medications (not anticoagulants) long-term use     HTN (hypertension)     Hyperlipidemia     Hypoxemia     ILD (interstitial lung disease) (HCC)     Left ureteral stone 04/13/2018    MDD (major depressive disorder)     ESTRELLITA on CPAP     Polycythemia     Positive FIT (fecal immunochemical test)     Tubular adenoma     colonoscopy 9/2019    Ureteral calculus of  06/10/2025 06:56 PM     EC25  Sinus bradycardiaLow voltage QRSBorderline ECGWhen compared with ECG of 10-MARKOS-2025 18:49, (unconfirmed)No significant change was found     ECHO:  25    Left Ventricle: The EF by visual approximation is 55 - 60%. Normal wall motion.    Right Ventricle: Right ventricle is mildly dilated.    Right Atrium: Right atrium is mildly dilated.    Image quality is adequate. Technically difficult study due to patient's body habitus.    Cath: 25  Diagnostic  Dominance: Right  Left Main   The vessel was visualized by angiography. Size of vessel >=2.0 mm. There is mild disease.      Left Anterior Descending   The vessel was visualized by angiography. Size of vessel >=2.0 mm. The vessel is calcified.   Mid LAD lesion, 75% stenosed. The pre-interventional distal flow is normal (JOSEFA 3)..      Left Circumflex      First Obtuse Marginal Branch   Collaterals   1st Mrg filled by collaterals from Dist LAD.      1st Mrg lesion, 100% stenosed. There is no pre-interventional antegrade distal flow.      Right Coronary Artery   The vessel was visualized by angiography. Size of vessel >=2.0 mm. The vessel is calcified.   Prox RCA to Mid RCA lesion, 85% stenosed. The pre-interventional distal flow is normal (JOSEFA 3)..        Left Heart  Left Ventricle LV EDP is: 8. The estimated EF = 55 - 60%.     Conclusion  Mr. Jensen Davidson is a 60 y/o male patient undergoing lung transplant workup at [facility], here for requested C.   Severe, calcified LAD and RCA stenoses by IVUS.   Discussed with Dr. Liu and will transfer to [facility] for complex PCI.       Staged PCI: 25  Diagnostic  Dominance: Right  Left Main   Size of vessel >=2.0 mm. There is mild disease.      Left Anterior Descending   Size of vessel >=2.0 mm. The vessel is calcified.   Mid LAD lesion, 60% stenosed. The lesion was previously treated. The pre-interventional distal flow is normal (JOSEFA 3)..   Dist LAD

## 2025-06-17 NOTE — PROGRESS NOTES
Data- discharge order received, pt verbalized agreement to discharge, disposition to previous residence, no needs for HHC/DME.     Action- discharge instructions prepared and given to pt, pt verbalized understanding. Medication information packet given r/t NEW and/or CHANGED prescriptions emphasizing name/purpose/side effects, pt verbalized understanding. Discharge instruction summary: Diet- general, Activity- restrictions following Left Heart Cath procedure reviewed with pt, Primary Care Physician as follows: Margie Maddox [phone] f/u appointment in 1 week, prescription medications filled at [facility]. Inpatient surgical procedure precautions reviewed: Left Heart Catheterization   1. WEIGHT: Admit Weight - Scale: 100.6 kg (221 lb 12.8 oz) (06/14/25 0325)        Today  Weight - Scale: 99.6 kg (219 lb 9.3 oz) (06/17/25 0445)       2. O2 SAT.: SpO2: 93 % (06/17/25 1150)    Response- Pt belongings gathered, IV removed. Disposition is home (no HHC/DME needs), transported with wife, taken to lobby via w/c w/ RN, no complications.

## 2025-06-17 NOTE — PROGRESS NOTES
Date(s) Administered    COVID-19, J&J, (age 18y+), IM, 0.5 mL 11/05/2021    Influenza Vaccine, unspecified formulation 11/04/2008, 10/27/2009    Influenza Virus Vaccine 11/04/2008, 10/27/2009, 11/13/2011, 10/01/2012, 12/13/2013, 10/17/2014, 10/23/2015, 09/27/2017    Influenza, AFLURIA (age 3 y+), FLUZONE, (age 6 mo+), Quadv MDV, 0.5mL 12/12/2016, 09/27/2017    Influenza, FLUARIX, FLULAVAL, FLUZONE (age 6 mo+) and AFLURIA, (age 3 y+), Quadv PF, 0.5mL 10/15/2019, 10/06/2020    Influenza, FLUCELVAX, (age 6 mo+) IM, Trivalent PF, 0.5mL 12/09/2024    Influenza, FLUCELVAX, (age 6 mo+), MDCK, Quadv PF, 0.5mL 12/09/2021, 09/29/2022, 09/26/2023    Influenza, FLUZONE High Dose, (age 65 y+), IM, Trivalent PF, 0.5mL 10/23/2015    Influenza, Quadv, 6 mo and older, IM, PF (Flulaval, Fluarix) 10/03/2018    Pneumococcal, PCV-13, PREVNAR 13, (age 6w+), IM, 0.5mL 10/01/2018    Pneumococcal, PPSV23, PNEUMOVAX 23, (age 2y+), SC/IM, 0.5mL 06/22/2016    TDaP, ADACEL (age 10y-64y), BOOSTRIX (age 10y+), IM, 0.5mL 06/22/2016    Zoster Recombinant (Shingrix) 11/09/2020, 04/22/2021      Immunization status: up to date and documented.    ASSESSMENTS      6/24/2025    12:06 PM 5/13/2024     9:37 AM 11/13/2023     9:38 AM   PROMIS Questions   In general, would you say your health is: 2 4 3   In general, would you say your quality of life is: 2 4 3   In general, how would you rate your physical health? 2 4 3   In general, how would you rate your mental health, including your mood and your ability to think? 2 4 4   In general, how would you rate your satisfaction with your social activities and relationships? 2 4 4   In general, please rate how well you carry out your usual social activities and roles. (This includes activities at home, at work and in your community, and responsibilities as a parent, child, spouse, employee, friend, etc.) 2 4 4   To what extent are you able to carry out your everyday physical activities such as walking, climbing

## 2025-06-17 NOTE — TELEPHONE ENCOUNTER
Specialty Medication Service    Date: 6/17/2025  Patient's Name: Jensen Davidson YOB: 1965            _____________________________________________________________________________________________    Spoke with patient to reschedule PharmD follow up appointment for Specialty Medication Services. Patient originally scheduled for today but is admitted at this time s/p PCI. Patient rescheduled for 6/24/25 at 12:00 PM.     Ritika Kim PharmD, [facility]  Ambulatory Clinical Pharmacist   Specialty Medication Services   Phone: [phone] option 4  6/17/2025 12:04 PM    For Pharmacy Admin Tracking Only    Program: [facility]  CPA in place:  Yes  Recommendation Provided To: Patient/Caregiver: 1 via Telephone  Intervention Detail: Scheduled Appointment  Intervention Accepted By: Patient/Caregiver: 1   Time Spent (min): 10

## 2025-06-17 NOTE — PROGRESS NOTES
06/17/25 1203   Resting (Room Air)   SpO2 89   HR 70   During Walk (Room Air)   SpO2 86   HR 67   Walk/Assistance Device Ambulation   Rate of Dyspnea 0   During Walk (On O2)   SpO2 92   HR 76   O2 Device Nasal cannula   O2 Flow Rate (l/min) 2 l/min   Need Additional O2 Flow Rate Rows No   Walk/Assistance Device Ambulation   Rate of Dyspnea 0   After Walk   SpO2 90   HR 79   O2 Device Nasal cannula   O2 Flow Rate (l/min) 2 l/min   Does the Patient Qualify for Home O2 Yes   Liter Flow at Rest 0   Liter Flow on Exertion 2   Does the Patient Need Portable Oxygen Tanks Yes

## 2025-06-18 ENCOUNTER — CARE COORDINATION (OUTPATIENT)
Dept: OTHER | Facility: CLINIC | Age: 60
End: 2025-06-18

## 2025-06-18 ENCOUNTER — TELEPHONE (OUTPATIENT)
Dept: PULMONOLOGY | Age: 60
End: 2025-06-18

## 2025-06-18 DIAGNOSIS — E78.2 MIXED HYPERLIPIDEMIA: ICD-10-CM

## 2025-06-18 RX ORDER — EVOLOCUMAB 140 MG/ML
140 INJECTION, SOLUTION SUBCUTANEOUS
Qty: 6 ML | Refills: 3 | Status: SHIPPED | OUTPATIENT
Start: 2025-06-18

## 2025-06-18 NOTE — CARE COORDINATION
Care Transitions Note    Initial Call - Call within 2 business days of discharge: Yes    Patient Current Location:  [place]    Care Transition Nurse contacted the patient by telephone to perform post hospital discharge assessment, verified name and  as identifiers.  Provided introduction to self, and explanation of the Care Transition Nurse role.    Patient: Jensen Davidson    Patient : 1965   MRN: [id number]    Reason for Admission: CAD  Discharge Date: 25  RURS: Readmission Risk Score: 13.2      Last Discharge Facility       Date Complaint Diagnosis Description Type Department Provider    25  CAD (coronary artery disease) Admission (Discharged) [facility] 5C Irene Gutierres MD            Was this an external facility discharge? No    Additional needs identified to be addressed with provider   No needs identified             Method of communication with provider: none.    Patients top risk factors for readmission: medical condition-CAD; ILD/on transplant list at [facility]    Interventions to address risk factors:   Education: discussed red flags  Review of patient management of conditions/medications: denies questions or concerns at this time    Care Summary Note: Patient states he is doing well. He has an appointment for his oxygen to be set up today at home in about 10 minutes. Patient denies any chest pain, shortness of breath, swelling. States he will follow up with cardiology on 25. He will follow up with PCP 25. Patient denies questions and concerns at this time. Call was brief due to upcoming appointment.    Care Transition Nurse reviewed discharge instructions and red flags with patient. The patient was given an opportunity to ask questions; no further questions or concerns at this time.. The patient verbalized understanding.   Were discharge instructions available to patient? Yes.   Reviewed appropriate site of care based on symptoms and resources available to patient including:

## 2025-06-18 NOTE — TELEPHONE ENCOUNTER
Last OV: 10/22/24 - Jose M  Next OV: 25 - Jose M  Last Labs: 25  Last EK25   Last Filled: 24     3 rf

## 2025-06-19 ENCOUNTER — APPOINTMENT (OUTPATIENT)
Dept: CARDIAC REHAB | Age: 60
End: 2025-06-19
Payer: MEDICARE

## 2025-06-19 ENCOUNTER — TELEPHONE (OUTPATIENT)
Dept: PHARMACY | Facility: HOSPITAL | Age: 60
End: 2025-06-19

## 2025-06-19 NOTE — TELEPHONE ENCOUNTER
Specialty Medication Service    Date: 6/19/2025  Patient's Name: Jensen Davidson YOB: 1965            _____________________________________________________________________________________________    Email received from [facility] in regard to current [employer] formulary medication, Repatha.  SMS override placed. Md availability override placed x 3 months.    Stacey Soni CPhT  Pharmacy   Specialty Medication Services   Phone: [phone] option 4      For Pharmacy Admin Tracking Only    Program: [facility]  CPA in place:  Yes  Recommendation Provided To: Pharmacy: 1  Intervention Detail: Benefit Assistance  Intervention Accepted By: Pharmacy: 1  Gap Closed?:    Time Spent (min): 15

## 2025-06-20 ENCOUNTER — TELEPHONE (OUTPATIENT)
Dept: PULMONOLOGY | Age: 60
End: 2025-06-20

## 2025-06-20 RX ORDER — LEVOFLOXACIN 500 MG/1
500 TABLET, FILM COATED ORAL DAILY
Qty: 7 TABLET | Refills: 0 | Status: SHIPPED | OUTPATIENT
Start: 2025-06-20 | End: 2025-06-27

## 2025-06-20 RX ORDER — PREDNISONE 10 MG/1
TABLET ORAL
Qty: 30 TABLET | Refills: 0 | Status: SHIPPED | OUTPATIENT
Start: 2025-06-20

## 2025-06-20 NOTE — TELEPHONE ENCOUNTER
Pt called and stated that he just got out of the hospital on Tues and is starting to feel bad again. He started coughing up yellow yesterday and wants to know if MD can call something in before it becomes the flu     Pharm: [facility] [address]

## 2025-06-22 NOTE — PROGRESS NOTES
Physician Progress Note      PATIENT:               IBETH PRAJAPATI  CSN #:                  [id number]  :                       1965  ADMIT DATE:       2025 8:56 PM  DISCH DATE:        2025 1:45 PM  RESPONDING  PROVIDER #:        VIRA ZULUAGA - CNP          QUERY TEXT:    Acute on chronic respiratory failure/Acute respiratory failure  is documented   in the medical record per PCP PN on 6/15/25 and dc summary 25 and per   Pulmonology consult on 25 . Please provide additional clinical indicators   supportive of your documentation. Or please document if the diagnosis of   acute respiratory failure has been ruled out after study.    The clinical indicators include:  -59 y.o. male with hypertension, hyperlipidemia, T2DM without long-term use of   insulin, COPD/ILD, chronic respiratory failure on 2 L NC O2, GERD, obesity,   ESTRELLITA  -6/15/25 AMANDA Prado  \" Feels ok, up ot 6L of oxygen but denies feeling SOB. He states he wears 2L   of oxygen at home at night and as needed. He wears 4L during pulmonary rehab.   O2 sat runs 90% on RA at home\"-  Pulmonology consult 25 \"Acute hypoxic respiratory failure, requiring up   to 6 L-now weaned down to 2 L.  Status post RCA stent x 2, most likely   responsible for patient's current presentation. \"  \"Respiratory: negative except for dyspnea on exertion and shortness of breath\"  -Spo2 89--93% on 4L NC  RR 16-18  -Spo2 90--95% on 5-6L NC  -6/15/25 CXR \"Increasing predominantly linear opacities in the bilateral lung   bases most consistent with atelectasis and or scarring however some   superimposed mild pneumonitis is not excluded.\"  -Treatment oxygen therapy , Pulmonology consult , DuoNeb BID, Prednisone daily   and CXR  Options provided:  -- Acute on chronic Respiratory Failure as evidenced by, Please document   evidence.  -- Acute Respiratory Failure ruled out after study and Chronic Respiratory   Failure confirmed  -- Other - I will

## 2025-06-23 ENCOUNTER — OFFICE VISIT (OUTPATIENT)
Dept: PRIMARY CARE CLINIC | Age: 60
End: 2025-06-23
Payer: MEDICARE

## 2025-06-23 VITALS
SYSTOLIC BLOOD PRESSURE: 102 MMHG | BODY MASS INDEX: 31.57 KG/M2 | TEMPERATURE: 97.9 F | WEIGHT: 220 LBS | OXYGEN SATURATION: 91 % | HEART RATE: 84 BPM | DIASTOLIC BLOOD PRESSURE: 64 MMHG

## 2025-06-23 DIAGNOSIS — F33.1 MODERATE EPISODE OF RECURRENT MAJOR DEPRESSIVE DISORDER (HCC): ICD-10-CM

## 2025-06-23 DIAGNOSIS — Z09 HOSPITAL DISCHARGE FOLLOW-UP: Primary | ICD-10-CM

## 2025-06-23 PROCEDURE — 3074F SYST BP LT 130 MM HG: CPT | Performed by: STUDENT IN AN ORGANIZED HEALTH CARE EDUCATION/TRAINING PROGRAM

## 2025-06-23 PROCEDURE — 3017F COLORECTAL CA SCREEN DOC REV: CPT | Performed by: STUDENT IN AN ORGANIZED HEALTH CARE EDUCATION/TRAINING PROGRAM

## 2025-06-23 PROCEDURE — G8417 CALC BMI ABV UP PARAM F/U: HCPCS | Performed by: STUDENT IN AN ORGANIZED HEALTH CARE EDUCATION/TRAINING PROGRAM

## 2025-06-23 PROCEDURE — 1036F TOBACCO NON-USER: CPT | Performed by: STUDENT IN AN ORGANIZED HEALTH CARE EDUCATION/TRAINING PROGRAM

## 2025-06-23 PROCEDURE — 99214 OFFICE O/P EST MOD 30 MIN: CPT | Performed by: STUDENT IN AN ORGANIZED HEALTH CARE EDUCATION/TRAINING PROGRAM

## 2025-06-23 PROCEDURE — G8427 DOCREV CUR MEDS BY ELIG CLIN: HCPCS | Performed by: STUDENT IN AN ORGANIZED HEALTH CARE EDUCATION/TRAINING PROGRAM

## 2025-06-23 PROCEDURE — 1111F DSCHRG MED/CURRENT MED MERGE: CPT | Performed by: STUDENT IN AN ORGANIZED HEALTH CARE EDUCATION/TRAINING PROGRAM

## 2025-06-23 PROCEDURE — 3078F DIAST BP <80 MM HG: CPT | Performed by: STUDENT IN AN ORGANIZED HEALTH CARE EDUCATION/TRAINING PROGRAM

## 2025-06-23 RX ORDER — ESCITALOPRAM OXALATE 10 MG/1
20 TABLET ORAL DAILY
Qty: 90 TABLET | Refills: 1 | Status: CANCELLED | OUTPATIENT
Start: 2025-06-23

## 2025-06-23 RX ORDER — ESCITALOPRAM OXALATE 20 MG/1
20 TABLET ORAL DAILY
Qty: 90 TABLET | Refills: 0 | Status: SHIPPED | OUTPATIENT
Start: 2025-06-23

## 2025-06-23 ASSESSMENT — PATIENT HEALTH QUESTIONNAIRE - PHQ9
5. POOR APPETITE OR OVEREATING: SEVERAL DAYS
2. FEELING DOWN, DEPRESSED OR HOPELESS: SEVERAL DAYS
10. IF YOU CHECKED OFF ANY PROBLEMS, HOW DIFFICULT HAVE THESE PROBLEMS MADE IT FOR YOU TO DO YOUR WORK, TAKE CARE OF THINGS AT HOME, OR GET ALONG WITH OTHER PEOPLE: VERY DIFFICULT
7. TROUBLE CONCENTRATING ON THINGS, SUCH AS READING THE NEWSPAPER OR WATCHING TELEVISION: NOT AT ALL
SUM OF ALL RESPONSES TO PHQ QUESTIONS 1-9: 7
4. FEELING TIRED OR HAVING LITTLE ENERGY: MORE THAN HALF THE DAYS
9. THOUGHTS THAT YOU WOULD BE BETTER OFF DEAD, OR OF HURTING YOURSELF: SEVERAL DAYS
SUM OF ALL RESPONSES TO PHQ QUESTIONS 1-9: 7
8. MOVING OR SPEAKING SO SLOWLY THAT OTHER PEOPLE COULD HAVE NOTICED. OR THE OPPOSITE, BEING SO FIGETY OR RESTLESS THAT YOU HAVE BEEN MOVING AROUND A LOT MORE THAN USUAL: SEVERAL DAYS
SUM OF ALL RESPONSES TO PHQ QUESTIONS 1-9: 6
SUM OF ALL RESPONSES TO PHQ QUESTIONS 1-9: 7
3. TROUBLE FALLING OR STAYING ASLEEP: NOT AT ALL
1. LITTLE INTEREST OR PLEASURE IN DOING THINGS: NOT AT ALL
6. FEELING BAD ABOUT YOURSELF - OR THAT YOU ARE A FAILURE OR HAVE LET YOURSELF OR YOUR FAMILY DOWN: SEVERAL DAYS

## 2025-06-23 ASSESSMENT — COLUMBIA-SUICIDE SEVERITY RATING SCALE - C-SSRS
6. HAVE YOU EVER DONE ANYTHING, STARTED TO DO ANYTHING, OR PREPARED TO DO ANYTHING TO END YOUR LIFE?: NO
1. WITHIN THE PAST MONTH, HAVE YOU WISHED YOU WERE DEAD OR WISHED YOU COULD GO TO SLEEP AND NOT WAKE UP?: YES
2. HAVE YOU ACTUALLY HAD ANY THOUGHTS OF KILLING YOURSELF?: NO

## 2025-06-23 NOTE — PROGRESS NOTES
Post-Discharge Transitional Care  Follow Up      Jensen Davidson   YOB: 1965    Date of Office Visit:  6/23/2025  Date of Hospital Admission: 6/13/25  Date of Hospital Discharge: 6/17/25  Risk of hospital readmission (high >=14%. Medium >=10%) :Readmission Risk Score: 13.2      Care management risk score Rising risk (score 2-5) and Complex Care (Scores >=6): No Risk Score On File     Non face to face  following discharge, date last encounter closed (first attempt may have been earlier): *No documented post hospital discharge outreach found in the last 14 days    Call initiated 2 business days of discharge: *No response recorded in the last 14 days    ASSESSMENT/PLAN:   Hospital discharge follow-up  - Recent admission 6/10/25 for unstable angina, underwent PCI of RCA/PDA and mid RCA with MITCHEL placement x 2. Patient compliant with his new meds of Isosorbide mononitrate XR 30mg daily, Prednisone 10mg daily x 20 days, Ranolazine 500mg BID, Brilinta 90mg BID.  - No acute findings today on exam. Had a recurrence of possible angina with LUE numnbess for 15 min, about 3 days ago that resolved with 2 ASA tablets. No recurrence of chest pain since then. He denies new acute cardio-resp or Neuro Sx's today.   - Has Cardio f/u tomorrow with Dr. Salguero. Rec he take Nitro next time he develops similar chest pain and to contact his cardiologist immediately.   - patient appears HDS, clinically stable on exam.  - Provided ER precautions. Patient understands.   - f/u 1 month  Orders:  -     [place] DISCHARGE MEDS RECONCILED W/ CURRENT OUTPATIENT MED LIST  -     [facility]  Moderate episode of recurrent major depressive disorder (HCC)  - Worsening anxiety and depression recently, d/t his worsening COPD, and stress over needing lung transplant.  - Having passive SI without active SI or HI. Provided ER precautions.   - Increase lexapro to 20mg daily. Patient has

## 2025-06-23 NOTE — PROGRESS NOTES
affect. His speech is normal and behavior is normal.     Personally reviewed and interpreted   EKG Interpretation 8/26/19: Sinus rhythm with prior septal infarct  EKG Interpretation 8/25/20: Sinus rhythm with prior septal infarct  EKG Interpretation 12/17/21: Sinus rhythm   EKG Interpretation  10/22/2024: Sinus Rhythm   EKG Interpretation 6/24/2025: not completed      Procedures:     Cath 7/26/17  INTERVENTIONS PERFORMED:  1.  We intervened on the second diagonal branch of the left anterior  descending and performed a balloon angioplasty only using 2.5 x 12-mm  balloon catheter.  This was an 80% stenotic lesion which was reduced  to 20%.  2.  Distal left anterior descending artery also had stenosis, this was  90% and after balloon angioplasty reduced to 20%.    RHC 4/17/18  1.  Normal right heart pressures with a right atrial pressure of 1 and a  pulmonary capillary wedge pressure of 8.  2.  No evidence of pulmonary hypertension with an instantaneous pressure of  26/14 and a mean pulmonary arterial pressure of 20.  3.  Normal cardiac outputs by thermodilution at 6.73 liters per minute with  a cardiac index of 2.95 liters per kilogram per minute.  By second equation  the cardiac output was 4.76 with a cardiac index of 2.09.  Of note, the  patient does have erythrocytosis.  4.  Pulmonary vascular resistance 147.  5.  Normal mixed venous oxygen saturations with a superior vena cava  saturation of 70 and a pulmonary arterial saturation of 73%.      Imaging:     Echo 9/2016  Summary  Normal LV size and systolic function: EF is   60%. Grade I diastolic  dysfunction.  Left atrium is of normal size.  Normal right ventricular size.     Stress Test: 9/2016  Summary    Pharmacological Stress/MPI Results:        1. Technically a satisfactory study.    2. Normal pharmacological stress portion of the study.    3. No evidence of Ischemia by Myocardial Perfusion Imaging.    4. Gated Study shows normal LV size and Systolic function;

## 2025-06-24 ENCOUNTER — APPOINTMENT (OUTPATIENT)
Dept: CARDIAC REHAB | Age: 60
End: 2025-06-24
Payer: MEDICARE

## 2025-06-24 ENCOUNTER — OFFICE VISIT (OUTPATIENT)
Dept: CARDIOLOGY CLINIC | Age: 60
End: 2025-06-24
Payer: MEDICARE

## 2025-06-24 ENCOUNTER — TELEPHONE (OUTPATIENT)
Dept: PRIMARY CARE CLINIC | Age: 60
End: 2025-06-24

## 2025-06-24 ENCOUNTER — PHARMACY VISIT (OUTPATIENT)
Dept: PHARMACY | Facility: HOSPITAL | Age: 60
End: 2025-06-24

## 2025-06-24 VITALS
HEART RATE: 68 BPM | SYSTOLIC BLOOD PRESSURE: 104 MMHG | BODY MASS INDEX: 31.35 KG/M2 | HEIGHT: 70 IN | RESPIRATION RATE: 19 BRPM | DIASTOLIC BLOOD PRESSURE: 64 MMHG | OXYGEN SATURATION: 93 % | WEIGHT: 219 LBS

## 2025-06-24 DIAGNOSIS — E78.5 HYPERLIPIDEMIA LDL GOAL <70: ICD-10-CM

## 2025-06-24 DIAGNOSIS — E78.2 MIXED HYPERLIPIDEMIA: Primary | ICD-10-CM

## 2025-06-24 DIAGNOSIS — I25.10 CORONARY ARTERY DISEASE INVOLVING NATIVE CORONARY ARTERY OF NATIVE HEART WITHOUT ANGINA PECTORIS: Primary | ICD-10-CM

## 2025-06-24 DIAGNOSIS — J84.9 ILD (INTERSTITIAL LUNG DISEASE) (HCC): ICD-10-CM

## 2025-06-24 DIAGNOSIS — I20.9 ANGINA PECTORIS: ICD-10-CM

## 2025-06-24 DIAGNOSIS — I10 ESSENTIAL HYPERTENSION: ICD-10-CM

## 2025-06-24 PROCEDURE — G8427 DOCREV CUR MEDS BY ELIG CLIN: HCPCS | Performed by: INTERNAL MEDICINE

## 2025-06-24 PROCEDURE — G8417 CALC BMI ABV UP PARAM F/U: HCPCS | Performed by: INTERNAL MEDICINE

## 2025-06-24 PROCEDURE — 1036F TOBACCO NON-USER: CPT | Performed by: INTERNAL MEDICINE

## 2025-06-24 PROCEDURE — 3078F DIAST BP <80 MM HG: CPT | Performed by: INTERNAL MEDICINE

## 2025-06-24 PROCEDURE — 3017F COLORECTAL CA SCREEN DOC REV: CPT | Performed by: INTERNAL MEDICINE

## 2025-06-24 PROCEDURE — 99214 OFFICE O/P EST MOD 30 MIN: CPT | Performed by: INTERNAL MEDICINE

## 2025-06-24 PROCEDURE — G2211 COMPLEX E/M VISIT ADD ON: HCPCS | Performed by: INTERNAL MEDICINE

## 2025-06-24 PROCEDURE — 3074F SYST BP LT 130 MM HG: CPT | Performed by: INTERNAL MEDICINE

## 2025-06-24 PROCEDURE — 1111F DSCHRG MED/CURRENT MED MERGE: CPT | Performed by: INTERNAL MEDICINE

## 2025-06-24 RX ORDER — RANOLAZINE 500 MG/1
500 TABLET, EXTENDED RELEASE ORAL 2 TIMES DAILY
Qty: 180 TABLET | Refills: 3 | Status: SHIPPED | OUTPATIENT
Start: 2025-06-24

## 2025-06-24 RX ORDER — ISOSORBIDE MONONITRATE 30 MG/1
30 TABLET, EXTENDED RELEASE ORAL DAILY
Qty: 90 TABLET | Refills: 3 | Status: SHIPPED | OUTPATIENT
Start: 2025-06-24

## 2025-06-24 RX ORDER — MYCOPHENOLATE MOFETIL 500 MG/1
500 TABLET ORAL 2 TIMES DAILY
COMMUNITY

## 2025-06-24 RX ORDER — TICAGRELOR 90 MG/1
90 TABLET, FILM COATED ORAL 2 TIMES DAILY
Qty: 180 TABLET | Refills: 3 | Status: SHIPPED | OUTPATIENT
Start: 2025-06-24

## 2025-06-24 ASSESSMENT — PROMIS GLOBAL HEALTH SCALE
IN GENERAL, PLEASE RATE HOW WELL YOU CARRY OUT YOUR USUAL SOCIAL ACTIVITIES (INCLUDES ACTIVITIES AT HOME, AT WORK, AND IN YOUR COMMUNITY, AND RESPONSIBILITIES AS A PARENT, CHILD, SPOUSE, EMPLOYEE, FRIEND, ETC) [ON A SCALE OF 1 (POOR) TO 5 (EXCELLENT)]?: FAIR
SUM OF RESPONSES TO QUESTIONS 3, 6, 7, & 8: 12
IN GENERAL, WOULD YOU SAY YOUR HEALTH IS...[ON A SCALE OF 1 (POOR) TO 5 (EXCELLENT)]: FAIR
SUM OF RESPONSES TO QUESTIONS 2, 4, 5, & 10: 8
TO WHAT EXTENT ARE YOU ABLE TO CARRY OUT YOUR EVERYDAY PHYSICAL ACTIVITIES SUCH AS WALKING, CLIMBING STAIRS, CARRYING GROCERIES, OR MOVING A CHAIR [ON A SCALE OF 1 (NOT AT ALL) TO 5 (COMPLETELY)]?: A LITTLE
IN GENERAL, HOW WOULD YOU RATE YOUR PHYSICAL HEALTH [ON A SCALE OF 1 (POOR) TO 5 (EXCELLENT)]?: FAIR
IN THE PAST 7 DAYS, HOW OFTEN HAVE YOU BEEN BOTHERED BY EMOTIONAL PROBLEMS, SUCH AS FEELING ANXIOUS, DEPRESSED, OR IRRITABLE [ON A SCALE FROM 1 (NEVER) TO 5 (ALWAYS)]?: OFTEN
IN GENERAL, WOULD YOU SAY YOUR QUALITY OF LIFE IS...[ON A SCALE OF 1 (POOR) TO 5 (EXCELLENT)]: FAIR
IN THE PAST 7 DAYS, HOW WOULD YOU RATE YOUR PAIN ON AVERAGE [ON A SCALE FROM 0 (NO PAIN) TO 10 (WORST IMAGINABLE PAIN)]?: 5
IN GENERAL, HOW WOULD YOU RATE YOUR SATISFACTION WITH YOUR SOCIAL ACTIVITIES AND RELATIONSHIPS [ON A SCALE OF 1 (POOR) TO 5 (EXCELLENT)]?: FAIR
IN GENERAL, HOW WOULD YOU RATE YOUR MENTAL HEALTH, INCLUDING YOUR MOOD AND YOUR ABILITY TO THINK [ON A SCALE OF 1 (POOR) TO 5 (EXCELLENT)]?: FAIR
IN THE PAST 7 DAYS, HOW WOULD YOU RATE YOUR FATIGUE ON AVERAGE [ON A SCALE FROM 1 (NONE) TO 5 (VERY SEVERE)]?: MODERATE

## 2025-06-24 NOTE — TELEPHONE ENCOUNTER
Called This morning to let patient know that his appointment is set with Dr. Maddox for Aug 1st @ 12pm for his 4 week follow up. Phone went Directly to voicemail, left a message on the machine. ADEOLA

## 2025-06-26 ENCOUNTER — TELEPHONE (OUTPATIENT)
Dept: PRIMARY CARE CLINIC | Age: 60
End: 2025-06-26

## 2025-06-26 ENCOUNTER — APPOINTMENT (OUTPATIENT)
Dept: CARDIAC REHAB | Age: 60
End: 2025-06-26
Payer: MEDICARE

## 2025-06-30 ENCOUNTER — CARE COORDINATION (OUTPATIENT)
Dept: OTHER | Facility: CLINIC | Age: 60
End: 2025-06-30

## 2025-06-30 NOTE — CARE COORDINATION
Care Transitions Note    Follow Up Call     Patient Current Location:  [place]    Care Transition Nurse contacted the patient by telephone. Verified name and  as identifiers.    Additional needs identified to be addressed with provider   No needs identified                 Method of communication with provider: none.    Care Summary Note: I spoke with patient and wife. Patient gave [facility] verbal permission to speak with his wife. [facility] patient followed up with PCP on 25. Lexapro was increased to 20 mg per day. Patient will follow up in 1 month.   Patient followed up with cardiology Dr. Salguero on 25. No medication changes. States they were advised to follow up in 1 year. Patient and wife are concerned because patient is still not feeling the greatest. States he did for a few days after stents were placed but then started feeling not so great again. States Dr. Salguero advised them he does not believe patient's symptoms were cardiac related.   Patient's wife states Dr. Quiles referred patient to [facility] for lung transplant workup. [facility] Dr. Quiles's office contacted [facility] and was advised an OON waiver would need to be completed. [facility] Dr. Quiles completed this along with 60 pages of information sometime around May 2025. States they were told they were able to go to [facility]. States they have paid out of pocket multiple times for imaging, labs, and office visits there. I [facility] sure [facility] is on [facility] and if so, if the claims need to be reviewed to see if it was billed correctly. Patient's wife states they are working patient up for lung transplant and the last step they needed to complete was the left heart cath. I will send a referral to CCSS to assist with finding out this information from [facility].     Patient  and Spouse has given permission for a CCSS referral and agrees to be contacted Yes This patient is referred this date to the CCSS for the following needs:  Assistance with

## 2025-07-01 ENCOUNTER — CARE COORDINATION (OUTPATIENT)
Dept: OTHER | Facility: CLINIC | Age: 60
End: 2025-07-01

## 2025-07-11 ENCOUNTER — CARE COORDINATION (OUTPATIENT)
Dept: OTHER | Facility: CLINIC | Age: 60
End: 2025-07-11

## 2025-07-11 NOTE — CARE COORDINATION
Care Transitions Note    ACM continuing to wait for email from [facility] regarding hand off request and to let ACM know if patient has been accepted. If so, ACM will sign off. Last email on 7/1/25 from [facility] stated:  Robret Hawk, the referral has been sent internally.   Next steps:   Review for TRS case status  Review for retro start date if TRS case opened (April 18, 2025)  Consider Waivers  Reconsider Claims    Will continue to follow until we receive a response.

## 2025-07-14 DIAGNOSIS — I10 ESSENTIAL HYPERTENSION: ICD-10-CM

## 2025-07-14 RX ORDER — OMEPRAZOLE 20 MG/1
CAPSULE, DELAYED RELEASE ORAL
Qty: 180 CAPSULE | Refills: 1 | Status: SHIPPED | OUTPATIENT
Start: 2025-07-14

## 2025-07-14 RX ORDER — ROSUVASTATIN CALCIUM 40 MG/1
TABLET, COATED ORAL
Qty: 90 TABLET | Refills: 1 | Status: SHIPPED | OUTPATIENT
Start: 2025-07-14

## 2025-07-14 RX ORDER — CARVEDILOL 6.25 MG/1
6.25 TABLET ORAL 2 TIMES DAILY WITH MEALS
Qty: 180 TABLET | Refills: 1 | Status: SHIPPED | OUTPATIENT
Start: 2025-07-14

## 2025-07-14 RX ORDER — LISINOPRIL 10 MG/1
TABLET ORAL
Qty: 90 TABLET | Refills: 1 | Status: SHIPPED | OUTPATIENT
Start: 2025-07-14

## 2025-07-14 RX ORDER — ASPIRIN 81 MG/1
TABLET, CHEWABLE ORAL
Qty: 90 TABLET | Refills: 1 | Status: SHIPPED | OUTPATIENT
Start: 2025-07-14

## 2025-07-14 NOTE — TELEPHONE ENCOUNTER
Patient is calling requesting a refill of   omeprazole (PRILOSEC) 20 MG delayed release capsule and   rosuvastatin (CRESTOR) 40 MG tablet  and   carvedilol (COREG) 6.25 MG tablet and   aspirin (ASPIRIN LOW DOSE) 81 MG chewable tablet and   lisinopril (PRINIVIL;ZESTRIL) 10 MG tablet  please send to [facility] - [facility] [address] [phone] - F [phone] [[id number]]  patients last OV 06.23.25 and next OV 08.01.25.     please advise patient once medication is sent

## 2025-07-14 NOTE — TELEPHONE ENCOUNTER
Refill Request       Last Seen: Last Seen Department: 6/23/2025  Last Seen by PCP: 12/9/2024    Last Written:   omeprazole (PRILOSEC) 20 MG delayed release capsule 12/9/24 180 with 1    rosuvastatin (CRESTOR) 40 MG tablet  12/9/24 90 with 1    carvedilol (COREG) 6.25 MG tablet 12/9/24 180 with 1    aspirin (ASPIRIN LOW DOSE) 81 MG chewable tablet 12/9/24 90 with 1    lisinopril (PRINIVIL;ZESTRIL) 10 MG tablet 12/9/24 90 with 1        Next Appointment:   Future Appointments   Date Time Provider Department Center   7/15/2025  9:45 AM Tali Cody RN [facility] CARDIO [facility]   7/15/2025 11:00 AM [place] NUC MED CAM RM 2 WSTZ NUC MED [facility]   7/15/2025 12:00 PM WST STRESS 2 WSTZ PRABHU [facility]   7/17/2025  9:45 AM Tali Cody RN [facility] CARDIO [facility]   7/22/2025  9:45 AM Tali Cody RN WS CARDIO [facility]   7/24/2025  9:45 AM Tali Cody RN [facility] CARDIO [facility]   7/29/2025  9:45 AM Tali Cody RN WS CARDIO [facility]   7/31/2025  9:45 AM Tali Cody RN WS CARDIO [facility]   8/1/2025 12:00 PM Margie Maddox MD [facility]   8/20/2025  9:20 AM Víctor Quiles DO [facility]   10/28/2025  4:00 PM Wesley Salguero MD [facility]   12/16/2025 12:00 PM Ritika Kim, [facility] MWMZ [facility] [place] and [place]     Requested Prescriptions     Pending Prescriptions Disp Refills    omeprazole (PRILOSEC) 20 MG delayed release capsule 180 capsule 1     Sig: TAKE ONE CAPSULE BY MOUTH TWICE A DAY    rosuvastatin (CRESTOR) 40 MG tablet 90 tablet 1     Sig: TAKE 1 TABLET BY MOUTH ONE TIME A DAY IN THE EVENING    carvedilol (COREG) 6.25 MG tablet 180 tablet 1     Sig: Take 1 tablet by mouth 2 times daily (with meals)    aspirin (ASPIRIN LOW DOSE) 81 MG chewable tablet 90 tablet 1     Sig: RESTART IN 5 DAYS CHEW AND SWALLOW ONE TABLET BY MOUTH ONE TIME A DAY    lisinopril (PRINIVIL;ZESTRIL) 10 MG tablet 90 tablet 1     Sig: TAKE 1 TABLET BY MOUTH ONE TIME A DAY

## 2025-07-15 ENCOUNTER — HOSPITAL ENCOUNTER (OUTPATIENT)
Dept: NUCLEAR MEDICINE | Age: 60
Discharge: HOME | End: 2025-07-15
Attending: INTERNAL MEDICINE
Payer: MEDICARE

## 2025-07-15 ENCOUNTER — HOSPITAL ENCOUNTER (OUTPATIENT)
Age: 60
Discharge: HOME | End: 2025-07-17
Attending: INTERNAL MEDICINE
Payer: MEDICARE

## 2025-07-15 DIAGNOSIS — I20.9 ANGINA PECTORIS: ICD-10-CM

## 2025-07-15 LAB
NUC REST DIASTOLIC VOLUME INDEX: 109 ML/M2
NUC REST EJECTION FRACTION: 61 %
NUC REST SYSTOLIC VOLUME INDEX: 43 ML/M2
STRESS BASELINE DIAS BP: 94 MMHG
STRESS BASELINE HR: 59 BPM
STRESS BASELINE SYS BP: 141 MMHG
STRESS ESTIMATED WORKLOAD: 1 METS
STRESS EXERCISE DUR MIN: 1 MIN
STRESS EXERCISE DUR SEC: 40 SEC
STRESS PEAK DIAS BP: 92 MMHG
STRESS PEAK SYS BP: 172 MMHG
STRESS PERCENT HR ACHIEVED: 53 %
STRESS POST PEAK HR: 87 BPM
STRESS RATE PRESSURE PRODUCT: NORMAL BPM*MMHG
STRESS TARGET HR: 163 BPM

## 2025-07-15 PROCEDURE — A9502 TC99M TETROFOSMIN: HCPCS | Performed by: INTERNAL MEDICINE

## 2025-07-15 PROCEDURE — 78452 HT MUSCLE IMAGE SPECT MULT: CPT

## 2025-07-15 PROCEDURE — 3430000000 HC RX DIAGNOSTIC RADIOPHARMACEUTICAL: Performed by: INTERNAL MEDICINE

## 2025-07-15 PROCEDURE — 78452 HT MUSCLE IMAGE SPECT MULT: CPT | Performed by: INTERNAL MEDICINE

## 2025-07-15 PROCEDURE — 93018 CV STRESS TEST I&R ONLY: CPT | Performed by: INTERNAL MEDICINE

## 2025-07-15 PROCEDURE — 6360000002 HC RX W HCPCS: Performed by: INTERNAL MEDICINE

## 2025-07-15 PROCEDURE — 93016 CV STRESS TEST SUPVJ ONLY: CPT | Performed by: INTERNAL MEDICINE

## 2025-07-15 PROCEDURE — 93017 CV STRESS TEST TRACING ONLY: CPT

## 2025-07-15 RX ORDER — AMINOPHYLLINE 25 MG/ML
50 INJECTION, SOLUTION INTRAVENOUS ONCE
Status: DISCONTINUED | OUTPATIENT
Start: 2025-07-15 | End: 2025-07-15

## 2025-07-15 RX ORDER — REGADENOSON 0.08 MG/ML
0.4 INJECTION, SOLUTION INTRAVENOUS
Status: COMPLETED | OUTPATIENT
Start: 2025-07-15 | End: 2025-07-15

## 2025-07-15 RX ADMIN — REGADENOSON 0.4 MG: 0.08 INJECTION, SOLUTION INTRAVENOUS at 11:47

## 2025-07-15 RX ADMIN — TETROFOSMIN 33.6 MILLICURIE: 1.38 INJECTION, POWDER, LYOPHILIZED, FOR SOLUTION INTRAVENOUS at 12:18

## 2025-07-15 RX ADMIN — TETROFOSMIN 12.8 MILLICURIE: 1.38 INJECTION, POWDER, LYOPHILIZED, FOR SOLUTION INTRAVENOUS at 10:41

## 2025-07-17 ENCOUNTER — CARE COORDINATION (OUTPATIENT)
Dept: OTHER | Facility: CLINIC | Age: 60
End: 2025-07-17

## 2025-07-17 NOTE — CARE COORDINATION
7/17/25 Care Coordination  Note    Care Coordination  (CCSS), Tracey Laguerre, received referral from [facility], Yojana Hills RN, requesting Assistance with benefits related needs (network exception process, prior authorization, ect.) yes - OON Exception Waiver.     Summary Note:   Obtained waivers for the following:    [facility]  [address] [place] [address]  NPI [id number]  Tax ID [id number]  Phone: [phone]     Heide Schneider, APRN-CNP - upcoming appt 8/15/25  Certified Nurse Practitioner  NPI: [id number]  [address]  [facility] [address]  Phone: [phone]  Fax: [phone]    DX Z94                       [facility] Claims to be adjusted:         [id number] - DOS 5/14/25 12,366.00                  [id number]  - DOS 5/28/14 1508.00           The pending claim [id number] processed as          Tier 2 for Dr. Brinda Samano           [id number]  - [facility] denied duplicate 1807.00        [id number]  - [facility] denied duplicate 8551.00                     Plan:  Chart routed to [facility] for review.   CCSS Plan for follow-up call in 3-5 days

## 2025-07-18 ENCOUNTER — TELEPHONE (OUTPATIENT)
Dept: CARDIOLOGY CLINIC | Age: 60
End: 2025-07-18

## 2025-07-18 ENCOUNTER — CARE COORDINATION (OUTPATIENT)
Dept: OTHER | Facility: CLINIC | Age: 60
End: 2025-07-18

## 2025-07-18 NOTE — TELEPHONE ENCOUNTER
Has been taking the Brilinta for about month since 6/16   Has not been good.  Worse than it's ever ever been 90-92 pule ox and he is on 2 liters of oxygen   No swelling       Please advise

## 2025-07-18 NOTE — TELEPHONE ENCOUNTER
We would prefer his stay on the Brilinta if he is able to power through the shortness of breath.  If he is not able to tolerate the shortness of breath we can switch him to Plavix 75 mg daily.  He will need loading dose of 300 mg (4 tabs) for the first dose then 75 mg daily there after.  Let me know what he chooses thank you.

## 2025-07-18 NOTE — CARE COORDINATION
25 Care Coordination  Note    Care Coordination  (CCSS), Tracey Laguerre, received referral from [facility], Yojana Hills RN, requesting Assistance with benefits related needs (network exception process, prior authorization, ect.) yes - OON Exception Waiver and claim adjustments.     CCSS was contacted by Rosa with [employer] via secure e-mail for follow up on referral listed above. verified name and  with Rosa as identifiers.    Summary Note:   Rosa stated that the waivers have been approved and are next in line to be loaded.  This CCSS contacted the claims team to that they can proceed with re-processing the claims.  Per Daily [employer]: the waivers are valid 25-26  [facility] - [id number]  Dr. Catalina Schneider - [id number]     Plan:  Chart routed to [facility] for review.   CCSS Plan for follow-up call in 3-5 days

## 2025-07-18 NOTE — TELEPHONE ENCOUNTER
Called patient he will try to stay on Brilinta another week.   He will call back if he wants to change to Plavix.

## 2025-07-18 NOTE — TELEPHONE ENCOUNTER
Medication Question/Concern    What is the name of the medication you need to speak with someone about?  ticagrelor (BRILINTA)     Was this prescribed by your cardiologist?   Yes    Dosage of the medication:  90 MG TABS tablet     How are you taking this medication (QD, BID, TID, QID, PRN):  Take 1 tablet by mouth 2 times daily     What issues/concerns are you having with this medication?  Patient called in. He states since starting this medication he has had trouble breathing. States it is constant, and started right after taking this medication.     He wants to know what he should do.     Please advise.     Callback: [phone]

## 2025-07-30 ENCOUNTER — CARE COORDINATION (OUTPATIENT)
Dept: OTHER | Facility: CLINIC | Age: 60
End: 2025-07-30

## 2025-07-30 NOTE — CARE COORDINATION
25 Care Coordination  Note    Care Coordination  (CCSS), Tracey Laguerre, received referral from [facility], Yojana Hills RN, requesting Assistance with benefits related needs (network exception process, prior authorization, ect.) yes - Claims.     CCSS was contacted by Dyana SHANE with [employer] via secure e-mail for follow up on referral listed above. verified name and  with Dyana SHANE as identifiers.    Summary Note:   Dyana stated that both claims have been sent back for repricing.  [id number] - DOS 25 12,366.00           [id number]  - DOS 25 1508.00     Plan:  Chart routed to [facility] for review.   CCSS Plan for follow-up call in 7-10 days

## 2025-08-01 ENCOUNTER — OFFICE VISIT (OUTPATIENT)
Dept: PRIMARY CARE CLINIC | Age: 60
End: 2025-08-01
Payer: MEDICARE

## 2025-08-01 VITALS
HEIGHT: 70 IN | BODY MASS INDEX: 30.32 KG/M2 | HEART RATE: 75 BPM | SYSTOLIC BLOOD PRESSURE: 118 MMHG | WEIGHT: 211.8 LBS | OXYGEN SATURATION: 93 % | DIASTOLIC BLOOD PRESSURE: 70 MMHG

## 2025-08-01 DIAGNOSIS — F32.5 MAJOR DEPRESSIVE DISORDER WITH SINGLE EPISODE, IN FULL REMISSION: ICD-10-CM

## 2025-08-01 DIAGNOSIS — E11.9 TYPE 2 DIABETES MELLITUS WITHOUT COMPLICATION, WITHOUT LONG-TERM CURRENT USE OF INSULIN (HCC): ICD-10-CM

## 2025-08-01 DIAGNOSIS — Z98.61 CAD S/P PERCUTANEOUS CORONARY ANGIOPLASTY: ICD-10-CM

## 2025-08-01 DIAGNOSIS — J84.9 ILD (INTERSTITIAL LUNG DISEASE) (HCC): Primary | ICD-10-CM

## 2025-08-01 DIAGNOSIS — I25.10 CAD S/P PERCUTANEOUS CORONARY ANGIOPLASTY: ICD-10-CM

## 2025-08-01 DIAGNOSIS — D75.1 POLYCYTHEMIA: ICD-10-CM

## 2025-08-01 DIAGNOSIS — J96.11 CHRONIC RESPIRATORY FAILURE WITH HYPOXIA (HCC): ICD-10-CM

## 2025-08-01 DIAGNOSIS — E78.5 DYSLIPIDEMIA: ICD-10-CM

## 2025-08-01 DIAGNOSIS — J44.89 COPD WITH ASTHMA (HCC): ICD-10-CM

## 2025-08-01 DIAGNOSIS — I10 ESSENTIAL HYPERTENSION: ICD-10-CM

## 2025-08-01 DIAGNOSIS — G47.33 OSA TREATED WITH BIPAP: ICD-10-CM

## 2025-08-01 PROCEDURE — 3017F COLORECTAL CA SCREEN DOC REV: CPT | Performed by: FAMILY MEDICINE

## 2025-08-01 PROCEDURE — G8427 DOCREV CUR MEDS BY ELIG CLIN: HCPCS | Performed by: FAMILY MEDICINE

## 2025-08-01 PROCEDURE — 3074F SYST BP LT 130 MM HG: CPT | Performed by: FAMILY MEDICINE

## 2025-08-01 PROCEDURE — 3023F SPIROM DOC REV: CPT | Performed by: FAMILY MEDICINE

## 2025-08-01 PROCEDURE — 3044F HG A1C LEVEL LT 7.0%: CPT | Performed by: FAMILY MEDICINE

## 2025-08-01 PROCEDURE — 3078F DIAST BP <80 MM HG: CPT | Performed by: FAMILY MEDICINE

## 2025-08-01 PROCEDURE — 1036F TOBACCO NON-USER: CPT | Performed by: FAMILY MEDICINE

## 2025-08-01 PROCEDURE — G8417 CALC BMI ABV UP PARAM F/U: HCPCS | Performed by: FAMILY MEDICINE

## 2025-08-01 PROCEDURE — 2022F DILAT RTA XM EVC RTNOPTHY: CPT | Performed by: FAMILY MEDICINE

## 2025-08-01 PROCEDURE — 99214 OFFICE O/P EST MOD 30 MIN: CPT | Performed by: FAMILY MEDICINE

## 2025-08-01 NOTE — PROGRESS NOTES
PROGRESS NOTE     Margie Maddox MD  [facility]                                  [address], [address], [facility] [address]         Phone: [phone]    Date of Service:  8/1/2025     Patient ID: Mary Kate Davidson is a 59 y.o. 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  Has appt to discuss possible lung transplant at [facility] on 8/15/25.  Feels BLACKBURN is actually worse since cardiac stents in June 2025. He is aware his Brilinta can cause some side effects of SOB.  He states his cardiologist is aware.      Continue triple therapy with Breztri, prednisone 5mg daily and prn albuterol.  Continue 2 L oxygen day and night.        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        Secondary polycythemia   He receives phlebotomy whenever his hemoglobin is greater than 20 at Dr. Pina's office.     Lab Results   Component Value Date    WBC 7.0 06/17/2025    HGB 14.2 06/17/2025    HCT 42.9 06/17/2025    MCV 76.5 (L) 06/17/2025     06/17/2025          Type 2 diabetes mellitus without complication, without long-term current use of insulin ([facility])  Recent A1c= 6.3       Patient encouraged to schedule diabetic eye exam.     The large amount of weight loss after gastric sleeve surgery has kept things diet controlled.         Essential hypertension  At goal.     Patient to continue  carvedilol 6.25 mg b.i.d., lisinopril 10 mg daily     Recent kidney function was WNLs.       Pure hypercholesterolemia  At goal.  Pt to continue Crestor 40mg, and Repatha.          CAD s/p stent x 2 June 2025.  Last saw Dr. Salguero  on 10/22/24.     No CP since  Pt ambulated to the bathroom with a steady gait     Rolan Chi LPN  55/01/24 [id number]

## 2025-08-20 ENCOUNTER — OFFICE VISIT (OUTPATIENT)
Dept: PULMONOLOGY | Age: 60
End: 2025-08-20
Payer: MEDICARE

## 2025-08-20 VITALS
TEMPERATURE: 97.9 F | OXYGEN SATURATION: 90 % | RESPIRATION RATE: 18 BRPM | BODY MASS INDEX: 30.18 KG/M2 | HEIGHT: 70 IN | SYSTOLIC BLOOD PRESSURE: 126 MMHG | WEIGHT: 210.8 LBS | DIASTOLIC BLOOD PRESSURE: 72 MMHG | HEART RATE: 77 BPM

## 2025-08-20 DIAGNOSIS — J96.11 CHRONIC RESPIRATORY FAILURE WITH HYPOXIA (HCC): ICD-10-CM

## 2025-08-20 DIAGNOSIS — J44.9 COPD, VERY SEVERE (HCC): ICD-10-CM

## 2025-08-20 DIAGNOSIS — G47.33 OSA TREATED WITH BIPAP: ICD-10-CM

## 2025-08-20 DIAGNOSIS — J84.9 ILD (INTERSTITIAL LUNG DISEASE) (HCC): Primary | ICD-10-CM

## 2025-08-20 PROCEDURE — 3078F DIAST BP <80 MM HG: CPT | Performed by: INTERNAL MEDICINE

## 2025-08-20 PROCEDURE — 99214 OFFICE O/P EST MOD 30 MIN: CPT | Performed by: INTERNAL MEDICINE

## 2025-08-20 PROCEDURE — 3017F COLORECTAL CA SCREEN DOC REV: CPT | Performed by: INTERNAL MEDICINE

## 2025-08-20 PROCEDURE — G8417 CALC BMI ABV UP PARAM F/U: HCPCS | Performed by: INTERNAL MEDICINE

## 2025-08-20 PROCEDURE — G2211 COMPLEX E/M VISIT ADD ON: HCPCS | Performed by: INTERNAL MEDICINE

## 2025-08-20 PROCEDURE — G8428 CUR MEDS NOT DOCUMENT: HCPCS | Performed by: INTERNAL MEDICINE

## 2025-08-20 PROCEDURE — 3023F SPIROM DOC REV: CPT | Performed by: INTERNAL MEDICINE

## 2025-08-20 PROCEDURE — 3074F SYST BP LT 130 MM HG: CPT | Performed by: INTERNAL MEDICINE

## 2025-08-20 PROCEDURE — 1036F TOBACCO NON-USER: CPT | Performed by: INTERNAL MEDICINE

## 2025-08-20 RX ORDER — PREDNISONE 10 MG/1
10 TABLET ORAL DAILY
Qty: 90 TABLET | Refills: 3 | Status: SHIPPED | OUTPATIENT
Start: 2025-08-20

## 2025-08-26 ENCOUNTER — CARE COORDINATION (OUTPATIENT)
Dept: OTHER | Facility: CLINIC | Age: 60
End: 2025-08-26

## (undated) DEVICE — RELOAD STPL H4.1X2MM DIA60MM THCK TISS GRN 6 ROW PWR GST B

## (undated) DEVICE — GLOVE ORANGE PI 7   MSG9070

## (undated) DEVICE — CATHETER DIAG AD 5FR L110CM 145DEG COR GRY HYDRPHLC NYL

## (undated) DEVICE — TROCAR: Brand: KII OPTICAL ACCESS SYSTEM

## (undated) DEVICE — LARGE NEEDLE DRIVER: Brand: ENDOWRIST

## (undated) DEVICE — RELOAD STPL H1.8-3.8MM REG THCK TISS G 6 ROW GRIPPING SURF

## (undated) DEVICE — CORONARY IMAGING CATHETERS: Brand: OPTICROSS™ 6 HD

## (undated) DEVICE — SOLUTION IV 1000ML 0.9% SOD CHL

## (undated) DEVICE — GLIDESHEATH SLENDER ACCESS KIT: Brand: GLIDESHEATH SLENDER

## (undated) DEVICE — SURGICAL SET UP - SURE SET: Brand: MEDLINE INDUSTRIES, INC.

## (undated) DEVICE — KIT AT-X65 ANGIOTOUCH HAND CONTROLLER

## (undated) DEVICE — CATH BLLN ANGIO 4X15MM NC EUPHORIA RX

## (undated) DEVICE — CATH BLLN ANGIO 3.75X15MM NC EUPHORIA RX

## (undated) DEVICE — SOLUTION INJ LR VISIV 1000ML BG

## (undated) DEVICE — [HIGH FLOW INSUFFLATOR,  DO NOT USE IF PACKAGE IS DAMAGED,  KEEP DRY,  KEEP AWAY FROM SUNLIGHT,  PROTECT FROM HEAT AND RADIOACTIVE SOURCES.]: Brand: PNEUMOSURE

## (undated) DEVICE — GOWN,SIRUS,POLYRNF,BRTHSLV,XL,30/CS: Brand: MEDLINE

## (undated) DEVICE — VISIGI 3D®  CALIBRATION SYSTEM  SIZE 36FR STD W/ BULB: Brand: BOEHRINGER® VISIGI 3D™ SLEEVE GASTRECTOMY CALIBRATION SYSTEM, SIZE 36FR W/BULB

## (undated) DEVICE — TR BAND RADIAL ARTERY COMPRESSION DEVICE: Brand: TR BAND

## (undated) DEVICE — TRAY CATHETER 16FR F INCLUDE BARDX IC COMPLT CARE DRNGE BG

## (undated) DEVICE — BOWL MED L 32OZ PLAS W/ MOLD GRAD EZ OPN PEEL PCH

## (undated) DEVICE — NEEDLE INSUF L150MM DIA2MM DISP FOR PNEUMOPERI ENDOPATH

## (undated) DEVICE — ARM DRAPE

## (undated) DEVICE — GOWN,SIRUS,POLYRNF,BRTHSLV,LG,30/CS: Brand: MEDLINE

## (undated) DEVICE — VESSEL SEALER EXTEND: Brand: ENDOWRIST

## (undated) DEVICE — GUIDEWIRE VASC L260CM DIA0.035IN RAD 3MM J TIP L7CM PTFE

## (undated) DEVICE — CORONARY IMAGING CATHETER: Brand: OPTICROSS™ 6 HD

## (undated) DEVICE — CADIERE FORCEPS: Brand: ENDOWRIST

## (undated) DEVICE — SOLUTION ANTIFOG VIS SYS CLEARIFY LAPSCP

## (undated) DEVICE — CATH LAB PACK: Brand: MEDLINE INDUSTRIES, INC.

## (undated) DEVICE — DRAPE,ANGIO,BRACH,STERILE,38X44: Brand: MEDLINE

## (undated) DEVICE — SUTURE VCRL SZ 4-0 L18IN ABSRB UD L19MM PS-2 3/8 CIR PRIM J496H

## (undated) DEVICE — CHLORAPREP 26ML ORANGE

## (undated) DEVICE — RELOAD STPL L60MM H1.5-3.6MM REG TISS BLU GRIPPING SURF B

## (undated) DEVICE — LAPAROSCOPIC SCISSORS: Brand: EPIX LAPAROSCOPIC SCISSORS

## (undated) DEVICE — ADHESIVE SKIN CLSR 0.7ML TOP DERMBND ADV

## (undated) DEVICE — CATHETER ANGIO 5FR L100CM GRY S STL NYL JL3.5 3 SEG BRAID L

## (undated) DEVICE — SUTURE VCRL SZ 0 L54IN ABSRB VLT W/O NDL POLYGLACTIN 910 J616H

## (undated) DEVICE — SYSTEM SMK EVAC LAP TBNG FILTER HSNG BENT STYL PNK SEE CLR

## (undated) DEVICE — SURE SET-DOUBLE BASIN-LF: Brand: MEDLINE INDUSTRIES, INC.

## (undated) DEVICE — BLADELESS OBTURATOR, LONG: Brand: WECK VISTA

## (undated) DEVICE — SUTURE VCRL SZ 2-0 L27IN ABSRB UD L26MM SH 1/2 CIR J417H

## (undated) DEVICE — CATHETER ANGIO 5FR L100CM GRY S STL NYL JR4 3 SEG BRAID L

## (undated) DEVICE — TROCAR: Brand: KII FIOS FIRST ENTRY

## (undated) DEVICE — STAPLER SKIN L440MM 32MM LNG 12 FIRING B FRM PWR + GRIPPING

## (undated) DEVICE — STANDARD HYPODERMIC NEEDLE,POLYPROPYLENE HUB: Brand: MONOJECT

## (undated) DEVICE — ELECTROSURGICAL PENCIL ROCKER SWITCH NON COATED BLADE ELECTRODE 10 FT (3 M) CORD HOLSTER: Brand: MEGADYNE

## (undated) DEVICE — BLANKET WRM W29.9XL79.1IN UP BODY FORC AIR MISTRAL-AIR

## (undated) DEVICE — SYRINGE MED 10ML POLYPR LUERSLIP TIP FLAT TOP W/O SFTY DISP

## (undated) DEVICE — PAD, DEFIB, ADULT, RADIOTRANS, PHYSIO: Brand: MEDLINE

## (undated) DEVICE — CATHETER ETER GUID 6FR 100CM 070IN JUDKINS R 4 VASC COR W O

## (undated) DEVICE — DRAPE,LAP,CHOLE,W/TROUGHS,STERILE: Brand: MEDLINE

## (undated) DEVICE — CATH BLLN ANGIO 2.50X15MM SC EUPHORA RX

## (undated) DEVICE — GLIDESHEATH SLENDER STAINLESS STEEL KIT: Brand: GLIDESHEATH SLENDER

## (undated) DEVICE — CATHETER GUID XB 3.5 6 FRX100 CM VISTA BRT TIP

## (undated) DEVICE — RUNTHROUGH NS EXTRA FLOPPY PTCA GUIDEWIRE: Brand: RUNTHROUGH

## (undated) DEVICE — GLOVE SURG SZ 75 L12IN FNGR THK79MIL GRN LTX FREE

## (undated) DEVICE — SINGLE-USE POLYPECTOMY SNARE: Brand: CAPTIVATOR

## (undated) DEVICE — CANNULA SEAL

## (undated) DEVICE — PUMP SUC IRR TBNG L10FT W/ HNDPC ASSEMB STRYKEFLOW 2

## (undated) DEVICE — SUTURE ETHBND EXCEL SZ 0 L30IN NONABSORBABLE GRN L26MM SH X834H

## (undated) DEVICE — CATH BLLN ANGIO 3.50X20MM NC EUPHORIA RX

## (undated) DEVICE — GARMENT,MEDLINE,DVT,INT,CALF,LG, GEN2: Brand: MEDLINE

## (undated) DEVICE — DEVICE CLSR 10/12MM XL PRT SYS SUT PASS ST DISP CARTER

## (undated) DEVICE — PLATE ES AD W 9FT CRD 2

## (undated) DEVICE — BINDER ABD H12IN FOR 62-74IN WAIST UNIV 4 PNL PREM DSGN E

## (undated) DEVICE — 40583 XL ADVANCED TRENDELENBURG POSITIONING KIT: Brand: 40583 XL ADVANCED TRENDELENBURG POSITIONING KIT

## (undated) DEVICE — Device: Brand: NOMOLINE™ LH ADULT NASAL CO2 CANNULA WITH O2 4M

## (undated) DEVICE — JEWISH HOSPITAL TURNOVER KIT: Brand: MEDLINE INDUSTRIES, INC.

## (undated) DEVICE — STERILE BAG FOR MDU5 PLUS MOTORDRIVE: Brand: PERMANENT SLED BAG

## (undated) DEVICE — SPONGE,LAP,4"X18",XR,ST,5/PK,40PK/CS: Brand: MEDLINE INDUSTRIES, INC.

## (undated) DEVICE — Device

## (undated) DEVICE — FORCEPS BX L240CM DIA2.4MM L NDL RAD JAW 4 133340